# Patient Record
Sex: FEMALE | Race: OTHER | HISPANIC OR LATINO | Employment: OTHER | URBAN - METROPOLITAN AREA
[De-identification: names, ages, dates, MRNs, and addresses within clinical notes are randomized per-mention and may not be internally consistent; named-entity substitution may affect disease eponyms.]

---

## 2017-12-14 ENCOUNTER — HOSPITAL ENCOUNTER (EMERGENCY)
Facility: HOSPITAL | Age: 27
Discharge: HOME | End: 2017-12-14
Attending: EMERGENCY MEDICINE | Admitting: EMERGENCY MEDICINE
Payer: COMMERCIAL

## 2017-12-14 ENCOUNTER — APPOINTMENT (EMERGENCY)
Dept: RADIOLOGY | Facility: HOSPITAL | Age: 27
End: 2017-12-14
Payer: COMMERCIAL

## 2017-12-14 VITALS
DIASTOLIC BLOOD PRESSURE: 77 MMHG | BODY MASS INDEX: 31.71 KG/M2 | RESPIRATION RATE: 17 BRPM | HEART RATE: 108 BPM | HEIGHT: 68 IN | TEMPERATURE: 99.1 F | OXYGEN SATURATION: 99 % | WEIGHT: 209.22 LBS | SYSTOLIC BLOOD PRESSURE: 138 MMHG

## 2017-12-14 DIAGNOSIS — M79.671 RIGHT FOOT PAIN: Primary | ICD-10-CM

## 2017-12-14 DIAGNOSIS — R10.2 SUPRAPUBIC PAIN: ICD-10-CM

## 2017-12-14 DIAGNOSIS — Z20.2 EXPOSURE TO STD: ICD-10-CM

## 2017-12-14 LAB
AMORPH URATE CRY URNS QL MICRO: ABNORMAL /HPF
BACTERIA UR QL AUTO: ABNORMAL /HPF
BILIRUB UR QL STRIP: NEGATIVE
CHLAMYDIA DNA CVX QL NAA+PROBE: NORMAL
CLARITY UR: ABNORMAL
COLOR UR: YELLOW
EXT PREG TEST URINE: NEGATIVE
GLUCOSE UR STRIP-MCNC: NEGATIVE MG/DL
HGB UR QL STRIP.AUTO: NEGATIVE
KETONES UR STRIP-MCNC: ABNORMAL MG/DL
LEUKOCYTE ESTERASE UR QL STRIP: ABNORMAL
N GONORRHOEA DNA GENITAL QL NAA+PROBE: NORMAL
NITRITE UR QL STRIP: NEGATIVE
NON-SQ EPI CELLS URNS QL MICRO: ABNORMAL /HPF
PH UR STRIP.AUTO: 5.5 [PH] (ref 4.5–8)
PROT UR STRIP-MCNC: NEGATIVE MG/DL
RBC #/AREA URNS AUTO: ABNORMAL /HPF
SP GR UR STRIP.AUTO: >=1.03 (ref 1–1.03)
UROBILINOGEN UR QL STRIP.AUTO: 0.2 E.U./DL
WBC #/AREA URNS AUTO: ABNORMAL /HPF

## 2017-12-14 PROCEDURE — 81001 URINALYSIS AUTO W/SCOPE: CPT | Performed by: EMERGENCY MEDICINE

## 2017-12-14 PROCEDURE — 73630 X-RAY EXAM OF FOOT: CPT

## 2017-12-14 PROCEDURE — 81025 URINE PREGNANCY TEST: CPT | Performed by: EMERGENCY MEDICINE

## 2017-12-14 PROCEDURE — 99283 EMERGENCY DEPT VISIT LOW MDM: CPT

## 2017-12-14 PROCEDURE — 87591 N.GONORRHOEAE DNA AMP PROB: CPT | Performed by: EMERGENCY MEDICINE

## 2017-12-14 PROCEDURE — 90471 IMMUNIZATION ADMIN: CPT

## 2017-12-14 PROCEDURE — 87491 CHLMYD TRACH DNA AMP PROBE: CPT | Performed by: EMERGENCY MEDICINE

## 2017-12-14 PROCEDURE — 87086 URINE CULTURE/COLONY COUNT: CPT | Performed by: EMERGENCY MEDICINE

## 2017-12-14 PROCEDURE — 90715 TDAP VACCINE 7 YRS/> IM: CPT | Performed by: EMERGENCY MEDICINE

## 2017-12-14 RX ORDER — CEPHALEXIN 500 MG/1
500 CAPSULE ORAL EVERY 6 HOURS SCHEDULED
Qty: 20 CAPSULE | Refills: 0 | Status: SHIPPED | OUTPATIENT
Start: 2017-12-14 | End: 2017-12-19

## 2017-12-14 RX ORDER — AZITHROMYCIN 250 MG/1
1000 TABLET, FILM COATED ORAL ONCE
Status: COMPLETED | OUTPATIENT
Start: 2017-12-14 | End: 2017-12-14

## 2017-12-14 RX ADMIN — TETANUS TOXOID, REDUCED DIPHTHERIA TOXOID AND ACELLULAR PERTUSSIS VACCINE, ADSORBED 0.5 ML: 5; 2.5; 8; 8; 2.5 SUSPENSION INTRAMUSCULAR at 06:12

## 2017-12-14 RX ADMIN — LIDOCAINE HYDROCHLORIDE 250 MG: 10 INJECTION, SOLUTION EPIDURAL; INFILTRATION; INTRACAUDAL; PERINEURAL at 06:12

## 2017-12-14 RX ADMIN — AZITHROMYCIN 1000 MG: 250 TABLET, FILM COATED ORAL at 06:12

## 2017-12-14 NOTE — ED NOTES
Xray at bedside     Kunal Collins, [facility]  12/14/17 4915

## 2017-12-14 NOTE — ED PROVIDER NOTES
History  Chief Complaint   Patient presents with    Foot Injury     Pt c/o slipping and injuring R foot  States there was previous injury     Exposure to STD     Pt also requesting testing for uti and chlamydia because she doesn't believe the treatment she received worked      26-year-old female presents with multiple complaints  Patient notes persistent right foot pain after a fall 2 weeks ago  Patient has pain over the dorsum of her right foot that she describes a constant dull and occasionally shooting pain without radiation  Patient denies any pain over the ankle  She states that she subsequently fell due to the pain earlier today, worsening the injury  She states she is now unable to bear weight on the foot  She denies striking her head or any loss of consciousness  Patient states that when she fell 2 weeks ago, she did strike her head  She is concerned because she did not receive tetanus prophylaxis at that point  Additionally, patient notes that she was diagnosed with Chlamydia 2 months ago  She states that she has subsequently having persistent symptoms which she describes as dyspareunia and suprapubic cramping  She denies any dysuria, vaginal discharge, or bleeding  She states that she was treated with doxycycline at the time of the incident and her boyfriend was treated with ceftriaxone and azithromycin  She denies any new sexual partners  Impression and plan: 1 )  Right foot pain  Will obtain plain film imaging of patient's right foot to evaluate for potential fracture  Will treat symptomatically with anti-inflammatory medications, crutches as she is unable to bear weight, and splinting or cast shoe depending upon the outcome of the patient's x-ray  Will refer to podiatry for further evaluation and monitoring   2 ) Fall without tetanus prophylaxis  Will update patient's tetanus vaccination  3 )  Potential recurrent exposure to sexually transmitted disease    Will check urinalysis to evaluate for pregnancy and urinary tract infection  Will send gonorrhea and chlamydia and if urinalysis is negative, will treat empirically  Patient is not having vaginal discharge or signs of PID  Will refer to gynecology for further evaluation and monitoring  None       Past Medical History:   Diagnosis Date    Cancer ([facility] [address] )     ovarian       Past Surgical History:   Procedure Laterality Date    OVARIAN CYST REMOVAL         History reviewed  No pertinent family history  I have reviewed and agree with the history as documented  Social History   Substance Use Topics    Smoking status: Current Every Day Smoker     Packs/day: 2 00    Smokeless tobacco: Never Used    Alcohol use Yes      Comment: socially        Review of Systems   Constitutional: Negative for chills, fatigue and fever  Respiratory: Negative for cough and shortness of breath  Cardiovascular: Negative for chest pain  Gastrointestinal: Negative for abdominal pain, diarrhea, nausea and vomiting  Genitourinary: Positive for pelvic pain (suprapubic)  Negative for dysuria, flank pain, genital sores, hematuria, urgency, vaginal bleeding, vaginal discharge and vaginal pain  Musculoskeletal: Negative for back pain  Neurological: Negative for dizziness, facial asymmetry and headaches  Psychiatric/Behavioral: Negative for confusion  Physical Exam  ED Triage Vitals [12/14/17 0520]   Temperature Pulse Respirations Blood Pressure SpO2   99 1 °F (37 3 °C) (!) 108 17 138/77 99 %      Temp Source Heart Rate Source Patient Position - Orthostatic VS BP Location FiO2 (%)   Oral Monitor Lying Right arm --      Pain Score       6           Orthostatic Vital Signs  Vitals:    12/14/17 0520   BP: 138/77   Pulse: (!) 108   Patient Position - Orthostatic VS: Lying       Physical Exam   HENT:   Head: Atraumatic  Eyes: Pupils are equal, round, and reactive to light  Neck: Neck supple  Cardiovascular: Normal rate  Pulmonary/Chest: Effort normal    Abdominal: Soft  She exhibits no distension  There is no tenderness  There is no rebound and no guarding  No CVAT  Musculoskeletal: She exhibits tenderness  She exhibits no deformity  Right foot: no deformity or defect noted  Absence of effusion, swelling, or ecchymosis  Normal range of motion and resisted strength of the ankle with dorsiflexion, plantar flexion, inversion, eversion, flexion and extension of the toes  Tenderness over the base of the 1-2nd metatarsals and the navicular  No tenderness to palpitation at the medial or lateral malleoli, base of the 5th metatarsal, lateral ligaments, deltoid ligament, proximal or mid fibula and tibia, the Achilles tendon, talocrural joint line  No tenderness to passive range of motion of the ankle, including inversion and eversion  Normal squeeze test      Neurological: She is alert  Normal sensory, motor of right foot in all dermatomes  Skin: Skin is warm and dry  Psychiatric: She has a normal mood and affect  Vitals reviewed  ED Medications  Medications   tetanus-diphtheria-acellular pertussis (BOOSTRIX) IM injection 0 5 mL (0 5 mL Intramuscular Given 12/14/17 0612)   cefTRIAXone (ROCEPHIN) 250 mg in lidocaine (PF) (XYLOCAINE-MPF) 1 % IM only syringe (250 mg Intramuscular Given 12/14/17 0612)   azithromycin (ZITHROMAX) tablet 1,000 mg (1,000 mg Oral Given 12/14/17 0612)       Diagnostic Studies  Results Reviewed     Procedure Component Value Units Date/Time    Urine Microscopic [[id number]]  (Abnormal) Collected:  12/14/17 0538    Lab Status:  Final result Specimen:  Urine from Urine, Clean Catch Updated:  12/14/17 0556     RBC, UA None Seen /hpf      WBC, UA 10-20 (A) /hpf      Epithelial Cells Innumerable (A) /hpf      Bacteria, UA Moderate (A) /hpf      AMORPH URATES Occasional /hpf     Urine culture [[id number]] Collected:  12/14/17 0538    Lab Status:   In process Specimen:  Urine from Urine, Clean Catch Updated: 12/14/17 0556    UA w Reflex to Microscopic w Reflex to Culture [[id number]]  (Abnormal) Collected:  12/14/17 0538    Lab Status:  Final result Specimen:  Urine from Urine, Clean Catch Updated:  12/14/17 0548     Color, UA Yellow     Clarity, UA Cloudy     Specific Gravity, UA >=1 030     pH, UA 5 5     Leukocytes, UA Small (A)     Nitrite, UA Negative     Protein, UA Negative mg/dl      Glucose, UA Negative mg/dl      Ketones, UA Trace (A) mg/dl      Urobilinogen, UA 0 2 E U /dl      Bilirubin, UA Negative     Blood, UA Negative    Chlamydia/GC amplified DNA by PCR [[id number]] Collected:  12/14/17 0539    Lab Status: In process Specimen:  Urine, Other Updated:  12/14/17 0545    POCT pregnancy, urine [[id number]]  (Normal) Resulted:  12/14/17 0544    Lab Status:  Final result Updated:  12/14/17 0544     EXT PREG TEST UR (Ref: Negative) Negative                 XR foot 3+ views RIGHT   ED Interpretation by Teri Dove MD (12/14 0552)   No acute findings  Procedures  Procedures       Phone Contacts  ED Phone Contact    ED Course  ED Course as of Dec 14 0616   Thu Dec 14, 2017   0609 Epithelial Cells: Carnia Oneal   9008 Extensive discussion with the patient  Considering she is having ongoing suprapubic pain with dyspareunia, after discussion of the risks and benefits will treat patient presumptively for gonorrhea and/or chlamydia with ceftriaxone and azithromycin  Has patient's urinalysis was contaminated with epithelial cells, will start patient on a course of cephalexin for potential urinary tract infection  I have discussed the risks of these medications with the patient including potential for diarrhea  I have discussed follow-up with OBGYN if her symptoms continue  Patient has established care regarding this  Discussed follow-up with Podiatry regarding her foot  Patient's foot placed in his short posterior splint by technician    Post check neurovascular exam is intact as completed by myself  [facility]  CritCare Time    Disposition  Final diagnoses:   Right foot pain   Exposure to STD   Suprapubic pain     Time reflects when diagnosis was documented in both MDM as applicable and the Disposition within this note     Time User Action Codes Description Comment    12/14/2017  5:39 AM Ethelda Hasten Add [G03 613] Right foot pain     12/14/2017  5:39 AM Ethelda Hasten Add [Z20 2] Exposure to STD     12/14/2017  6:11 AM Ethelda Hasten Add [R10 2] Suprapubic pain       ED Disposition     ED Disposition Condition Comment    Discharge  Jakcie Records discharge to home/self care  Condition at discharge: Stable        Follow-up Information     Follow up With Specialties Details Why Contact Info    [facility]  Schedule an appointment as soon as possible for a visit Follow up  [address] [facility]  [address] [facility] Obstetrics and Gynecology Schedule an appointment as soon as possible for a visit Follow up  [address]  [phone]        Patient's Medications   Discharge Prescriptions    CEPHALEXIN (KEFLEX) 500 MG CAPSULE    Take 1 capsule by mouth every 6 (six) hours for 5 days       Start Date: 12/14/2017End Date: 12/19/2017       Order Dose: 500 mg       Quantity: 20 capsule    Refills: 0     No discharge procedures on file      ED Provider  Electronically Signed by           Thang Mcfarlane MD  12/14/17 [address] [place] [address] Loyal Heimlich, MD  12/14/17 7707

## 2017-12-14 NOTE — DISCHARGE INSTRUCTIONS
Sexually Transmitted Diseases   WHAT YOU NEED TO KNOW:   A sexually transmitted disease (STD), is also called a sexually transmitted infection (STI)  An STD is an infection caused by bacteria or a virus  STDs are spread by oral, genital, or anal sex  Some examples of STDs are HIV, chlamydia, syphilis, and gonorrhea  DISCHARGE INSTRUCTIONS:   Return to the emergency department if:   · You have genital swelling or pain, or unusual bleeding  · You have joint pain, rash, swollen lymph nodes, or night sweats  · You have severe abdominal pain  Contact your healthcare provider if:   · You have a fever  · Your symptoms do not go away or they get worse, even after treatment  · You have bleeding or pain during sex  · You have questions or concerns about your condition or care  Medicines:   · Medicines  help treat the infection  · Take your medicine as directed  Contact your healthcare provider if you think your medicine is not helping or if you have side effects  Tell him of her if you are allergic to any medicine  Keep a list of the medicines, vitamins, and herbs you take  Include the amounts, and when and why you take them  Bring the list or the pill bottles to follow-up visits  Carry your medicine list with you in case of an emergency  Prevent the spread of an STD:  Ask your healthcare provider for more information about the following safe sex practices:  · Use condoms  Use a latex condom if you have oral, genital, or anal sex  Use a new condom each time  Use a polyurethane condom if you are allergic to latex  · Do not douche  Douching upsets the normal balance of bacteria are found in your vagina  It does not prevent or clear up vaginal infections  · Do not have sex with someone who has an STD  This includes oral and anal sex  · Limit sexual partners  Have sex with one person who is not having sex with anyone else  · Do not have sex during treatment    Do not have sex while you or your partners are being treated for an STD  · Get screening tests regularly  if you are sexually active  · Get vaccinated  Vaccines may help to prevent your risk of some STDs  Ask your healthcare provider for more information about vaccines for STDs  Follow up with your healthcare provider as directed:  Write down your questions so you remember to ask them during your visits  © 2017 [address] [employer] [employer] Information is for End User's use only and may not be sold, redistributed or otherwise used for commercial purposes  All illustrations and images included in CareNotes® are the copyrighted property of [employer]  or [employer]ika [employer]ell  The above information is an  only  It is not intended as medical advice for individual conditions or treatments  Talk to your doctor, nurse or pharmacist before following any medical regimen to see if it is safe and effective for you  Foot Sprain   WHAT YOU NEED TO KNOW:   A foot sprain is caused by a stretched or torn ligament in the foot or toe  Ligaments are tough tissues that connect bones  DISCHARGE INSTRUCTIONS:   Seek care immediately if:   · You have numbness or tingling below the injury, such as in your toes  · The skin on your injured foot is blue or pale  · You have increased pain, even after you take pain medicine  Contact your healthcare provider if:   · You have new weakness in your foot  · You have new or increased swelling in your foot  · You have new or increased stiffness when you move your injured foot  · You have questions or concerns about your condition or care  Medicines:   · NSAIDs , such as ibuprofen, help decrease swelling, pain, and fever  This medicine is available with or without a doctor's order  NSAIDs can cause stomach bleeding or kidney problems in certain people  If you take blood thinner medicine, always ask if NSAIDs are safe for you   Always read the medicine label and follow directions  Do not give these medicines to children under 10months of age without direction from your child's healthcare provider  · Take your medicine as directed  Contact your healthcare provider if you think your medicine is not helping or if you have side effects  Tell him of her if you are allergic to any medicine  Keep a list of the medicines, vitamins, and herbs you take  Include the amounts, and when and why you take them  Bring the list or the pill bottles to follow-up visits  Carry your medicine list with you in case of an emergency  Self-care:   · Rest your foot  Limit movement in your sprained foot for the first 2 to 3 days  You might need crutches to take weight off your injured foot as it heals  Use crutches as directed  · Apply ice  on your foot for 15 to 20 minutes every hour or as directed  Use an ice pack, or put crushed ice in a plastic bag  Cover it with a towel  Ice helps prevent tissue damage and decreases swelling and pain  · Compress your foot  You may need to use tape or an elastic bandage to support your foot if you have a mild sprain  You may need a splint on your foot for support if your sprain is severe  Wear your splint for as many days as directed  · Elevate your foot  above the level of your heart as often as you can  This will help decrease swelling and pain  Prop your foot on pillows or blankets to keep it elevated comfortably  Exercise your foot:  You may be given exercises to improve your strength and to help decrease stiffness  The exercises and physical therapy can help restore strength and increase the range of motion in your foot  Ask your healthcare provider when you can return to your normal activities or play sports  Prevent another foot sprain:   · Warm up and stretch before you exercise  · Do not exercise when you feel pain or are tired  · Wear equipment to protect yourself when you play sports    Follow up with your healthcare provider as directed:  Write down your questions so you remember to ask them during your visits  © 2017 [employer] [employer] Information is for End User's use only and may not be sold, redistributed or otherwise used for commercial purposes  All illustrations and images included in CareNotes® are the copyrighted property of [employer]  or [employer] Jewell  The above information is an  only  It is not intended as medical advice for individual conditions or treatments  Talk to your doctor, nurse or pharmacist before following any medical regimen to see if it is safe and effective for you

## 2017-12-15 LAB — BACTERIA UR CULT: NORMAL

## 2018-02-12 ENCOUNTER — HOSPITAL ENCOUNTER (EMERGENCY)
Facility: HOSPITAL | Age: 28
Discharge: HOME | End: 2018-02-12
Attending: EMERGENCY MEDICINE | Admitting: EMERGENCY MEDICINE
Payer: COMMERCIAL

## 2018-02-12 VITALS
DIASTOLIC BLOOD PRESSURE: 80 MMHG | TEMPERATURE: 98.1 F | HEART RATE: 112 BPM | SYSTOLIC BLOOD PRESSURE: 141 MMHG | OXYGEN SATURATION: 95 % | WEIGHT: 235.89 LBS | HEIGHT: 68 IN | RESPIRATION RATE: 18 BRPM | BODY MASS INDEX: 35.75 KG/M2

## 2018-02-12 DIAGNOSIS — Z00.8 MEDICAL CLEARANCE FOR PSYCHIATRIC ADMISSION: ICD-10-CM

## 2018-02-12 DIAGNOSIS — N73.0 PID (ACUTE PELVIC INFLAMMATORY DISEASE): Primary | ICD-10-CM

## 2018-02-12 DIAGNOSIS — F32.A DEPRESSION: ICD-10-CM

## 2018-02-12 LAB
AMPHETAMINES SERPL QL SCN: NEGATIVE
BARBITURATES UR QL: NEGATIVE
BENZODIAZ UR QL: NEGATIVE
CLARITY, POC: CLEAR
COCAINE UR QL: NEGATIVE
COLOR, POC: YELLOW
EXT BILIRUBIN, UA: NEGATIVE
EXT BLOOD URINE: NEGATIVE
EXT GLUCOSE, UA: NEGATIVE
EXT KETONES: NEGATIVE
EXT NITRITE, UA: NEGATIVE
EXT PH, UA: 6
EXT PREG TEST URINE: NEGATIVE
EXT PROTEIN, UA: NEGATIVE
EXT SPECIFIC GRAVITY, UA: 1.01
EXT UROBILINOGEN: NEGATIVE
METHADONE UR QL: NEGATIVE
OPIATES UR QL SCN: NEGATIVE
PCP UR QL: NEGATIVE
THC UR QL: NEGATIVE
WBC # BLD EST: NEGATIVE 10*3/UL

## 2018-02-12 PROCEDURE — 81025 URINE PREGNANCY TEST: CPT | Performed by: EMERGENCY MEDICINE

## 2018-02-12 PROCEDURE — 87591 N.GONORRHOEAE DNA AMP PROB: CPT | Performed by: EMERGENCY MEDICINE

## 2018-02-12 PROCEDURE — 81002 URINALYSIS NONAUTO W/O SCOPE: CPT | Performed by: EMERGENCY MEDICINE

## 2018-02-12 PROCEDURE — 99283 EMERGENCY DEPT VISIT LOW MDM: CPT

## 2018-02-12 PROCEDURE — 80307 DRUG TEST PRSMV CHEM ANLYZR: CPT | Performed by: EMERGENCY MEDICINE

## 2018-02-12 PROCEDURE — 87491 CHLMYD TRACH DNA AMP PROBE: CPT | Performed by: EMERGENCY MEDICINE

## 2018-02-12 PROCEDURE — 96372 THER/PROPH/DIAG INJ SC/IM: CPT

## 2018-02-12 RX ORDER — METRONIDAZOLE 500 MG/1
500 TABLET ORAL EVERY 12 HOURS SCHEDULED
Qty: 28 TABLET | Refills: 0 | Status: SHIPPED | OUTPATIENT
Start: 2018-02-12 | End: 2018-02-26

## 2018-02-12 RX ORDER — CLONAZEPAM 2 MG/1
1 TABLET ORAL 2 TIMES DAILY
Status: ON HOLD | COMMUNITY
End: 2022-05-17 | Stop reason: SDUPTHER

## 2018-02-12 RX ORDER — AZITHROMYCIN 250 MG/1
1000 TABLET, FILM COATED ORAL ONCE
Status: COMPLETED | OUTPATIENT
Start: 2018-02-12 | End: 2018-02-12

## 2018-02-12 RX ORDER — QUETIAPINE FUMARATE 50 MG/1
200 TABLET, FILM COATED ORAL
Status: ON HOLD | COMMUNITY

## 2018-02-12 RX ORDER — METRONIDAZOLE 500 MG/1
500 TABLET ORAL ONCE
Status: COMPLETED | OUTPATIENT
Start: 2018-02-12 | End: 2018-02-12

## 2018-02-12 RX ORDER — FLUCONAZOLE 200 MG/1
200 TABLET ORAL DAILY
Qty: 1 TABLET | Refills: 0 | Status: SHIPPED | OUTPATIENT
Start: 2018-02-12 | End: 2018-02-13

## 2018-02-12 RX ADMIN — METRONIDAZOLE 500 MG: 500 TABLET ORAL at 04:24

## 2018-02-12 RX ADMIN — AZITHROMYCIN 1000 MG: 250 TABLET, FILM COATED ORAL at 04:24

## 2018-02-12 RX ADMIN — LIDOCAINE HYDROCHLORIDE 250 MG: 10 INJECTION, SOLUTION EPIDURAL; INFILTRATION; INTRACAUDAL; PERINEURAL at 04:31

## 2018-02-12 NOTE — DISCHARGE INSTRUCTIONS
Tox screen here is negative  Patient is cleared for psychiatric admission to [facility]  Should proceed there tomorrow upon wakening and packing to be admitted for psychiatric care  Depression   WHAT YOU NEED TO KNOW:   What is depression? Depression is a medical condition that causes feelings of sadness or hopelessness that do not go away  Depression may cause you to lose interest in things you used to enjoy  These feelings may interfere with your daily life  What causes or increases my risk for depression? Depression may be caused by changes in brain chemicals that affect your mood  Your risk for depression may be higher if you have any of the following:  · Stressful events such as the death of a loved one, unemployment, childhood trauma, divorce, or domestic abuse    · A chronic medical condition such as diabetes, heart disease, or cancer    · Parents, siblings, or other family members with a history of depression    · Drug or alcohol abuse  What are the signs and symptoms of depression? · Appetite changes, or weight gain or loss    · Trouble going to sleep or staying asleep, or sleeping too much    · Fatigue or lack of energy    · Feeling restless, irritable, or withdrawn    · Feeling worthless, hopeless, discouraged, or guilty    · Trouble concentrating, remembering things, doing daily tasks, or making decisions    · Thoughts about hurting or killing yourself  How is depression diagnosed? Your healthcare provider will ask about your symptoms and how long you have had them  He or she will ask if you have any family members with depression  Tell your healthcare provider about any stressful events in your life  He or she may ask about any other health conditions or medicines you take  How is depression treated? · Therapy  may be used to treat your depression  A therapist will help you learn to cope with your thoughts and feelings  This can be done alone or in a group   It may also be done with family members or a significant other  · Antidepressant medicine  may be given to improve or balance your mood  You may need to take this medicine for several weeks before you begin to feel better  Tell your healthcare provider about any side effects or problems you have with your medicine  The type or amount of medicine may need to be changed  How can I manage depression? · Get regular physical activity  Try to exercise for 30 minutes, 3 to 5 days a week  Work with your healthcare provider to develop an exercise plan that you enjoy  Physical activity may improve your symptoms  · Get enough sleep  Create a routine to help you relax before bed  You can listen to music, read, or do yoga  Try to go to bed and wake up at the same time every day  Sleep is important for emotional health  · Eat a variety of healthy foods from all of the food groups  A healthy meal plan is low in fat, salt, and added sugar  Ask your healthcare provider for more information about a meal plan that is right for you  · Do not drink alcohol or use drugs  Alcohol and drugs can make your symptoms worse  Call 911 for any of the following:   · You think about harming yourself or someone else  When should I contact my healthcare provider? · Your symptoms do not improve  · You cannot make it to your next appointment  · You have new symptoms  · You have questions or concerns about your condition or care  CARE AGREEMENT:   You have the right to help plan your care  Learn about your health condition and how it may be treated  Discuss treatment options with your caregivers to decide what care you want to receive  You always have the right to refuse treatment  The above information is an  only  It is not intended as medical advice for individual conditions or treatments   Talk to your doctor, nurse or pharmacist before following any medical regimen to see if it is safe and effective for you   © 2017 [facility] Information is for End User's use only and may not be sold, redistributed or otherwise used for commercial purposes  All illustrations and images included in CareNotes® are the copyrighted property of [employer]  or Mika [employer]ell  Medical Clearance for Psychiatric Care   WHAT YOU NEED TO KNOW:   What is medical clearance for psychiatric care? Medical clearance for psychiatric care is a medical exam to make sure that a patient's psychiatric symptoms are not caused by a medical condition  Psychiatric symptoms such as delusions or hallucinations may be caused or made worse by medicine or a physical illness  Healthcare providers will provide any necessary treatment so that the patient may be safely transferred to a psychiatric facility  Talk to the patient's healthcare provider if you have questions or concerns about his condition or care  What tests may be needed to medically clear a patient for psychiatric care? The healthcare provider will ask if the patient takes medicine or has a history of psychiatric or health conditions  He will ask the patient about his symptoms and when they began  The patient may need any of the following:  · A mental exam  checks the patient's awareness and memory  The healthcare provider will ask the patient if he knows his name, his location, and the date  · A neurological exam  checks the patient's vision, sensation, reflexes, and muscle function  · An EKG  records the patient's heart rhythm to make sure it is safe to transfer him  · Blood and urine tests  check for infection, test kidney function, or get information about the patient's overall health  · An x-ray  takes pictures of the patient's chest to check for infection or fluid in the lungs  · A CT scan , or CAT scan, is a type of x-ray that uses a computer to take pictures of the patient's head  The scan checks for fluid or a tumor   The patient may be given a dye before the pictures are taken to help healthcare providers see the pictures better  Tell the healthcare provider if the patient has ever had a reaction to contrast dye  CARE AGREEMENT:   Patients have the right to help plan their care  To help with this plan, patients must learn about their health condition, how it may be treated, and when psychiatric care is needed  Treatment options should be discussed with healthcare providers  Patients and healthcare providers can work together to decide what care may be best  The above information is an  only  It is not intended as medical advice for individual conditions or treatments  Talk to your doctor, nurse or pharmacist before following any medical regimen to see if it is safe and effective for you  © 2017 [address] [employer]  Information is for End User's use only and may not be sold, redistributed or otherwise used for commercial purposes  All illustrations and images included in CareNotes® are the copyrighted property of [employer]  or Mika Jewell  Pelvic Inflammatory Disease   WHAT YOU NEED TO KNOW:   What is pelvic inflammatory disease? Pelvic inflammatory disease (PID) is a condition that causes your reproductive organs to become inflamed  Your reproductive organs include your ovaries, fallopian tubes, uterus, cervix (lower area of your uterus), and vagina  What causes PID? PID is an infection caused by bacteria or viruses  The infection may start in your vagina and spread upward through your cervix  The infection may then spread to your uterus and into your tubes and ovaries  PID may also spread to other areas of your abdomen  Causes of PID include the following:  · Vaginal infections  develop when the normal vaginal bacteria suddenly increase  A vaginal infection may then lead to PID  · Sexually transmitted infections (STIs)  are spread by having sex with an infected partner   STIs that commonly cause PID are gonorrhea, chlamydia, and cytomegalovirus (CMV)  PID often occurs if an STI is not treated  Ask your healthcare provider for more information about STIs  What increases my risk for PID? Uterine procedures may damage your cervix and cause PID  Your cervix helps block germs from entering into your reproductive organs  The following may increase your risk:  · A new sexual partner within the last 3 months, or more than 1 partner    · Past STI or PID    · Sex at a young age    ·     · An x-ray of your uterus and tubes using an injection of dye    · Intrauterine device (IUD) insertion within the past month    · Intrauterine insemination (injecting sperm directly into your uterus)    · In vitro fertilization  What are the signs and symptoms of PID? · Fever    · Nausea or vomiting    · Pain during sex, especially if your PID is new    · Lower abdominal or back pain    · Green or yellow discharge from your vagina that may have an unusual or bad smell    · Vaginal bleeding or spotting during or after sex or bleeding in between your monthly periods  How is PID diagnosed? Your healthcare provider will ask about your health and sex history  Tell your healthcare provider if you have other medical conditions or if you have had surgeries or procedures  Tell your healthcare provider about any STIs that you or your partner may have  You may be given a pregnancy test as well as any of the following:  · A bimanual vaginal exam  is used to examine your cervix and ovaries  Your healthcare provider will insert 2 gloved fingers into your vagina and place the other hand on your abdomen  Your healthcare provider will feel for your cervix and ovaries while pressing down lightly on your abdomen  If you feel pain, you may have PID  · A biopsy  is used to take a sample of tissue from your uterus to be checked for signs of inflammation  · Blood and urine tests  may be used to check for infection   The tests may also show if another condition is causing your symptoms  · A culture or smear test  may help your healthcare provider learn which germ is causing your PID  A sample of discharge from your vagina or cervix may be taken for testing  · An ultrasound  is used to take pictures of your organs and tissues  You may have an abdominal, transvaginal, or Doppler ultrasound  · Laparoscopy  is surgery to look directly at your uterus, ovaries, and fallopian tubes  A scope is put into your abdomen through a small incision  You may have one or more incisions in or below your bellybutton  Ask your healthcare provider for more information about this surgery  How is PID treated? · Antibiotics  are given to fight a bacterial infection       · Surgery  may be needed to treat problems related to PID  If you have an abscess on your tubes or ovaries, you may need surgery to drain it  Ask your healthcare provider for more information about surgeries or other procedures you may need  What can I do to manage PID? · Finish your treatment  If you do not finish your treatment for PID, your infection may not go away  You may also have an increased risk for another STI in the future  · Do not have sex until your healthcare provider says it is okay  You will need to finish treatment before it is safe to have sex  · Do not have unprotected sex  Always use a latex condom  Do not have sex while you or your partners are being treated for an STI  · Talk to your sex partners  If you have an STI, tell your recent partners  Tell them to see a healthcare provider for testing and treatment  This will help stop the spread of infection to others or back to you  When should I seek immediate care? · You have chills or a high fever  · You have pain in your upper right abdomen  · You have pain in your lower abdomen that does not go away with rest or medicine      · Your symptoms get worse or do not improve after 3 days of treatment  When should I contact my healthcare provider? · You have nausea or are vomiting  · Your skin is red, itchy, or you have a new rash  · You think or know you are pregnant  · You have questions or concerns about your condition or care  CARE AGREEMENT:   You have the right to help plan your care  Learn about your health condition and how it may be treated  Discuss treatment options with your caregivers to decide what care you want to receive  You always have the right to refuse treatment  The above information is an  only  It is not intended as medical advice for individual conditions or treatments  Talk to your doctor, nurse or pharmacist before following any medical regimen to see if it is safe and effective for you  © 2017 [address] [employer] Information is for End User's use only and may not be sold, redistributed or otherwise used for commercial purposes  All illustrations and images included in CareNotes® are the copyrighted property of [employer]  or [address]

## 2018-02-12 NOTE — ED PROVIDER NOTES
History  Chief Complaint   Patient presents with    Psychiatric Evaluation     pt presents to ED with suicidal thoughts, without a plan, pt has hx of suicidal attempts by slitting her wrists  Pt also wants an chlamydia test, co of odor and dischrge  pt is trying to get placement [facility] to [facility]  29-year-old female presents to the emergency department with multiple complaints  This patient is presenting stating that she would like to go [facility] in the morning however they told her to come here for clearance  She states that she needs the clearance because she has a pelvic infection  Her pelvic infection sounds consistent with PID  And she will have a pelvic exam performed here  I called Sherrie Norman from [facility] who stated that the reason she came to the emergency department was for medical clearance  She has been taking her boyfriend's Klonopin and Seroquel and she needed to be cleared from these before she can come to [facility]  Patient denies this and states that no one had ever told her that  The patient is either a poor historian or is lying  Patient states she is going [facility] to get her medications sorted out and because she was having some suicidal thoughts  The patient recently lost her  to suicide and she is having a difficult time dealing with this  She has depression however she states she does not have a plan and does not want to commit suicide at this time  She states she has a lot to live for including her 8year-old daughter and her grandmother  She states that she is not currently having any thoughts of suicide  She states she is safe to go home  She states if she had any thoughts of suicide overnight before getting to [facility] is she would talk with her grandmother and called the ambulance to come to the emergency department  However at this time she is not having suicidal thoughts, has no plan    She is able to contract for safety and there is no grounds to keep her in the emergency department at this time  Prior to Admission Medications   Prescriptions Last Dose Informant Patient Reported? Taking? QUEtiapine (SEROquel) 300 mg tablet   Yes Yes   Sig: Take 400 mg by mouth daily at bedtime   clonazePAM (KlonoPIN) 2 mg tablet   Yes No   Sig: Take 1 mg by mouth 2 (two) times a day      Facility-Administered Medications: None       Past Medical History:   Diagnosis Date    Cancer ([facility] [address] )     ovarian       Past Surgical History:   Procedure Laterality Date    OVARIAN CYST REMOVAL         History reviewed  No pertinent family history  I have reviewed and agree with the history as documented  Social History   Substance Use Topics    Smoking status: Current Every Day Smoker     Packs/day: 1 00     Types: Cigarettes    Smokeless tobacco: Never Used    Alcohol use Yes      Comment: socially        Review of Systems   Constitutional: Negative for chills, fatigue and fever  HENT: Negative for congestion and sore throat  Respiratory: Negative for cough, chest tightness and shortness of breath  Cardiovascular: Negative for chest pain and palpitations  Gastrointestinal: Positive for abdominal pain (Suprapubic)  Negative for constipation, diarrhea, nausea and vomiting  Genitourinary: Positive for dyspareunia, pelvic pain, vaginal discharge and vaginal pain  Negative for dysuria, flank pain and genital sores  Musculoskeletal: Negative for back pain and neck pain  Skin: Negative for color change and rash  Allergic/Immunologic: Negative for immunocompromised state  Neurological: Negative for dizziness, syncope and headaches         Physical Exam  ED Triage Vitals   Temperature Pulse Respirations Blood Pressure SpO2   02/12/18 0105 02/12/18 0103 02/12/18 0103 02/12/18 0103 02/12/18 0103   98 1 °F (36 7 °C) (!) 114 20 168/81 98 %      Temp Source Heart Rate Source Patient Position - Orthostatic VS BP Location FiO2 (%)   02/12/18 0105 02/12/18 0103 02/12/18 0103 02/12/18 0103 --   Oral Monitor Sitting Right arm       Pain Score       02/12/18 0103       5           Orthostatic Vital Signs  Vitals:    02/12/18 0103 02/12/18 0222   BP: 168/81 141/80   Pulse: (!) 114 (!) 112   Patient Position - Orthostatic VS: Sitting        Physical Exam   Constitutional: She is oriented to person, place, and time  She appears well-developed and well-nourished  No distress  HENT:   Head: Normocephalic and atraumatic  Mouth/Throat: Oropharynx is clear and moist    Eyes: Conjunctivae and EOM are normal  Pupils are equal, round, and reactive to light  Right eye exhibits no discharge  Left eye exhibits no discharge  No scleral icterus  Neck: Normal range of motion  Neck supple  No JVD present  Cardiovascular: Regular rhythm, normal heart sounds and intact distal pulses  Exam reveals no gallop and no friction rub  No murmur heard  Mild tachycardia   Pulmonary/Chest: Effort normal and breath sounds normal  No respiratory distress  She has no wheezes  She has no rales  She exhibits no tenderness  Abdominal: Soft  Bowel sounds are normal  She exhibits no distension  There is tenderness (Suprapubic)  There is no guarding  Genitourinary: Vaginal discharge found  Genitourinary Comments: Pelvic exam reveals no outside vaginal lesions  Positive cervical motion tenderness  White yellow discharge   Musculoskeletal: Normal range of motion  She exhibits no edema, tenderness or deformity  Neurological: She is alert and oriented to person, place, and time  No cranial nerve deficit  Skin: Skin is warm and dry  No rash noted  She is not diaphoretic  No erythema  No pallor  Psychiatric: She has a normal mood and affect  Her behavior is normal  Judgment and thought content normal    Vitals reviewed        ED Medications  Medications   cefTRIAXone (ROCEPHIN) 250 mg in lidocaine (PF) (XYLOCAINE-MPF) 1 % IM only syringe (250 mg Intramuscular Given 2/12/18 0431)   azithromycin (ZITHROMAX) tablet 1,000 mg (1,000 mg Oral Given 2/12/18 0424)   metroNIDAZOLE (FLAGYL) tablet 500 mg (500 mg Oral Given 2/12/18 0424)       Diagnostic Studies  Results Reviewed     Procedure Component Value Units Date/Time    Rapid drug screen, urine [[id number]]  (Normal) Collected:  02/12/18 0257    Lab Status:  Final result Specimen:  Urine Updated:  02/12/18 0417     Amph/Meth UR Negative     Barbiturate Ur Negative     Benzodiazepine Urine Negative     Cocaine Urine Negative     Methadone Urine Negative     Opiate Urine Negative     PCP Ur Negative     THC Urine Negative    Narrative:         FOR MEDICAL PURPOSES ONLY  IF CONFIRMATION NEEDED PLEASE CONTACT THE LAB WITHIN 5 DAYS  Drug Screen Cutoff Levels:  AMPHETAMINE/METHAMPHETAMINES  1000 ng/mL  BARBITURATES     200 ng/mL  BENZODIAZEPINES     200 ng/mL  COCAINE      300 ng/mL  METHADONE      300 ng/mL  OPIATES      300 ng/mL  PHENCYCLIDINE     25 ng/mL  THC       50 ng/mL    Chlamydia/GC amplified DNA by PCR [[id number]] Collected:  02/12/18 0257    Lab Status:   In process Specimen:  Vaginal Updated:  02/12/18 0310    POCT urinalysis dipstick [[id number]]  (Normal) Resulted:  02/12/18 0228    Lab Status:  Final result Updated:  02/12/18 0228     Color, UA Yellow     Clarity, UA Clear     EXT Glucose, UA Negative     EXT Bilirubin, UA (Ref: Negative) Negative     EXT Ketones, UA (Ref: Negative) Negative     EXT Spec Grav, UA 1 010     EXT Blood, UA (Ref: Negative) Negative     EXT pH, UA 6 0     EXT Protein, UA (Ref: Negative) Negative     EXT Urobilinogen, UA (Ref: 0 2- 1 0) Negative     EXT Leukocytes, UA (Ref: Negative) Negative     EXT Nitrite, UA (Ref: Negative) Negative    POCT pregnancy, urine [[id number]]  (Normal) Resulted:  02/12/18 0227    Lab Status:  Final result Updated:  02/12/18 0228     EXT PREG TEST UR (Ref: Negative) Negative                 No orders to display Procedures  Procedures       Phone Contacts  ED Phone Contact    ED Course  ED Course                                MDM  Number of Diagnoses or Management Options  Depression:   Medical clearance for psychiatric admission:   PID (acute pelvic inflammatory disease):   Diagnosis management comments: PID - will be treated for pelvic inflammatory disease  Advised patient to have her partners tested as well  Patient admits to suicidal thoughts earlier in the day however at this point she has no suicidal thoughts, has a plan in place in case she develops suicidal thoughts, it is going to [facility] tomorrow  She was here for medical clearance and she has obtained medical clearance as her tox screen is negative  She was provided with this paperwork to come [facility]  Discussed with patient and they understood the risks and benefits of discharge  Patient had opportunity to ask questions regarding care and discharge instructions and had no further questions  Advised follow up with PCP, advised returning if worsening, and discussed disease specific return precautions  Patient understood discharge instructions  Amount and/or Complexity of Data Reviewed  Clinical lab tests: ordered and reviewed      CritCare Time    Disposition  Final diagnoses:   PID (acute pelvic inflammatory disease)   Depression   Medical clearance for psychiatric admission     Time reflects when diagnosis was documented in both MDM as applicable and the Disposition within this note     Time User Action Codes Description Comment    2/12/2018  4:23 AM Neva Hammond Add [N73 0] PID (acute pelvic inflammatory disease)     2/12/2018  4:23 AM Malgorzata Hammond Add [F32 9] Depression     2/12/2018  4:23 AM Malgorzata Hammond Add [Z00 8] Medical clearance for psychiatric admission       ED Disposition     ED Disposition Condition Comment    Discharge  Teretha Cluck discharge to home/self care      Condition at discharge: Good        Follow-up Information     Follow up With Specialties Details Why Contact Info Additional [facility] [facility]  If symptoms worsen: thoughts of suicide, homocide, worsening abdominal pain, etc [address] [facility] [phone] [facility] ED, [address], [place], [address]    follow up osiel Galicia first thing tomorrow for psych admission  Patient's Medications   Discharge Prescriptions    FLUCONAZOLE (DIFLUCAN) 200 MG TABLET    Take 1 tablet (200 mg total) by mouth daily for 1 day Take if yeast infection develops       Start Date: 2/12/2018 End Date: 2/13/2018       Order Dose: 200 mg       Quantity: 1 tablet    Refills: 0    METRONIDAZOLE (FLAGYL) 500 MG TABLET    Take 1 tablet (500 mg total) by mouth every 12 (twelve) hours for 14 days       Start Date: 2/12/2018 End Date: 2/26/2018       Order Dose: 500 mg       Quantity: 28 tablet    Refills: 0     No discharge procedures on file      ED Provider  Electronically Signed by           Everton Jacobson DO  02/12/18 6755

## 2018-02-27 LAB
CHLAMYDIA DNA CVX QL NAA+PROBE: ABNORMAL
N GONORRHOEA DNA GENITAL QL NAA+PROBE: ABNORMAL

## 2021-08-16 ENCOUNTER — BMR PREADMISSION ASSESSMENT (OUTPATIENT)
Dept: ADMISSIONS | Facility: REHABILITATION | Age: 31
End: 2021-08-16

## 2021-10-07 ENCOUNTER — BMR PREADMISSION ASSESSMENT (OUTPATIENT)
Dept: ADMISSIONS | Facility: REHABILITATION | Age: 31
End: 2021-10-07

## 2022-04-19 ENCOUNTER — APPOINTMENT (EMERGENCY)
Dept: RADIOLOGY | Facility: HOSPITAL | Age: 32
End: 2022-04-19
Payer: COMMERCIAL

## 2022-04-19 ENCOUNTER — HOSPITAL ENCOUNTER (EMERGENCY)
Facility: HOSPITAL | Age: 32
Discharge: HOME | End: 2022-04-19
Attending: EMERGENCY MEDICINE
Payer: COMMERCIAL

## 2022-04-19 VITALS
SYSTOLIC BLOOD PRESSURE: 102 MMHG | OXYGEN SATURATION: 98 % | HEIGHT: 68 IN | BODY MASS INDEX: 27.36 KG/M2 | DIASTOLIC BLOOD PRESSURE: 57 MMHG | RESPIRATION RATE: 18 BRPM | HEART RATE: 98 BPM | TEMPERATURE: 97.4 F | WEIGHT: 180.56 LBS

## 2022-04-19 DIAGNOSIS — H61.20 CERUMEN IMPACTION: ICD-10-CM

## 2022-04-19 DIAGNOSIS — S46.911A STRAIN OF RIGHT UPPER ARM, INITIAL ENCOUNTER: Primary | ICD-10-CM

## 2022-04-19 DIAGNOSIS — H92.09 EAR DISCOMFORT: ICD-10-CM

## 2022-04-19 DIAGNOSIS — R30.0 DYSURIA: ICD-10-CM

## 2022-04-19 DIAGNOSIS — Z46.6 ENCOUNTER FOR FOLEY CATHETER REPLACEMENT: ICD-10-CM

## 2022-04-19 LAB
ALBUMIN SERPL BCP-MCNC: 3.2 G/DL (ref 3.5–5)
ALP SERPL-CCNC: 102 U/L (ref 46–116)
ALT SERPL W P-5'-P-CCNC: 42 U/L (ref 12–78)
ANION GAP SERPL CALCULATED.3IONS-SCNC: 7 MMOL/L (ref 4–13)
AST SERPL W P-5'-P-CCNC: 46 U/L (ref 5–45)
BACTERIA UR QL AUTO: ABNORMAL /HPF
BASOPHILS # BLD AUTO: 0.04 THOUSANDS/ΜL (ref 0–0.1)
BASOPHILS NFR BLD AUTO: 1 % (ref 0–1)
BILIRUB SERPL-MCNC: 0.49 MG/DL (ref 0.2–1)
BILIRUB UR QL STRIP: NEGATIVE
BUN SERPL-MCNC: 7 MG/DL (ref 5–25)
CALCIUM ALBUM COR SERPL-MCNC: 9.2 MG/DL (ref 8.3–10.1)
CALCIUM SERPL-MCNC: 8.6 MG/DL (ref 8.3–10.1)
CHLORIDE SERPL-SCNC: 105 MMOL/L (ref 100–108)
CLARITY UR: ABNORMAL
CO2 SERPL-SCNC: 27 MMOL/L (ref 21–32)
COLOR UR: ABNORMAL
CREAT SERPL-MCNC: 0.39 MG/DL (ref 0.6–1.3)
EOSINOPHIL # BLD AUTO: 0.14 THOUSAND/ΜL (ref 0–0.61)
EOSINOPHIL NFR BLD AUTO: 3 % (ref 0–6)
ERYTHROCYTE [DISTWIDTH] IN BLOOD BY AUTOMATED COUNT: 13.7 % (ref 11.6–15.1)
GFR SERPL CREATININE-BSD FRML MDRD: 140 ML/MIN/1.73SQ M
GLUCOSE SERPL-MCNC: 79 MG/DL (ref 65–140)
GLUCOSE UR STRIP-MCNC: NEGATIVE MG/DL
HCT VFR BLD AUTO: 39.1 % (ref 34.8–46.1)
HGB BLD-MCNC: 13.2 G/DL (ref 11.5–15.4)
HGB UR QL STRIP.AUTO: ABNORMAL
IMM GRANULOCYTES # BLD AUTO: 0.02 THOUSAND/UL (ref 0–0.2)
IMM GRANULOCYTES NFR BLD AUTO: 0 % (ref 0–2)
KETONES UR STRIP-MCNC: ABNORMAL MG/DL
LEUKOCYTE ESTERASE UR QL STRIP: ABNORMAL
LYMPHOCYTES # BLD AUTO: 1.76 THOUSANDS/ΜL (ref 0.6–4.47)
LYMPHOCYTES NFR BLD AUTO: 31 % (ref 14–44)
MCH RBC QN AUTO: 30.1 PG (ref 26.8–34.3)
MCHC RBC AUTO-ENTMCNC: 33.8 G/DL (ref 31.4–37.4)
MCV RBC AUTO: 89 FL (ref 82–98)
MONOCYTES # BLD AUTO: 0.46 THOUSAND/ΜL (ref 0.17–1.22)
MONOCYTES NFR BLD AUTO: 8 % (ref 4–12)
NEUTROPHILS # BLD AUTO: 3.19 THOUSANDS/ΜL (ref 1.85–7.62)
NEUTS SEG NFR BLD AUTO: 57 % (ref 43–75)
NITRITE UR QL STRIP: NEGATIVE
NON-SQ EPI CELLS URNS QL MICRO: ABNORMAL /HPF
NRBC BLD AUTO-RTO: 0 /100 WBCS
PH UR STRIP.AUTO: 6.5 [PH]
PLATELET # BLD AUTO: 192 THOUSANDS/UL (ref 149–390)
PMV BLD AUTO: 9.6 FL (ref 8.9–12.7)
POTASSIUM SERPL-SCNC: 4.2 MMOL/L (ref 3.5–5.3)
PROT SERPL-MCNC: 7.2 G/DL (ref 6.4–8.2)
PROT UR STRIP-MCNC: ABNORMAL MG/DL
RBC # BLD AUTO: 4.38 MILLION/UL (ref 3.81–5.12)
RBC #/AREA URNS AUTO: ABNORMAL /HPF
SODIUM SERPL-SCNC: 139 MMOL/L (ref 136–145)
SP GR UR STRIP.AUTO: 1.02 (ref 1–1.03)
UROBILINOGEN UR QL STRIP.AUTO: 0.2 E.U./DL
WBC # BLD AUTO: 5.61 THOUSAND/UL (ref 4.31–10.16)
WBC #/AREA URNS AUTO: ABNORMAL /HPF

## 2022-04-19 PROCEDURE — 81001 URINALYSIS AUTO W/SCOPE: CPT | Performed by: EMERGENCY MEDICINE

## 2022-04-19 PROCEDURE — 85025 COMPLETE CBC W/AUTO DIFF WBC: CPT | Performed by: EMERGENCY MEDICINE

## 2022-04-19 PROCEDURE — 87077 CULTURE AEROBIC IDENTIFY: CPT | Performed by: EMERGENCY MEDICINE

## 2022-04-19 PROCEDURE — 87086 URINE CULTURE/COLONY COUNT: CPT | Performed by: EMERGENCY MEDICINE

## 2022-04-19 PROCEDURE — 80053 COMPREHEN METABOLIC PANEL: CPT | Performed by: EMERGENCY MEDICINE

## 2022-04-19 PROCEDURE — 36415 COLL VENOUS BLD VENIPUNCTURE: CPT | Performed by: EMERGENCY MEDICINE

## 2022-04-19 PROCEDURE — 99285 EMERGENCY DEPT VISIT HI MDM: CPT | Performed by: EMERGENCY MEDICINE

## 2022-04-19 PROCEDURE — 87186 SC STD MICRODIL/AGAR DIL: CPT | Performed by: EMERGENCY MEDICINE

## 2022-04-19 PROCEDURE — 73030 X-RAY EXAM OF SHOULDER: CPT

## 2022-04-19 PROCEDURE — 99284 EMERGENCY DEPT VISIT MOD MDM: CPT

## 2022-04-19 RX ORDER — ACETAMINOPHEN 325 MG/1
975 TABLET ORAL ONCE
Status: COMPLETED | OUTPATIENT
Start: 2022-04-19 | End: 2022-04-19

## 2022-04-19 RX ADMIN — CARBAMIDE PEROXIDE 6.5% 5 DROP: 6.5 LIQUID AURICULAR (OTIC) at 19:51

## 2022-04-19 RX ADMIN — ACETAMINOPHEN 975 MG: 325 TABLET ORAL at 18:41

## 2022-04-19 NOTE — ED NOTES
Shoulder immobilizer placed   debrox and syringe to the patient and instructions to pt as per Provider Dr Jordy Restrepo  Family and pt verbalized understanding       Amanda Avila RN  04/19/22 2557

## 2022-04-19 NOTE — DISCHARGE INSTRUCTIONS
As discussed, please follow up with PCP for further evaluation if symptoms continue  Follow up with ortho if right arm pain continues  Okay to take Tylenol for pain as needed  Debrox for removal of earwax    Sling for comfort, though make sure to move arm around to prevent "frozen shoulder "

## 2022-04-20 NOTE — ED NOTES
Discharged to home with 3 persons assistance to get pt into her personal vehicle   Pt rather thake her own car driven by family member     Edison Villar RN  04/19/22 2020

## 2022-04-21 ENCOUNTER — TELEPHONE (OUTPATIENT)
Dept: OTHER | Facility: HOSPITAL | Age: 32
End: 2022-04-21

## 2022-04-21 DIAGNOSIS — N39.0 URINARY TRACT INFECTION WITHOUT HEMATURIA, SITE UNSPECIFIED: Primary | ICD-10-CM

## 2022-04-21 LAB
BACTERIA UR CULT: ABNORMAL
BACTERIA UR CULT: ABNORMAL

## 2022-04-21 RX ORDER — SULFAMETHOXAZOLE AND TRIMETHOPRIM 800; 160 MG/1; MG/1
1 TABLET ORAL EVERY 12 HOURS SCHEDULED
Qty: 14 TABLET | Refills: 0 | Status: SHIPPED | OUTPATIENT
Start: 2022-04-21 | End: 2022-04-28

## 2022-05-04 ENCOUNTER — HOSPITAL ENCOUNTER (EMERGENCY)
Facility: HOSPITAL | Age: 32
Discharge: HOME | End: 2022-05-04
Attending: EMERGENCY MEDICINE
Payer: COMMERCIAL

## 2022-05-04 VITALS
BODY MASS INDEX: 27.28 KG/M2 | WEIGHT: 180 LBS | HEIGHT: 68 IN | RESPIRATION RATE: 18 BRPM | DIASTOLIC BLOOD PRESSURE: 77 MMHG | SYSTOLIC BLOOD PRESSURE: 126 MMHG | TEMPERATURE: 98.1 F | HEART RATE: 84 BPM | OXYGEN SATURATION: 98 %

## 2022-05-04 DIAGNOSIS — Z46.6 URINARY CATHETER (FOLEY) CHANGE REQUIRED: Primary | ICD-10-CM

## 2022-05-04 PROCEDURE — 99283 EMERGENCY DEPT VISIT LOW MDM: CPT

## 2022-05-04 PROCEDURE — 51705 CHANGE OF BLADDER TUBE: CPT | Performed by: EMERGENCY MEDICINE

## 2022-05-04 PROCEDURE — 69209 REMOVE IMPACTED EAR WAX UNI: CPT | Performed by: EMERGENCY MEDICINE

## 2022-05-04 PROCEDURE — 99284 EMERGENCY DEPT VISIT MOD MDM: CPT | Performed by: EMERGENCY MEDICINE

## 2022-05-04 RX ORDER — TRIAMCINOLONE ACETONIDE 1 MG/G
CREAM TOPICAL 2 TIMES DAILY
Qty: 30 G | Refills: 0 | Status: ON HOLD | OUTPATIENT
Start: 2022-05-04

## 2022-05-04 RX ORDER — SELENIUM SULFIDE 2.5 MG/100ML
LOTION TOPICAL DAILY PRN
Qty: 118 ML | Refills: 0 | Status: ON HOLD | OUTPATIENT
Start: 2022-05-04

## 2022-05-04 NOTE — ED PROVIDER NOTES
History  No chief complaint on file  32year old female patient comes to the ED with cc of clogging awan and clogged ears  She also has a skin eruption that is most consistent with plaque psoriasis  She has psoriasis on her trunk but it has spread to her face recently  On physical exam the patient has bilateral cerumen impaction, some of which was removed with a curette, but the patient was not tolerating the procedure well will go home and utilize Debrox instead  The patient also complains of increased sediment in her Awan which she gets she is due for Awan change  Awan will be exchanged here  History provided by:  Patient   used: No    Medical Problem  Severity:  Mild  Onset quality:  Gradual  Timing:  Constant  Progression:  Worsening  Chronicity:  New  Associated symptoms: no chest pain, no congestion, no fever, no myalgias and no rhinorrhea        Prior to Admission Medications   Prescriptions Last Dose Informant Patient Reported? Taking? QUEtiapine (SEROquel) 300 mg tablet   Yes No   Sig: Take 400 mg by mouth daily at bedtime   clonazePAM (KlonoPIN) 2 mg tablet   Yes No   Sig: Take 1 mg by mouth 2 (two) times a day      Facility-Administered Medications: None       Past Medical History:   Diagnosis Date    Abscess     to the spine aug 10 2021    Cancer ([facility] [address] )     ovarian       Past Surgical History:   Procedure Laterality Date    OVARIAN CYST REMOVAL         No family history on file  I have reviewed and agree with the history as documented  E-Cigarette/Vaping     E-Cigarette/Vaping Substances     Social History     Tobacco Use    Smoking status: Current Every Day Smoker     Packs/day: 1 00     Types: Cigarettes    Smokeless tobacco: Never Used   Substance Use Topics    Alcohol use: Yes     Comment: socially    Drug use: No     Comment: 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       Review of Systems   Constitutional: Negative for fever     HENT: Negative for congestion and rhinorrhea  Cardiovascular: Negative for chest pain  Musculoskeletal: Negative for myalgias  All other systems reviewed and are negative  Physical Exam  Physical Exam  Vitals and nursing note reviewed  Constitutional:       Appearance: She is well-developed  HENT:      Head: Normocephalic and atraumatic  Right Ear: External ear normal       Left Ear: External ear normal    Eyes:      Conjunctiva/sclera: Conjunctivae normal    Neck:      Thyroid: No thyromegaly  Vascular: No JVD  Trachea: No tracheal deviation  Cardiovascular:      Rate and Rhythm: Normal rate  Pulmonary:      Effort: Pulmonary effort is normal       Breath sounds: Normal breath sounds  No stridor  Abdominal:      General: There is no distension  Palpations: Abdomen is soft  There is no mass  Tenderness: There is no abdominal tenderness  There is no guarding  Hernia: No hernia is present  Musculoskeletal:         General: No tenderness or deformity  Normal range of motion  Lymphadenopathy:      Cervical: No cervical adenopathy  Skin:     General: Skin is warm  Coloration: Skin is not pale  Findings: No erythema or rash  Neurological:      Mental Status: She is alert and oriented to person, place, and time     Psychiatric:         Behavior: Behavior normal          Vital Signs  ED Triage Vitals [05/04/22 1846]   Temperature Pulse Respirations Blood Pressure SpO2   98 1 °F (36 7 °C) 84 18 126/77 98 %      Temp Source Heart Rate Source Patient Position - Orthostatic VS BP Location FiO2 (%)   Oral Monitor Sitting Left arm --      Pain Score       --           Vitals:    05/04/22 1846   BP: 126/77   Pulse: 84   Patient Position - Orthostatic VS: Sitting         Visual Acuity      ED Medications  Medications - No data to display    Diagnostic Studies  Results Reviewed     None                 No orders to display              Procedures  Procedures         ED Course MDM  Number of Diagnoses or Management Options  Urinary catheter (Gonzalez) change required: new and requires workup     Amount and/or Complexity of Data Reviewed  Decide to obtain previous medical records or to obtain history from someone other than the patient: yes  Review and summarize past medical records: yes    Patient Progress  Patient progress: stable      Disposition  Final diagnoses:   Urinary catheter (Gonzalez) change required     Time reflects when diagnosis was documented in both MDM as applicable and the Disposition within this note     Time User Action Codes Description Comment    5/4/2022  7:02 PM Jessicatistine Pride Add [Z46 6] Urinary catheter (Gonzalez) change required       ED Disposition     ED Disposition Condition Date/Time Comment    Discharge Stable Wed May 4, 2022  7:02 PM Tino Salts discharge to home/self care  Follow-up Information     Follow up With Specialties Details Why Contact Info    Shilpa Fagan MD [place] [address]  [phone]            Patient's Medications   Discharge Prescriptions    CARBAMIDE PEROXIDE (DEBROX) 6 5 % OTIC SOLUTION    Administer 5 drops into ears 2 (two) times a day       Start Date: 5/4/2022  End Date: --       Order Dose: 5 drops       Quantity: 15 mL    Refills: 0    SELENIUM SULFIDE (SELSUN) 2 5 % SHAMPOO    Apply topically daily as needed for dandruff       Start Date: 5/4/2022  End Date: --       Order Dose: --       Quantity: 118 mL    Refills: 0    TRIAMCINOLONE (KENALOG) 0 1 % CREAM    Apply topically 2 (two) times a day       Start Date: 5/4/2022  End Date: --       Order Dose: --       Quantity: 30 g    Refills: 0       No discharge procedures on file      PDMP Review     None          ED Provider  Electronically Signed by           Carmen Husain DO  05/04/22 1929

## 2022-05-04 NOTE — ED PROVIDER NOTES
History  Chief Complaint   Patient presents with    Arm Pain     rt arm pain since 1 5 week after a fall  unable to do OT yesterday due to the pain  also c/o of darhhea since 2 days and c/ of urine bag leaking   HPI   19-year-old female with extensive history significant for spinal epidural abscess (Aug 2021) with resultant quadriplegia presents to the ED with multiple complaints  Patient states that she has had right upper arm pain for the past 1 5 weeks since her arm was pulled while she was being adjusted  She states that on top of chronic weakness, she has had decreased range of motion at the right shoulder due to pain  She also complains of clogged feeling in both ears, which has been present for a long time  She is concerned that she may have a UTI, as she has seen increased sediment in her catheter  She complains of mild dysuria, as well  She does have history of CAUTI  On ROS she denies any additional acute complaints  Patient lives at home, has a caretaker  Prior to Admission Medications   Prescriptions Last Dose Informant Patient Reported? Taking? QUEtiapine (SEROquel) 300 mg tablet   Yes No   Sig: Take 400 mg by mouth daily at bedtime   clonazePAM (KlonoPIN) 2 mg tablet   Yes No   Sig: Take 1 mg by mouth 2 (two) times a day      Facility-Administered Medications: None       Past Medical History:   Diagnosis Date    Abscess     to the spine aug 10 2021    Cancer ([facility] [address] )     ovarian       Past Surgical History:   Procedure Laterality Date    OVARIAN CYST REMOVAL         No family history on file  I have reviewed and agree with the history as documented      E-Cigarette/Vaping     E-Cigarette/Vaping Substances     Social History     Tobacco Use    Smoking status: Current Every Day Smoker     Packs/day: 1 00     Types: Cigarettes    Smokeless tobacco: Never Used   Substance Use Topics    Alcohol use: Yes     Comment: socially    Drug use: No     Comment: 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       Review of Systems   Constitutional: Negative for fever  HENT:        Ears clogged, no pain   Respiratory: Negative for shortness of breath  Cardiovascular: Negative for chest pain  Genitourinary: Positive for dysuria  Musculoskeletal: Negative for joint swelling  Skin: Negative for wound  Neurological:        Chronic quadriplegia   All other systems reviewed and are negative  Physical Exam  Physical Exam  Vitals and nursing note reviewed  Constitutional:       General: She is not in acute distress  Appearance: She is not diaphoretic  HENT:      Head: Normocephalic and atraumatic  Right Ear: There is impacted cerumen  Left Ear: There is impacted cerumen  Mouth/Throat:      Mouth: Mucous membranes are moist    Cardiovascular:      Rate and Rhythm: Normal rate and regular rhythm  Pulmonary:      Effort: Pulmonary effort is normal  No respiratory distress  Breath sounds: Normal breath sounds  Abdominal:      General: There is no distension  Palpations: Abdomen is soft  Tenderness: There is no abdominal tenderness  Genitourinary:     Comments: Gonzalez catheter in place  Musculoskeletal:      Cervical back: Normal range of motion and neck supple  Comments: Atrophy in all 4 extremities  Pain with active ROM R shoulder   Skin:     General: Skin is warm and dry  Neurological:      Mental Status: She is alert and oriented to person, place, and time     Psychiatric:         Behavior: Behavior normal          Vital Signs  ED Triage Vitals   Temperature Pulse Respirations Blood Pressure SpO2   04/19/22 1649 04/19/22 1649 04/19/22 1649 04/19/22 1649 04/19/22 1649   (!) 97 4 °F (36 3 °C) 71 20 126/77 99 %      Temp Source Heart Rate Source Patient Position - Orthostatic VS BP Location FiO2 (%)   04/19/22 1649 04/19/22 1649 04/19/22 1649 04/19/22 1649 --   Oral Monitor Lying Left arm       Pain Score       04/19/22 1841       8           Vitals: 04/19/22 1649 04/19/22 1700 04/19/22 1800 04/19/22 1900   BP: 126/77 116/77 119/83 102/57   Pulse: 71 65 76 98   Patient Position - Orthostatic VS: Lying   Lying         Visual Acuity  Visual Acuity      Most Recent Value   L Pupil Size (mm) 4   R Pupil Size (mm) 4          ED Medications  Medications   acetaminophen (TYLENOL) tablet 975 mg (975 mg Oral Given 4/19/22 1841)   carbamide peroxide (DEBROX) 6 5 % otic solution 5 drop (5 drops Both Ears Given 4/19/22 1951)       Diagnostic Studies  Results Reviewed     Procedure Component Value Units Date/Time    Urine culture [[id number]]  (Abnormal)  (Susceptibility) Collected: 04/19/22 1835    Lab Status: Final result Specimen: Urine, Indwelling Gonzalez Catheter Updated: 04/21/22 1618     Urine Culture 80,000-89,000 cfu/ml Methicillin Resistant Staphylococcus aureus      <10,000 cfu/ml     Susceptibility     Methicillin Resistant Staphylococcus aureus (1)     Antibiotic Interpretation Microscan   Method Status    ZID Performed  Yes  ARDEN Final    Ampicillin ($$) Resistant >8 00 ug/ml ARDEN Final    Cefazolin ($) Resistant 16 00 ug/ml ARDEN Final    Gentamicin ($$) Susceptible <=1 ug/ml ARDEN Final    Nitrofurantoin Susceptible <=32 ug/ml ARDEN Final    Oxacillin Resistant >2 00 ug/ml ARDEN Final    Tetracycline Susceptible <=2 ug/ml ARDEN Final    Trimethoprim + Sulfamethoxazole ($$$) Susceptible <=0 5/9 5 ug/ml ARDEN Final    Vancomycin ($) Susceptible 1 00 ug/ml ARDEN Final                   Urine Microscopic [[id number]]  (Abnormal) Collected: 04/19/22 1835    Lab Status: Final result Specimen: Urine, Indwelling Gonzalez Catheter Updated: 04/19/22 1912     RBC, UA 10-20 /hpf      WBC, UA 10-20 /hpf      Epithelial Cells Occasional /hpf      Bacteria, UA Occasional /hpf     UA w Reflex to Microscopic w Reflex to Culture [[id number]]  (Abnormal) Collected: 04/19/22 1835    Lab Status: Final result Specimen: Urine, Indwelling Gonzalez Catheter Updated: 04/19/22 1847     Color, UA Light Yellow Clarity, UA Slightly Cloudy     Specific Gravity, UA 1 025     pH, UA 6 5     Leukocytes, UA Small     Nitrite, UA Negative     Protein,  (2+) mg/dl      Glucose, UA Negative mg/dl      Ketones, UA 15 (1+) mg/dl      Urobilinogen, UA 0 2 E U /dl      Bilirubin, UA Negative     Blood, UA Moderate    Comprehensive metabolic panel [[id number]]  (Abnormal) Collected: 04/19/22 1744    Lab Status: Final result Specimen: Blood from Arm, Right Updated: 04/19/22 1816     Sodium 139 mmol/L      Potassium 4 2 mmol/L      Chloride 105 mmol/L      CO2 27 mmol/L      ANION GAP 7 mmol/L      BUN 7 mg/dL      Creatinine 0 39 mg/dL      Glucose 79 mg/dL      Calcium 8 6 mg/dL      Corrected Calcium 9 2 mg/dL      AST 46 U/L      ALT 42 U/L      Alkaline Phosphatase 102 U/L      Total Protein 7 2 g/dL      Albumin 3 2 g/dL      Total Bilirubin 0 49 mg/dL      eGFR 140 ml/min/1 73sq m     Narrative:      National Kidney Disease Foundation guidelines for Chronic Kidney Disease (CKD):     Stage 1 with normal or high GFR (GFR > 90 mL/min/1 73 square meters)    Stage 2 Mild CKD (GFR = 60-89 mL/min/1 73 square meters)    Stage 3A Moderate CKD (GFR = 45-59 mL/min/1 73 square meters)    Stage 3B Moderate CKD (GFR = 30-44 mL/min/1 73 square meters)    Stage 4 Severe CKD (GFR = 15-29 mL/min/1 73 square meters)    Stage 5 End Stage CKD (GFR <15 mL/min/1 73 square meters)  Note: GFR calculation is accurate only with a steady state creatinine    CBC and differential [[id number]] Collected: 04/19/22 1744    Lab Status: Final result Specimen: Blood from Arm, Right Updated: 04/19/22 1749     WBC 5 61 Thousand/uL      RBC 4 38 Million/uL      Hemoglobin 13 2 g/dL      Hematocrit 39 1 %      MCV 89 fL      MCH 30 1 pg      MCHC 33 8 g/dL      RDW 13 7 %      MPV 9 6 fL      Platelets 543 Thousands/uL      nRBC 0 /100 WBCs      Neutrophils Relative 57 %      Immat GRANS % 0 %      Lymphocytes Relative 31 %      Monocytes Relative 8 % Eosinophils Relative 3 %      Basophils Relative 1 %      Neutrophils Absolute 3 19 Thousands/µL      Immature Grans Absolute 0 02 Thousand/uL      Lymphocytes Absolute 1 76 Thousands/µL      Monocytes Absolute 0 46 Thousand/µL      Eosinophils Absolute 0 14 Thousand/µL      Basophils Absolute 0 04 Thousands/µL                  XR shoulder 2+ views RIGHT   ED Interpretation by Matthew Sun MD (04/19 1930)   No acute osseous abnormality      Final Result by Isaac Bran MD (04/20 5699)      No acute osseous abnormality  Workstation performed: [id number]                    Procedures  Procedures         ED Course  ED Course as of 05/04/22 1601   Tue Apr 19, 2022   1929 Awaiting XR                               SBIRT 20yo+      Most Recent Value   SBIRT (25 yo +)    In order to provide better care to our patients, we are screening all of our patients for alcohol and drug use  Would it be okay to ask you these screening questions? Yes Filed at: 04/19/2022 1723   Initial Alcohol Screen: US AUDIT-C     1  How often do you have a drink containing alcohol? 3 Filed at: 04/19/2022 1723   2  How many drinks containing alcohol do you have on a typical day you are drinking? 3 Filed at: 04/19/2022 1723   3b  FEMALE Any Age, or MALE 65+: How often do you have 4 or more drinks on one occassion? 3 Filed at: 04/19/2022 1723   Audit-C Score 9 Filed at: 04/19/2022 1723   Full Alcohol Screen: US AUDIT    4  How often during the last year have you found that you were not able to stop drinking once you had started? 2 Filed at: 04/19/2022 1723   5  How often during past year have you failed to do what was normally expected of you because of drinking? 2 Filed at: 04/19/2022 1723   6  How often in past year have you needed a first drink in the morning to get yourself going after a heavy drinking session? 2 Filed at: 04/19/2022 1723   7  How often in past year have you had feeling of guilt or remorse after drinking?   2 Filed at: 04/19/2022 1723   8  How often in past year have you been unable to remember what happened night before because you had been drinking? 2 Filed at: 04/19/2022 1723   9  Have you or someone else been injured as a result of your drinking? 0 Filed at: 04/19/2022 1723   10  Has a relative, friend, doctor or other health worker been concerned about your drinking and suggested you cut down? 4 Filed at: 04/19/2022 1723   AUDIT Total Score 23 Filed at: 04/19/2022 1723   MINA: How many times in the past year have you    Used an illegal drug or used a prescription medication for non-medical reasons? Never Filed at: 04/19/2022 1723                    MDM  Number of Diagnoses or Management Options  Cerumen impaction  Dysuria  Ear discomfort  Encounter for Gonzalez catheter replacement  Strain of right upper arm, initial encounter  Diagnosis management comments: 66-year-old female with multiple complaints as above  Plan for UA w culture, XR R shoulder (low suspicion for fracture), debrox for cerumen impaction  Likely dc with OP follow up         Disposition  Final diagnoses:   Cerumen impaction   Ear discomfort   Strain of right upper arm, initial encounter   Encounter for Gonzalez catheter replacement   Dysuria     Time reflects when diagnosis was documented in both MDM as applicable and the Disposition within this note     Time User Action Codes Description Comment    4/19/2022  7:36 PM Nyra Radha Add [H61 20] Cerumen impaction     4/19/2022  7:36 PM Nyra Radha Add [H92 09] Ear discomfort     4/19/2022  7:36 PM Nyra Radha Add [B44 322K] Strain of right upper arm, initial encounter     4/19/2022  7:36 PM Nyra Radha Add [Z46 6] Encounter for Gonzalez catheter replacement     4/19/2022  7:36 PM Nyra Radha Add [R30 0] Dysuria     4/19/2022  7:36 PM Nyra Radha Modify [H61 20] Cerumen impaction     4/19/2022  7:36 PM Nyra Radha Modify [Z01 596H] Strain of right upper arm, initial encounter       ED Disposition     ED Disposition Condition Date/Time Comment    Discharge Stable Tue Apr 19, 2022 48 Bonny Andrew Carlosritu discharge to home/self care  Follow-up Information     Follow up With Specialties Details Why Contact Info Additional Information    Juan Cruz MD Family Medicine  As needed, If symptoms worsen [address] [facility] [address] [facility] Emergency Medicine  If symptoms worsen [address] [facility] [address] [facility], [address] [place], [place], [address] [facility] Orthopedic Surgery Schedule an appointment as soon as possible for a visit  As needed [address] [facility] [address]  [address] [facility], [address] [address] [place], [place], [address] [facility]          Discharge Medication List as of 4/19/2022  7:38 PM      CONTINUE these medications which have NOT CHANGED    Details   clonazePAM (KlonoPIN) 2 mg tablet Take 1 mg by mouth 2 (two) times a day, Historical Med      QUEtiapine (SEROquel) 300 mg tablet Take 400 mg by mouth daily at bedtime, Historical Med             No discharge procedures on file      PDMP Review     None          ED Provider  Electronically Signed by           Rosie Miller MD  05/15/22 3789

## 2022-05-14 ENCOUNTER — HOSPITAL ENCOUNTER (OUTPATIENT)
Facility: HOSPITAL | Age: 32
Setting detail: OBSERVATION
End: 2022-05-17
Attending: EMERGENCY MEDICINE | Admitting: INTERNAL MEDICINE
Payer: COMMERCIAL

## 2022-05-14 DIAGNOSIS — G47.00 INSOMNIA: ICD-10-CM

## 2022-05-14 DIAGNOSIS — R76.8 HEPATITIS C ANTIBODY POSITIVE IN BLOOD: ICD-10-CM

## 2022-05-14 DIAGNOSIS — R26.2 AMBULATORY DYSFUNCTION: ICD-10-CM

## 2022-05-14 DIAGNOSIS — G82.50 QUADRIPLEGIC SPINAL PARALYSIS (HCC): ICD-10-CM

## 2022-05-14 DIAGNOSIS — F19.10 SUBSTANCE ABUSE (HCC): ICD-10-CM

## 2022-05-14 DIAGNOSIS — F41.9 ANXIETY: ICD-10-CM

## 2022-05-14 DIAGNOSIS — R53.81 PHYSICAL DECONDITIONING: ICD-10-CM

## 2022-05-14 DIAGNOSIS — M54.9 CHRONIC BACK PAIN, UNSPECIFIED BACK LOCATION, UNSPECIFIED BACK PAIN LATERALITY: Primary | ICD-10-CM

## 2022-05-14 DIAGNOSIS — G89.29 CHRONIC BACK PAIN, UNSPECIFIED BACK LOCATION, UNSPECIFIED BACK PAIN LATERALITY: Primary | ICD-10-CM

## 2022-05-14 PROBLEM — Z97.8 FOLEY CATHETER IN PLACE: Status: ACTIVE | Noted: 2022-05-14

## 2022-05-14 PROBLEM — R62.7 FAILURE TO THRIVE IN ADULT: Status: ACTIVE | Noted: 2022-05-14

## 2022-05-14 PROBLEM — G82.20 PARAPLEGIA (HCC): Status: ACTIVE | Noted: 2022-05-14

## 2022-05-14 LAB
ALBUMIN SERPL BCP-MCNC: 3.2 G/DL (ref 3.5–5)
ALP SERPL-CCNC: 97 U/L (ref 46–116)
ALT SERPL W P-5'-P-CCNC: 48 U/L (ref 12–78)
ANION GAP SERPL CALCULATED.3IONS-SCNC: 9 MMOL/L (ref 4–13)
AST SERPL W P-5'-P-CCNC: 38 U/L (ref 5–45)
BACTERIA UR QL AUTO: NORMAL /HPF
BASOPHILS # BLD AUTO: 0.05 THOUSANDS/ΜL (ref 0–0.1)
BASOPHILS NFR BLD AUTO: 1 % (ref 0–1)
BILIRUB SERPL-MCNC: 0.21 MG/DL (ref 0.2–1)
BILIRUB UR QL STRIP: NEGATIVE
BUN SERPL-MCNC: 6 MG/DL (ref 5–25)
CALCIUM ALBUM COR SERPL-MCNC: 8.9 MG/DL (ref 8.3–10.1)
CALCIUM SERPL-MCNC: 8.3 MG/DL (ref 8.3–10.1)
CHLORIDE SERPL-SCNC: 106 MMOL/L (ref 100–108)
CLARITY UR: CLEAR
CO2 SERPL-SCNC: 25 MMOL/L (ref 21–32)
COLOR UR: YELLOW
CREAT SERPL-MCNC: 0.39 MG/DL (ref 0.6–1.3)
EOSINOPHIL # BLD AUTO: 0.11 THOUSAND/ΜL (ref 0–0.61)
EOSINOPHIL NFR BLD AUTO: 2 % (ref 0–6)
ERYTHROCYTE [DISTWIDTH] IN BLOOD BY AUTOMATED COUNT: 13.2 % (ref 11.6–15.1)
FLUAV RNA RESP QL NAA+PROBE: NEGATIVE
FLUBV RNA RESP QL NAA+PROBE: NEGATIVE
GFR SERPL CREATININE-BSD FRML MDRD: 140 ML/MIN/1.73SQ M
GLUCOSE SERPL-MCNC: 85 MG/DL (ref 65–140)
GLUCOSE UR STRIP-MCNC: NEGATIVE MG/DL
HCT VFR BLD AUTO: 38.7 % (ref 34.8–46.1)
HGB BLD-MCNC: 12.8 G/DL (ref 11.5–15.4)
HGB UR QL STRIP.AUTO: NEGATIVE
IMM GRANULOCYTES # BLD AUTO: 0.01 THOUSAND/UL (ref 0–0.2)
IMM GRANULOCYTES NFR BLD AUTO: 0 % (ref 0–2)
KETONES UR STRIP-MCNC: NEGATIVE MG/DL
LEUKOCYTE ESTERASE UR QL STRIP: ABNORMAL
LYMPHOCYTES # BLD AUTO: 2.27 THOUSANDS/ΜL (ref 0.6–4.47)
LYMPHOCYTES NFR BLD AUTO: 51 % (ref 14–44)
MCH RBC QN AUTO: 29.7 PG (ref 26.8–34.3)
MCHC RBC AUTO-ENTMCNC: 33.1 G/DL (ref 31.4–37.4)
MCV RBC AUTO: 90 FL (ref 82–98)
MONOCYTES # BLD AUTO: 0.2 THOUSAND/ΜL (ref 0.17–1.22)
MONOCYTES NFR BLD AUTO: 4 % (ref 4–12)
NEUTROPHILS # BLD AUTO: 1.94 THOUSANDS/ΜL (ref 1.85–7.62)
NEUTS SEG NFR BLD AUTO: 42 % (ref 43–75)
NITRITE UR QL STRIP: POSITIVE
NON-SQ EPI CELLS URNS QL MICRO: NORMAL /HPF
NRBC BLD AUTO-RTO: 0 /100 WBCS
PH UR STRIP.AUTO: 6.5 [PH]
PLATELET # BLD AUTO: 175 THOUSANDS/UL (ref 149–390)
PMV BLD AUTO: 8.7 FL (ref 8.9–12.7)
POTASSIUM SERPL-SCNC: 3.8 MMOL/L (ref 3.5–5.3)
PROT SERPL-MCNC: 7.1 G/DL (ref 6.4–8.2)
PROT UR STRIP-MCNC: NEGATIVE MG/DL
RBC # BLD AUTO: 4.31 MILLION/UL (ref 3.81–5.12)
RBC #/AREA URNS AUTO: NORMAL /HPF
RSV RNA RESP QL NAA+PROBE: NEGATIVE
SARS-COV-2 RNA RESP QL NAA+PROBE: NEGATIVE
SODIUM SERPL-SCNC: 140 MMOL/L (ref 136–145)
SP GR UR STRIP.AUTO: <=1.005 (ref 1–1.03)
UROBILINOGEN UR QL STRIP.AUTO: 0.2 E.U./DL
WBC # BLD AUTO: 4.58 THOUSAND/UL (ref 4.31–10.16)
WBC #/AREA URNS AUTO: NORMAL /HPF

## 2022-05-14 PROCEDURE — 0241U HB NFCT DS VIR RESP RNA 4 TRGT: CPT | Performed by: SURGERY

## 2022-05-14 PROCEDURE — 81001 URINALYSIS AUTO W/SCOPE: CPT | Performed by: SURGERY

## 2022-05-14 PROCEDURE — 85025 COMPLETE CBC W/AUTO DIFF WBC: CPT | Performed by: SURGERY

## 2022-05-14 PROCEDURE — 93005 ELECTROCARDIOGRAM TRACING: CPT

## 2022-05-14 PROCEDURE — 99284 EMERGENCY DEPT VISIT MOD MDM: CPT

## 2022-05-14 PROCEDURE — 99220 PR INITIAL OBSERVATION CARE/DAY 70 MINUTES: CPT

## 2022-05-14 PROCEDURE — 80053 COMPREHEN METABOLIC PANEL: CPT | Performed by: SURGERY

## 2022-05-14 PROCEDURE — 36415 COLL VENOUS BLD VENIPUNCTURE: CPT | Performed by: SURGERY

## 2022-05-14 RX ADMIN — SODIUM CHLORIDE 1000 ML: 0.9 INJECTION, SOLUTION INTRAVENOUS at 23:16

## 2022-05-15 PROBLEM — F19.10 SUBSTANCE ABUSE (HCC): Status: ACTIVE | Noted: 2022-05-15

## 2022-05-15 LAB
ATRIAL RATE: 93 BPM
HAV IGM SER QL: ABNORMAL
HBV CORE IGM SER QL: ABNORMAL
HBV SURFACE AG SER QL: ABNORMAL
HCV AB SER QL: ABNORMAL
P AXIS: 58 DEGREES
PR INTERVAL: 156 MS
QRS AXIS: 60 DEGREES
QRSD INTERVAL: 84 MS
QT INTERVAL: 376 MS
QTC INTERVAL: 467 MS
T WAVE AXIS: 69 DEGREES
VENTRICULAR RATE: 93 BPM

## 2022-05-15 PROCEDURE — 36415 COLL VENOUS BLD VENIPUNCTURE: CPT

## 2022-05-15 PROCEDURE — 97163 PT EVAL HIGH COMPLEX 45 MIN: CPT

## 2022-05-15 PROCEDURE — 93010 ELECTROCARDIOGRAM REPORT: CPT | Performed by: INTERNAL MEDICINE

## 2022-05-15 PROCEDURE — 80074 ACUTE HEPATITIS PANEL: CPT

## 2022-05-15 PROCEDURE — 99232 SBSQ HOSP IP/OBS MODERATE 35: CPT | Performed by: INTERNAL MEDICINE

## 2022-05-15 RX ORDER — ZOLPIDEM TARTRATE 5 MG/1
5 TABLET ORAL
Status: DISCONTINUED | OUTPATIENT
Start: 2022-05-15 | End: 2022-05-17 | Stop reason: HOSPADM

## 2022-05-15 RX ORDER — PRAZOSIN HYDROCHLORIDE 1 MG/1
1 CAPSULE ORAL
Status: DISCONTINUED | OUTPATIENT
Start: 2022-05-15 | End: 2022-05-17 | Stop reason: HOSPADM

## 2022-05-15 RX ORDER — ZOLPIDEM TARTRATE 5 MG/1
5 TABLET ORAL
Status: ON HOLD | COMMUNITY
End: 2022-05-17 | Stop reason: SDUPTHER

## 2022-05-15 RX ORDER — QUETIAPINE FUMARATE 100 MG/1
200 TABLET, FILM COATED ORAL
Status: DISCONTINUED | OUTPATIENT
Start: 2022-05-15 | End: 2022-05-17 | Stop reason: HOSPADM

## 2022-05-15 RX ORDER — PRAZOSIN HYDROCHLORIDE 1 MG/1
1 CAPSULE ORAL
Status: ON HOLD | COMMUNITY

## 2022-05-15 RX ORDER — DULOXETIN HYDROCHLORIDE 60 MG/1
60 CAPSULE, DELAYED RELEASE ORAL DAILY
Status: ON HOLD | COMMUNITY

## 2022-05-15 RX ORDER — BACLOFEN 10 MG/1
10 TABLET ORAL 3 TIMES DAILY
Status: ON HOLD | COMMUNITY

## 2022-05-15 RX ORDER — GABAPENTIN 400 MG/1
400 CAPSULE ORAL EVERY 8 HOURS
Status: ON HOLD | COMMUNITY

## 2022-05-15 RX ORDER — CLONAZEPAM 1 MG/1
1 TABLET ORAL 2 TIMES DAILY
Status: DISCONTINUED | OUTPATIENT
Start: 2022-05-15 | End: 2022-05-17 | Stop reason: HOSPADM

## 2022-05-15 RX ORDER — GABAPENTIN 400 MG/1
400 CAPSULE ORAL EVERY 8 HOURS
Status: DISCONTINUED | OUTPATIENT
Start: 2022-05-15 | End: 2022-05-17 | Stop reason: HOSPADM

## 2022-05-15 RX ORDER — BACLOFEN 10 MG/1
10 TABLET ORAL 3 TIMES DAILY
Status: DISCONTINUED | OUTPATIENT
Start: 2022-05-15 | End: 2022-05-17 | Stop reason: HOSPADM

## 2022-05-15 RX ORDER — NICOTINE 21 MG/24HR
1 PATCH, TRANSDERMAL 24 HOURS TRANSDERMAL DAILY
Status: DISCONTINUED | OUTPATIENT
Start: 2022-05-15 | End: 2022-05-17 | Stop reason: HOSPADM

## 2022-05-15 RX ORDER — DULOXETIN HYDROCHLORIDE 60 MG/1
60 CAPSULE, DELAYED RELEASE ORAL DAILY
Status: DISCONTINUED | OUTPATIENT
Start: 2022-05-15 | End: 2022-05-17 | Stop reason: HOSPADM

## 2022-05-15 RX ADMIN — GABAPENTIN 400 MG: 400 CAPSULE ORAL at 17:27

## 2022-05-15 RX ADMIN — NICOTINE 1 PATCH: 21 PATCH, EXTENDED RELEASE TRANSDERMAL at 08:32

## 2022-05-15 RX ADMIN — DULOXETINE HYDROCHLORIDE 60 MG: 60 CAPSULE, DELAYED RELEASE ORAL at 08:31

## 2022-05-15 RX ADMIN — APIXABAN 2.5 MG: 2.5 TABLET, FILM COATED ORAL at 18:27

## 2022-05-15 RX ADMIN — ZOLPIDEM TARTRATE 5 MG: 5 TABLET, COATED ORAL at 22:03

## 2022-05-15 RX ADMIN — BACLOFEN 10 MG: 10 TABLET ORAL at 08:31

## 2022-05-15 RX ADMIN — APIXABAN 2.5 MG: 2.5 TABLET, FILM COATED ORAL at 08:31

## 2022-05-15 RX ADMIN — CLONAZEPAM 1 MG: 1 TABLET ORAL at 08:31

## 2022-05-15 RX ADMIN — BACLOFEN 10 MG: 10 TABLET ORAL at 22:03

## 2022-05-15 RX ADMIN — CLONAZEPAM 1 MG: 1 TABLET ORAL at 18:27

## 2022-05-15 RX ADMIN — GABAPENTIN 400 MG: 400 CAPSULE ORAL at 08:31

## 2022-05-15 RX ADMIN — SODIUM CHLORIDE 1000 ML: 0.9 INJECTION, SOLUTION INTRAVENOUS at 02:18

## 2022-05-15 RX ADMIN — QUETIAPINE FUMARATE 200 MG: 100 TABLET ORAL at 22:03

## 2022-05-15 RX ADMIN — PRAZOSIN HYDROCHLORIDE 1 MG: 1 CAPSULE ORAL at 22:03

## 2022-05-15 RX ADMIN — BACLOFEN 10 MG: 10 TABLET ORAL at 18:28

## 2022-05-15 NOTE — ASSESSMENT & PLAN NOTE
· Awan catheter in place  · Currently draining urine  · UA not suggestive of UTI  · Continue monitoring output and awan care

## 2022-05-15 NOTE — H&P
[address] [facility]  H&P- Vinicius Tyson 1990, 32 y o  female MRN: [id number]  Unit/Bed#: -Saw Encounter: [id number]  Primary Care Provider: Devonte Butts MD   Date and time admitted to hospital: 5/14/2022  9:09 PM    * Failure to thrive in adult  Assessment & Plan  Patient presenting to the ED with complaints that there is no caregiver available at home  She is a quadriplegic after spinal epidural abscess  Had been living at home with her grandmother, who is moving, and unable to take care for any longer  She currently denies any complaints      BP 98/55 (BP Location: Right arm)   Pulse 93   Temp 98 5 °F (36 9 °C) (Oral)   Resp 18   Ht 5' 8" (1 727 m)   LMP 04/15/2022   SpO2 95%   BMI 27 37 kg/m²     · Patient developed quadriplegia last year after a spinal epidural abscess due to IV drug abuse  · Currently lives at home with 81y/o grandmother who is no longer able to care for her   · Presented to the ED today as there is no care at home available   · Case management consulted       Substance abuse ([facility] )  Assessment & Plan  · Has IV drug abuse history  · Requesting hepatitis and HIV testing at this time  · Recommended continued cessation    Back pain  Assessment & Plan  · Had complaints of chronic mild back pain while in ED  · Reports no pain currently  · Will utilize aqua K pad and continue home baclofen + gabapentin at this time  · Continue monitoring     Awan catheter in place  Assessment & Plan  · Awan catheter in place  · Currently draining urine  · UA not suggestive of UTI  · Continue monitoring output and awan care     Quadriplegic spinal paralysis ([facility] )  Assessment & Plan  · Patient is a paraplegic following a spinal abscess   · Awan catheter in place as above   · S/P tracheostomy and PEG tube reversal  · Frequent repositioning   · Supportive care   · Currently admitted for failure to thrive as above     VTE Pharmacologic Prophylaxis: VTE Score: 3 Moderate Risk (Score 3-4) - Pharmacological DVT Prophylaxis Ordered: apixaban (Eliquis)  Code Status: Level 1 - Full Code   Discussion with family: update in AM      Anticipated Length of Stay: Patient will be admitted on an observation basis with an anticipated length of stay of less than 2 midnights secondary to failure to thrive   Total Time for Visit, including Counseling / Coordination of Care: 30 minutes Greater than 50% of this total time spent on direct patient counseling and coordination of care  Chief Complaint: no available caretaker at home    History of Present Illness:  Quinn Morin is a 32 y o  female with a PMH of spinal epidural abscess and resulting [place] who presents with no available caretaker at home  Patient presenting to the ED with complaints that there is no caregiver available at home  She is a quadriplegic after spinal epidural abscess  Had been living at home with her grandmother, who is moving, and unable to take care for any longer  She currently denies any complaints  All questions answered at the bedside the patient's satisfaction  Review of Systems:  Review of Systems   Constitutional: Negative for activity change, appetite change, chills, diaphoresis, fatigue and fever  HENT: Negative for congestion, ear pain and nosebleeds  Eyes: Negative for pain and visual disturbance  Respiratory: Negative for apnea, cough, chest tightness, shortness of breath and wheezing  Cardiovascular: Negative for chest pain, palpitations and leg swelling  Gastrointestinal: Negative for abdominal distention, abdominal pain, blood in stool, constipation, diarrhea, nausea and vomiting  Endocrine: Negative for cold intolerance, heat intolerance and polyuria  Genitourinary: Negative for flank pain and hematuria  Musculoskeletal: Positive for back pain and gait problem (Quadraplegic)  Negative for arthralgias, neck pain and neck stiffness  Skin: Negative for color change, rash and wound     Neurological: Negative for dizziness, tremors, syncope, light-headedness and headaches  +Quadrapelgia     Hematological: Negative for adenopathy  All other systems reviewed and are negative  Past Medical and Surgical History:   Past Medical History:   Diagnosis Date    Abscess     to the spine aug 10 2021    Cancer ([facility] )     ovarian       Past Surgical History:   Procedure Laterality Date    LUMBAR LAMINECTOMY  08/10/2021    evacuation of spinal epidural abcess    OVARIAN CYST REMOVAL      POSTERIOR FUSION CERVICAL SPINE      secondary to evacuation of spinal abcess s/p IVDU       Meds/Allergies:  Prior to Admission medications    Medication Sig Start Date End Date Taking? Authorizing Provider   carbamide peroxide (DEBROX) 6 5 % otic solution Administer 5 drops into ears 2 (two) times a day 5/4/22   Ora Star, DO   clonazePAM (KlonoPIN) 2 mg tablet Take 1 mg by mouth 2 (two) times a day    Historical Provider, MD   QUEtiapine (SEROquel) 300 mg tablet Take 400 mg by mouth daily at bedtime    Historical Provider, MD   selenium sulfide (SELSUN) 2 5 % shampoo Apply topically daily as needed for dandruff 5/4/22   Ora Star, DO   triamcinolone (KENALOG) 0 1 % cream Apply topically 2 (two) times a day 5/4/22   Ora Star, DO     I have reviewed home medications with patient personally  Allergies:    Allergies   Allergen Reactions    Coconut Oil - Food Allergy Anaphylaxis    Suboxone [Buprenorphine Hcl-Naloxone Hcl] Anaphylaxis       Social History:  Marital Status: Single   Occupation: N/A  Patient Pre-hospital Living Situation: Home  Patient Pre-hospital Level of Mobility: non-ambulatory/bed bound  Patient Pre-hospital Diet Restrictions: PEG  Substance Use History:   Social History     Substance and Sexual Activity   Alcohol Use Yes    Comment: socially     Social History     Tobacco Use   Smoking Status Smoker, Current Status Unknown    Packs/day: 1 00   Smokeless Tobacco Never Used     Social History     Substance and Sexual Activity   Drug Use No    Comment: 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       Family History:  History reviewed  No pertinent family history  Physical Exam:     Vitals:   Blood Pressure: (!) 82/46 (05/15/22 0041)  Pulse: 98 (05/15/22 0041)  Temperature: 98 2 °F (36 8 °C) (05/15/22 0041)  Temp Source: Oral (05/15/22 0041)  Respirations: 18 (05/15/22 0041)  Height: 5' 8" (172 7 cm) (05/15/22 0041)  Weight - Scale: 81 6 kg (179 lb 14 3 oz) (05/15/22 0041)  SpO2: 95 % (05/15/22 0101)    Physical Exam  Vitals and nursing note reviewed  Constitutional:       General: She is not in acute distress  Appearance: Normal appearance  HENT:      Head: Normocephalic and atraumatic  Right Ear: External ear normal       Left Ear: External ear normal       Nose: Nose normal       Mouth/Throat:      Mouth: Mucous membranes are moist    Eyes:      Pupils: Pupils are equal, round, and reactive to light  Cardiovascular:      Rate and Rhythm: Normal rate and regular rhythm  Pulses: Normal pulses  Heart sounds: Normal heart sounds  No murmur heard  Pulmonary:      Effort: Pulmonary effort is normal  No respiratory distress  Breath sounds: Normal breath sounds  No wheezing or rales  Chest:      Chest wall: No tenderness  Abdominal:      General: Bowel sounds are normal  There is no distension  Palpations: Abdomen is soft  There is no mass  Tenderness: There is no abdominal tenderness  There is no guarding  Genitourinary:     Comments: Gonzalez catheter in place and draining urine   Musculoskeletal:         General: No swelling or tenderness  Cervical back: Normal range of motion and neck supple  No rigidity or tenderness  Right lower leg: No edema  Left lower leg: No edema  Skin:     General: Skin is warm and dry  Capillary Refill: Capillary refill takes less than 2 seconds  Findings: No lesion or rash     Neurological:      General: No focal deficit present  Mental Status: She is alert  Motor: Weakness (All 4 extremities ) present  Psychiatric:         Mood and Affect: Mood normal           Additional Data:     Lab Results:  Results from last 7 days   Lab Units 05/14/22  2301   WBC Thousand/uL 4 58   HEMOGLOBIN g/dL 12 8   HEMATOCRIT % 38 7   PLATELETS Thousands/uL 175   NEUTROS PCT % 42*   LYMPHS PCT % 51*   MONOS PCT % 4   EOS PCT % 2     Results from last 7 days   Lab Units 05/14/22  2301   SODIUM mmol/L 140   POTASSIUM mmol/L 3 8   CHLORIDE mmol/L 106   CO2 mmol/L 25   BUN mg/dL 6   CREATININE mg/dL 0 39*   ANION GAP mmol/L 9   CALCIUM mg/dL 8 3   ALBUMIN g/dL 3 2*   TOTAL BILIRUBIN mg/dL 0 21   ALK PHOS U/L 97   ALT U/L 48   AST U/L 38   GLUCOSE RANDOM mg/dL 85                       Imaging: No pertinent imaging reviewed  No orders to display       EKG and Other Studies Reviewed on Admission:   · EKG: No EKG obtained  ** Please Note: This note has been constructed using a voice recognition system   **

## 2022-05-15 NOTE — UTILIZATION REVIEW
Initial Clinical Review    Admission: Date/Time/Statement:   Admission Orders (From admission, onward)     Ordered        05/14/22 2346  Place in Observation  Once                      Orders Placed This Encounter   Procedures    Place in Observation     Standing Status:   Standing     Number of Occurrences:   1     Order Specific Question:   Level of Care     Answer:   Med Surg [16]     ED Arrival Information     Expected   -    Arrival   5/14/2022 21:09    Acuity   Urgent            Means of arrival   Ambulance    Escorted by   [facility]    Service   Hospitalist    Admission type   Urgent            Arrival complaint   -           Chief Complaint   Patient presents with    Back Pain     Pt C/O of back pain which is chronic in nature, pt is not appropriately being cared for at home, patient is a quadriplegic, unable to obtain placement for care       Initial Presentation: 32 y o  female PMH of spinal epidural abscess and resulting [place] who presents with no available caretaker at home  Patient presenting to the ED with complaints that there is no caregiver available at home  She is a quadriplegic after spinal epidural abscess  Had been living at home with her grandmother, who is moving, and unable to take care for any longer  Admitted OBS status for CM consult   Hx substance abuse requesting hepatitis and HIV testing   Gonzalez in place , ua neg for UTI   5/15 IM Note   CM consulted for safe DC plan   Pt need placement   5/16 IM Note   Pt medically cleared for DC awaiting placement   PT rec acute rehab   CM working on safe DC plan      ED Triage Vitals [05/14/22 2131]   Temperature Pulse Respirations Blood Pressure SpO2   98 5 °F (36 9 °C) 93 18 98/55 95 %      Temp Source Heart Rate Source Patient Position - Orthostatic VS BP Location FiO2 (%)   Oral Monitor Lying Right arm --      Pain Score       5          Wt Readings from Last 1 Encounters:   05/15/22 81 6 kg (179 lb 14 3 oz)     Additional Vital Signs:   05/15/22 2256 98 2 °F (36 8 °C) 65 18 144/97 -- -- Lying   05/15/22 2203 -- -- -- 140/95 -- -- --   05/15/22 1426 98 5 °F (36 9 °C) 92 18 109/81 96 % None (Room air) Lying   05/15/22 0700 98 5 °F (36 9 °C) 92 18 105/78 96 % -- Lying       05/15/22 0543 98 5 °F (36 9 °C) -- -- -- -- -- --   05/15/22 0400 -- 92 18 100/62 96 % None (Room air) Lying   05/15/22 0200 -- -- -- 84/48 Abnormal  -- -- Lying   05/15/22 0101 -- -- -- -- 95 % None (Room air) --   05/15/22 0041 98 2 °F (36 8 °C) 98 18 82/46 Abnormal  -- -- Lying       Pertinent Labs/Diagnostic Test Results:   No orders to display     Results from last 7 days   Lab Units 05/14/22  2248   SARS-COV-2  Negative     Results from last 7 days   Lab Units 05/16/22  0608 05/14/22  2301   WBC Thousand/uL 4 36 4 58   HEMOGLOBIN g/dL 12 1 12 8   HEMATOCRIT % 36 5 38 7   PLATELETS Thousands/uL 176 175   NEUTROS ABS Thousands/µL 2 10 1 94     Results from last 7 days   Lab Units 05/16/22  0608 05/14/22  2301   SODIUM mmol/L 139 140   POTASSIUM mmol/L 3 9 3 8   CHLORIDE mmol/L 105 106   CO2 mmol/L 26 25   ANION GAP mmol/L 8 9   BUN mg/dL 10 6   CREATININE mg/dL 0 49* 0 39*   EGFR ml/min/1 73sq m 130 140   CALCIUM mg/dL 8 5 8 3     Results from last 7 days   Lab Units 05/14/22  2301   AST U/L 38   ALT U/L 48   ALK PHOS U/L 97   TOTAL PROTEIN g/dL 7 1   ALBUMIN g/dL 3 2*   TOTAL BILIRUBIN mg/dL 0 21     Results from last 7 days   Lab Units 05/16/22  0608 05/14/22  2301   GLUCOSE RANDOM mg/dL 91 85     Results from last 7 days   Lab Units 05/14/22  2250   CLARITY UA  Clear   COLOR UA  Yellow   SPEC GRAV UA  <=1 005   PH UA  6 5   GLUCOSE UA mg/dl Negative   KETONES UA mg/dl Negative   BLOOD UA  Negative   PROTEIN UA mg/dl Negative   NITRITE UA  Positive*   BILIRUBIN UA  Negative   UROBILINOGEN UA E U /dl 0 2   LEUKOCYTES UA  Trace*   WBC UA /hpf None Seen   RBC UA /hpf None Seen   BACTERIA UA /hpf None Seen   EPITHELIAL CELLS WET PREP /hpf None Seen     Results from last 7 days   Lab Units 05/14/22  2248   INFLUENZA A PCR  Negative   INFLUENZA B PCR  Negative   RSV PCR  Negative     ED Treatment:   Medication Administration from 05/14/2022 2109 to 05/15/2022 0019       Date/Time Order Dose Route Action Action by Comments     05/14/2022 2316 sodium chloride 0 9 % bolus 1,000 mL 1,000 mL Intravenous New Bag Bard Karen RN         Past Medical History:   Diagnosis Date    Abscess     to the spine aug 10 2021    Cancer [facility])     ovarian     Present on Admission:  **None**      Admitting Diagnosis: Substance abuse ([facility] ) [F19 10]  Back pain [M54 9]  Quadriplegic spinal paralysis (HCC) [G82 50]  Physical deconditioning [R53 81]  Ambulatory dysfunction [R26 2]  Chronic back pain, unspecified back location, unspecified back pain laterality [M54 9, G89 29]  Age/Sex: 32 y o  female  Admission Orders:  Scheduled Medications:  apixaban, 2 5 mg, Oral, BID  baclofen, 10 mg, Oral, TID  clonazePAM, 1 mg, Oral, BID  DULoxetine, 60 mg, Oral, Daily  gabapentin, 400 mg, Oral, Q8H  nicotine, 1 patch, Transdermal, Daily  prazosin, 1 mg, Oral, HS  QUEtiapine, 200 mg, Oral, HS      Continuous IV Infusions:     PRN Meds:  zolpidem, 5 mg, Oral, HS PRN    PT eval   Reg diet     IP CONSULT TO CASE MANAGEMENT    Network Utilization Review Department  ATTENTION: Please call with any questions or concerns to [phone] and carefully listen to the prompts so that you are directed to the right person  All voicemails are confidential   Giovanni Lima all requests for admission clinical reviews, approved or denied determinations and any other requests to dedicated fax number below belonging to the campus where the patient is receiving treatment  List of dedicated fax numbers for the Facilities:  [address] [place] [address] DENIALS (Administrative/Medical Necessity) [phone]   [address] (Maternity/NICU/Pediatrics) [phone]   [address] [place] [address] [phone]     [address]  [phone]   [address] [facility] [place] [address] [address] [place] [address] [phone] [phone]

## 2022-05-15 NOTE — ASSESSMENT & PLAN NOTE
· Patient is a paraplegic following a spinal abscess   · Gonzalez catheter in place as above   · S/P tracheostomy and PEG tube reversal  · Frequent repositioning   · Supportive care   · Currently admitted for failure to thrive as above

## 2022-05-15 NOTE — PHYSICAL THERAPY NOTE
Physical Therapy Evaluation     Patient's Name: Melissa Rene    Admitting Diagnosis  Substance abuse ([facility] ) [F19 10]  Back pain [M54 9]  Quadriplegic spinal paralysis ([facility] ) [G82 50]  Physical deconditioning [R53 81]  Ambulatory dysfunction [R26 2]  Chronic back pain, unspecified back location, unspecified back pain laterality [M54 9, G89 29]    Problem List  Patient Active Problem List   Diagnosis    Failure to thrive in adult    Quadriplegic spinal paralysis ([facility] )    Gonzalez catheter in place    Back pain    Substance abuse [facility])       Past Medical History  Past Medical History:   Diagnosis Date    Abscess     to the spine aug 10 2021    Cancer ([facility] )     ovarian       Past Surgical History  Past Surgical History:   Procedure Laterality Date    LUMBAR LAMINECTOMY  08/10/2021    evacuation of spinal epidural abcess    OVARIAN CYST REMOVAL      POSTERIOR FUSION CERVICAL SPINE      secondary to evacuation of spinal abcess s/p IVDU        05/15/22 0800   PT Last Visit   PT Visit Date 05/15/22   Note Type   Note type Evaluation   Pain Assessment   Pain Assessment Tool 0-10   Pain Score No Pain   Restrictions/Precautions   Weight Bearing Precautions Per Order No   Braces or Orthoses Other (Comment)  (none per patient)   Other Precautions Chair Alarm; Bed Alarm; Fall Risk;Pain   Home Living   Type of [address] [place] [address] One level;Performs ADLs on one level; Able to live on main level with bedroom/bathroom; Ramped entrance   Bathroom Shower/Tub Tub/shower unit   [employer]   (incontinent of bowel)   Bathroom Equipment Tub transfer bench   Bathroom Accessibility Accessible via wheelchair   [facility]  (tilt in space wheelchair)   Additional Comments Pt reports she utilizes a manual tilt in space wheelchair at baseline  She completes stand pivot transfers with assistance from family  Prior Function   Level of [place] Needs assistance with IADLs; Needs assistance with ADLs and functional mobility Lives With Friend(s); Other (Comment)  (friend, grandmother, brother, father, sister)   Receives Help From Friend(s)  (friend was caregiver 69hrs/wk)   ADL Assistance Needs assistance   IADLs Needs assistance   Falls in the last 6 months (S)  5 to 8  (during transfers)   Vocational   (does not work)   Comments Pt reports her friend was being paid to assist her 69hrs/wk however she is planning on moving  Her family is unable to assist her   General   Family/Caregiver Present No   Cognition   Overall Cognitive Status WFL   Arousal/Participation Alert   Orientation Level Oriented X4   Memory Within functional limits   Following Commands Follows all commands and directions without difficulty   Comments Pt agreeable to participate in PT eval   Subjective   Subjective "I can move a bit more now"   RUE Assessment   RUE Assessment X   RUE Strength   RUE Overall Strength Deficits   LUE Assessment   LUE Assessment X   LUE Strength   LUE Overall Strength Deficits   RLE Assessment   RLE Assessment X   Strength RLE   R Hip Flexion 2/5   R Knee Extension 3+/5   R Ankle Dorsiflexion 2/5   R Ankle Plantar Flexion 2/5   LLE Assessment   LLE Assessment X   Strength LLE   L Hip Flexion 2/5   L Knee Extension 3+/5   L Ankle Dorsiflexion 2/5   L Ankle Plantar Flexion 2/5   Coordination   Movements are Fluid and Coordinated 0   Finger to Nose & Finger to Finger  Impaired   Heel to [place] Impaired   Light Touch   RLE Light Touch Grossly intact   LLE Light Touch Grossly intact   Bed Mobility   Rolling R 3  Moderate assistance   Additional items Assist x 1;Bedrails; Increased time required;LE management   Rolling L 2  Maximal assistance   Additional items Assist x 1;Bedrails; Increased time required;LE management   Additional Comments Pt refused to sit at EOB despite encouragement due to fatigue and "not sleeping well"  Completed rolling 6x to each side for changing clothing, removing extra blankets, and toileting     Transfers   Sit to Stand Unable to assess  (Pt declined to sit at EOB at this time)   Ambulation/Elevation   Gait pattern Not tested   Endurance Deficit   Endurance Deficit Yes   Endurance Deficit Description decreased activity tolerance   Activity Tolerance   Activity Tolerance Patient limited by fatigue;Patient limited by pain   Medical Staff Made Aware Allan Shipman   Nurse Made Aware RN Gwen confirmed pt appropriate for PT eval and made aware of session outcomes  Following session pt remained in bed with needs met, call bell within reach   Assessment   Prognosis Fair   Problem List Decreased strength;Decreased mobility; Impaired balance;Decreased endurance; Impaired tone;Pain   Assessment Pt is 32 y o  female seen for PT evaluation s/p admit to [facility] on 5/14/2022 w/ Failure to thrive in adult  PT consulted to assess pt's functional mobility and d/c needs  Order placed for PT eval and tx, w/ up w/ A order  Comorbidities affecting pt's physical performance at time of assessment include: failure to thrive in adult, quadriplegic spinal paralysis, back pain  PTA, pt was requiring A for mobility, lives w/ grandmother, father, brother and sister in 1 level house and unemployed  Personal factors affecting pt at time of IE include: inaccessible home environment, inability to ambulate household distances, inability to navigate community distances, inability to navigate level surfaces w/o external assistance, unable to perform dynamic tasks in community, positive fall history, inability to perform IADLs and inability to perform ADLs  Please find objective findings from PT assessment regarding body systems outlined above with impairments and limitations including weakness, impaired balance, decreased endurance, impaired coordination, decreased activity tolerance, decreased functional mobility tolerance, fall risk and impaired tone   The following objective measures performed on IE also reveal limitations: Barthel Index: 10/100, Modified Rashel: 4 (moderate/severe disability) and AM-PAC 6-Clicks: 28/04 low function score  Pt's clinical presentation is currently unstable/unpredictable seen in pt's presentation of continued need for medical management and monitoring, impaired tone and decreased strength limiting mobility, impaired balance and activity tolerance resulting in an increased risk for falls and inability to complete ADLs without assistance  Pt to benefit from continued PT tx to address deficits as defined above and maximize level of functional independent mobility and consistency  From PT/mobility standpoint, recommendation at time of d/c would be post acute rehabilitation services, acute rehab, pending progress in order to facilitate return to PLOF  Barriers to Discharge Inaccessible home environment;Decreased caregiver support   Goals   Patient Goals "To go to rehab"   STG Expiration Date 05/25/22   Short Term Goal #1 In 7-10 days: Increase bilateral LE strength 1/2 grade to facilitate independent mobility, Perform all bed mobility tasks with mod A of 1 to decrease caregiver burden, Tolerate 4 hr OOB to faciliate upright tolerance, Tolerate seated at EOB 10 minutes to facilitate functional task performance and PT to see and establish goals for transfers when appropriate   Plan   Treatment/Interventions Functional transfer training;LE strengthening/ROM; Therapeutic exercise; Endurance training;Bed mobility;Spoke to nursing;Spoke to case management   PT Frequency 3-5x/wk   Recommendation   PT Discharge Recommendation Post acute rehabilitation services   AM-PAC Basic Mobility Inpatient   Turning in Bed Without Bedrails 2   Lying on Back to Sitting on Edge of Flat Bed 2   Moving Bed to Chair 1   Standing Up From Chair 1   Walk in Room 1   Climb 3-5 Stairs 1   Basic Mobility Inpatient Raw Score 8   Turning Head Towards Sound 4   Follow Simple Instructions 4   Low Function Basic Mobility Raw Score 16   Low Function Basic Mobility Standardized Score 25 72   Highest Level Of Mobility   [facility] Goal 3: Sit at edge of bed   [facility] Achieved 2: Bed activities/Dependent transfer   Modified [place] Scale   Modified [place] Scale 4   Barthel Index   Feeding 5   Bathing 0   Grooming Score 0   Dressing Score 0   Bladder Score 0   Bowels Score 0   Toilet Use Score 0   Transfers (Bed/Chair) Score 5   Mobility (Level Surface) Score 0   Stairs Score 0   Barthel Index Score 10       Herminia David, PT

## 2022-05-15 NOTE — PLAN OF CARE
Problem: PHYSICAL THERAPY ADULT  Goal: Performs mobility at highest level of function for planned discharge setting  See evaluation for individualized goals  Description: Treatment/Interventions: Functional transfer training, LE strengthening/ROM, Therapeutic exercise, Endurance training, Bed mobility, Spoke to nursing, Spoke to case management          See flowsheet documentation for full assessment, interventions and recommendations  Outcome: Progressing  Note: Prognosis: Fair  Problem List: Decreased strength, Decreased mobility, Impaired balance, Decreased endurance, Impaired tone, Pain  Assessment: Pt is 32 y o  female seen for PT evaluation s/p admit to [facility] on 5/14/2022 w/ Failure to thrive in adult  PT consulted to assess pt's functional mobility and d/c needs  Order placed for PT eval and tx, w/ up w/ A order  Comorbidities affecting pt's physical performance at time of assessment include: failure to thrive in adult, quadriplegic spinal paralysis, back pain  PTA, pt was requiring A for mobility, lives w/ grandmother, father, brother and sister in 1 level house and unemployed  Personal factors affecting pt at time of IE include: inaccessible home environment, inability to ambulate household distances, inability to navigate community distances, inability to navigate level surfaces w/o external assistance, unable to perform dynamic tasks in community, positive fall history, inability to perform IADLs and inability to perform ADLs  Please find objective findings from PT assessment regarding body systems outlined above with impairments and limitations including weakness, impaired balance, decreased endurance, impaired coordination, decreased activity tolerance, decreased functional mobility tolerance, fall risk and impaired tone   The following objective measures performed on IE also reveal limitations: Barthel Index: 10/100, Modified Rashel: 4 (moderate/severe disability) and AM-PAC 6-Clicks: 16/24 low function score  Pt's clinical presentation is currently unstable/unpredictable seen in pt's presentation of continued need for medical management and monitoring, impaired tone and decreased strength limiting mobility, impaired balance and activity tolerance resulting in an increased risk for falls and inability to complete ADLs without assistance  Pt to benefit from continued PT tx to address deficits as defined above and maximize level of functional independent mobility and consistency  From PT/mobility standpoint, recommendation at time of d/c would be post acute rehabilitation services, acute rehab, pending progress in order to facilitate return to PLOF  Barriers to Discharge: Inaccessible home environment, Decreased caregiver support        PT Discharge Recommendation: Post acute rehabilitation services          See flowsheet documentation for full assessment

## 2022-05-15 NOTE — CASE MANAGEMENT
Case Management Assessment & Discharge Planning Note    Patient name Jewel Caputo  Location /-90 MRN [id number]  : 1990 Date 5/15/2022       Current Admission Date: 2022  Current Admission Diagnosis:Failure to thrive in adult   Patient Active Problem List    Diagnosis Date Noted    Substance abuse ([facility] [address] ) 05/15/2022    Failure to thrive in adult 2022    Quadriplegic spinal paralysis ([facility] ) 2022    Gonzalez catheter in place 2022    Back pain 2022      LOS (days): 0  Geometric Mean LOS (GMLOS) (days):   Days to GMLOS:     OBJECTIVE:              Current admission status: Observation       Preferred Pharmacy:   [facility] [place] [facility] [address]  [facility], [address] [place] [address] Drive [address]  Phone: [phone] Fax: [phone]    Primary Care Provider: Renetta Starks MD    Primary Insurance: [facility]  Secondary Insurance:     ASSESSMENT:  Active Health Care Proxies    There are no active Health Care Proxies on file  Advance Directives  Does patient have a [address] [place] [address] Avenue?: No  Was patient offered paperwork?: Yes (CM supplied info)  Does patient currently have a Health Care decision maker?: Yes, please see Health Care Proxy section  Does patient have Advance Directives?: No  Was patient offered paperwork?: Yes (CM supplied info)  Primary Contact: karena Silva    Obs Notice Signed: 05/15/22    Readmission Root Cause  30 Day Readmission: No    Patient Information  Admitted from[de-identified] Home  Mental Status: Alert  During Assessment patient was accompanied by: Not accompanied during assessment  Assessment information provided by[de-identified] Patient  Primary Caregiver: Self  Support Systems: Friends/neighbors, [place] of Residence: [place] [address] do you live in?: [address] entry access options   Select all that apply : Ramp  Type of Current Residence: [facility]  In the last 15 months, was there a time when you were not able to pay the mortgage or rent on time?: No  In the last 12 months, how many places have you lived?: 1  In the last 12 months, was there a time when you did not have a steady place to sleep or slept in a shelter (including now)?: No  Homeless/housing insecurity resource given?: No  Living Arrangements: Lives w/ Extended Family, Lives w/ Friend (lives with grandmother, sister, brother and best friend who is returning to [place])  Is patient a ?: No    Activities of Daily Living Prior to Admission  Functional Status: Assistance  Completes ADLs independently?: No  Level of ADL dependence: Assistance  Ambulates independently?: No  Level of ambulatory dependence: Total Dependent  Does patient use assisted devices?: Yes  Assisted Devices (DME) used: Shower Chair, Walker, [address] lift, Wheelchair  Does patient currently own DME?: Yes  What DME does the patient currently own?: Shower Chair, Kermitt Prudent, Wheelchair, [address] lift  Does patient have a history of Outpatient Therapy (PT/OT)?: Yes ([facility])  Does the patient have a history of Short-Term Rehab?: Yes ([facility])  Does patient have a history of HHC?: Yes ([facility])  Does patient currently have [address]?: No         Patient Information Continued  Income Source: SSI/SSD  Does patient have prescription coverage?: Yes  Within the past 12 months, you worried that your food would run out before you got the money to buy more : Never true  Within the past 12 months, the food you bought just didn't last and you didn't have money to get more : Never true  Food insecurity resource given?: No  Does patient receive dialysis treatments?: No  Does patient have a history of substance abuse?: Yes  Historical substance use preference: Alcohol/ETOH, "Crack" cocaine, Heroin  History of Withdrawal Symptoms: Other withdrawal symptoms (specify in comment) (Developed abscess of spine from drug use    Has been clean and sober x 10 months)  Is patient currently in treatment for substance abuse?: N/A - sober  Does patient have a history of Mental Health Diagnosis?: Yes  Is patient receiving treatment for mental health?: Yes (Is treated with medication for depression and anxiety by ehr psychiatrist-Dr Brett Yoon)  Has patient received inpatient treatment related to mental health in the last 2 years?: No    PHQ 2/9 Screening   Reviewed PHQ 2/9 Depression Screening Score?: No    Means of Transportation  Means of Transport to Appts[de-identified] Magaly Dubon  In the past 12 months, has lack of transportation kept you from medical appointments or from getting medications?: No  In the past 12 months, has lack of transportation kept you from meetings, work, or from getting things needed for daily living?: No  Was application for public transport provided?: No        DISCHARGE DETAILS:    Discharge planning discussed with[de-identified] patient  Freedom of Choice: Yes  Comments - Freedom of Choice: PT's recommendation is for acute rehab and pt is agreeable to this  PT feels that pt definitely has potential to improve  Is starting to have more mobility in both legs and her R arm  Pt's caregiver will be moving back to [facility] and her [de-identified] yr old grandmother can not manage her care in her present condition    CM contacted family/caregiver?: No- see comments (Pt states that she will speak to them when they visit today)  Were Treatment Team discharge recommendations reviewed with patient/caregiver?: Yes  Did patient/caregiver verbalize understanding of patient care needs?: Yes  Were patient/caregiver advised of the risks associated with not following Treatment Team discharge recommendations?: Yes    Contacts  Patient Contacts: [place]  Relationship to Patient[de-identified] [address]         Is the patient interested in [facility] at discharge?: No    DME Referral Provided  Referral made for DME?: No    Other Referral/Resources/Interventions Provided:  Interventions: Acute Rehab  Referral Comments: PT's recommendation is for acute rehab  Acute rehab pended  Pt has no preference of location  Caregiver is moving back to [facility] and 81yo grandmother will not be able to care for her in her present condition      Would you like to participate in our [address] service program?  : No - Declined    Treatment Team Recommendation: Acute Rehab  Discharge Destination Plan[de-identified] Acute Rehab  Transport at Discharge : [facility] Ambulance

## 2022-05-15 NOTE — ASSESSMENT & PLAN NOTE
Patient presenting to the ED with complaints that there is no caregiver available at home  She is a quadriplegic after spinal epidural abscess  Had been living at home with her grandmother, who is moving, and unable to take care for any longer  She currently denies any complaints      BP 98/55 (BP Location: Right arm)   Pulse 93   Temp 98 5 °F (36 9 °C) (Oral)   Resp 18   Ht 5' 8" (1 727 m)   LMP 04/15/2022   SpO2 95%   BMI 27 37 kg/m²     · Patient developed quadriplegia last year after a spinal epidural abscess due to IV drug abuse  · Currently lives at home with 81y/o grandmother who is no longer able to care for her   · Presented to the ED today as there is no care at home available   · Case management consulted

## 2022-05-15 NOTE — ASSESSMENT & PLAN NOTE
· Had complaints of chronic mild back pain while in ED  · Reports no pain currently  · Will utilize aqua K pad and continue home baclofen + gabapentin at this time  · Continue monitoring

## 2022-05-15 NOTE — ASSESSMENT & PLAN NOTE
Patient developed quadriplegia last year after a spinal epidural abscess due to IV drug abuse  Currently lives at home with [de-identified] grandmother who is no longer able to care for her   Presented to the ED today as there is no care at home available   Case management consulted

## 2022-05-15 NOTE — ASSESSMENT & PLAN NOTE
· Patient is a paraplegic following a spinal abscess   · Gonzalez catheter in place as above   · S/P tracheostomy and PEG tube reversal  · Frequent repositioning

## 2022-05-15 NOTE — ASSESSMENT & PLAN NOTE
· Has IV drug abuse history  · Requesting hepatitis and HIV testing at this time  · Recommended continued cessation

## 2022-05-15 NOTE — PROGRESS NOTES
[facility]  Progress Note - Sebas Luu 1990, 32 y o  female MRN: [id number]  Unit/Bed#: -01 Encounter: [id number]  Primary Care Provider: Jeremias Huffman MD   Date and time admitted to hospital: 5/14/2022  9:09 PM    * Failure to thrive in adult  Assessment & Plan  Patient developed quadriplegia last year after a spinal epidural abscess due to IV drug abuse  Currently lives at home with [de-identified] grandmother who is no longer able to care for her   Presented to the ED today as there is no care at home available   Case management consulted       Back pain  Assessment & Plan  · Had complaints of chronic mild back pain while in ED  · Reports no pain currently  · Will utilize aqua K pad and continue home baclofen + gabapentin at this time  · Continue monitoring     Gonzalez catheter in place  Assessment & Plan  · Gonzalez catheter in place  · In setting of quadriplegia     Quadriplegic spinal paralysis ([address] )  Assessment & Plan  · Patient is a paraplegic following a spinal abscess   · Gonzalez catheter in place as above   · S/P tracheostomy and PEG tube reversal  · Frequent repositioning         VTE Pharmacologic Prophylaxis:   Pharmacologic: Apixaban (Eliquis)  Mechanical VTE Prophylaxis in Place: Yes    Patient Centered Rounds: I have performed bedside rounds with nursing staff today  Discussions with Specialists or Other Care Team Provider: cm, nursing    Education and Discussions with Family / Patient: pt    Time Spent for Care: 30 minutes  More than 50% of total time spent on counseling and coordination of care as described above      Current Length of Stay: 0 day(s)    Current Patient Status: Observation   Certification Statement: The patient will continue to require additional inpatient hospital stay due to see above    Discharge Plan: pending likely placement need    Code Status: Level 1 - Full Code      Subjective:   Denies chest pain, cough, abd pain, nausea, vomiting    Objective: Vitals:   Temp (24hrs), Av 4 °F (36 9 °C), Min:98 2 °F (36 8 °C), Max:98 5 °F (36 9 °C)    Temp:  [98 2 °F (36 8 °C)-98 5 °F (36 9 °C)] 98 5 °F (36 9 °C)  HR:  [92-98] 92  Resp:  [18] 18  BP: ()/(46-62) 100/62  SpO2:  [95 %-96 %] 96 %  Body mass index is 27 35 kg/m²  Input and Output Summary (last 24 hours): Intake/Output Summary (Last 24 hours) at 5/15/2022 0814  Last data filed at 5/15/2022 0401  Gross per 24 hour   Intake 2000 ml   Output 1000 ml   Net 1000 ml       Physical Exam:     Physical Exam  Constitutional:       General: She is not in acute distress  Appearance: She is well-developed  She is not diaphoretic  HENT:      Head: Normocephalic and atraumatic  Nose: Nose normal       Mouth/Throat:      Pharynx: No oropharyngeal exudate  Eyes:      General: No scleral icterus  Right eye: No discharge  Left eye: No discharge  Conjunctiva/sclera: Conjunctivae normal    Neck:      Thyroid: No thyromegaly  Vascular: No JVD  Cardiovascular:      Rate and Rhythm: Normal rate and regular rhythm  Heart sounds: Normal heart sounds  No murmur heard  No friction rub  No gallop  Pulmonary:      Effort: Pulmonary effort is normal  No respiratory distress  Breath sounds: Normal breath sounds  No wheezing or rales  Chest:      Chest wall: No tenderness  Abdominal:      General: Bowel sounds are normal  There is no distension  Palpations: Abdomen is soft  Tenderness: There is no abdominal tenderness  There is no guarding or rebound  Musculoskeletal:         General: No tenderness or deformity  Normal range of motion  Cervical back: Normal range of motion and neck supple  Skin:     General: Skin is warm and dry  Findings: No erythema or rash  Neurological:      Mental Status: She is alert  Mental status is at baseline  Cranial Nerves: No cranial nerve deficit  Sensory: No sensory deficit        Motor: No abnormal muscle tone  Coordination: Coordination normal            Additional Data:     Labs:    Results from last 7 days   Lab Units 05/14/22  2301   WBC Thousand/uL 4 58   HEMOGLOBIN g/dL 12 8   HEMATOCRIT % 38 7   PLATELETS Thousands/uL 175   NEUTROS PCT % 42*   LYMPHS PCT % 51*   MONOS PCT % 4   EOS PCT % 2     Results from last 7 days   Lab Units 05/14/22  2301   SODIUM mmol/L 140   POTASSIUM mmol/L 3 8   CHLORIDE mmol/L 106   CO2 mmol/L 25   BUN mg/dL 6   CREATININE mg/dL 0 39*   ANION GAP mmol/L 9   CALCIUM mg/dL 8 3   ALBUMIN g/dL 3 2*   TOTAL BILIRUBIN mg/dL 0 21   ALK PHOS U/L 97   ALT U/L 48   AST U/L 38   GLUCOSE RANDOM mg/dL 85                           * I Have Reviewed All Lab Data Listed Above  * Additional Pertinent Lab Tests Reviewed: All Labs Within Last 24 Hours Reviewed    Imaging:    Imaging Reports Reviewed Today Include: na  Imaging Personally Reviewed by Myself Includes:  na    Recent Cultures (last 7 days):           Last 24 Hours Medication List:   Current Facility-Administered Medications   Medication Dose Route Frequency Provider Last Rate    apixaban  2 5 mg Oral BID [facility]      [facility]  [facility]ID [facility]      clonazePAM  1 mg Oral BID [place], [place]      DULoxetine  60 mg Oral Daily [place], [place]      gabapentin  400 mg Oral [address] [place], [place]      nicotine  1 patch Transdermal Daily [place], [place]      prazosin  1 mg Oral HS Kendall Jaylyn [place], [place]      QUEtiapine  200 mg Oral HS Kendall Jaylyn [place], [place]      zolpidem  5 mg Oral HS PRN [facility] ISAI Andrew          Today, Patient Was Seen By: Ravindra Sandhu MD    ** Please Note: Dictation voice to text software may have been used in the creation of this document   **

## 2022-05-16 LAB
ANION GAP SERPL CALCULATED.3IONS-SCNC: 8 MMOL/L (ref 4–13)
BASOPHILS # BLD AUTO: 0.04 THOUSANDS/ΜL (ref 0–0.1)
BASOPHILS NFR BLD AUTO: 1 % (ref 0–1)
BUN SERPL-MCNC: 10 MG/DL (ref 5–25)
CALCIUM SERPL-MCNC: 8.5 MG/DL (ref 8.3–10.1)
CHLORIDE SERPL-SCNC: 105 MMOL/L (ref 100–108)
CO2 SERPL-SCNC: 26 MMOL/L (ref 21–32)
CREAT SERPL-MCNC: 0.49 MG/DL (ref 0.6–1.3)
EOSINOPHIL # BLD AUTO: 0.11 THOUSAND/ΜL (ref 0–0.61)
EOSINOPHIL NFR BLD AUTO: 3 % (ref 0–6)
ERYTHROCYTE [DISTWIDTH] IN BLOOD BY AUTOMATED COUNT: 13.2 % (ref 11.6–15.1)
GFR SERPL CREATININE-BSD FRML MDRD: 130 ML/MIN/1.73SQ M
GLUCOSE SERPL-MCNC: 91 MG/DL (ref 65–140)
HCT VFR BLD AUTO: 36.5 % (ref 34.8–46.1)
HGB BLD-MCNC: 12.1 G/DL (ref 11.5–15.4)
IMM GRANULOCYTES # BLD AUTO: 0.01 THOUSAND/UL (ref 0–0.2)
IMM GRANULOCYTES NFR BLD AUTO: 0 % (ref 0–2)
LYMPHOCYTES # BLD AUTO: 1.81 THOUSANDS/ΜL (ref 0.6–4.47)
LYMPHOCYTES NFR BLD AUTO: 42 % (ref 14–44)
MCH RBC QN AUTO: 30.3 PG (ref 26.8–34.3)
MCHC RBC AUTO-ENTMCNC: 33.2 G/DL (ref 31.4–37.4)
MCV RBC AUTO: 91 FL (ref 82–98)
MONOCYTES # BLD AUTO: 0.29 THOUSAND/ΜL (ref 0.17–1.22)
MONOCYTES NFR BLD AUTO: 7 % (ref 4–12)
NEUTROPHILS # BLD AUTO: 2.1 THOUSANDS/ΜL (ref 1.85–7.62)
NEUTS SEG NFR BLD AUTO: 47 % (ref 43–75)
NRBC BLD AUTO-RTO: 0 /100 WBCS
PLATELET # BLD AUTO: 176 THOUSANDS/UL (ref 149–390)
PMV BLD AUTO: 8.8 FL (ref 8.9–12.7)
POTASSIUM SERPL-SCNC: 3.9 MMOL/L (ref 3.5–5.3)
RBC # BLD AUTO: 4 MILLION/UL (ref 3.81–5.12)
SODIUM SERPL-SCNC: 139 MMOL/L (ref 136–145)
WBC # BLD AUTO: 4.36 THOUSAND/UL (ref 4.31–10.16)

## 2022-05-16 PROCEDURE — 80048 BASIC METABOLIC PNL TOTAL CA: CPT | Performed by: INTERNAL MEDICINE

## 2022-05-16 PROCEDURE — 85025 COMPLETE CBC W/AUTO DIFF WBC: CPT | Performed by: INTERNAL MEDICINE

## 2022-05-16 PROCEDURE — 99225 PR SBSQ OBSERVATION CARE/DAY 25 MINUTES: CPT | Performed by: INTERNAL MEDICINE

## 2022-05-16 RX ADMIN — BACLOFEN 10 MG: 10 TABLET ORAL at 08:47

## 2022-05-16 RX ADMIN — GABAPENTIN 400 MG: 400 CAPSULE ORAL at 16:39

## 2022-05-16 RX ADMIN — BACLOFEN 10 MG: 10 TABLET ORAL at 21:58

## 2022-05-16 RX ADMIN — PRAZOSIN HYDROCHLORIDE 1 MG: 1 CAPSULE ORAL at 21:58

## 2022-05-16 RX ADMIN — CLONAZEPAM 1 MG: 1 TABLET ORAL at 08:47

## 2022-05-16 RX ADMIN — CLONAZEPAM 1 MG: 1 TABLET ORAL at 16:39

## 2022-05-16 RX ADMIN — DULOXETINE HYDROCHLORIDE 60 MG: 60 CAPSULE, DELAYED RELEASE ORAL at 08:47

## 2022-05-16 RX ADMIN — GABAPENTIN 400 MG: 400 CAPSULE ORAL at 08:47

## 2022-05-16 RX ADMIN — GABAPENTIN 400 MG: 400 CAPSULE ORAL at 01:13

## 2022-05-16 RX ADMIN — ZOLPIDEM TARTRATE 5 MG: 5 TABLET, COATED ORAL at 21:58

## 2022-05-16 RX ADMIN — QUETIAPINE FUMARATE 200 MG: 100 TABLET ORAL at 21:58

## 2022-05-16 RX ADMIN — APIXABAN 2.5 MG: 2.5 TABLET, FILM COATED ORAL at 16:39

## 2022-05-16 RX ADMIN — NICOTINE 1 PATCH: 21 PATCH, EXTENDED RELEASE TRANSDERMAL at 08:47

## 2022-05-16 RX ADMIN — APIXABAN 2.5 MG: 2.5 TABLET, FILM COATED ORAL at 08:47

## 2022-05-16 RX ADMIN — BACLOFEN 10 MG: 10 TABLET ORAL at 16:39

## 2022-05-16 NOTE — PLAN OF CARE
Problem: Potential for Falls  Goal: Patient will remain free of falls  Description: INTERVENTIONS:  - Educate patient/family on patient safety including physical limitations  - Instruct patient to call for assistance with activity   - Consult OT/PT to assist with strengthening/mobility   - Keep Call bell within reach  - Keep bed low and locked with side rails adjusted as appropriate  - Keep care items and personal belongings within reach  - Initiate and maintain comfort rounds  - Make Fall Risk Sign visible to staff  - Offer Toileting every 2 Hours, in advance of need  - Initiate/Maintain bed alarm  - Obtain necessary fall risk management equipment:   - Apply yellow socks and bracelet for high fall risk patients  - Consider moving patient to room near nurses station  Outcome: Progressing     Problem: MOBILITY - ADULT  Goal: Maintain or return to baseline ADL function  Description: INTERVENTIONS:  -  Assess patient's ability to carry out ADLs; assess patient's baseline for ADL function and identify physical deficits which impact ability to perform ADLs (bathing, care of mouth/teeth, toileting, grooming, dressing, etc )  - Assess/evaluate cause of self-care deficits   - Assess range of motion  - Assess patient's mobility; develop plan if impaired  - Assess patient's need for assistive devices and provide as appropriate  - Encourage maximum independence but intervene and supervise when necessary  - Involve family in performance of ADLs  - Assess for home care needs following discharge   - Consider OT consult to assist with ADL evaluation and planning for discharge  - Provide patient education as appropriate  Outcome: Progressing  Goal: Maintains/Returns to pre admission functional level  Description: INTERVENTIONS:  - Perform BMAT or MOVE assessment daily    - Set and communicate daily mobility goal to care team and patient/family/caregiver     - Collaborate with rehabilitation services on mobility goals if consulted  - Perform Range of Motion 3 times a day  - Reposition patient every 2 hours    - Dangle patient 3 times a day  - Stand patient 3 times a day  - Ambulate patient 3 times a day  - Out of bed to chair 3 times a day   - Out of bed for meals 3 times a day  - Out of bed for toileting  - Record patient progress and toleration of activity level   Outcome: Progressing     Problem: Prexisting or High Potential for Compromised Skin Integrity  Goal: Skin integrity is maintained or improved  Description: INTERVENTIONS:  - Identify patients at risk for skin breakdown  - Assess and monitor skin integrity  - Assess and monitor nutrition and hydration status  - Monitor labs   - Assess for incontinence   - Turn and reposition patient  - Assist with mobility/ambulation  - Relieve pressure over bony prominences  - Avoid friction and shearing  - Provide appropriate hygiene as needed including keeping skin clean and dry  - Evaluate need for skin moisturizer/barrier cream  - Collaborate with interdisciplinary team   - Patient/family teaching  - Consider wound care consult   Outcome: Progressing

## 2022-05-16 NOTE — PROGRESS NOTES
[facility]  Progress Note - Carlyn Epps 1990, 32 y o  female MRN: [id number]  Unit/Bed#: -01 Encounter: [id number]  Primary Care Provider: Rick Brown MD   Date and time admitted to hospital: 5/14/2022  9:09 PM    * Failure to thrive in adult  Assessment & Plan  Patient developed quadriplegia last year after a spinal epidural abscess due to IV drug abuse  Currently lives at home with [de-identified] grandmother who is no longer able to care for her   Presented to the ED today as there is no care at home available   Case management consulted     Substance abuse [facility])  Assessment & Plan  · Has IV drug abuse history  · Requesting hepatitis and HIV testing at this time  · Recommended continued cessation    Back pain  Assessment & Plan  · Had complaints of chronic mild back pain while in ED  · Reports no pain currently  · Will utilize aqua K pad and continue home baclofen + gabapentin at this time  · Continue monitoring     Gonzalez catheter in place  Assessment & Plan  · Gonzalez catheter in place  · In setting of quadriplegia     Quadriplegic spinal paralysis [facility])  Assessment & Plan    Patient is a paraplegic following a spinal abscess   Gonzalez catheter in place as above   S/P tracheostomy and PEG tube reversal  Frequent repositioning         VTE Pharmacologic Prophylaxis:   Pharmacologic: Apixaban (Eliquis)  Mechanical VTE Prophylaxis in Place: Yes    Patient Centered Rounds: I have performed bedside rounds with nursing staff today  Discussions with Specialists or Other Care Team Provider: cm, nursing    Education and Discussions with Family / Patient: pt    Time Spent for Care: 30 minutes  More than 50% of total time spent on counseling and coordination of care as described above      Current Length of Stay: 0 day(s)    Current Patient Status: Observation   Certification Statement: The patient will continue to require additional inpatient hospital stay due to See below    Discharge Plan: Medically clear for discharge awaiting placement    Code Status: Level 1 - Full Code      Subjective:   Acute overnight events  Denies chest pain, shortness  Of breath cough, fevers, chills    Objective:     Vitals:   Temp (24hrs), Av 1 °F (36 7 °C), Min:97 5 °F (36 4 °C), Max:98 5 °F (36 9 °C)    Temp:  [97 5 °F (36 4 °C)-98 5 °F (36 9 °C)] 97 5 °F (36 4 °C)  HR:  [65-92] 72  Resp:  [18] 18  BP: (109-144)/(75-97) 117/75  SpO2:  [96 %-97 %] 97 %  Body mass index is 27 35 kg/m²  Input and Output Summary (last 24 hours): Intake/Output Summary (Last 24 hours) at 2022 0816  Last data filed at 2022 0305  Gross per 24 hour   Intake 360 ml   Output 6625 ml   Net -6265 ml       Physical Exam:     Physical Exam  Constitutional:       General: She is not in acute distress  Appearance: She is well-developed  She is not diaphoretic  HENT:      Head: Normocephalic and atraumatic  Nose: Nose normal       Mouth/Throat:      Pharynx: No oropharyngeal exudate  Eyes:      General: No scleral icterus  Right eye: No discharge  Left eye: No discharge  Conjunctiva/sclera: Conjunctivae normal    Neck:      Thyroid: No thyromegaly  Vascular: No JVD  Cardiovascular:      Rate and Rhythm: Normal rate and regular rhythm  Heart sounds: Normal heart sounds  No murmur heard  No friction rub  No gallop  Pulmonary:      Effort: Pulmonary effort is normal  No respiratory distress  Breath sounds: Normal breath sounds  No wheezing or rales  Chest:      Chest wall: No tenderness  Abdominal:      General: Bowel sounds are normal  There is no distension  Palpations: Abdomen is soft  Tenderness: There is no abdominal tenderness  There is no guarding or rebound  Musculoskeletal:         General: No tenderness or deformity  Normal range of motion  Cervical back: Normal range of motion and neck supple  Skin:     General: Skin is warm and dry  Findings: No erythema or rash  Neurological:      Mental Status: She is alert  Mental status is at baseline  Cranial Nerves: No cranial nerve deficit  Sensory: No sensory deficit  Motor: No abnormal muscle tone  Coordination: Coordination normal            Additional Data:     Labs:    Results from last 7 days   Lab Units 05/16/22  0608   WBC Thousand/uL 4 36   HEMOGLOBIN g/dL 12 1   HEMATOCRIT % 36 5   PLATELETS Thousands/uL 176   NEUTROS PCT % 47   LYMPHS PCT % 42   MONOS PCT % 7   EOS PCT % 3     Results from last 7 days   Lab Units 05/16/22  0608 05/14/22  2301   SODIUM mmol/L 139 140   POTASSIUM mmol/L 3 9 3 8   CHLORIDE mmol/L 105 106   CO2 mmol/L 26 25   BUN mg/dL 10 6   CREATININE mg/dL 0 49* 0 39*   ANION GAP mmol/L 8 9   CALCIUM mg/dL 8 5 8 3   ALBUMIN g/dL  --  3 2*   TOTAL BILIRUBIN mg/dL  --  0 21   ALK PHOS U/L  --  97   ALT U/L  --  48   AST U/L  --  38   GLUCOSE RANDOM mg/dL 91 85                           * I Have Reviewed All Lab Data Listed Above  * Additional Pertinent Lab Tests Reviewed:  All Labs Within Last 24 Hours Reviewed    Imaging:    Imaging Reports Reviewed Today Include: na  Imaging Personally Reviewed by Myself Includes:  na    Recent Cultures (last 7 days):           Last 24 Hours Medication List:   Current Facility-Administered Medications   Medication Dose Route Frequency Provider Last Rate    apixaban  2 5 mg Oral BID Feliberto Jolly PA-C      baclofen  10 mg Oral TID Feliberto Jolly PA-C      clonazePAM  1 mg Oral BID [place], [place]      DULoxetine  60 mg Oral Daily [place], [place]      gabapentin  400 mg Oral [address] [place], [place]      nicotine  1 patch Transdermal Daily [place], [place]      prazosin  1 mg Oral HS Debora [place], [place]      QUEtiapine  200 mg Oral HS [place]      zolpidem  5 mg Oral HS PRN Feliberto Jolly PA-C          Today, Patient Was Seen By: González Jolly MD    ** Please Note: Dictation voice to text software may have been used in the creation of this document   **

## 2022-05-16 NOTE — ASSESSMENT & PLAN NOTE
Patient is a paraplegic following a spinal abscess   Gonzalez catheter in place as above   S/P tracheostomy and PEG tube reversal  Frequent repositioning

## 2022-05-16 NOTE — ASSESSMENT & PLAN NOTE
Inpatient device interrogation and/or reprogramming orders received; the industry representative was contacted and provided with patient's name and room number.        · Had complaints of chronic mild back pain while in ED  · Reports no pain currently  · Will utilize aqua K pad and continue home baclofen + gabapentin at this time  · Continue monitoring

## 2022-05-16 NOTE — ARC ADMISSION
Referral received for [facility] and [facility]  Referral reviewed with ARC MD and patient is denied due to not having a dispo after rehab because family can no longer care for her and caregiver is moving    ARC MD recommended long term care vs referral to [facility] since she has been there for OP therapy in the past

## 2022-05-16 NOTE — PLAN OF CARE
Problem: Potential for Falls  Goal: Patient will remain free of falls  Description: INTERVENTIONS:  - Educate patient/family on patient safety including physical limitations  - Instruct patient to call for assistance with activity   - Consult OT/PT to assist with strengthening/mobility   - Keep Call bell within reach  - Keep bed low and locked with side rails adjusted as appropriate  - Keep care items and personal belongings within reach  - Initiate and maintain comfort rounds  - Make Fall Risk Sign visible to staff  - Offer Toileting every  Hours, in advance of need  - Initiate/Maintain alarm  - Obtain necessary fall risk management equipment:   - Apply yellow socks and bracelet for high fall risk patients  - Consider moving patient to room near nurses station  Outcome: Progressing     Problem: MOBILITY - ADULT  Goal: Maintain or return to baseline ADL function  Description: INTERVENTIONS:  -  Assess patient's ability to carry out ADLs; assess patient's baseline for ADL function and identify physical deficits which impact ability to perform ADLs (bathing, care of mouth/teeth, toileting, grooming, dressing, etc )  - Assess/evaluate cause of self-care deficits   - Assess range of motion  - Assess patient's mobility; develop plan if impaired  - Assess patient's need for assistive devices and provide as appropriate  - Encourage maximum independence but intervene and supervise when necessary  - Involve family in performance of ADLs  - Assess for home care needs following discharge   - Consider OT consult to assist with ADL evaluation and planning for discharge  - Provide patient education as appropriate  Outcome: Progressing  Goal: Maintains/Returns to pre admission functional level  Description: INTERVENTIONS:  - Perform BMAT or MOVE assessment daily    - Set and communicate daily mobility goal to care team and patient/family/caregiver     - Collaborate with rehabilitation services on mobility goals if consulted  - Perform Range of Motion  times a day  - Reposition patient every hours    - Dangle patient  times a day  - Stand patient  times a day  - Ambulate patient  times a day  - Out of bed to chair  times a day   - Out of bed for meals times a day  - Out of bed for toileting  - Record patient progress and toleration of activity level   Outcome: Progressing     Problem: Prexisting or High Potential for Compromised Skin Integrity  Goal: Skin integrity is maintained or improved  Description: INTERVENTIONS:  - Identify patients at risk for skin breakdown  - Assess and monitor skin integrity  - Assess and monitor nutrition and hydration status  - Monitor labs   - Assess for incontinence   - Turn and reposition patient  - Assist with mobility/ambulation  - Relieve pressure over bony prominences  - Avoid friction and shearing  - Provide appropriate hygiene as needed including keeping skin clean and dry  - Evaluate need for skin moisturizer/barrier cream  - Collaborate with interdisciplinary team   - Patient/family teaching  - Consider wound care consult   Outcome: Progressing

## 2022-05-17 VITALS
BODY MASS INDEX: 27.26 KG/M2 | RESPIRATION RATE: 18 BRPM | SYSTOLIC BLOOD PRESSURE: 124 MMHG | HEIGHT: 68 IN | OXYGEN SATURATION: 96 % | HEART RATE: 78 BPM | DIASTOLIC BLOOD PRESSURE: 83 MMHG | TEMPERATURE: 98.2 F | WEIGHT: 179.9 LBS

## 2022-05-17 PROBLEM — G47.00 INSOMNIA: Status: ACTIVE | Noted: 2022-05-17

## 2022-05-17 PROBLEM — R76.8 HEPATITIS C ANTIBODY POSITIVE IN BLOOD: Status: ACTIVE | Noted: 2022-05-17

## 2022-05-17 PROBLEM — F41.9 ANXIETY: Status: ACTIVE | Noted: 2022-05-17

## 2022-05-17 LAB
ANION GAP SERPL CALCULATED.3IONS-SCNC: 8 MMOL/L (ref 4–13)
BASOPHILS # BLD AUTO: 0.03 THOUSANDS/ΜL (ref 0–0.1)
BASOPHILS NFR BLD AUTO: 1 % (ref 0–1)
BUN SERPL-MCNC: 12 MG/DL (ref 5–25)
CALCIUM SERPL-MCNC: 8.8 MG/DL (ref 8.3–10.1)
CHLORIDE SERPL-SCNC: 104 MMOL/L (ref 100–108)
CO2 SERPL-SCNC: 25 MMOL/L (ref 21–32)
CREAT SERPL-MCNC: 0.41 MG/DL (ref 0.6–1.3)
EOSINOPHIL # BLD AUTO: 0.13 THOUSAND/ΜL (ref 0–0.61)
EOSINOPHIL NFR BLD AUTO: 3 % (ref 0–6)
ERYTHROCYTE [DISTWIDTH] IN BLOOD BY AUTOMATED COUNT: 13.1 % (ref 11.6–15.1)
FLUAV RNA RESP QL NAA+PROBE: NEGATIVE
FLUBV RNA RESP QL NAA+PROBE: NEGATIVE
GFR SERPL CREATININE-BSD FRML MDRD: 138 ML/MIN/1.73SQ M
GLUCOSE P FAST SERPL-MCNC: 91 MG/DL (ref 65–99)
GLUCOSE SERPL-MCNC: 91 MG/DL (ref 65–140)
HCT VFR BLD AUTO: 38.9 % (ref 34.8–46.1)
HGB BLD-MCNC: 12.8 G/DL (ref 11.5–15.4)
IMM GRANULOCYTES # BLD AUTO: 0.01 THOUSAND/UL (ref 0–0.2)
IMM GRANULOCYTES NFR BLD AUTO: 0 % (ref 0–2)
LYMPHOCYTES # BLD AUTO: 1.78 THOUSANDS/ΜL (ref 0.6–4.47)
LYMPHOCYTES NFR BLD AUTO: 41 % (ref 14–44)
MCH RBC QN AUTO: 29.6 PG (ref 26.8–34.3)
MCHC RBC AUTO-ENTMCNC: 32.9 G/DL (ref 31.4–37.4)
MCV RBC AUTO: 90 FL (ref 82–98)
MONOCYTES # BLD AUTO: 0.32 THOUSAND/ΜL (ref 0.17–1.22)
MONOCYTES NFR BLD AUTO: 7 % (ref 4–12)
NEUTROPHILS # BLD AUTO: 2.12 THOUSANDS/ΜL (ref 1.85–7.62)
NEUTS SEG NFR BLD AUTO: 48 % (ref 43–75)
NRBC BLD AUTO-RTO: 0 /100 WBCS
PLATELET # BLD AUTO: 176 THOUSANDS/UL (ref 149–390)
PMV BLD AUTO: 8.6 FL (ref 8.9–12.7)
POTASSIUM SERPL-SCNC: 3.8 MMOL/L (ref 3.5–5.3)
RBC # BLD AUTO: 4.32 MILLION/UL (ref 3.81–5.12)
RSV RNA RESP QL NAA+PROBE: NEGATIVE
SARS-COV-2 RNA RESP QL NAA+PROBE: NEGATIVE
SODIUM SERPL-SCNC: 137 MMOL/L (ref 136–145)
WBC # BLD AUTO: 4.39 THOUSAND/UL (ref 4.31–10.16)

## 2022-05-17 PROCEDURE — 0241U HB NFCT DS VIR RESP RNA 4 TRGT: CPT | Performed by: STUDENT IN AN ORGANIZED HEALTH CARE EDUCATION/TRAINING PROGRAM

## 2022-05-17 PROCEDURE — 80048 BASIC METABOLIC PNL TOTAL CA: CPT | Performed by: INTERNAL MEDICINE

## 2022-05-17 PROCEDURE — 87389 HIV-1 AG W/HIV-1&-2 AB AG IA: CPT | Performed by: INTERNAL MEDICINE

## 2022-05-17 PROCEDURE — 99217 PR OBSERVATION CARE DISCHARGE MANAGEMENT: CPT | Performed by: STUDENT IN AN ORGANIZED HEALTH CARE EDUCATION/TRAINING PROGRAM

## 2022-05-17 PROCEDURE — 85025 COMPLETE CBC W/AUTO DIFF WBC: CPT | Performed by: INTERNAL MEDICINE

## 2022-05-17 PROCEDURE — 99285 EMERGENCY DEPT VISIT HI MDM: CPT | Performed by: SURGERY

## 2022-05-17 RX ORDER — CLONAZEPAM 2 MG/1
1 TABLET ORAL 2 TIMES DAILY
Qty: 10 TABLET | Refills: 0 | Status: ON HOLD | OUTPATIENT
Start: 2022-05-17

## 2022-05-17 RX ORDER — ZOLPIDEM TARTRATE 5 MG/1
5 TABLET ORAL
Qty: 5 TABLET | Refills: 0 | Status: ON HOLD | OUTPATIENT
Start: 2022-05-17

## 2022-05-17 RX ADMIN — GABAPENTIN 400 MG: 400 CAPSULE ORAL at 01:07

## 2022-05-17 RX ADMIN — BACLOFEN 10 MG: 10 TABLET ORAL at 08:21

## 2022-05-17 RX ADMIN — CLONAZEPAM 1 MG: 1 TABLET ORAL at 17:32

## 2022-05-17 RX ADMIN — CLONAZEPAM 1 MG: 1 TABLET ORAL at 08:21

## 2022-05-17 RX ADMIN — NICOTINE 1 PATCH: 21 PATCH, EXTENDED RELEASE TRANSDERMAL at 08:21

## 2022-05-17 RX ADMIN — BACLOFEN 10 MG: 10 TABLET ORAL at 17:32

## 2022-05-17 RX ADMIN — GABAPENTIN 400 MG: 400 CAPSULE ORAL at 08:21

## 2022-05-17 RX ADMIN — APIXABAN 2.5 MG: 2.5 TABLET, FILM COATED ORAL at 08:21

## 2022-05-17 RX ADMIN — GABAPENTIN 400 MG: 400 CAPSULE ORAL at 17:32

## 2022-05-17 RX ADMIN — DULOXETINE HYDROCHLORIDE 60 MG: 60 CAPSULE, DELAYED RELEASE ORAL at 08:21

## 2022-05-17 RX ADMIN — APIXABAN 2.5 MG: 2.5 TABLET, FILM COATED ORAL at 17:32

## 2022-05-17 NOTE — DISCHARGE SUMMARY
[facility]  Discharge- Melissa Rene 1990, 32 y o  female MRN: [id number]  Unit/Bed#: -01 Encounter: [id number]  Primary Care Provider: Juan Jose Augustin MD   Date and time admitted to hospital: 5/14/2022  9:09 PM    * Failure to thrive in adult  Assessment & Plan  Patient developed quadriplegia last year after a spinal epidural abscess due to IV drug abuse  Currently lives at home with 81y/o grandmother who is no longer able to care for her   Presented to the ED today as there is no care at home available   Patient had been awaiting placement to rehab prior to my service  She has been except to [facility] as per   Currently she is stable for discharge and transition to [address] [place] [address]  Hepatitis C antibody positive in blood  Assessment & Plan  Noted with positive hep C antibodies  Patient reports that she is aware of having chronic history  HIV result pending at the time of the discharge and recommended outpatient follow-up with PCP for report  Follow-up with primary care provider  Insomnia  Assessment & Plan  Continue Ambien q h s         Anxiety  Assessment & Plan  Continue Xanax    Substance abuse ([facility] [address] )  Assessment & Plan  · Has IV drug abuse history  · HIV result pending at the time of discharge    · Recommended continued cessation    Back pain  Assessment & Plan  · Had complaints of chronic mild back pain while in ED  · Reports no pain currently  · Will utilize aqua K pad and continue home baclofen + gabapentin at this time  · Continue monitoring     Gonzalez catheter in place  Assessment & Plan  · Gonzalez catheter in place  · In setting of quadriplegia     Quadriplegic spinal paralysis [facility])  Assessment & Plan    Patient is a paraplegic following a spinal abscess   Gonzalez catheter in place as above   S/P tracheostomy and PEG tube reversal  Frequent repositioning       Medical Problems             Resolved Problems  Date Reviewed: 5/16/2022   None Discharging Physician / Practitioner: Iebth Reyez MD  PCP: Edmundo Barry MD  Admission Date:   Admission Orders (From admission, onward)     Ordered        05/14/22 2346  Place in Observation  Once                      Discharge Date: 05/17/22      Reason for Admission:  Lack of social support at home  Hospital Course:   Bonnie Jimenez is a 32 y o  female patient with past medical history of spinal epidural abscess in the settings of history of IV drug use, resulting in paraplegia, chronic Gonzalez catheter, anxiety, insomnia, who originally presented to the hospital on 5/14/2022 due to lack of caretaker availability at home  Patient reports that she has been dealing with grandmother however she is moving and unable to take care of her any longer  Patient had been awaiting placement to rehab prior to beginning my service  She is currently stable at her baseline and she has been accepted to [facility] as per case management  She is hemodynamically stable to transition  Refer to earlier notes for further clarification  Please see above list of diagnoses and related plan for additional information  Condition at Discharge: fair    Discharge Day Visit / Exam:   Subjective:  Patient appears comfortable not in distress  Denies any complaints  Patient was made aware of hep C findings and reports that she is aware of positive test which is chronic however she is eager to find out HIV result which is currently pending at the time of the discharge and I have recommended follow-up with PCP for results  She is agreeable      Vitals: Blood Pressure: 124/83 (05/17/22 1500)  Pulse: 78 (05/17/22 0700)  Temperature: 98 2 °F (36 8 °C) (05/17/22 1500)  Temp Source: Oral (05/17/22 1500)  Respirations: 18 (05/17/22 0700)  Height: 5' 8" (172 7 cm) (05/15/22 0041)  Weight - Scale: 81 6 kg (179 lb 14 3 oz) (05/15/22 0041)  SpO2: 96 % (05/17/22 0700)  Exam:   Physical Exam  Constitutional:       General: She is not in acute distress  Appearance: She is not ill-appearing  Eyes:      Pupils: Pupils are equal, round, and reactive to light  Cardiovascular:      Rate and Rhythm: Normal rate  Pulmonary:      Effort: Pulmonary effort is normal  No respiratory distress  Breath sounds: No wheezing or rhonchi  Musculoskeletal:      Cervical back: No rigidity  Neurological:      Mental Status: She is alert and oriented to person, place, and time  Psychiatric:         Behavior: Behavior normal          Discharge instructions/Information to patient and family:   See after visit summary for information provided to patient and family  Provisions for Follow-Up Care:  See after visit summary for information related to follow-up care and any pertinent home health orders  Disposition:   Other: [facility]    Planned Readmission:      Discharge Statement:  I spent 35 minutes discharging the patient  This time was spent on the day of discharge  I had direct contact with the patient on the day of discharge  Greater than 50% of the total time was spent examining patient, answering all patient questions, arranging and discussing plan of care with patient as well as directly providing post-discharge instructions  Additional time then spent on discharge activities  Discharge Medications:  See after visit summary for reconciled discharge medications provided to patient and/or family        **Please Note: This note may have been constructed using a voice recognition system**

## 2022-05-17 NOTE — PLAN OF CARE
Problem: Potential for Falls  Goal: Patient will remain free of falls  Description: INTERVENTIONS:  - Educate patient/family on patient safety including physical limitations  - Instruct patient to call for assistance with activity   - Consult OT/PT to assist with strengthening/mobility   - Keep Call bell within reach  - Keep bed low and locked with side rails adjusted as appropriate  - Keep care items and personal belongings within reach  - Initiate and maintain comfort rounds  - Make Fall Risk Sign visible to staff  - Offer Toileting every 2 Hours, in advance of need  - Initiate/Maintain bed alarm  - Obtain necessary fall risk management equipment:   - Apply yellow socks and bracelet for high fall risk patients  - Consider moving patient to room near nurses station  Outcome: Progressing     Problem: MOBILITY - ADULT  Goal: Maintain or return to baseline ADL function  Description: INTERVENTIONS:  -  Assess patient's ability to carry out ADLs; assess patient's baseline for ADL function and identify physical deficits which impact ability to perform ADLs (bathing, care of mouth/teeth, toileting, grooming, dressing, etc )  - Assess/evaluate cause of self-care deficits   - Assess range of motion  - Assess patient's mobility; develop plan if impaired  - Assess patient's need for assistive devices and provide as appropriate  - Encourage maximum independence but intervene and supervise when necessary  - Involve family in performance of ADLs  - Assess for home care needs following discharge   - Consider OT consult to assist with ADL evaluation and planning for discharge  - Provide patient education as appropriate  Outcome: Progressing  Goal: Maintains/Returns to pre admission functional level  Description: INTERVENTIONS:  - Perform BMAT or MOVE assessment daily    - Set and communicate daily mobility goal to care team and patient/family/caregiver     - Collaborate with rehabilitation services on mobility goals if consulted  - Perform Range of Motion 3 times a day  - Reposition patient every 2 hours    - Dangle patient 3 times a day  - Stand patient 3 times a day  - Ambulate patient 3 times a day  - Out of bed to chair 3 times a day   - Out of bed for meals 3 times a day  - Out of bed for toileting  - Record patient progress and toleration of activity level   Outcome: Progressing     Problem: Prexisting or High Potential for Compromised Skin Integrity  Goal: Skin integrity is maintained or improved  Description: INTERVENTIONS:  - Identify patients at risk for skin breakdown  - Assess and monitor skin integrity  - Assess and monitor nutrition and hydration status  - Monitor labs   - Assess for incontinence   - Turn and reposition patient  - Assist with mobility/ambulation  - Relieve pressure over bony prominences  - Avoid friction and shearing  - Provide appropriate hygiene as needed including keeping skin clean and dry  - Evaluate need for skin moisturizer/barrier cream  - Collaborate with interdisciplinary team   - Patient/family teaching  - Consider wound care consult   Outcome: Progressing English

## 2022-05-17 NOTE — CASE MANAGEMENT
Case Management Discharge Planning Note    Patient name Madal[facility] /-[facility] MRN [id number]  : 1990 Date 2022       Current Admission Date: 2022  Current Admission Diagnosis:Failure to thrive in adult   Patient Active Problem List    Diagnosis Date Noted    Substance abuse ([facility] [address] ) 05/15/2022    Failure to thrive in adult 2022    Quadriplegic spinal paralysis ([facility] [address] ) 2022    Gonzalez catheter in place 2022    Back pain 2022      LOS (days): 0  Geometric Mean LOS (GMLOS) (days):   Days to GMLOS:     OBJECTIVE:            Current admission status: Observation   Preferred Pharmacy:   [facility] [address], [address] [id number]  Phone: [phone] Fax: [phone]    Primary Care Provider: Pradeep Segundo MD    Primary Insurance: [facility]  Secondary Insurance:     DISCHARGE DETAILS:      Other Referral/Resources/Interventions Provided:  Interventions: Acute Rehab  Referral Comments:  LEFT MESSAGE FOR LILY AGUILERA/ADMISSION COORD  REGARDING ADMISSION APPROVAL/DENIAL AND BED AVAILABILITY  AWAIT CALLBACK  Treatment Team Recommendation: Acute Rehab  Discharge Destination Plan[de-identified] Acute Rehab  Transport at Discharge : BLS Ambulance      [facility] ACUTE REHAB COORD HAS DENIED FOR ADMISSION   [facility] CARE PORT MESSAGE

## 2022-05-17 NOTE — CASE MANAGEMENT
Case Management Discharge Planning Note    Patient name Bonnie Jimenez  [facility] /-73 MRN [id number]  : 1990 Date 2022       Current Admission Date: 2022  Current Admission Diagnosis:Failure to thrive in adult   Patient Active Problem List    Diagnosis Date Noted    Substance abuse ([facility] [address] ) 05/15/2022    Failure to thrive in adult 2022    Quadriplegic spinal paralysis ([facility] [address] ) 2022    Gonzalez catheter in place 2022    Back pain 2022      LOS (days): 0  Geometric Mean LOS (GMLOS) (days):   Days to GMLOS:     OBJECTIVE:            Current admission status: Observation   Preferred Pharmacy:   [facility] [address] [address]  Phone: [phone] Fax: [phone]    Primary Care Provider: Edmundo Barry MD    Primary Insurance: [address] [place] [address]  Secondary Insurance:     DISCHARGE DETAILS:    Discharge planning discussed with[de-identified] PATIENT  Freedom of Choice: Yes  Comments - Freedom of Choice: CM DISCUSSED FREEDOM OF CHOICE  PT APPROVED BY INSURANCE AND CARL CRESPO/LILY FIELD APPROVED W/ BED AVAILABLE TODAY  PT DESIREES LILY KAUR  CM contacted family/caregiver?: No- see comments  Were Treatment Team discharge recommendations reviewed with patient/caregiver?: Yes  Did patient/caregiver verbalize understanding of patient care needs?: Yes  Were patient/caregiver advised of the risks associated with not following Treatment Team discharge recommendations?: Yes         [facility]         Is the patient interested in [address] at discharge?: No    DME Referral Provided  Referral made for DME?: No    Other Referral/Resources/Interventions Provided:  Interventions: Acute Rehab  Referral Comments: SEE NOTES ABOVE           Treatment Team Recommendation: Acute Rehab  Discharge Destination Plan[de-identified] Acute Rehab  Transport at Discharge : hospitals Ambulance  Dispatcher Contacted: Yes  Number/Name of Dispatcher: Jess Manriquez  Transported by [employer] and Unit #):  JOLANTA  ETA of Transport (Date): 05/17/22  ETA of Transport (Time): [address] Name, [address] : [address] [place] [facility]  Receiving Facility/Agency Phone Number: [phone]  Facility/Agency Fax Number: [phone]

## 2022-05-17 NOTE — CASE MANAGEMENT
Case Management Discharge Planning Note    Patient name Erica Dozier  Location /-06 MRN [id number]  : 1990 Date 2022       Current Admission Date: 2022  Current Admission Diagnosis:Failure to thrive in adult   Patient Active Problem List    Diagnosis Date Noted    Substance abuse ([facility] [address] ) 05/15/2022    Failure to thrive in adult 2022    Quadriplegic spinal paralysis ([facility] [address] ) 2022    Gonzalez catheter in place 2022    Back pain 2022      LOS (days): 0  Geometric Mean LOS (GMLOS) (days):   Days to GMLOS:     OBJECTIVE:            Current admission status: Observation   Preferred Pharmacy:   [facility] # [address], [address] [address]  Phone: [phone] Fax: [phone]    Primary Care Provider: Angélica Sánchez MD    Primary Insurance: [employer]  Secondary Insurance:     DISCHARGE DETAILS:    Discharge planning discussed with[de-identified] PATIENT  Freedom of Choice: Yes  Comments - Freedom of Choice: PT AGREES TO [facility] ACUTE REHAB  AND WAS WORKING ON SAME W/ [facility] PROGRAM AS OUTPATIENT W/ Whitney Gilliam [phone]  AUTH PENDING PER INSURANCE  CM REQUESTED COVID SWAB PER DR BEE WOODARD  D/C PENDING FOR TODAY  CM contacted family/caregiver?: No- see comments  Were Treatment Team discharge recommendations reviewed with patient/caregiver?: Yes  Did patient/caregiver verbalize understanding of patient care needs?: Yes  Were patient/caregiver advised of the risks associated with not following Treatment Team discharge recommendations?: Yes         Requested  [employer] [address]         Is the patient interested in [address] at discharge?: No    DME Referral Provided  Referral made for DME?: No    Other Referral/Resources/Interventions Provided:  Interventions: Acute Rehab  Referral Comments: SEE NOTES ABOVE           Treatment Team Recommendation: Acute Rehab  Discharge Destination Plan[de-identified] Acute Rehab  Transport at Discharge : [facility]  Dispatcher Contacted: Yes (CMN REQUESTED TRANSPORT VIA [facility]  [facility]  CMN PLACED IN STICKER CHART W/ FACE SHEET   COPY TO MEDICAL RECORDS BIN FOR TRANSPORT )                 Transfer Mode: Stretcher  Accompanied by: Alone

## 2022-05-17 NOTE — DISCHARGE INSTR - AVS FIRST PAGE
Recommended close follow-up with primary care provider in 3-5 days of discharge  Recommended follow-up with Gastroenterology/hepatology positive hepatitis-C antibodies

## 2022-05-17 NOTE — CASE MANAGEMENT
Case Management Discharge Planning Note    Patient name Jelena Bhatt  Location /-63 MRN [id number]  : 1990 Date 2022       Current Admission Date: 2022  Current Admission Diagnosis:Failure to thrive in adult   Patient Active Problem List    Diagnosis Date Noted    Substance abuse ([facility] [address] ) 05/15/2022    Failure to thrive in adult 2022    Quadriplegic spinal paralysis ([facility] [address] ) 2022    Gonzalez catheter in place 2022    Back pain 2022      LOS (days): 0  Geometric Mean LOS (GMLOS) (days):   Days to GMLOS:     OBJECTIVE:            Current admission status: Observation   Preferred Pharmacy:   [facility] [address], [address] [id number]  Phone: [phone] Fax: [phone]    Primary Care Provider: Sharon Purcell MD    Primary Insurance: [address] [place] [address]  Secondary Insurance:     DISCHARGE DETAILS:    Other Referral/Resources/Interventions Provided:  Interventions: Acute Rehab  Referral Comments: CM SPOKE [place]  REGARDING ADMISSION APPROVAL/DENIAL AND BED AVAILABILITY  SHE IS REVIEWING AND GET BACK TO WRITER  W / ADMISSION DETERMINATION            Treatment Team Recommendation: Acute Rehab  Discharge Destination Plan[de-identified] Acute Rehab  Transport at Discharge : [facility]

## 2022-05-18 LAB — HIV 1+2 AB+HIV1 P24 AG SERPL QL IA: NORMAL

## 2022-05-26 ENCOUNTER — TELEPHONE (OUTPATIENT)
Dept: UROLOGY | Facility: AMBULATORY SURGERY CENTER | Age: 32
End: 2022-05-26

## 2022-06-09 ENCOUNTER — OFFICE VISIT (OUTPATIENT)
Dept: UROLOGY | Facility: CLINIC | Age: 32
End: 2022-06-09
Payer: COMMERCIAL

## 2022-06-09 VITALS
DIASTOLIC BLOOD PRESSURE: 76 MMHG | BODY MASS INDEX: 27.35 KG/M2 | SYSTOLIC BLOOD PRESSURE: 130 MMHG | HEART RATE: 62 BPM | HEIGHT: 68 IN

## 2022-06-09 DIAGNOSIS — N20.0 KIDNEY STONES: Primary | ICD-10-CM

## 2022-06-09 PROCEDURE — 99203 OFFICE O/P NEW LOW 30 MIN: CPT | Performed by: PHYSICIAN ASSISTANT

## 2022-06-09 NOTE — PROGRESS NOTES
UROLOGY CONSULTATION NOTE     Patient Identifiers: Demetri Montenegro (MRN: [id number])  Service Requesting Consultation: Referral Self  Service Providing Consultation:  Urology, Elif Carnes PA-C  Consults  Date of Service: 6/9/2022    Reason for Consultation:  Kidney stone and neurogenic bladder    History of Present Illness:     Demetri Montenegro is a 32 y o  female with history of epidural abscess and quadriplegia with neurogenic bladder  She is seen at [facility] as well as [facility]  Currently at [facility] she had an ultrasound in February which showed a 10 mm stone in left kidney nonobstructing  She is concerned about this and would like it re-evaluated  She had a Gonzalez catheter in for some time and now it is out and she is voiding volitionally but requires straight catheterization occasionally  Past Medical, Past Surgical History:     Past Medical History:   Diagnosis Date    Abscess     to the spine aug 10 2021    Cancer [facility])     ovarian   :    Past Surgical History:   Procedure Laterality Date    LUMBAR LAMINECTOMY  08/10/2021    evacuation of spinal epidural abcess    OVARIAN CYST REMOVAL      POSTERIOR FUSION CERVICAL SPINE      secondary to evacuation of spinal abcess s/p IVDU   :    Medications, Allergies:     Current Outpatient Medications:     apixaban (ELIQUIS) 2 5 mg, Take 2 5 mg by mouth in the morning and 2 5 mg in the evening , Disp: , Rfl:     baclofen 10 mg tablet, Take 10 mg by mouth in the morning and 10 mg in the evening and 10 mg before bedtime  , Disp: , Rfl:     carbamide peroxide (DEBROX) 6 5 % otic solution, Administer 5 drops into ears 2 (two) times a day, Disp: 15 mL, Rfl: 0    clonazePAM (KlonoPIN) 2 mg tablet, Take 0 5 tablets (1 mg total) by mouth in the morning and 0 5 tablets (1 mg total) in the evening , Disp: 10 tablet, Rfl: 0    DULoxetine (CYMBALTA) 60 mg delayed release capsule, Take 60 mg by mouth in the morning , Disp: , Rfl:     gabapentin (NEURONTIN) 400 mg capsule, Take 400 mg by mouth every 8 (eight) hours, Disp: , Rfl:     prazosin (MINIPRESS) 1 mg capsule, Take 1 mg by mouth daily at bedtime, Disp: , Rfl:     QUEtiapine (SEROquel) 50 mg tablet, Take 200 mg by mouth daily at bedtime, Disp: , Rfl:     selenium sulfide (SELSUN) 2 5 % shampoo, Apply topically daily as needed for dandruff, Disp: 118 mL, Rfl: 0    triamcinolone (KENALOG) 0 1 % cream, Apply topically 2 (two) times a day, Disp: 30 g, Rfl: 0    zolpidem (Ambien) 5 mg tablet, Take 1 tablet (5 mg total) by mouth daily at bedtime as needed for sleep, Disp: 5 tablet, Rfl: 0    Allergies: Allergies   Allergen Reactions    Coconut Oil - Food Allergy Anaphylaxis    Coconut Oil-Flaxseed Oil - Food Allergy Anaphylaxis    Suboxone [Buprenorphine Hcl-Naloxone Hcl] Anaphylaxis   :    Social and Family History:   Social History:   Social History     Tobacco Use    Smoking status: Former Smoker     Packs/day: 1 00    Smokeless tobacco: Never Used   Vaping Use    Vaping Use: Never used   Substance Use Topics    Alcohol use: Yes     Comment: socially    Drug use: No     Comment: 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     Social History     Tobacco Use   Smoking Status Former Smoker    Packs/day: 1 00   Smokeless Tobacco Never Used       Family History:  History reviewed  No pertinent family history :     Review of Systems:     General: Fever, chills, or night sweats: negative  Cardiac: Negative for chest pain  Pulmonary: Negative for shortness of breath  Gastrointestinal: Abdominal pain negative  Nausea, vomiting, or diarrhea negative,  Genitourinary: See HPI above  Patient does not have hematuria  All other systems queried were negative  Physical Exam:   General: Patient is pleasant and in NAD    brought in by stretcher  /76 (BP Location: Left arm, Patient Position: Sitting, Cuff Size: Adult)   Pulse 62   Ht 5' 8" (1 727 m)   BMI 27 35 kg/m²   HEENT:  Conjunctiva clear  Constitutional:  pleasant and cooperative     no apparent distress  Cardiac: Peripheral edema: negative  Pulmonary: Non-labored breathing  Abdomen: Soft, non-tender, non-distended  No surgical scars  No masses, tenderness, hernias noted  Genitourinary: Negative CVA tenderness, negative suprapubic tenderness  Extremities:  Moves extremities  Neurological:CNII-XII intact  No numbness or tingling  Essentially non focal neurologic exam  Psychiatric:mood affect and behavior normal        Labs:     Lab Results   Component Value Date    HGB 12 8 05/17/2022    HCT 38 9 05/17/2022    WBC 4 39 05/17/2022     05/17/2022   ]    Lab Results   Component Value Date    K 3 8 05/17/2022     05/17/2022    CO2 25 05/17/2022    BUN 12 05/17/2022    CREATININE 0 41 (L) 05/17/2022    CALCIUM 8 8 05/17/2022   ]    Imaging:   I personally reviewed the images and report of the following studies, and reviewed them with the patient:  Ultrasound of the limited retroperitoneum with focus on the kidneys   The right kidney demonstrates no hydronephrosis and measures 9 8 cm  The left   kidney demonstrates no hydronephrosis and measures 11 cm  Renal echogenicity is   within normal limits  Left renal simple appearing cyst measures 1 3 cm  Left   intrarenal calculi measure up to 10 mm by sonographic measurements  Impression: No hydronephrosis  Left nephrolithiasis         ASSESSMENT:     1  Nephrolithiasis  2  Neurogenic bladder with history of indwelling catheter  3  History UTI       PLAN:   -recommend we check a CT stone study for more definitive evaluation of her stone  -if her stone is nonobstructing will defer any further follow-up  -if she has an obstructing stone then treatment will be recommended      Thank you for allowing me to participate in this patients care  Please do not hesitate to call with any additional questions    Edwina Temple PA-C

## 2022-06-25 NOTE — ED PROVIDER NOTES
History  Chief Complaint   Patient presents with    Back Pain     Pt C/O of back pain which is chronic in nature, pt is not appropriately being cared for at home, patient is a quadriplegic, unable to obtain placement for care     Jewel Caputo is a 32 y o  female with a past medical history of, IV drug abuse, spinal epidural abscess and resulting quadriplegia presenting today secondary to having no available caretaker at home  Patient had been living at home with her grandmother, was moving and is unable to take care for any longer  Patient has no complaints at this time  Her spinal epidural abscess occurred last year causing cardioplegia this occurred secondary to IV drug abuse  No further complaints at this time  Prior to Admission Medications   Prescriptions Last Dose Informant Patient Reported? Taking? DULoxetine (CYMBALTA) 60 mg delayed release capsule  Self Yes Yes   Sig: Take 60 mg by mouth in the morning  QUEtiapine (SEROquel) 50 mg tablet  Self Yes No   Sig: Take 200 mg by mouth daily at bedtime   apixaban (ELIQUIS) 2 5 mg  Self Yes Yes   Sig: Take 2 5 mg by mouth in the morning and 2 5 mg in the evening  baclofen 10 mg tablet  Self Yes Yes   Sig: Take 10 mg by mouth in the morning and 10 mg in the evening and 10 mg before bedtime     carbamide peroxide (DEBROX) 6 5 % otic solution  Self No No   Sig: Administer 5 drops into ears 2 (two) times a day   clonazePAM (KlonoPIN) 2 mg tablet   Yes No   Sig: Take 1 mg by mouth 2 (two) times a day   gabapentin (NEURONTIN) 400 mg capsule  Self Yes Yes   Sig: Take 400 mg by mouth every 8 (eight) hours   prazosin (MINIPRESS) 1 mg capsule  Self Yes Yes   Sig: Take 1 mg by mouth daily at bedtime   selenium sulfide (SELSUN) 2 5 % shampoo  Self No No   Sig: Apply topically daily as needed for dandruff   triamcinolone (KENALOG) 0 1 % cream  Self No No   Sig: Apply topically 2 (two) times a day   zolpidem (Ambien) 5 mg tablet   Yes Yes   Sig: Take 5 mg by mouth daily at bedtime as needed for sleep      Facility-Administered Medications: None       Past Medical History:   Diagnosis Date    Abscess     to the spine aug 10 2021    Cancer ([facility] [address] )     ovarian       Past Surgical History:   Procedure Laterality Date    LUMBAR LAMINECTOMY  08/10/2021    evacuation of spinal epidural abcess    OVARIAN CYST REMOVAL      POSTERIOR FUSION CERVICAL SPINE      secondary to evacuation of spinal abcess s/p IVDU       History reviewed  No pertinent family history  I have reviewed and agree with the history as documented  E-Cigarette/Vaping    E-Cigarette Use Never User      E-Cigarette/Vaping Substances    Nicotine Yes      Social History     Tobacco Use    Smoking status: Former Smoker     Packs/day: 1 00    Smokeless tobacco: Never Used   Vaping Use    Vaping Use: Never used   Substance Use Topics    Alcohol use: Yes     Comment: socially    Drug use: No     Comment: 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       Review of Systems   Constitutional: Negative for activity change, chills, diaphoresis and fever  HENT: Negative for congestion, ear discharge, ear pain, rhinorrhea, sore throat and trouble swallowing  Eyes: Negative for pain, discharge, redness and visual disturbance  Respiratory: Negative for cough, chest tightness, shortness of breath and wheezing  Cardiovascular: Negative for chest pain, palpitations and leg swelling  Gastrointestinal: Negative for abdominal distention, abdominal pain, blood in stool, constipation, diarrhea, nausea and vomiting  Genitourinary: Negative for difficulty urinating, dysuria, flank pain, frequency, hematuria and urgency  Musculoskeletal: Negative for arthralgias, myalgias, neck pain and neck stiffness  Skin: Negative for color change and rash  Neurological: Negative for dizziness, syncope, facial asymmetry, weakness, light-headedness, numbness and headaches  Physical Exam  Physical Exam  Vitals reviewed  Constitutional:       General: She is not in acute distress  Appearance: Normal appearance  She is not ill-appearing  HENT:      Head: Normocephalic and atraumatic  Right Ear: Tympanic membrane normal       Left Ear: Tympanic membrane normal       Nose: Nose normal  No congestion or rhinorrhea  Mouth/Throat:      Mouth: Mucous membranes are moist       Pharynx: Oropharynx is clear  No oropharyngeal exudate or posterior oropharyngeal erythema  Eyes:      Extraocular Movements: Extraocular movements intact  Conjunctiva/sclera: Conjunctivae normal       Pupils: Pupils are equal, round, and reactive to light  Cardiovascular:      Rate and Rhythm: Normal rate and regular rhythm  Pulses: Normal pulses  Heart sounds: Normal heart sounds  Pulmonary:      Effort: Pulmonary effort is normal  No respiratory distress  Breath sounds: Normal breath sounds  No wheezing  Abdominal:      General: Abdomen is flat  Bowel sounds are normal  There is no distension  Palpations: Abdomen is soft  There is no mass  Tenderness: There is no abdominal tenderness  There is no right CVA tenderness, left CVA tenderness or guarding  Hernia: No hernia is present  Musculoskeletal:         General: No swelling, tenderness or deformity  Normal range of motion  Cervical back: Normal range of motion and neck supple  No rigidity or tenderness  Right lower leg: No edema  Left lower leg: No edema  Comments: Quadriplegic at baseline  Skin:     General: Skin is warm and dry  Capillary Refill: Capillary refill takes less than 2 seconds  Coloration: Skin is not jaundiced  Findings: No erythema or rash  Neurological:      General: No focal deficit present  Mental Status: She is alert and oriented to person, place, and time  Cranial Nerves: No cranial nerve deficit  Sensory: No sensory deficit  Motor: No weakness        Coordination: Coordination normal       Gait: Gait normal       Deep Tendon Reflexes: Reflexes normal          Vital Signs  ED Triage Vitals [05/14/22 2131]   Temperature Pulse Respirations Blood Pressure SpO2   98 5 °F (36 9 °C) 93 18 98/55 95 %      Temp Source Heart Rate Source Patient Position - Orthostatic VS BP Location FiO2 (%)   Oral Monitor Lying Right arm --      Pain Score       5           Vitals:    05/16/22 1500 05/16/22 2158 05/17/22 0700 05/17/22 1500   BP: 117/75 115/77 96/66 124/83   Pulse: 91  78    Patient Position - Orthostatic VS: Lying  Lying          Visual Acuity      ED Medications  Medications   sodium chloride 0 9 % bolus 1,000 mL (1,000 mL Intravenous New Bag 5/14/22 2316)   sodium chloride 0 9 % bolus 1,000 mL (1,000 mL Intravenous New Bag 5/15/22 0218)       Diagnostic Studies  Results Reviewed     Procedure Component Value Units Date/Time    Hepatitis panel, acute [[id number]]  (Abnormal) Collected: 05/15/22 0015    Lab Status: Final result Specimen: Blood from Arm, Right Updated: 05/15/22 1429     Hepatitis B Surface Ag Non-reactive     Hep A IgM Non-reactive     Hepatitis C Ab High Reactive     Hep B C IgM Non-reactive    COVID/FLU/RSV - 2 hour TAT [[id number]]  (Normal) Collected: 05/14/22 2248    Lab Status: Final result Specimen: Nasopharyngeal Swab Updated: 05/14/22 2350     SARS-CoV-2 Negative     INFLUENZA A PCR Negative     INFLUENZA B PCR Negative     RSV PCR Negative    Narrative:      FOR PEDIATRIC PATIENTS - copy/paste COVID Guidelines URL to browser: [email or web address]  Advisor Client [employer]    SARS-CoV-2 assay is a Nucleic Acid Amplification assay intended for the  qualitative detection of nucleic acid from SARS-CoV-2 in nasopharyngeal  swabs  Results are for the presumptive identification of SARS-CoV-2 RNA      Positive results are indicative of infection with SARS-CoV-2, the virus  causing COVID-19, but do not rule out bacterial infection or co-infection  with other viruses  Laboratories within the [place] and its  territories are required to report all positive results to the appropriate  public health authorities  Negative results do not preclude SARS-CoV-2  infection and should not be used as the sole basis for treatment or other  patient management decisions  Negative results must be combined with  clinical observations, patient history, and epidemiological information  This test has not been FDA cleared or approved  This test has been authorized by FDA under an Emergency Use Authorization  (EUA)  This test is only authorized for the duration of time the  declaration that circumstances exist justifying the authorization of the  emergency use of an in vitro diagnostic tests for detection of SARS-CoV-2  virus and/or diagnosis of COVID-19 infection under section 564(b)(1) of  the Act, 21 U  S C  666HSI-9(P)(4), unless the authorization is terminated  or revoked sooner  The test has been validated but independent review by FDA  and CLIA is pending  Test performed using [employer] Gene[employer]pert: This RT-PCR assay targets N2,  a region unique to SARS-CoV-2  A conserved region in the E-gene was chosen  for pan-Sarbecovirus detection which includes SARS-CoV-2      Comprehensive metabolic panel [[id number]]  (Abnormal) Collected: 05/14/22 2301    Lab Status: Final result Specimen: Blood from Arm, Right Updated: 05/14/22 2330     Sodium 140 mmol/L      Potassium 3 8 mmol/L      Chloride 106 mmol/L      CO2 25 mmol/L      ANION GAP 9 mmol/L      BUN 6 mg/dL      Creatinine 0 39 mg/dL      Glucose 85 mg/dL      Calcium 8 3 mg/dL      Corrected Calcium 8 9 mg/dL      AST 38 U/L      ALT 48 U/L      Alkaline Phosphatase 97 U/L      Total Protein 7 1 g/dL      Albumin 3 2 g/dL      Total Bilirubin 0 21 mg/dL      eGFR 140 ml/min/1 73sq m     Narrative:      [place] guidelines for Chronic Kidney Disease (CKD):     Stage 1 with normal or high GFR (GFR > 90 mL/min/1 73 square meters)    Stage 2 Mild CKD (GFR = 60-89 mL/min/1 73 square meters)    Stage 3A Moderate CKD (GFR = 45-59 mL/min/1 73 square meters)    Stage 3B Moderate CKD (GFR = 30-44 mL/min/1 73 square meters)    Stage 4 Severe CKD (GFR = 15-29 mL/min/1 73 square meters)    Stage 5 End Stage CKD (GFR <15 mL/min/1 73 square meters)  Note: GFR calculation is accurate only with a steady state creatinine    CBC and differential [[id number]]  (Abnormal) Collected: 05/14/22 2301    Lab Status: Final result Specimen: Blood from Arm, Right Updated: 05/14/22 2318     WBC 4 58 Thousand/uL      RBC 4 31 Million/uL      Hemoglobin 12 8 g/dL      Hematocrit 38 7 %      MCV 90 fL      MCH 29 7 pg      MCHC 33 1 g/dL      RDW 13 2 %      MPV 8 7 fL      Platelets 519 Thousands/uL      nRBC 0 /100 WBCs      Neutrophils Relative 42 %      Immat GRANS % 0 %      Lymphocytes Relative 51 %      Monocytes Relative 4 %      Eosinophils Relative 2 %      Basophils Relative 1 %      Neutrophils Absolute 1 94 Thousands/µL      Immature Grans Absolute 0 01 Thousand/uL      Lymphocytes Absolute 2 27 Thousands/µL      Monocytes Absolute 0 20 Thousand/µL      Eosinophils Absolute 0 11 Thousand/µL      Basophils Absolute 0 05 Thousands/µL     Urine Microscopic [[id number]]  (Normal) Collected: 05/14/22 2250    Lab Status: Final result Specimen: Urine, Indwelling Gonzalez Catheter Updated: 05/14/22 2304     RBC, UA None Seen /hpf      WBC, UA None Seen /hpf      Epithelial Cells None Seen /hpf      Bacteria, UA None Seen /hpf     UA w Reflex to Microscopic w Reflex to Culture [[id number]]  (Abnormal) Collected: 05/14/22 2250    Lab Status: Final result Specimen: Urine, Indwelling Gonzalez Catheter Updated: 05/14/22 2302     Color, UA Yellow     Clarity, UA Clear     Specific Gravity, UA <=1 005     pH, UA 6 5     Leukocytes, UA Trace     Nitrite, UA Positive     Protein, UA Negative mg/dl      Glucose, UA Negative mg/dl Ketones, UA Negative mg/dl      Urobilinogen, UA 0 2 E U /dl      Bilirubin, UA Negative     Occult Blood, UA Negative                 No orders to display              Procedures  Procedures         ED Course  ED Course as of 06/25/22 0620   Sat May 14, 2022   2342 Tiger text to [facility] for admission  1924 [facility] internal medicine will accept for observation  SBIRT 22yo+    Flowsheet Row Most Recent Value   SBIRT (23 yo +)    In order to provide better care to our patients, we are screening all of our patients for alcohol and drug use  Would it be okay to ask you these screening questions? Yes Filed at: 05/15/2022 0010   Initial Alcohol Screen: US AUDIT-C     1  How often do you have a drink containing alcohol? 0 Filed at: 05/15/2022 0010   2  How many drinks containing alcohol do you have on a typical day you are drinking? 0 Filed at: 05/15/2022 0010   3b  FEMALE Any Age, or MALE 65+: How often do you have 4 or more drinks on one occassion? 0 Filed at: 05/15/2022 0010   Audit-C Score 0 Filed at: 05/15/2022 0010   MINA: How many times in the past year have you    Used an illegal drug or used a prescription medication for non-medical reasons? Never Filed at: 05/15/2022 0010                    MDM  Number of Diagnoses or Management Options  Ambulatory dysfunction: established and worsening  Chronic back pain, unspecified back location, unspecified back pain laterality: established and worsening  Physical deconditioning: established and worsening  Diagnosis management comments: Patient available caretaker at home, quadriplegic  Will admit secondary to ambulatory dysfunction         Amount and/or Complexity of Data Reviewed  Clinical lab tests: ordered and reviewed  Tests in the radiology section of CPT®: ordered and reviewed  Tests in the medicine section of CPT®: ordered and reviewed  Obtain history from someone other than the patient: yes  Review and summarize past medical records: yes  Discuss the patient with other providers: yes  Independent visualization of images, tracings, or specimens: yes    Risk of Complications, Morbidity, and/or Mortality  Presenting problems: moderate  Diagnostic procedures: moderate  Management options: moderate    Patient Progress  Patient progress: stable      Disposition  Final diagnoses:   Chronic back pain, unspecified back location, unspecified back pain laterality   Ambulatory dysfunction   Physical deconditioning     Time reflects when diagnosis was documented in both MDM as applicable and the Disposition within this note     Time User Action Codes Description Comment    5/14/2022 11:44 PM Evalene Marrow Add [M54 9,  G89 29] Chronic back pain, unspecified back location, unspecified back pain laterality     5/14/2022 11:44 PM Evalene Marrow Add [R26 2] Ambulatory dysfunction     5/14/2022 11:44 PM Evalene Marrow Add [R53 81] Physical deconditioning     5/14/2022 11:59 PM Marya Cliche Add [F19 10] Substance abuse ([facility] [address] )     5/15/2022 12:12 AM Marya Cliche Add [G82 50] Quadriplegic spinal paralysis ([facility] [address] )     5/17/2022  4:48 PM Mangani, Luis M V Add [G47 00] Insomnia     5/17/2022  4:48 PM Mangani, Luis M V Add [F41 9] Anxiety     5/17/2022  4:48 PM ManganiNina V Add [R76 8] Hepatitis C antibody positive in blood       ED Disposition     ED Disposition   Admit    Condition   Stable    Date/Time   Sat May 14, 2022 1971    Comment   Case was discussed with Marcos Butler PA-C and the patient's admission status was agreed to be Admission Status: observation status to the service of Dr Vazquez Malave MD Documentation    [address], [place] [address]   Transported by (Company and Unit #) [address], [facility] [address]   Transported by [facility] and Unit #) [facility]      Follow-up Information     Follow up With Specialties Details Why Contact Info    Vickey Sorensen MD Family Medicine Follow up  [address] [facility] [address], MD Gastroenterology Follow up  [facility]            Discharge Medication List as of 5/17/2022  5:44 PM      CONTINUE these medications which have CHANGED    Details   clonazePAM (KlonoPIN) 2 mg tablet Take 0 5 tablets (1 mg total) by mouth in the morning and 0 5 tablets (1 mg total) in the evening , Starting Tue 5/17/2022, Print      zolpidem (Ambien) 5 mg tablet Take 1 tablet (5 mg total) by mouth daily at bedtime as needed for sleep, Starting Tue 5/17/2022, Print         CONTINUE these medications which have NOT CHANGED    Details   apixaban (ELIQUIS) 2 5 mg Take 2 5 mg by mouth in the morning and 2 5 mg in the evening , Historical Med      baclofen 10 mg tablet Take 10 mg by mouth in the morning and 10 mg in the evening and 10 mg before bedtime  , Historical Med      DULoxetine (CYMBALTA) 60 mg delayed release capsule Take 60 mg by mouth in the morning , Historical Med      gabapentin (NEURONTIN) 400 mg capsule Take 400 mg by mouth every 8 (eight) hours, Historical Med      prazosin (MINIPRESS) 1 mg capsule Take 1 mg by mouth daily at bedtime, Historical Med      carbamide peroxide (DEBROX) 6 5 % otic solution Administer 5 drops into ears 2 (two) times a day, Starting Wed 5/4/2022, Normal      QUEtiapine (SEROquel) 50 mg tablet Take 200 mg by mouth daily at bedtime, Historical Med      selenium sulfide (SELSUN) 2 5 % shampoo Apply topically daily as needed for dandruff, Starting Wed 5/4/2022, Normal      triamcinolone (KENALOG) 0 1 % cream Apply topically 2 (two) times a day, Starting Wed 5/4/2022, Normal             Outpatient Discharge Orders   HIV 1/2 Antigen/Antibody (4th Generation) w Reflex [facility]   Standing Status: Future Standing Exp   Date: 05/14/23       PDMP Review       Value Time User    PDMP Reviewed  Yes 5/17/2022  4:45 PM Laura Boogie MD          ED Provider  Electronically Signed by           Tracey Tolentino PA-C  06/25/22 8906

## 2022-07-12 ENCOUNTER — OFFICE VISIT (OUTPATIENT)
Dept: DENTISTRY | Facility: CLINIC | Age: 32
End: 2022-07-12

## 2022-07-12 VITALS — DIASTOLIC BLOOD PRESSURE: 49 MMHG | SYSTOLIC BLOOD PRESSURE: 74 MMHG | HEART RATE: 94 BPM | TEMPERATURE: 97.3 F

## 2022-07-12 DIAGNOSIS — B99.9 INFECTION: Primary | ICD-10-CM

## 2022-07-12 RX ORDER — AMOXICILLIN 500 MG/1
500 CAPSULE ORAL EVERY 8 HOURS SCHEDULED
Qty: 21 CAPSULE | Refills: 0 | Status: CANCELLED | OUTPATIENT
Start: 2022-07-12 | End: 2022-07-19

## 2022-07-12 NOTE — PROGRESS NOTES
Subjective   Patient ID: Ashley Childress is a 32 y o  female  Chief Complaint   Patient presents with   • Emergency/limited Exam     Reviewed medical history ( patient has special needs in a wheel chair) [facility] [employer]   ASA II   Chief complaint today " My whole mouth hurts" patient is sober from drug abuse and has not seen a dentist in 11ys  Completed Pan upon evaluation, maxillary teeth severe decay and root tip, discussed having full upper denture and mandibular teeth need full mouth debridement to determine prognosis of remaining teeth  Patient is aware of that she will need to see a oral surgeon for extractions       NV: Full mouth debridement ( 60 min in room #7 because of wheelchair bound)     Exam by Dr Devonte Samayoa

## 2022-08-02 ENCOUNTER — APPOINTMENT (EMERGENCY)
Dept: VASCULAR ULTRASOUND | Facility: HOSPITAL | Age: 32
DRG: 249 | End: 2022-08-02
Payer: COMMERCIAL

## 2022-08-02 ENCOUNTER — HOSPITAL ENCOUNTER (INPATIENT)
Facility: HOSPITAL | Age: 32
LOS: 24 days | Discharge: HOME HEALTH | DRG: 249 | End: 2022-08-28
Attending: EMERGENCY MEDICINE | Admitting: INTERNAL MEDICINE
Payer: COMMERCIAL

## 2022-08-02 DIAGNOSIS — R53.1 WEAKNESS: ICD-10-CM

## 2022-08-02 DIAGNOSIS — F39 MOOD DISORDER (HCC): ICD-10-CM

## 2022-08-02 DIAGNOSIS — G47.00 INSOMNIA: ICD-10-CM

## 2022-08-02 DIAGNOSIS — K59.00 CONSTIPATION: ICD-10-CM

## 2022-08-02 DIAGNOSIS — G89.29 CHRONIC BACK PAIN, UNSPECIFIED BACK LOCATION, UNSPECIFIED BACK PAIN LATERALITY: ICD-10-CM

## 2022-08-02 DIAGNOSIS — N39.0 UTI (URINARY TRACT INFECTION): ICD-10-CM

## 2022-08-02 DIAGNOSIS — G82.50 QUADRIPLEGIC SPINAL PARALYSIS (HCC): ICD-10-CM

## 2022-08-02 DIAGNOSIS — A41.9 SEPSIS (HCC): ICD-10-CM

## 2022-08-02 DIAGNOSIS — R11.0 NAUSEA: Primary | ICD-10-CM

## 2022-08-02 DIAGNOSIS — F41.9 ANXIETY: ICD-10-CM

## 2022-08-02 DIAGNOSIS — M54.9 CHRONIC BACK PAIN, UNSPECIFIED BACK LOCATION, UNSPECIFIED BACK PAIN LATERALITY: ICD-10-CM

## 2022-08-02 DIAGNOSIS — L21.9 SEBORRHEIC DERMATITIS: ICD-10-CM

## 2022-08-02 DIAGNOSIS — Z46.6 URINARY CATHETER (FOLEY) CHANGE REQUIRED: ICD-10-CM

## 2022-08-02 DIAGNOSIS — Z97.8 FOLEY CATHETER IN PLACE: ICD-10-CM

## 2022-08-02 PROBLEM — R60.0 LOWER EXTREMITY EDEMA: Status: ACTIVE | Noted: 2022-08-02

## 2022-08-02 PROBLEM — Z59.9 INABILITY TO RETURN TO LIVING SITUATION: Status: ACTIVE | Noted: 2022-08-02

## 2022-08-02 LAB
ALBUMIN SERPL BCP-MCNC: 3.3 G/DL (ref 3.5–5)
ALP SERPL-CCNC: 107 U/L (ref 46–116)
ALT SERPL W P-5'-P-CCNC: 51 U/L (ref 12–78)
ANION GAP SERPL CALCULATED.3IONS-SCNC: 9 MMOL/L (ref 4–13)
AST SERPL W P-5'-P-CCNC: 55 U/L (ref 5–45)
BASOPHILS # BLD AUTO: 0.03 THOUSANDS/ΜL (ref 0–0.1)
BASOPHILS NFR BLD AUTO: 0 % (ref 0–1)
BILIRUB SERPL-MCNC: 0.66 MG/DL (ref 0.2–1)
BUN SERPL-MCNC: 8 MG/DL (ref 5–25)
CALCIUM ALBUM COR SERPL-MCNC: 9.2 MG/DL (ref 8.3–10.1)
CALCIUM SERPL-MCNC: 8.6 MG/DL (ref 8.3–10.1)
CHLORIDE SERPL-SCNC: 104 MMOL/L (ref 96–108)
CO2 SERPL-SCNC: 26 MMOL/L (ref 21–32)
CREAT SERPL-MCNC: 0.46 MG/DL (ref 0.6–1.3)
EOSINOPHIL # BLD AUTO: 0.04 THOUSAND/ΜL (ref 0–0.61)
EOSINOPHIL NFR BLD AUTO: 1 % (ref 0–6)
ERYTHROCYTE [DISTWIDTH] IN BLOOD BY AUTOMATED COUNT: 14.3 % (ref 11.6–15.1)
GFR SERPL CREATININE-BSD FRML MDRD: 132 ML/MIN/1.73SQ M
GLUCOSE SERPL-MCNC: 97 MG/DL (ref 65–140)
HCT VFR BLD AUTO: 41.2 % (ref 34.8–46.1)
HGB BLD-MCNC: 14 G/DL (ref 11.5–15.4)
IMM GRANULOCYTES # BLD AUTO: 0.03 THOUSAND/UL (ref 0–0.2)
IMM GRANULOCYTES NFR BLD AUTO: 0 % (ref 0–2)
LYMPHOCYTES # BLD AUTO: 1.13 THOUSANDS/ΜL (ref 0.6–4.47)
LYMPHOCYTES NFR BLD AUTO: 16 % (ref 14–44)
MCH RBC QN AUTO: 30 PG (ref 26.8–34.3)
MCHC RBC AUTO-ENTMCNC: 34 G/DL (ref 31.4–37.4)
MCV RBC AUTO: 88 FL (ref 82–98)
MONOCYTES # BLD AUTO: 0.35 THOUSAND/ΜL (ref 0.17–1.22)
MONOCYTES NFR BLD AUTO: 5 % (ref 4–12)
NEUTROPHILS # BLD AUTO: 5.56 THOUSANDS/ΜL (ref 1.85–7.62)
NEUTS SEG NFR BLD AUTO: 78 % (ref 43–75)
NRBC BLD AUTO-RTO: 0 /100 WBCS
PLATELET # BLD AUTO: 220 THOUSANDS/UL (ref 149–390)
PMV BLD AUTO: 8.9 FL (ref 8.9–12.7)
POTASSIUM SERPL-SCNC: 4.6 MMOL/L (ref 3.5–5.3)
PROT SERPL-MCNC: 8.2 G/DL (ref 6.4–8.4)
RBC # BLD AUTO: 4.67 MILLION/UL (ref 3.81–5.12)
SODIUM SERPL-SCNC: 139 MMOL/L (ref 135–147)
WBC # BLD AUTO: 7.14 THOUSAND/UL (ref 4.31–10.16)

## 2022-08-02 PROCEDURE — 85025 COMPLETE CBC W/AUTO DIFF WBC: CPT | Performed by: EMERGENCY MEDICINE

## 2022-08-02 PROCEDURE — 80053 COMPREHEN METABOLIC PANEL: CPT | Performed by: EMERGENCY MEDICINE

## 2022-08-02 PROCEDURE — 96374 THER/PROPH/DIAG INJ IV PUSH: CPT

## 2022-08-02 PROCEDURE — 99285 EMERGENCY DEPT VISIT HI MDM: CPT | Performed by: EMERGENCY MEDICINE

## 2022-08-02 PROCEDURE — 93971 EXTREMITY STUDY: CPT

## 2022-08-02 PROCEDURE — 36415 COLL VENOUS BLD VENIPUNCTURE: CPT | Performed by: EMERGENCY MEDICINE

## 2022-08-02 PROCEDURE — 93971 EXTREMITY STUDY: CPT | Performed by: SURGERY

## 2022-08-02 PROCEDURE — 99285 EMERGENCY DEPT VISIT HI MDM: CPT

## 2022-08-02 PROCEDURE — 99220 PR INITIAL OBSERVATION CARE/DAY 70 MINUTES: CPT | Performed by: FAMILY MEDICINE

## 2022-08-02 RX ORDER — ZOLPIDEM TARTRATE 5 MG/1
5 TABLET ORAL
Status: DISCONTINUED | OUTPATIENT
Start: 2022-08-02 | End: 2022-08-28 | Stop reason: HOSPADM

## 2022-08-02 RX ORDER — PRAZOSIN HYDROCHLORIDE 1 MG/1
1 CAPSULE ORAL
Status: DISCONTINUED | OUTPATIENT
Start: 2022-08-02 | End: 2022-08-28 | Stop reason: HOSPADM

## 2022-08-02 RX ORDER — ENOXAPARIN SODIUM 100 MG/ML
40 INJECTION SUBCUTANEOUS DAILY
Status: DISCONTINUED | OUTPATIENT
Start: 2022-08-03 | End: 2022-08-28 | Stop reason: HOSPADM

## 2022-08-02 RX ORDER — ONDANSETRON 2 MG/ML
4 INJECTION INTRAMUSCULAR; INTRAVENOUS ONCE
Status: COMPLETED | OUTPATIENT
Start: 2022-08-02 | End: 2022-08-02

## 2022-08-02 RX ORDER — DULOXETIN HYDROCHLORIDE 60 MG/1
60 CAPSULE, DELAYED RELEASE ORAL DAILY
Status: DISCONTINUED | OUTPATIENT
Start: 2022-08-03 | End: 2022-08-28 | Stop reason: HOSPADM

## 2022-08-02 RX ORDER — CLONAZEPAM 1 MG/1
1 TABLET ORAL 2 TIMES DAILY
Status: DISCONTINUED | OUTPATIENT
Start: 2022-08-02 | End: 2022-08-28 | Stop reason: HOSPADM

## 2022-08-02 RX ORDER — GABAPENTIN 400 MG/1
400 CAPSULE ORAL EVERY 8 HOURS
Status: DISCONTINUED | OUTPATIENT
Start: 2022-08-02 | End: 2022-08-28 | Stop reason: HOSPADM

## 2022-08-02 RX ORDER — QUETIAPINE FUMARATE 100 MG/1
200 TABLET, FILM COATED ORAL
Status: DISCONTINUED | OUTPATIENT
Start: 2022-08-02 | End: 2022-08-28 | Stop reason: HOSPADM

## 2022-08-02 RX ADMIN — PRAZOSIN HYDROCHLORIDE 1 MG: 1 CAPSULE ORAL at 22:11

## 2022-08-02 RX ADMIN — QUETIAPINE FUMARATE 200 MG: 200 TABLET ORAL at 22:11

## 2022-08-02 RX ADMIN — ZOLPIDEM TARTRATE 5 MG: 5 TABLET, COATED ORAL at 22:11

## 2022-08-02 RX ADMIN — ONDANSETRON 4 MG: 2 INJECTION INTRAMUSCULAR; INTRAVENOUS at 13:03

## 2022-08-02 RX ADMIN — GABAPENTIN 400 MG: 400 CAPSULE ORAL at 17:31

## 2022-08-02 RX ADMIN — CLONAZEPAM 1 MG: 1 TABLET ORAL at 17:31

## 2022-08-02 NOTE — ASSESSMENT & PLAN NOTE
Background:  Presented to the ED as was noted to be vomiting and reported that she had not been getting proper care at home requesting placement     · Discharged to [facility] on 05/17/2022  · Case management consulted  · PT/OT consulted

## 2022-08-02 NOTE — ED NOTES
Patient changed  Gonzalez catheter seems to be draining appropriately  Resting on stretcher  Currently patient is mesturating, clots noted when cleaning  Brief placed  Turned and repositioned          Dorie Kulkarni RN  08/02/22 1644

## 2022-08-02 NOTE — ED NOTES
Assumed care of patient at this time   Patient received on hospital bed, no distress noted     Rachael Barry, HEYDI  08/02/22 4998

## 2022-08-02 NOTE — ED PROVIDER NOTES
History  Chief Complaint   Patient presents with    Nausea     Patient states that nausea and vomiting since this am  States that the last time she had food was at 0500 this am  Also wishes to have a consult to be placed in a nursing facility because she is not getting the proper care at home  Paraplegic  Patient also states that she randomly shaved her hair yesterday because "I have not been feeling well"       This is a 78-year-old paraplegic patient comes emergency department with a chief complaint of nausea since this morning feeling hot and cold and not having a bowel movement for few days  However the true concerns of the patient is that she lives with her grandmother who has not been taking proper care of her  She has a friend that has been helping but she is moving away and she feels like she has nowhere to go which she can get the help that she needs  She feels unsafe going back home and she can not take care of herself  She does have a visiting nurse that comes about once a week  Patient is requesting evaluation and treatment for placement purposes  She has chronic lower extremity edema but the left is much more swollen than the right and this is acute on chronic  History provided by:  Patient   used: No    Nausea  The primary symptoms include nausea  Primary symptoms do not include fever, abdominal pain, vomiting, dysuria, arthralgias or rash  The illness does not include chills or back pain  Prior to Admission Medications   Prescriptions Last Dose Informant Patient Reported? Taking? DULoxetine (CYMBALTA) 60 mg delayed release capsule  Self Yes No   Sig: Take 60 mg by mouth in the morning  QUEtiapine (SEROquel) 50 mg tablet  Self Yes No   Sig: Take 200 mg by mouth daily at bedtime   Patient not taking: Reported on 7/12/2022   apixaban (ELIQUIS) 2 5 mg  Self Yes No   Sig: Take 2 5 mg by mouth in the morning and 2 5 mg in the evening     Patient not taking: Reported on 7/12/2022   baclofen 10 mg tablet  Self Yes No   Sig: Take 10 mg by mouth in the morning and 10 mg in the evening and 10 mg before bedtime  Patient not taking: Reported on 7/12/2022   carbamide peroxide (DEBROX) 6 5 % otic solution  Self No No   Sig: Administer 5 drops into ears 2 (two) times a day   Patient not taking: Reported on 7/12/2022   clonazePAM (KlonoPIN) 2 mg tablet  Self No No   Sig: Take 0 5 tablets (1 mg total) by mouth in the morning and 0 5 tablets (1 mg total) in the evening    gabapentin (NEURONTIN) 400 mg capsule  Self Yes No   Sig: Take 400 mg by mouth every 8 (eight) hours   prazosin (MINIPRESS) 1 mg capsule  Self Yes No   Sig: Take 1 mg by mouth daily at bedtime   selenium sulfide (SELSUN) 2 5 % shampoo  Self No No   Sig: Apply topically daily as needed for dandruff   triamcinolone (KENALOG) 0 1 % cream  Self No No   Sig: Apply topically 2 (two) times a day   zolpidem (Ambien) 5 mg tablet  Self No No   Sig: Take 1 tablet (5 mg total) by mouth daily at bedtime as needed for sleep      Facility-Administered Medications: None       Past Medical History:   Diagnosis Date    Abscess     to the spine aug 10 2021    Cancer ([facility] [address] )     ovarian       Past Surgical History:   Procedure Laterality Date    LUMBAR LAMINECTOMY  08/10/2021    evacuation of spinal epidural abcess    OVARIAN CYST REMOVAL      POSTERIOR FUSION CERVICAL SPINE      secondary to evacuation of spinal abcess s/p IVDU       No family history on file  I have reviewed and agree with the history as documented      E-Cigarette/Vaping    E-Cigarette Use Never User      E-Cigarette/Vaping Substances    Nicotine Yes      Social History     Tobacco Use    Smoking status: Former Smoker     Packs/day: 1 00    Smokeless tobacco: Never Used   Vaping Use    Vaping Use: Never used   Substance Use Topics    Alcohol use: Yes     Comment: socially    Drug use: No     Comment: 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       Review of Systems   Constitutional: Negative for chills and fever  HENT: Negative for ear pain and sore throat  Eyes: Negative for pain and visual disturbance  Respiratory: Negative for cough and shortness of breath  Cardiovascular: Negative for chest pain and palpitations  Gastrointestinal: Positive for nausea  Negative for abdominal pain and vomiting  Genitourinary: Negative for dysuria and hematuria  Musculoskeletal: Negative for arthralgias and back pain  Skin: Negative for color change and rash  Neurological: Negative for seizures and syncope  All other systems reviewed and are negative  Physical Exam  Physical Exam  Vitals and nursing note reviewed  Constitutional:       General: She is not in acute distress  Appearance: Normal appearance  She is well-developed and normal weight  HENT:      Head: Normocephalic and atraumatic  Nose: Nose normal       Mouth/Throat:      Pharynx: Oropharynx is clear  Eyes:      Extraocular Movements: Extraocular movements intact  Conjunctiva/sclera: Conjunctivae normal       Pupils: Pupils are equal, round, and reactive to light  Cardiovascular:      Rate and Rhythm: Normal rate and regular rhythm  Pulses: Normal pulses  Heart sounds: Normal heart sounds  No murmur heard  Pulmonary:      Effort: Pulmonary effort is normal  No respiratory distress  Breath sounds: Normal breath sounds  Abdominal:      General: Abdomen is flat  Bowel sounds are normal       Palpations: Abdomen is soft  Tenderness: There is no abdominal tenderness  Musculoskeletal:      Cervical back: Normal range of motion and neck supple  Right lower leg: Edema present  Left lower leg: Left lower leg edema: with pitting  Skin:     General: Skin is warm and dry  Capillary Refill: Capillary refill takes less than 2 seconds  Neurological:      Mental Status: She is alert     Psychiatric:      Comments: Anxious, stressed  But not SI Vital Signs  ED Triage Vitals [08/02/22 1215]   Temperature Pulse Respirations Blood Pressure SpO2   98 6 °F (37 °C) 68 18 137/90 93 %      Temp Source Heart Rate Source Patient Position - Orthostatic VS BP Location FiO2 (%)   Oral Monitor Lying Right arm --      Pain Score       --           Vitals:    08/02/22 1215 08/02/22 1518   BP: 137/90 131/80   Pulse: 68 72   Patient Position - Orthostatic VS: Lying          Visual Acuity      ED Medications  Medications   ondansetron (ZOFRAN) injection 4 mg (4 mg Intravenous Given 8/2/22 1303)       Diagnostic Studies  Results Reviewed     Procedure Component Value Units Date/Time    Comprehensive metabolic panel [[id number]]  (Abnormal) Collected: 08/02/22 1305    Lab Status: Final result Specimen: Blood from Arm, Right Updated: 08/02/22 1331     Sodium 139 mmol/L      Potassium 4 6 mmol/L      Chloride 104 mmol/L      CO2 26 mmol/L      ANION GAP 9 mmol/L      BUN 8 mg/dL      Creatinine 0 46 mg/dL      Glucose 97 mg/dL      Calcium 8 6 mg/dL      Corrected Calcium 9 2 mg/dL      AST 55 U/L      ALT 51 U/L      Alkaline Phosphatase 107 U/L      Total Protein 8 2 g/dL      Albumin 3 3 g/dL      Total Bilirubin 0 66 mg/dL      eGFR 132 ml/min/1 73sq m     Narrative:      [place] guidelines for Chronic Kidney Disease (CKD):     Stage 1 with normal or high GFR (GFR > 90 mL/min/1 73 square meters)    Stage 2 Mild CKD (GFR = 60-89 mL/min/1 73 square meters)    Stage 3A Moderate CKD (GFR = 45-59 mL/min/1 73 square meters)    Stage 3B Moderate CKD (GFR = 30-44 mL/min/1 73 square meters)    Stage 4 Severe CKD (GFR = 15-29 mL/min/1 73 square meters)    Stage 5 End Stage CKD (GFR <15 mL/min/1 73 square meters)  Note: GFR calculation is accurate only with a steady state creatinine    CBC and differential [[id number]]  (Abnormal) Collected: 08/02/22 1305    Lab Status: Final result Specimen: Blood from Arm, Right Updated: 08/02/22 1314     WBC 7 14 Thousand/uL      RBC 4 67 Million/uL      Hemoglobin 14 0 g/dL      Hematocrit 41 2 %      MCV 88 fL      MCH 30 0 pg      MCHC 34 0 g/dL      RDW 14 3 %      MPV 8 9 fL      Platelets 799 Thousands/uL      nRBC 0 /100 WBCs      Neutrophils Relative 78 %      Immat GRANS % 0 %      Lymphocytes Relative 16 %      Monocytes Relative 5 %      Eosinophils Relative 1 %      Basophils Relative 0 %      Neutrophils Absolute 5 56 Thousands/µL      Immature Grans Absolute 0 03 Thousand/uL      Lymphocytes Absolute 1 13 Thousands/µL      Monocytes Absolute 0 35 Thousand/µL      Eosinophils Absolute 0 04 Thousand/µL      Basophils Absolute 0 03 Thousands/µL                  VAS lower limb venous duplex study, unilateral/limited    (Results Pending)              Procedures  Procedures         ED Course  ED Course as of 08/02/22 1531   Tue Aug 02, 2022   1316 WBC: 7 14   1316 Hemoglobin: 14 0   1316 HCT: 41 2                               SBIRT 20yo+    Flowsheet Row Most Recent Value   SBIRT (25 yo +)    In order to provide better care to our patients, we are screening all of our patients for alcohol and drug use  Would it be okay to ask you these screening questions? No Filed at: 08/02/2022 1324                    MDM  Number of Diagnoses or Management Options     Amount and/or Complexity of Data Reviewed  Clinical lab tests: ordered and reviewed  Tests in the radiology section of CPT®: ordered and reviewed (Neg DVT US of the leg )    Risk of Complications, Morbidity, and/or Mortality  General comments: Case discussed with hospitalist,   Awaiting case management input       Patient Progress  Patient progress: stable      Disposition  Final diagnoses:   Nausea   Weakness     Time reflects when diagnosis was documented in both MDM as applicable and the Disposition within this note     Time User Action Codes Description Comment    8/2/2022  3:30 PM Bev Torres 58 [R11 0] Nausea     8/2/2022  3:30 PM Jonathan Prajapati Add [R53 1] Weakness       ED Disposition     ED Disposition   Admit    Condition   Stable    Date/Time   Tue Aug 2, 2022  3:30 PM    Comment   Case was discussed with hospitalist and the patient's admission status was agreed to be Admission Status: observation status to the service of Dr Kiana Evans, [address] [place] [address]   Follow-up Information    None         Patient's Medications   Discharge Prescriptions    No medications on file       No discharge procedures on file      PDMP Review       Value Time User    PDMP Reviewed  Yes 5/17/2022  4:45 PM Anamaria Encarnacion MD          ED Provider  Electronically Signed by           Gin Billingsley MD  08/02/22 5695

## 2022-08-02 NOTE — ED NOTES
Pt transferred into hospital bed with 2 tech assist  Gonzalez emptied, 700cc     Keily Watkins, RN  08/02/22 4696

## 2022-08-02 NOTE — ASSESSMENT & PLAN NOTE
· Chronic lower extremity edema greater in left and right   · Patient reports that she had a duplex of lower extremity was clear last year  · Lower extremity Doppler negative for any DVT  · Elevate extremities  · Previously maintained on Eliquis however has been noncompliant outpatient

## 2022-08-02 NOTE — ASSESSMENT & PLAN NOTE
· Status post spinal epidural abscess in the setting of IV drug abuse  · Lives at home with 25-year-old grandmother who axis primary caregiver  · Improved sleep paraplegia patient does have some lower extremity sensation and movement as well and has the ability to return to full function Ng status with appropriate rehab  · Frequent turns  · Assist with all feeding  · Gonzalez catheter care    · Continue pain control with aqua K, baclofen, and gabapentin

## 2022-08-02 NOTE — Clinical Note
Rosalba Mendoza was seen and treated in our emergency department on 8/2/2022  Diagnosis:     Evelyne Rod    She may return on this date: If you have any questions or concerns, please don't hesitate to call        Quinn Champion RN    ______________________________           _______________          _______________  Hospital Representative                              Date                                Time

## 2022-08-02 NOTE — ED NOTES
Pt turned and repositioned to R side with foam wedge  Requested hospital bed, waiting for it from storage        Alexandra Bernal RN  08/02/22 5888

## 2022-08-02 NOTE — H&P
[address] [place] [address]  H&P- Siddhartha Rene 1990, 32 y o  female MRN: [id number]  Unit/Bed#: ED 10 Encounter: [id number]  Primary Care Provider: Donato Gallagher MD   Date and time admitted to hospital: 8/2/2022 12:01 PM    * Inability to return to living situation  Assessment & Plan  Background:  Presented to the ED as was noted to be vomiting and reported that she had not been getting proper care at home requesting placement  · Discharged to [facility] on 05/17/2022  · Case management consulted  · PT/OT consulted    Quadriplegic spinal paralysis [facility])  Assessment & Plan  · Status post spinal epidural abscess in the setting of IV drug abuse  · Lives at home with 51-year-old grandmother who axis primary caregiver  · Frequent turns  · Oral care/suctioning  · Assist with all feeding  · Gonzalez catheter care    · Continue pain control with aqua K, baclofen, and gabapentin    Anxiety  Assessment & Plan  · Continue Xanax per PTA medication regimen as well as Ambien for sleep      VTE Prophylaxis: Enoxaparin (Lovenox)  / sequential compression device   Code Status: full code   Discussion with family:  Family at bedside    Anticipated Length of Stay:  Patient will be admitted on an Observation basis with an anticipated length of stay of  < 2 midnights  Justification for Hospital Stay: inability it return to previous living environment     Total Time for Visit, including Counseling / Coordination of Care: 45 minutes  Greater than 50% of this total time spent on direct patient counseling and coordination of care  Past Medical History:    Past Medical History:   Diagnosis Date    Abscess     to the spine aug 10 2021    Cancer [facility])     ovarian       Chief Complaint:   Nausea (Patient states that nausea and vomiting since this am  States that the last time she had food was at 0500 this am  Also wishes to have a consult to be placed in a nursing facility because she is not getting the proper care at home  Paraplegic  Patient also states that she randomly shaved her hair yesterday because "I have not been feeling well"/)      History of Present Illness:    Elver Lane is a 32 y o  female with past medical history as noted in table above who presents with Nausea (Patient states that nausea and vomiting since this am  States that the last time she had food was at 0500 this am  Also wishes to have a consult to be placed in a nursing facility because she is not getting the proper care at home  Paraplegic  Patient also states that she randomly shaved her hair yesterday because "I have not been feeling well"/)    However the true concerns of the patient is that she lives with her grandmother who has not been taking proper care of her  She has a friend that has been helping but she is moving away and she feels like she has nowhere to go which she can get the help that she needs  She feels unsafe going back home and she can not take care of herself  She does have a visiting nurse that comes about once a week  Patient is requesting evaluation and treatment for placement purposes  She has chronic lower extremity edema but the left is much more swollen than the right and this is acute on chronic  Review of Systems:    Review of Systems   Constitutional: Positive for activity change  HENT: Negative  Eyes: Negative  Respiratory: Negative for shortness of breath  Cardiovascular: Positive for leg swelling  Endocrine: Negative  Genitourinary: Negative  Musculoskeletal: Negative  Skin: Negative  Neurological: Negative  Hematological: Negative  Psychiatric/Behavioral: Negative  All other systems reviewed and are negative        Past Surgical History:     Past Medical History:   Diagnosis Date    Abscess     to the spine aug 10 2021    Cancer [facility])     ovarian       Past Surgical History:   Procedure Laterality Date    LUMBAR LAMINECTOMY  08/10/2021    evacuation of spinal epidural abcess    OVARIAN CYST REMOVAL      POSTERIOR FUSION CERVICAL SPINE      secondary to evacuation of spinal abcess s/p IVDU       Meds/Allergies:    Prior to Admission medications    Medication Sig Start Date End Date Taking? Authorizing Provider   apixaban (ELIQUIS) 2 5 mg Take 2 5 mg by mouth in the morning and 2 5 mg in the evening  Patient not taking: Reported on 7/12/2022    Historical Provider, MD   baclofen 10 mg tablet Take 10 mg by mouth in the morning and 10 mg in the evening and 10 mg before bedtime  Patient not taking: Reported on 7/12/2022    Historical Provider, MD   carbamide peroxide (DEBROX) 6 5 % otic solution Administer 5 drops into ears 2 (two) times a day  Patient not taking: Reported on 7/12/2022 5/4/22   Christian Tellez DO   clonazePAM (KlonoPIN) 2 mg tablet Take 0 5 tablets (1 mg total) by mouth in the morning and 0 5 tablets (1 mg total) in the evening  5/17/22   Luis M PETERSEN MD   DULoxetine (CYMBALTA) 60 mg delayed release capsule Take 60 mg by mouth in the morning  Historical Provider, MD   gabapentin (NEURONTIN) 400 mg capsule Take 400 mg by mouth every 8 (eight) hours    Historical Provider, MD   prazosin (MINIPRESS) 1 mg capsule Take 1 mg by mouth daily at bedtime    Historical Provider, MD   QUEtiapine (SEROquel) 50 mg tablet Take 200 mg by mouth daily at bedtime  Patient not taking: Reported on 7/12/2022    Historical Provider, MD   selenium sulfide (SELSUN) 2 5 % shampoo Apply topically daily as needed for dandruff 5/4/22   Christian Tellez DO   triamcinolone (KENALOG) 0 1 % cream Apply topically 2 (two) times a day 5/4/22   Christian Tellez DO   zolpidem (Ambien) 5 mg tablet Take 1 tablet (5 mg total) by mouth daily at bedtime as needed for sleep 5/17/22   Emmie Quarles MD     I have reviewed home medications using allscripts  Allergies:    Allergies   Allergen Reactions    Coconut Oil - Food Allergy Anaphylaxis    Coconut Oil-Flaxseed Oil - Food Allergy Anaphylaxis    Suboxone [Buprenorphine Hcl-Naloxone Hcl] Anaphylaxis       Social History:     Marital Status: Single   Patient Pre-hospital Living Situation: with grandmother  Patient Pre-hospital Level of Mobility: paraplegic   Patient Pre-hospital Diet Restrictions: none   Substance Use History:   Social History     Substance and Sexual Activity   Alcohol Use Yes    Comment: socially     Social History     Tobacco Use   Smoking Status Former Smoker    Packs/day: 1 00   Smokeless Tobacco Never Used     Social History     Substance and Sexual Activity   Drug Use No    Comment: 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       Family History:    No family history on file  Physical Exam:     Vitals:   Blood Pressure: 131/80 (08/02/22 1518)  Pulse: 72 (08/02/22 1518)  Temperature: 98 6 °F (37 °C) (08/02/22 1215)  Temp Source: Oral (08/02/22 1215)  Respirations: 16 (08/02/22 1518)  SpO2: 99 % (08/02/22 1518)    Physical Exam  Vitals reviewed  HENT:      Head: Normocephalic  Cardiovascular:      Rate and Rhythm: Normal rate  Pulmonary:      Effort: No respiratory distress  Abdominal:      Palpations: Abdomen is soft  Musculoskeletal:         General: No tenderness  Right lower leg: Edema present  Left lower leg: Edema present  Skin:     General: Skin is warm  Neurological:      Mental Status: She is alert  Mental status is at baseline  Sensory: Sensory deficit present  Motor: Weakness present  Psychiatric:         Mood and Affect: Mood normal        Additional Data:     Lab Results: I have personally reviewed pertinent reports        Results from last 7 days   Lab Units 08/02/22  1305   WBC Thousand/uL 7 14   HEMOGLOBIN g/dL 14 0   HEMATOCRIT % 41 2   PLATELETS Thousands/uL 220   NEUTROS PCT % 78*   LYMPHS PCT % 16   MONOS PCT % 5   EOS PCT % 1     Results from last 7 days   Lab Units 08/02/22  1305   SODIUM mmol/L 139   POTASSIUM mmol/L 4 6   CHLORIDE mmol/L 104   CO2 mmol/L 26   BUN mg/dL 8   CREATININE mg/dL 0 46* ANION GAP mmol/L 9   CALCIUM mg/dL 8 6   ALBUMIN g/dL 3 3*   TOTAL BILIRUBIN mg/dL 0 66   ALK PHOS U/L 107   ALT U/L 51   AST U/L 55*   GLUCOSE RANDOM mg/dL 97                     Imaging: I have personally reviewed pertinent reports  VAS lower limb venous duplex study, unilateral/limited    (Results Pending)     EKG, Pathology, and Other Studies Reviewed on Admission:   · EKG: no EKG obtained   · Outpatient documentation reviewed when available     [facility] Records Reviewed: Yes     ** Please Note: This note has been constructed using a voice recognition system   **

## 2022-08-03 LAB
ANION GAP SERPL CALCULATED.3IONS-SCNC: 8 MMOL/L (ref 4–13)
BASOPHILS # BLD AUTO: 0.04 THOUSANDS/ΜL (ref 0–0.1)
BASOPHILS NFR BLD AUTO: 1 % (ref 0–1)
BUN SERPL-MCNC: 9 MG/DL (ref 5–25)
CALCIUM SERPL-MCNC: 8.2 MG/DL (ref 8.3–10.1)
CHLORIDE SERPL-SCNC: 103 MMOL/L (ref 96–108)
CO2 SERPL-SCNC: 26 MMOL/L (ref 21–32)
CREAT SERPL-MCNC: 0.61 MG/DL (ref 0.6–1.3)
EOSINOPHIL # BLD AUTO: 0.1 THOUSAND/ΜL (ref 0–0.61)
EOSINOPHIL NFR BLD AUTO: 2 % (ref 0–6)
ERYTHROCYTE [DISTWIDTH] IN BLOOD BY AUTOMATED COUNT: 14.4 % (ref 11.6–15.1)
GFR SERPL CREATININE-BSD FRML MDRD: 121 ML/MIN/1.73SQ M
GLUCOSE P FAST SERPL-MCNC: 92 MG/DL (ref 65–99)
GLUCOSE SERPL-MCNC: 92 MG/DL (ref 65–140)
HCT VFR BLD AUTO: 33.6 % (ref 34.8–46.1)
HGB BLD-MCNC: 11.5 G/DL (ref 11.5–15.4)
IMM GRANULOCYTES # BLD AUTO: 0.01 THOUSAND/UL (ref 0–0.2)
IMM GRANULOCYTES NFR BLD AUTO: 0 % (ref 0–2)
LYMPHOCYTES # BLD AUTO: 1.67 THOUSANDS/ΜL (ref 0.6–4.47)
LYMPHOCYTES NFR BLD AUTO: 36 % (ref 14–44)
MCH RBC QN AUTO: 30.8 PG (ref 26.8–34.3)
MCHC RBC AUTO-ENTMCNC: 34.2 G/DL (ref 31.4–37.4)
MCV RBC AUTO: 90 FL (ref 82–98)
MONOCYTES # BLD AUTO: 0.4 THOUSAND/ΜL (ref 0.17–1.22)
MONOCYTES NFR BLD AUTO: 9 % (ref 4–12)
NEUTROPHILS # BLD AUTO: 2.43 THOUSANDS/ΜL (ref 1.85–7.62)
NEUTS SEG NFR BLD AUTO: 52 % (ref 43–75)
NRBC BLD AUTO-RTO: 0 /100 WBCS
PLATELET # BLD AUTO: 166 THOUSANDS/UL (ref 149–390)
PMV BLD AUTO: 8.9 FL (ref 8.9–12.7)
POTASSIUM SERPL-SCNC: 3.3 MMOL/L (ref 3.5–5.3)
RBC # BLD AUTO: 3.73 MILLION/UL (ref 3.81–5.12)
SODIUM SERPL-SCNC: 137 MMOL/L (ref 135–147)
WBC # BLD AUTO: 4.65 THOUSAND/UL (ref 4.31–10.16)

## 2022-08-03 PROCEDURE — 97163 PT EVAL HIGH COMPLEX 45 MIN: CPT

## 2022-08-03 PROCEDURE — 80048 BASIC METABOLIC PNL TOTAL CA: CPT | Performed by: FAMILY MEDICINE

## 2022-08-03 PROCEDURE — 99225 PR SBSQ OBSERVATION CARE/DAY 25 MINUTES: CPT | Performed by: INTERNAL MEDICINE

## 2022-08-03 PROCEDURE — 36415 COLL VENOUS BLD VENIPUNCTURE: CPT | Performed by: FAMILY MEDICINE

## 2022-08-03 PROCEDURE — 97167 OT EVAL HIGH COMPLEX 60 MIN: CPT

## 2022-08-03 PROCEDURE — 85025 COMPLETE CBC W/AUTO DIFF WBC: CPT | Performed by: FAMILY MEDICINE

## 2022-08-03 RX ORDER — DIPHENHYDRAMINE HCL 25 MG
25 TABLET ORAL EVERY 8 HOURS PRN
Status: DISCONTINUED | OUTPATIENT
Start: 2022-08-03 | End: 2022-08-03

## 2022-08-03 RX ORDER — POTASSIUM CHLORIDE 20 MEQ/1
40 TABLET, EXTENDED RELEASE ORAL ONCE
Status: COMPLETED | OUTPATIENT
Start: 2022-08-03 | End: 2022-08-03

## 2022-08-03 RX ORDER — DIPHENHYDRAMINE HYDROCHLORIDE 50 MG/ML
25 INJECTION INTRAMUSCULAR; INTRAVENOUS ONCE
Status: COMPLETED | OUTPATIENT
Start: 2022-08-03 | End: 2022-08-03

## 2022-08-03 RX ADMIN — GABAPENTIN 400 MG: 400 CAPSULE ORAL at 00:39

## 2022-08-03 RX ADMIN — POTASSIUM CHLORIDE 40 MEQ: 1500 TABLET, EXTENDED RELEASE ORAL at 08:24

## 2022-08-03 RX ADMIN — CLONAZEPAM 1 MG: 1 TABLET ORAL at 08:24

## 2022-08-03 RX ADMIN — PRAZOSIN HYDROCHLORIDE 1 MG: 1 CAPSULE ORAL at 21:20

## 2022-08-03 RX ADMIN — GABAPENTIN 400 MG: 400 CAPSULE ORAL at 17:24

## 2022-08-03 RX ADMIN — QUETIAPINE FUMARATE 200 MG: 200 TABLET ORAL at 21:20

## 2022-08-03 RX ADMIN — GABAPENTIN 400 MG: 400 CAPSULE ORAL at 08:24

## 2022-08-03 RX ADMIN — DULOXETINE HYDROCHLORIDE 60 MG: 60 CAPSULE, DELAYED RELEASE ORAL at 08:24

## 2022-08-03 RX ADMIN — ZOLPIDEM TARTRATE 5 MG: 5 TABLET, COATED ORAL at 21:20

## 2022-08-03 RX ADMIN — CLONAZEPAM 1 MG: 1 TABLET ORAL at 17:24

## 2022-08-03 RX ADMIN — DIPHENHYDRAMINE HYDROCHLORIDE 25 MG: 50 INJECTION, SOLUTION INTRAMUSCULAR; INTRAVENOUS at 14:01

## 2022-08-03 NOTE — PLAN OF CARE
Problem: PHYSICAL THERAPY ADULT  Goal: Performs mobility at highest level of function for planned discharge setting  See evaluation for individualized goals  Description: Treatment/Interventions: Functional transfer training, LE strengthening/ROM, Elevations, Therapeutic exercise, Endurance training, Bed mobility, Gait training, Equipment eval/education, Patient/family training, Spoke to nursing, OT          See flowsheet documentation for full assessment, interventions and recommendations  Outcome: Progressing  Note: Prognosis: Good  Problem List: Decreased strength, Decreased endurance, Impaired balance, Decreased mobility, Pain  Assessment: Pt is 32 y o  female seen for PT evaluation s/p admit to [facility] on 8/2/2022 w/ Inability to return to living situation  PT consulted to assess pt's functional mobility and d/c needs  Order placed for PT eval and tx, w/ activity order  Comorbidities affecting pt's physical performance at time of assessment include: quadriplegic spinal paralysis incomplete (2021 s/p spinal epidural abscess), anxiety, awan catheter, substance abuse hx, decreased social support    PTA, pt was requiring A for mobility, on disability and assist for ADL/IADL  Personal factors affecting pt at time of IE include: lives in one story house, limited home support, positive fall history, inability to perform IADLs, inability to perform ADLs and "steep ramp" to enter/exit home  Please find objective findings from PT assessment regarding body systems outlined above with impairments and limitations including weakness, impaired balance, decreased endurance, pain, decreased functional mobility tolerance and fall risk  The following objective measures performed on IE also reveal limitations: AM-PAC 6-Clicks: 9+/91  Pt's clinical presentation is currently unstable/unpredictable seen in pt's presentation    Pt to benefit from continued PT tx to address deficits as defined above and maximize level of functional independent mobility and consistency  From PT/mobility standpoint, recommendation at time of d/c would be post acute rehabilitation services pending progress in order to facilitate return to PLOF  Barriers to Discharge: Inaccessible home environment, Decreased caregiver support          See flowsheet documentation for full assessment

## 2022-08-03 NOTE — ED NOTES
Pt stating she has generalized itchiness   Provider notified and asked for order     Liz Ibarra RN  08/03/22 3942

## 2022-08-03 NOTE — OCCUPATIONAL THERAPY NOTE
Occupational Therapy Evaluation     Patient Name: Christian Salazar  Today's Date: 8/3/2022  Problem List  Principal Problem:    Inability to return to living situation  Active Problems:    Quadriplegic spinal paralysis [facility])    Anxiety    Lower extremity edema    Past Medical History  Past Medical History:   Diagnosis Date    Abscess     to the spine aug 10 2021    Cancer ([address] )     ovarian     Past Surgical History  Past Surgical History:   Procedure Laterality Date    LUMBAR LAMINECTOMY  08/10/2021    evacuation of spinal epidural abcess    OVARIAN CYST REMOVAL      POSTERIOR FUSION CERVICAL SPINE      secondary to evacuation of spinal abcess s/p IVDU         08/03/22 0913   OT Last Visit   OT Visit Date 08/03/22   Note Type   Note type Evaluation   Restrictions/Precautions   Braces or Orthoses Other (Comment)  (hand splint for L wrist drop; dorsiflex assist brace for L LE)   Other Precautions Multiple lines; Fall Risk;Telemetry   Pain Assessment   Pain Assessment Tool 0-10   Pain Score No Pain   Home Living   Type of [address] [place] [address] entrance  ("very steep ramp")   Bathroom Shower/Tub Walk-in shower   Bathroom Toilet Standard   Bathroom Equipment Commode   [address]  (sliding board, reconfirm regarding RW)   Additional Comments Per pt and medical chart, pt wasn't receiving adequate care at home  Reports that she has cargiver but not reliably  pt reports she started walking at good alfonso, not more than 1 week since she has stood up  She has not taken any steps since she left rehab about a month ago  Reports has been using platform walker in therapy only  Pt typically uses slide board vs stand/sit pivot to transfer at home and gets around with a manual w/c  Prior Function   Level of [place] Needs assistance with IADLs; Needs assistance with ADLs and functional mobility   Lives With Family  (grandmother and family)   Receives Help From Family;Friend(s)   ADL Assistance Needs assistance  (some assist; shower w/c she rolls into shower)   IADLs Needs assistance   Falls in the last 6 months 1 to 4  (3 when transferring with help of "drunk" father)   Vocational On disability   Lifestyle   Autonomy Pt requires some assist with ADLs, has assist with IADLs, and completed functional mobility using w/c and slide board   Reciprocal Relationships Has a supportive boyfriend who is a , however he is not present to assist her  Has a caregiver who is unreliable  Father abuses alcohol and is not able to properly assist her  Grandmother [de-identified] y o ) unable to care for pt either   Service to Others Pt is on disability   Psychosocial   Psychosocial (WDL) X   Patient Behaviors/Mood Cooperative;Calm   ADL   Eating Assistance 5  Supervision/Setup   Grooming Assistance 5  Supervision/Setup   UB Bathing Assistance 4  Minimal Assistance   LB Bathing Assistance 3  Moderate Assistance   UB Dressing Assistance 4  Minimal Assistance   LB Dressing Assistance 3  Moderate [address]  3  Moderate Assistance   Toileting Deficit Perineal hygiene  (while supine in bed)   Additional Comments Assist levels as outlined above are based on functional assessment of performance skills and deficits   Bed Mobility   Supine to Sit 3  Moderate assistance   Additional items Assist x 1; Increased time required;Verbal cues   Sit to Supine 3  Moderate assistance   Additional items Assist x 1; Increased time required;Verbal cues   Additional Comments Pt supine in bed at start of session and agreeable to OT  Pt sat EOB estimated 5 mins with fair sitting balance  pt returned to supine in bed at end of session with all needs met, call bell within reach     Transfers   Sit to Stand   (not tested at this time)   Balance   Static Sitting Fair   Dynamic Sitting Fair -   Activity Tolerance   Activity Tolerance Patient tolerated treatment well   Medical Staff Made Aware care coordination with PT yaquelin   Nurse Made Aware discussed case with RN   RUE Assessment   RUE Assessment X   RUE Overall AROM   R Shoulder Flexion limited   R Elbow Flexion WFL   R Elbow Extension WFL   R Wrist Flexion WFL   R Wrist Extension WFL   R Mass Grasp WFL   LUE Assessment   LUE Assessment X   LUE Overall AROM   L Shoulder Flexion limited   L Elbow Flexion WFL   L Elbow Extension WFL   L Wrist Flexion WFL   L Wrist Extension impaired - wrist drop at baseline   Hand Function   Gross Motor Coordination Impaired   Fine Motor Coordination Impaired   Vision-Basic Assessment   Patient Visual Report   (no acute visual changes reported - will continue to assess)   Cognition   Overall Cognitive Status [facility]   Arousal/Participation Alert; Cooperative   Attention Within functional limits   Orientation Level Oriented X4   Memory Within functional limits   Following Commands Follows all commands and directions without difficulty   Comments Pt able to identify self by full name and birthdate  Assessment   Limitation Decreased ADL status; Decreased UE ROM; Decreased UE strength;Decreased endurance;Decreased self-care trans;Decreased high-level ADLs   Assessment Pt is a 32 y o  female seen for OT evaluation (time 8676-4160) s/p admit to [facility] on 8/2/2022 w/ Inability to return to living situation  Comorbidities affecting pt's functional performance at time of assessment include: lower extremity edema, anxiety, insomnia, hepatitis C, substance abuse, failure to thrive in adult, quadriplegic spinal paralysis, awan catheter in place, back pain, and  has a past medical history of Abscess and Cancer ([facility] )    Personal factors affecting pt at time of IE include:limited home support, difficulty performing ADLS, difficulty performing IADLS  and health management   Prior to admission, Pt requires some assist with ADLs, has assist with IADLs, and completed functional mobility using w/c and slide board   Upon evaluation: Based on functional assessment of performance skills and deficits, pt requires set-up for feeding, supervision for grooming, Min A for UB ADLs, Mod A for LB ADLs, Mod A for toileting, Mod A x 1 for bed mobility,  which may indicate the following deficits impacting occupational performance: energy level, joint range of motion, body adjustment reactions, postural correction reactions, supporting reactions, bilateral integration and coordination, fine motor control, gross motor control and physical endurance/activity tolerance  Cognition appears to be [facility] and vision is [facility] - please refer to flowsheet above for details  Pt to benefit from continued skilled OT tx while in the hospital to address deficits as defined above and maximize level of functional independence w ADL's and functional mobility  Occupational Performance areas to address include: bathing, showering, toileting and hygiene, dressing, functional mobility, personal hygiene and grooming, health management, interest exploration, community participation and family participation  From OT standpoint, recommendation at time of d/c would be post acute rehab  Goals   Patient Goals to go to rehab   Plan   Treatment Interventions ADL retraining;Functional transfer training; Endurance training;Patient/family training;Equipment evaluation/education; Compensatory technique education;Continued evaluation; Energy conservation; Activityengagement   Goal Expiration Date 08/13/22   OT Frequency 3-5x/wk   Recommendation   OT Discharge Recommendation Post acute rehabilitation services   AM-PAC Daily Activity Inpatient   Lower Body Dressing 2   Bathing 2   Toileting 2   Upper Body Dressing 3   Grooming 3   Eating 3   Daily Activity Raw Score 15   Daily Activity Standardized Score (Calc for Raw Score >=11) 34 69   AM-PAC Applied Cognition Inpatient   Following a Speech/Presentation 4   Understanding Ordinary Conversation 4   Taking Medications 4   Remembering Where Things Are Placed or Put Away 4   Remembering List of 4-5 Errands 4   Taking Care of Complicated Tasks 4   Applied Cognition Raw Score 24   Applied Cognition Standardized Score 62 21   Barthel Index   Feeding 5   Bathing 0   Grooming Score 0   Dressing Score 5   Bladder Score 10   Bowels Score 10   Toilet Use Score 0   Transfers (Bed/Chair) Score 5   Mobility (Level Surface) Score 0   Stairs Score 0   Barthel Index Score 35   Modified [place] Scale   Modified [place] Scale 4   Goals to be met within 10 days:    Pt will complete grooming/oral hygiene tasks Mod I while seated EOB    Pt will complete UB bathing/dressing Mod I while seated EOB    Pt will complete LB bathing/dressing Mod I while seated EOB     Pt will improve functional activity tolerance while seated EOB to 15+ minutes in order to increase safety and independence during functional transfers and ADL tasks  Pt will improve dynamic sitting balance to good to increase safety and independence during functional transfers and ADL and decrease risk for falls  Pt will participate in continued assessment of functional transfers and mobility in order to determine most appropriate POC  Pt will identify and implement at least 3 healthy coping strategies to increase participation in meaningful activities and decrease risk for readmission      Keith Hodgkin, OTR/L

## 2022-08-03 NOTE — ED NOTES
Pt resting comfortably showing no signs of distress at this time      Carlito Stevenson RN  08/03/22 0150

## 2022-08-03 NOTE — CASE MANAGEMENT
Case Management Assessment & Discharge Planning Note    Patient name Dav Diaz  Location ED 10/ED 10 MRN [id number]  : 1990 Date 8/3/2022       Current Admission Date: 2022  Current Admission Diagnosis:Inability to return to living situation   Patient Active Problem List    Diagnosis Date Noted    Inability to return to living situation 2022    Lower extremity edema 2022    Anxiety 2022    Insomnia 2022    Hepatitis C antibody positive in blood 2022    Substance abuse ([facility] ) 05/15/2022    Failure to thrive in adult 2022    Quadriplegic spinal paralysis ([facility] ) 2022    Gonzalez catheter in place 2022    Back pain 2022      LOS (days): 0  Geometric Mean LOS (GMLOS) (days):   Days to GMLOS:     OBJECTIVE:              Current admission status: Observation       Preferred Pharmacy:   [facility], [address] [id number]  Phone: [phone] Fax: [phone]    Primary Care Provider: Paula Shen MD    Primary Insurance: [address] [place] [address]  Secondary Insurance:     ASSESSMENT:  Active Health Care Proxies    There are no active Health Care Proxies on file         Advance Directives  Does patient have a [address] [place] [address] Avenue?: No  Was patient offered paperwork?: Yes (already has paperwork)  Does patient currently have a Health Care decision maker?: Yes, please see Health Care Proxy section  Does patient have Advance Directives?: No  Was patient offered paperwork?: Yes (already has paperwork)  Primary Contact: karena DieterTremayne    Obs Notice Signed:  (not on workqueue)    Readmission Root Cause  30 Day Readmission: No    Patient Information  Admitted from[de-identified] Home  Mental Status: Alert  During Assessment patient was accompanied by: Not accompanied during assessment  Assessment information provided by[de-identified] Patient  Primary Caregiver: Family  Caregiver's Name[de-identified] Pt lkives with her grandmother, father, sister and brother, but does not feel they provide adequate care  Her boyfriend tries to help, but he is a  and is not around much  Her friend also tries to help, but has her own health problems  Caregiver's Relationship to Patient[de-identified] Kobe Cummings Telephone Number[de-identified] [phone]  Support Systems: Family members (grandmother Elton Mcdonald)  [place] of Residence: [place] [address] do you live in?: [address] entry access options  Select all that apply : Ramp  Type of Current Residence: [place]  In the last 12 months, was there a time when you were not able to pay the mortgage or rent on time?: No  In the last 12 months, how many places have you lived?: 1  In the last 12 months, was there a time when you did not have a steady place to sleep or slept in a shelter (including now)?: No  Homeless/housing insecurity resource given?: No  Living Arrangements: Lives w/ Extended Family (Lives with grandmother, father, sister and brother)  Is patient a ?: No    Activities of Daily Living Prior to Admission  Functional Status: Total dependent  Completes ADLs independently?: No  Level of ADL dependence: Total Dependent  Ambulates independently?: No  Level of ambulatory dependence: Total Dependent  Does patient use assisted devices?: Yes  Assisted Devices (DME) used:  Shower Chair, Walker, Wheelchair, Curtis Gal lift  Does patient currently own DME?: Yes  What DME does the patient currently own?: Wheelchair, Tana Bearded, Jayesh [address], [address] lift  Does patient have a history of Outpatient Therapy (PT/OT)?: Yes Negrito Resenidz)  Does the patient have a history of Short-Term Rehab?: Yes  Does patient have a history of HHC?: Yes  Does patient currently have [address]?: Yes ([facility])    [facility]  Type of Current Home Care Services: Home health aide, Home PT, Home OT, Nurse visit  [address][de-identified] [facility] Follow-Up Provider[de-identified] PCP    Patient Information Continued  Income Source: SSI/SSD  Does patient have prescription coverage?: Yes  Within the past 12 months, you worried that your food would run out before you got the money to buy more : Never true  Within the past 12 months, the food you bought just didn't last and you didn't have money to get more : Never true  Food insecurity resource given?: No  Does patient receive dialysis treatments?: No  Does patient have a history of substance abuse?: Yes  Historical substance use preference: "Crack" cocaine, Heroin  History of Withdrawal Symptoms: Other withdrawal symptoms (specify in comment) (Developed abscess of spine from drug use  Has been clean and sober for almost a year)  Is patient currently in treatment for substance abuse?: N/A - sober  Does patient have a history of Mental Health Diagnosis?: Yes  Is patient receiving treatment for mental health?: Yes (Is treated for depression with medication by her psychiatrist-Dr Lianna Cohen)  Has patient received inpatient treatment related to mental health in the last 2 years?: No    PHQ 2/9 Screening   Reviewed PHQ 2/9 Depression Screening Score?: No    Means of Transportation  Means of Transport to Appts[de-identified] Maglay Dubon  In the past 12 months, has lack of transportation kept you from medical appointments or from getting medications?: No  In the past 12 months, has lack of transportation kept you from meetings, work, or from getting things needed for daily living?: No  Was application for public transport provided?: No        DISCHARGE DETAILS:    Discharge planning discussed with[de-identified] patient  Freedom of Choice: Yes  Comments - Freedom of Choice: Pt is asking for referrals to facilities that could go into long term care if needed  Pt feels she does not get adequate support from her family    CM contacted family/caregiver?: No- see comments (Pt does not want CM to contact family)  Were Treatment Team discharge recommendations reviewed with patient/caregiver?: Yes  Did patient/caregiver verbalize understanding of patient care needs?: Yes  Were patient/caregiver advised of the risks associated with not following Treatment Team discharge recommendations?: Yes    Contacts  Patient Contacts: Luis Cheung  Relationship to Patient[de-identified] [address]         Is the patient interested in [place] [address] at discharge?: No    DME Referral Provided  Referral made for DME?: No    Other Referral/Resources/Interventions Provided:  Interventions: Short Term Rehab  Referral Comments: Pt is requesting a STR that could go into long term care if needed    Referrals placed    Would you like to participate in our [facility] service program?  : No - Declined    Treatment Team Recommendation: Short Term Rehab  Discharge Destination Plan[de-identified] Short Term Rehab  Transport at Discharge : BLS Ambulance

## 2022-08-03 NOTE — PLAN OF CARE
Problem: OCCUPATIONAL THERAPY ADULT  Goal: Performs self-care activities at highest level of function for planned discharge setting  See evaluation for individualized goals  Description: Treatment Interventions: ADL retraining, Functional transfer training, Endurance training, Patient/family training, Equipment evaluation/education, Compensatory technique education, Continued evaluation, Energy conservation, Activityengagement          See flowsheet documentation for full assessment, interventions and recommendations  Note: Limitation: Decreased ADL status, Decreased UE ROM, Decreased UE strength, Decreased endurance, Decreased self-care trans, Decreased high-level ADLs     Assessment: Pt is a 32 y o  female seen for OT evaluation (time 3355-4611) s/p admit to [facility] on 8/2/2022 w/ Inability to return to living situation  Comorbidities affecting pt's functional performance at time of assessment include: lower extremity edema, anxiety, insomnia, hepatitis C, substance abuse, failure to thrive in adult, quadriplegic spinal paralysis, awan catheter in place, back pain, and  has a past medical history of Abscess and Cancer ([facility] [address] )    Personal factors affecting pt at time of IE include:limited home support, difficulty performing ADLS, difficulty performing IADLS  and health management   Prior to admission, Pt requires some assist with ADLs, has assist with IADLs, and completed functional mobility using w/c and slide board   Upon evaluation: Based on functional assessment of performance skills and deficits, pt requires set-up for feeding, supervision for grooming, Min A for UB ADLs, Mod A for LB ADLs, Mod A for toileting, Mod A x 1 for bed mobility,  which may indicate the following deficits impacting occupational performance: energy level, joint range of motion, body adjustment reactions, postural correction reactions, supporting reactions, bilateral integration and coordination, fine motor control, gross motor control and physical endurance/activity tolerance  Cognition appears to be [facility] and vision is [facility] - please refer to flowsheet above for details  Pt to benefit from continued skilled OT tx while in the hospital to address deficits as defined above and maximize level of functional independence w ADL's and functional mobility  Occupational Performance areas to address include: bathing, showering, toileting and hygiene, dressing, functional mobility, personal hygiene and grooming, health management, interest exploration, community participation and family participation  From OT standpoint, recommendation at time of d/c would be post acute rehab       OT Discharge Recommendation: Post acute rehabilitation services

## 2022-08-03 NOTE — PHYSICAL THERAPY NOTE
Physical Therapy Evaluation     Patient's Name: Thong Sampson    Admitting Diagnosis  Nausea [R11 0]    Problem List  Patient Active Problem List   Diagnosis    Failure to thrive in adult    Quadriplegic spinal paralysis ([facility] )    Gonzalez catheter in place    Back pain    Substance abuse ([facility] )    Anxiety    Insomnia    Hepatitis C antibody positive in blood    Inability to return to living situation    Lower extremity edema       Past Medical History  Past Medical History:   Diagnosis Date    Abscess     to the spine aug 10 2021    Cancer ([facility] )     ovarian       Past Surgical History  Past Surgical History:   Procedure Laterality Date    LUMBAR LAMINECTOMY  08/10/2021    evacuation of spinal epidural abcess    OVARIAN CYST REMOVAL      POSTERIOR FUSION CERVICAL SPINE      secondary to evacuation of spinal abcess s/p IVDU          08/03/22 9235   Note Type   Note type Evaluation   Pain Assessment   Pain Assessment Tool 0-10   Pain Score No Pain   Home Living   Type of [address] [place] [address] entrance  (very steep ramp)   Bathroom Shower/Tub Walk-in shower   [address]  (sliding board, reconfirm regarding RW)   Additional Comments Pt  reports family use of drugs/alcohol abuse and inability to care for her  She has hx of rehab at [facility] where she was starting to walk  She has been standing at home but not walking  She was using a DF assist brace on LLE and platform walker for L UE  Pt  boyfriend drives  She has primarily been using sliding board for transfers vs stand pivot  Manual W/C     Prior Function   Lives With Family  (grandmother and family)   ADL Assistance Needs assistance  (some assist; shower w/c she rolls into shower)   IADLs Needs assistance   Falls in the last 6 months 1 to 4  (3 when transferring with help of drunk father)   Vocational On disability   Cognition   Overall Cognitive Status [facility]   Arousal/Participation Alert   Orientation Level Oriented X4 Memory Within functional limits   Following Commands Follows all commands and directions without difficulty   Subjective   Subjective I am doing ok today  RLE Assessment   RLE Assessment X   Strength RLE   R Hip Flexion 3-/5   R Knee Extension 3-/5   R Ankle Dorsiflexion 3-/5   LLE Assessment   LLE Assessment X   Strength LLE   L Hip Flexion 3/5   L Knee Extension 3-/5   L Ankle Dorsiflexion 2-/5   Bed Mobility   Supine to Sit 3  Moderate assistance   Additional items Assist x 1; Increased time required;Verbal cues   Sit to Supine 3  Moderate assistance   Additional items Assist x 1; Increased time required;Verbal cues   Additional Comments Pt  seated EOB, fair sitting balance  Able to maintain sitting balance with UE with mod perturbation in all directions  Deferred standing at time of IE due to safety, height of plinth  Will continue to assess as appropriate  Balance   Static Sitting Fair   Dynamic Sitting Fair -   Activity Tolerance   Activity Tolerance Patient tolerated treatment well   Nurse Made Aware RN ok to see   Assessment   Prognosis Good   Problem List Decreased strength;Decreased endurance; Impaired balance;Decreased mobility;Pain   Assessment Pt is 32 y o  female seen for PT evaluation s/p admit to [facility] on 8/2/2022 w/ Inability to return to living situation  PT consulted to assess pt's functional mobility and d/c needs  Order placed for PT eval and tx, w/ activity order  Comorbidities affecting pt's physical performance at time of assessment include: quadriplegic spinal paralysis incomplete (2021 s/p spinal epidural abscess), anxiety, awan catheter, substance abuse hx, decreased social support    PTA, pt was requiring A for mobility, on disability and assist for ADL/IADL  Personal factors affecting pt at time of IE include: lives in one story house, limited home support, positive fall history, inability to perform IADLs, inability to perform ADLs and "steep ramp" to enter/exit home  Please find objective findings from PT assessment regarding body systems outlined above with impairments and limitations including weakness, impaired balance, decreased endurance, pain, decreased functional mobility tolerance and fall risk  The following objective measures performed on IE also reveal limitations: AM-PAC 6-Clicks: 5+/46  Pt's clinical presentation is currently unstable/unpredictable seen in pt's presentation   Pt to benefit from continued PT tx to address deficits as defined above and maximize level of functional independent mobility and consistency  From PT/mobility standpoint, recommendation at time of d/c would be post acute rehabilitation services pending progress in order to facilitate return to PLOF  Barriers to Discharge Inaccessible home environment;Decreased caregiver support   Goals   Patient Goals to go to rehab   STG Expiration Date 08/17/22   Short Term Goal #1 1  Pt will complete bed mobility rolling L/R with Min A x1  2  Pt  Will complete sit to/from supine transfers with Min A x1  3  Pt  Will tolerate sitting EOB >30-40 min with good sitting tolerance/balance  4  Pt will participate in LE strengthening TE to increase strength in B/L LE by 1/2 grade as able  5  PT to see for transfer, standing balance and ambulation goals as appropriate  PT Treatment Day 0   Plan   Treatment/Interventions Functional transfer training;LE strengthening/ROM; Elevations; Therapeutic exercise; Endurance training;Bed mobility;Gait training;Equipment eval/education;Patient/family training;Spoke to nursing;OT   PT Frequency 3-5x/wk   AM-PAC Basic Mobility Inpatient   Turning in Bed Without Bedrails 3   Lying on Back to Sitting on Edge of Flat Bed 2   Moving Bed to Chair 1   Standing Up From Chair 1   Walk in Room 1   Climb 3-5 Stairs 1   Basic Mobility Inpatient Raw Score 9   Turning Head Towards Sound 4   Follow Simple Instructions 4   Low Function Basic Mobility Raw Score 17   Low Function Basic Mobility Standardized Score 27 46   Highest Level Of Mobility   -[facility] Goal 3: Sit at edge of bed         Jesus Trujillo, PT

## 2022-08-03 NOTE — PROGRESS NOTES
[address] [place] [address]  Progress Note - Scarlett Marcelino 1990, 32 y o  female MRN: [id number]  Unit/Bed#: ED 10 Encounter: [id number]  Primary Care Provider: Bernice Jean-Baptiste MD   Date and time admitted to hospital: 8/2/2022 12:01 PM    * Inability to return to living situation  Assessment & Plan  Background:  Presented to the ED as was noted to be vomiting and reported that she had not been getting proper care at home requesting placement  · Discharged to [facility] on 05/17/2022  · Case management consulted  · PT/OT consulted    Lower extremity edema  Assessment & Plan  · Chronic lower extremity edema greater in left and right   · Patient reports that she had a duplex of lower extremity was clear last year  · Lower extremity Doppler negative for any DVT  · Elevate extremities  · Previously maintained on Eliquis however has been noncompliant outpatient    Anxiety  Assessment & Plan  · Continue Xanax per PTA medication regimen as well as Ambien for sleep    Quadriplegic spinal paralysis ([facility] )  Assessment & Plan  · Status post spinal epidural abscess in the setting of IV drug abuse  · Lives at home with 72-year-old grandmother who axis primary caregiver  · Improved sleep paraplegia patient does have some lower extremity sensation and movement as well and has the ability to return to full function Ng status with appropriate rehab  · Frequent turns  · Assist with all feeding  · Gonzalez catheter care    · Continue pain control with aqua K, baclofen, and gabapentin        VTE Pharmacologic Prophylaxis: VTE Score: 3 Moderate Risk (Score 3-4) - Pharmacological DVT Prophylaxis Ordered: enoxaparin (Lovenox)  Patient Centered Rounds: I performed bedside rounds with nursing staff today  Discussions with Specialists or Other Care Team Provider:  Case management    Education and Discussions with Family / Patient: Patient declined call to   Time Spent for Care: 20 minutes   More than 50% of total time spent on counseling and coordination of care as described above  Current Length of Stay: 0 day(s)  Current Patient Status: Observation   Certification Statement: Patient is medically stable awaiting placement  Discharge Plan: Patient is medically stable awaiting placement    Code Status: Level 1 - Full Code    Subjective:   Patient resting comfortably on examination  Patient had no overnight events or complaints on exam     Objective:     Vitals:   Temp (24hrs), Av 6 °F (37 °C), Min:98 6 °F (37 °C), Max:98 6 °F (37 °C)    Temp:  [98 6 °F (37 °C)] 98 6 °F (37 °C)  HR:  [68-94] 80  Resp:  [15-18] 16  BP: ()/(53-90) 105/55  SpO2:  [93 %-99 %] 96 %  There is no height or weight on file to calculate BMI  Input and Output Summary (last 24 hours): Intake/Output Summary (Last 24 hours) at 8/3/2022 1141  Last data filed at 2022 1909  Gross per 24 hour   Intake --   Output 700 ml   Net -700 ml       Physical Exam:   Physical Exam  Vitals and nursing note reviewed  Constitutional:       General: She is not in acute distress  Appearance: She is well-developed  HENT:      Head: Normocephalic and atraumatic  Mouth/Throat:      Comments: Poor dentition  Eyes:      General: No scleral icterus  Conjunctiva/sclera: Conjunctivae normal    Cardiovascular:      Rate and Rhythm: Normal rate and regular rhythm  Heart sounds: Normal heart sounds  No murmur heard  No friction rub  No gallop  Pulmonary:      Effort: Pulmonary effort is normal  No respiratory distress  Breath sounds: Normal breath sounds  No wheezing or rales  Abdominal:      General: Bowel sounds are normal  There is no distension  Palpations: Abdomen is soft  Tenderness: There is no abdominal tenderness  Musculoskeletal:         General: Normal range of motion  Skin:     General: Skin is warm  Findings: No rash     Neurological:      Mental Status: She is alert and oriented to person, place, and time  Sensory: Sensory deficit present  Motor: Weakness present  Additional Data:     Labs:  Results from last 7 days   Lab Units 08/03/22  0435   WBC Thousand/uL 4 65   HEMOGLOBIN g/dL 11 5   HEMATOCRIT % 33 6*   PLATELETS Thousands/uL 166   NEUTROS PCT % 52   LYMPHS PCT % 36   MONOS PCT % 9   EOS PCT % 2     Results from last 7 days   Lab Units 08/03/22  0435 08/02/22  1305   SODIUM mmol/L 137 139   POTASSIUM mmol/L 3 3* 4 6   CHLORIDE mmol/L 103 104   CO2 mmol/L 26 26   BUN mg/dL 9 8   CREATININE mg/dL 0 61 0 46*   ANION GAP mmol/L 8 9   CALCIUM mg/dL 8 2* 8 6   ALBUMIN g/dL  --  3 3*   TOTAL BILIRUBIN mg/dL  --  0 66   ALK PHOS U/L  --  107   ALT U/L  --  51   AST U/L  --  55*   GLUCOSE RANDOM mg/dL 92 97                       Lines/Drains:  Invasive Devices  Report    Peripheral Intravenous Line  Duration           Peripheral IV 08/02/22 Dorsal (posterior); Right Hand <1 day          Drain  Duration           Urethral Catheter Double-lumen 90 days              Urinary Catheter:  Goal for removal: N/A - Chronic Gonzalez               Imaging: No pertinent imaging reviewed  Recent Cultures (last 7 days):         Last 24 Hours Medication List:   Current Facility-Administered Medications   Medication Dose Route Frequency Provider Last Rate    clonazePAM  1 mg Oral BID HOWARD Nick      DULoxetine  60 mg Oral Daily HOWARD Nick      enoxaparin  40 mg Subcutaneous Daily HOWARD Nick      gabapentin  400 mg Oral Q8H HOWARD Loera      prazosin  1 mg Oral HS HOWARD Loera      QUEtiapine  200 mg Oral HS Reginald Menezes PA-C      zolpidem  5 mg Oral HS PRN HOWARD Sanon          Today, Patient Was Seen By: Shameka Campoverde DO    **Please Note: This note may have been constructed using a voice recognition system  **

## 2022-08-03 NOTE — ASSESSMENT & PLAN NOTE
Background:  Presented to the ED as was noted to be vomiting and reported that she had not been getting proper care at home requesting placement     · Discharged to [facility] on 05/17/2022  · Case management consulted  · PT/OT consulted

## 2022-08-03 NOTE — UTILIZATION REVIEW
Initial Clinical Review    Admission: Date/Time/Statement:  8/2/22 1531 Observation AND CHANGED 8/4/22 1138 INPATIENT RE:  PATIENT HAS BEEN IN OBSERVATION > 40 HOURS AND NEEDS ONGOING STAY AS QUADRIPLEGIC SPINAL PARALYSIS WITH WORSENING BASELINE FUNCTION NEEDING CONTINUE PT/OT AND RECOMMENDATION OF REHAB  Start   Ordered   08/04/22 1139  Inpatient Admission  Once        Transfer Service: Hospitalist       Question Answer Comment   Level of Care Med Surg    Estimated length of stay More than 2 Midnights    Certification I certify that inpatient services are medically necessary for this patient for a duration of greater than two midnights  See H&P and MD Progress Notes for additional information about the patient's course of treatment  08/04/22 1138       ED Arrival Information     Expected   -    Arrival   8/2/2022 11:59    Acuity   Urgent            Means of arrival   Ambulance    Escorted by   [facility]    Service   Hospitalist    Admission type   Urgent            Arrival complaint   nausea           Chief Complaint   Patient presents with    Nausea     Patient states that nausea and vomiting since this am  States that the last time she had food was at 0500 this am  Also wishes to have a consult to be placed in a nursing facility because she is not getting the proper care at home  Paraplegic  Patient also states that she randomly shaved her hair yesterday because "I have not been feeling well"       Initial Presentation: 32 y o  female  From to ED via ems admitted to observation [address]  due to Nausea/History of quadriplegia due to spinal epidural abscess in setting of IV drug abuse/inability to receive proper care at home  Presented due to nausea staring morning or arrival   Feels grandmother is unable to care for her, friend who assisted moved and feels unsafe going back home    Feels left leg more swollen than usual   On exam Right lower leg edema, Left lower leg with pitting edema  Anxious  Weakness  In the ED given Zofran  Plan is PT/OT, case management for placement options  Continue awan, oral care and suctioning  Zofran as needed  Continue pain control with aqua K, baclofen, and gabapentin    8/3/22 Observation:  Has no complaints  Feels can't return home  On exam poor dentition  Weakness  PT/OT  saw today and with decreased strength, endurance, balance and mobility  Has pain  PTA, pt was requiring A for mobility, on disability and assist for ADL/IADL  Recommendation is post acute rehab services  8/4/22 CHANGED TO INPATIENT:  per patient - had a 'bad night '  Has chronic awan  Patient is moderate assist, does 50 - 74%  Has chronic lower extremity edema  Has some lower extremity sensation and movement  Continue PT/OT  Continue pain control with aqua K, baclofen and gabapentin  8/5/22:   No new complaints  On exam chronic LE edema L>R  Has some lower extremity sensation and movement   To continue pain control with aqua K, baclofen and gabapentin  Agreeable to rehab  Care management has made referrals and awaiting acceptance       ED Triage Vitals   Temperature Pulse Respirations Blood Pressure SpO2   08/02/22 1215 08/02/22 1215 08/02/22 1215 08/02/22 1215 08/02/22 1215   98 6 °F (37 °C) 68 18 137/90 93 %      Temp Source Heart Rate Source Patient Position - Orthostatic VS BP Location FiO2 (%)   08/02/22 1215 08/02/22 1215 08/02/22 1215 08/02/22 1215 --   Oral Monitor Lying Right arm       Pain Score       08/03/22 0913       No Pain          Wt Readings from Last 1 Encounters:   08/03/22 81 6 kg (179 lb 14 3 oz)     Additional Vital Signs:   08/05/22 07:08:08 98 3 °F (36 8 °C) 89 -- 113/73 86 97 % -- --   08/04/22 23:00:03 98 5 °F (36 9 °C) -- -- 97/60 72 -- -- --   08/04/22 22:57:45 98 5 °F (36 9 °C) 105 18 97/60 72 95 % -- --   08/04/22 2120 -- -- -- -- -- -- None (Room air) --   08/04/22 21:19:20 98 °F (36 7 °C) 86 18 116/73 87 96 %       08/04/22 07:29:53 98 °F (36 7 °C) 100 21 96/63 74 97 % -- --   08/03/22 2120 97 6 °F (36 4 °C) 86 18 114/74 87 96 % None (Room air) Lying     08/03/22 0700 -- 78 18 105/57 71 97 % None (Room air) --   08/03/22 0500 -- 70 -- 97/53 70 97 % -- --   08/03/22 0300 -- 90 -- 102/54 73 96 % -- --   08/03/22 0100 -- 93 -- 101/58 73 96 % -- --   08/02/22 2300 -- 94 -- 105/59 77 95 % -- --   08/02/22 2200 -- 78 -- 126/78 97 98 % -- --   08/02/22 2100 -- 90 -- 118/63 84 98 % -- --   08/02/22 1800 -- 72 15 141/83 108 99 % None (Room air) Lying   08/02/22 1700 -- 72 15 134/79 101 99 % None (Room air)        Pertinent Labs/Diagnostic Test Results:   VAS lower limb venous duplex study, unilateral/limited   Final Result by Chintan Martinez MD (08/02 2002)        8/2/22 venous doppler - RIGHT LOWER LIMB LIMITED:   Evaluation shows no evidence of thrombus in the common femoral vein  Doppler evaluation shows a normal response to augmentation maneuvers      LEFT LOWER LIMB:   No evidence of acute or chronic deep vein thrombosis   No evidence of superficial thrombophlebitis noted  Doppler evaluation shows a normal response to augmentation maneuvers  Popliteal, posterior tibial and anterior tibial arterial Doppler waveforms are   triphasic       Results from last 7 days   Lab Units 08/03/22  0435 08/02/22  1305   WBC Thousand/uL 4 65 7 14   HEMOGLOBIN g/dL 11 5 14 0   HEMATOCRIT % 33 6* 41 2   PLATELETS Thousands/uL 166 220   NEUTROS ABS Thousands/µL 2 43 5 56     Results from last 7 days   Lab Units 08/03/22  0435 08/02/22  1305   SODIUM mmol/L 137 139   POTASSIUM mmol/L 3 3* 4 6   CHLORIDE mmol/L 103 104   CO2 mmol/L 26 26   ANION GAP mmol/L 8 9   BUN mg/dL 9 8   CREATININE mg/dL 0 61 0 46*   EGFR ml/min/1 73sq m 121 132   CALCIUM mg/dL 8 2* 8 6     Results from last 7 days   Lab Units 08/02/22  1305   AST U/L 55*   ALT U/L 51   ALK PHOS U/L 107   TOTAL PROTEIN g/dL 8 2   ALBUMIN g/dL 3 3*   TOTAL BILIRUBIN mg/dL 0 66     Results from last 7 days   Lab Units 08/03/22  0435 08/02/22  1305   GLUCOSE RANDOM mg/dL 92 97       ED Treatment:   Medication Administration from 08/02/2022 1159 to 08/03/2022 0847       Date/Time Order Dose Route Action Comments     08/02/2022 1303 ondansetron (ZOFRAN) injection 4 mg 4 mg Intravenous Given      08/02/2022 2211 zolpidem (AMBIEN) tablet 5 mg 5 mg Oral Given      08/02/2022 2211 prazosin (MINIPRESS) capsule 1 mg 1 mg Oral Given      08/03/2022 0824 gabapentin (NEURONTIN) capsule 400 mg 400 mg Oral Given      08/03/2022 0039 gabapentin (NEURONTIN) capsule 400 mg 400 mg Oral Given      08/02/2022 1731 gabapentin (NEURONTIN) capsule 400 mg 400 mg Oral Given      08/03/2022 0824 DULoxetine (CYMBALTA) delayed release capsule 60 mg 60 mg Oral Given      08/03/2022 0824 clonazePAM (KlonoPIN) tablet 1 mg 1 mg Oral Given      08/02/2022 1731 clonazePAM (KlonoPIN) tablet 1 mg 1 mg Oral Given      08/02/2022 2211 QUEtiapine (SEROquel) tablet 200 mg 200 mg Oral Given      08/03/2022 0824 potassium chloride (K-DUR,KLOR-CON) CR tablet 40 mEq 40 mEq Oral Given         Past Medical History:   Diagnosis Date    Abscess     to the spine aug 10 2021    Cancer (HCC)     ovarian     Present on Admission:   Quadriplegic spinal paralysis ([facility] )   Anxiety   Lower extremity edema      Admitting Diagnosis: Nausea [R11 0]  Weakness [R53 1]  Age/Sex: 32 y o  female  Admission Orders:  Scheduled Medications:  clonazePAM, 1 mg, Oral, BID  DULoxetine, 60 mg, Oral, Daily  enoxaparin, 40 mg, Subcutaneous, Daily  gabapentin, 400 mg, Oral, Q8H  prazosin, 1 mg, Oral, HS  QUEtiapine, 200 mg, Oral, HS    diphenhydrAMINE (BENADRYL) injection 25 mg  Dose: 25 mg  Freq:  Once Route: IV  Start: 08/03/22 1400 End: 08/03/22 1401    Continuous IV Infusions: none      PRN Meds:  zolpidem, 5 mg, Oral, HS PRN - used x 1 8/2/22, x 1 8/3/22, x 1 8/4/22     hydrOXYzine HCL (ATARAX) tablet 25 mg - used x 2 8/4/22   Dose: 25 mg  Freq: Every 6 hours PRN Route: PO  PRN Reason: itching  Start: 08/04/22 1651    Niurka DUKES  PT/OT      Network Utilization Review Department  ATTENTION: Please call with any questions or concerns to [phone] and carefully listen to the prompts so that you are directed to the right person  All voicemails are confidential   Casie Bee all requests for admission clinical reviews, approved or denied determinations and any other requests to dedicated fax number below belonging to the campus where the patient is receiving treatment   List of dedicated fax numbers for the Facilities:  [address] DENIALS (Administrative/Medical Necessity) [phone]   [address] (Maternity/NICU/Pediatrics) [phone]   [address]  [address] Medical [place] [address] [address] [place] [address] [place] [address] [facility] [place] [address] [phone]

## 2022-08-03 NOTE — ASSESSMENT & PLAN NOTE
· Status post spinal epidural abscess in the setting of IV drug abuse  · Lives at home with 49-year-old grandmother who axis primary caregiver  · Improved sleep paraplegia patient does have some lower extremity sensation and movement as well and has the ability to return to full function Ng status with appropriate rehab  · Frequent turns  · Assist with all feeding  · Gonzalez catheter care    · Continue pain control with aqua K, baclofen, and gabapentin

## 2022-08-03 NOTE — RESPIRATORY THERAPY NOTE
RT Protocol Note  Medardo Lizama 32 y o  female MRN: [id number]  Unit/Bed#: ED 10 Encounter: [id number]    Assessment    Principal Problem:    Inability to return to living situation  Active Problems:    Quadriplegic spinal paralysis ([facility] )    Anxiety    Lower extremity edema      Home Pulmonary Medications:         Past Medical History:   Diagnosis Date    Abscess     to the spine aug 10 2021    Cancer ([facility] [address] )     ovarian     Social History     Socioeconomic History    Marital status: Single     Spouse name: Not on file    Number of children: Not on file    Years of education: Not on file    Highest education level: Not on file   Occupational History    Not on file   Tobacco Use    Smoking status: Former Smoker     Packs/day: 1 00    Smokeless tobacco: Never Used   Vaping Use    Vaping Use: Never used   Substance and Sexual Activity    Alcohol use: Yes     Comment: socially    Drug use: No     Comment: 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    Sexual activity: Not Currently   Other Topics Concern    Not on file   Social History Narrative    Not on file     Social Determinants of Health     Financial Resource Strain: Not on file   Food Insecurity: No Food Insecurity    Worried About Running Out of Food in the Last Year: Never true    Ran Out of Food in the Last Year: Never true   Transportation Needs: No Transportation Needs    Lack of Transportation (Medical): No    Lack of Transportation (Non-Medical):  No   Physical Activity: Not on file   Stress: Not on file   Social Connections: Not on file   Intimate Partner Violence: Not on file   Housing Stability: Low Risk     Unable to Pay for Housing in the Last Year: No    Number of Places Lived in the Last Year: 1    Unstable Housing in the Last Year: No       Subjective         Objective    Physical Exam:   Assessment Type: (P) Assess only  General Appearance: (P) Awake, Alert  Respiratory Pattern: (P) Normal  Chest Assessment: (P) Chest expansion symmetrical  Bilateral Breath Sounds: (P) Clear    Vitals:  Blood pressure 105/59, pulse 94, temperature 98 6 °F (37 °C), temperature source Oral, resp  rate 15, SpO2 95 %  Imaging and other studies: I have personally reviewed pertinent reports  Plan    Respiratory Plan: (P) No distress/Pulmonary history, Discontinue Protocol        Resp Comments: (P) pt not in distress  lungs are clear  spo2 on room air 98%  No resp hx  protocol complete no need for intervention

## 2022-08-04 PROCEDURE — 99232 SBSQ HOSP IP/OBS MODERATE 35: CPT | Performed by: INTERNAL MEDICINE

## 2022-08-04 RX ORDER — HYDROXYZINE HYDROCHLORIDE 25 MG/1
25 TABLET, FILM COATED ORAL EVERY 6 HOURS PRN
Status: DISCONTINUED | OUTPATIENT
Start: 2022-08-04 | End: 2022-08-28 | Stop reason: HOSPADM

## 2022-08-04 RX ADMIN — CLONAZEPAM 1 MG: 1 TABLET ORAL at 17:07

## 2022-08-04 RX ADMIN — QUETIAPINE FUMARATE 200 MG: 200 TABLET ORAL at 21:34

## 2022-08-04 RX ADMIN — HYDROXYZINE HYDROCHLORIDE 25 MG: 25 TABLET ORAL at 17:07

## 2022-08-04 RX ADMIN — GABAPENTIN 400 MG: 400 CAPSULE ORAL at 17:07

## 2022-08-04 RX ADMIN — DULOXETINE HYDROCHLORIDE 60 MG: 60 CAPSULE, DELAYED RELEASE ORAL at 08:32

## 2022-08-04 RX ADMIN — GABAPENTIN 400 MG: 400 CAPSULE ORAL at 23:26

## 2022-08-04 RX ADMIN — GABAPENTIN 400 MG: 400 CAPSULE ORAL at 00:26

## 2022-08-04 RX ADMIN — HYDROXYZINE HYDROCHLORIDE 25 MG: 25 TABLET ORAL at 23:26

## 2022-08-04 RX ADMIN — CLONAZEPAM 1 MG: 1 TABLET ORAL at 08:32

## 2022-08-04 RX ADMIN — PRAZOSIN HYDROCHLORIDE 1 MG: 1 CAPSULE ORAL at 21:34

## 2022-08-04 RX ADMIN — GABAPENTIN 400 MG: 400 CAPSULE ORAL at 08:32

## 2022-08-04 RX ADMIN — ZOLPIDEM TARTRATE 5 MG: 5 TABLET, COATED ORAL at 21:34

## 2022-08-04 NOTE — ASSESSMENT & PLAN NOTE
Background:  Presented to the ED as was noted to be vomiting and reported that she had not been getting proper care at home requesting placement     · Discharged to [facility] on 05/17/2022  · Case management consulted  · PT/OT consulted

## 2022-08-04 NOTE — NURSING NOTE
Pt refused morning labs, I went in and advised patient of the importance of lab draws  She stated she had a bad night and wanted to do it later  Will pass on to dayshift RN

## 2022-08-04 NOTE — ASSESSMENT & PLAN NOTE
· Chronic lower extremity edema greater in left and right   · Patient reports that she had a duplex of lower extremity was clear last year  · Lower extremity Doppler negative for any DVT  · Elevate extremities  · Patient was previously on Eliquis but states this medication was discontinued

## 2022-08-04 NOTE — PROGRESS NOTES
Pt refusing full body/skin assessment  Pt does not want to turn/roll at this time  Pt educated on importance of assessment for prevention of wounds  Pt still refusing at this time  Pt on accumax matress

## 2022-08-04 NOTE — ASSESSMENT & PLAN NOTE
· Status post spinal epidural abscess in the setting of IV drug abuse  · Lives at home with 80-year-old grandmother who axis primary caregiver  · Improved sleep paraplegia patient does have some lower extremity sensation and movement as well and has the ability to return to full function Ng status with appropriate rehab  · Frequent turns  · Assist with all feeding  · Gonzalez catheter care    · Continue pain control with aqua K, baclofen, and gabapentin

## 2022-08-04 NOTE — PROGRESS NOTES
[address] [facility]  Progress Note - Gabriella Davalos 1990, 32 y o  female MRN: [id number]  Unit/Bed#: -01 Encounter: [id number]  Primary Care Provider: Rosemarie Arreola MD   Date and time admitted to hospital: 8/2/2022 12:01 PM    * Inability to return to living situation  Assessment & Plan  Background:  Presented to the ED as was noted to be vomiting and reported that she had not been getting proper care at home requesting placement  · Discharged to [facility] on 05/17/2022  · Case management consulted  · PT/OT consulted    Lower extremity edema  Assessment & Plan  · Chronic lower extremity edema greater in left and right   · Patient reports that she had a duplex of lower extremity was clear last year  · Lower extremity Doppler negative for any DVT  · Elevate extremities  · Patient was previously on Eliquis but states this medication was discontinued    Anxiety  Assessment & Plan  · Continue Xanax per PTA medication regimen as well as Ambien for sleep    Quadriplegic spinal paralysis ([facility] [address] )  Assessment & Plan  · Status post spinal epidural abscess in the setting of IV drug abuse  · Lives at home with 20-year-old grandmother who axis primary caregiver  · Improved sleep paraplegia patient does have some lower extremity sensation and movement as well and has the ability to return to full function Ng status with appropriate rehab  · Frequent turns  · Assist with all feeding  · Gonzalez catheter care    · Continue pain control with aqua K, baclofen, and gabapentin        VTE Pharmacologic Prophylaxis: VTE Score: 3 Moderate Risk (Score 3-4) - Pharmacological DVT Prophylaxis Ordered: enoxaparin (Lovenox)  Patient Centered Rounds: I performed bedside rounds with nursing staff today  Discussions with Specialists or Other Care Team Provider:  Case management    Education and Discussions with Family / Patient: Patient declined call to   Time Spent for Care: 20 minutes   More than 50% of total time spent on counseling and coordination of care as described above  Current Length of Stay: 0 day(s)  Current Patient Status: Inpatient   Certification Statement: Patient medically stable awaiting placement  Discharge Plan: Patient medically stable awaiting placement    Code Status: Level 1 - Full Code    Subjective:   Patient resting comfortably on examination  Patient had no overnight events or complaints on exam this morning  Objective:     Vitals:   Temp (24hrs), Av 7 °F (36 5 °C), Min:97 6 °F (36 4 °C), Max:98 °F (36 7 °C)    Temp:  [97 6 °F (36 4 °C)-98 °F (36 7 °C)] 98 °F (36 7 °C)  HR:  [] 100  Resp:  [18-21] 21  BP: ()/(63-76) 96/63  SpO2:  [96 %-98 %] 97 %  Body mass index is 27 35 kg/m²  Input and Output Summary (last 24 hours): Intake/Output Summary (Last 24 hours) at 2022 1145  Last data filed at 2022 0501  Gross per 24 hour   Intake 888 ml   Output 2450 ml   Net -1562 ml       Physical Exam:   Physical Exam  Vitals and nursing note reviewed  Constitutional:       General: She is not in acute distress  Appearance: She is well-developed  HENT:      Head: Normocephalic and atraumatic  Eyes:      General: No scleral icterus  Conjunctiva/sclera: Conjunctivae normal    Cardiovascular:      Rate and Rhythm: Normal rate and regular rhythm  Heart sounds: Normal heart sounds  No murmur heard  No friction rub  No gallop  Pulmonary:      Effort: Pulmonary effort is normal  No respiratory distress  Breath sounds: Normal breath sounds  No wheezing or rales  Abdominal:      General: Bowel sounds are normal  There is no distension  Palpations: Abdomen is soft  Tenderness: There is no abdominal tenderness  Musculoskeletal:         General: Normal range of motion  Skin:     General: Skin is warm  Findings: No rash  Neurological:      Mental Status: She is alert and oriented to person, place, and time        Sensory: Sensory deficit present  Motor: Weakness present  Additional Data:     Labs:  Results from last 7 days   Lab Units 08/03/22  0435   WBC Thousand/uL 4 65   HEMOGLOBIN g/dL 11 5   HEMATOCRIT % 33 6*   PLATELETS Thousands/uL 166   NEUTROS PCT % 52   LYMPHS PCT % 36   MONOS PCT % 9   EOS PCT % 2     Results from last 7 days   Lab Units 08/03/22  0435 08/02/22  1305   SODIUM mmol/L 137 139   POTASSIUM mmol/L 3 3* 4 6   CHLORIDE mmol/L 103 104   CO2 mmol/L 26 26   BUN mg/dL 9 8   CREATININE mg/dL 0 61 0 46*   ANION GAP mmol/L 8 9   CALCIUM mg/dL 8 2* 8 6   ALBUMIN g/dL  --  3 3*   TOTAL BILIRUBIN mg/dL  --  0 66   ALK PHOS U/L  --  107   ALT U/L  --  51   AST U/L  --  55*   GLUCOSE RANDOM mg/dL 92 97                       Lines/Drains:  Invasive Devices  Report    Peripheral Intravenous Line  Duration           Peripheral IV 08/02/22 Dorsal (posterior); Right Hand 1 day          Drain  Duration           Urethral Catheter Double-lumen 91 days              Urinary Catheter:  Goal for removal: N/A - Chronic Gonzalez               Imaging: No pertinent imaging reviewed  Recent Cultures (last 7 days):         Last 24 Hours Medication List:   Current Facility-Administered Medications   Medication Dose Route Frequency Provider Last Rate    clonazePAM  1 mg Oral BID HOWARD Nick      DULoxetine  60 mg Oral Daily HOWARD Nick      enoxaparin  40 mg Subcutaneous Daily HOWARD Nick      gabapentin  400 mg Oral Q8H HOWARD Loera      prazosin  1 mg Oral HS HOWARD Loera      QUEtiapine  200 mg Oral HS Maureen Lawson PA-C      zolpidem  5 mg Oral HS PRN HOWARD Zambrano          Today, Patient Was Seen By: Yesenia Khanna DO    **Please Note: This note may have been constructed using a voice recognition system  **

## 2022-08-05 PROCEDURE — 99232 SBSQ HOSP IP/OBS MODERATE 35: CPT | Performed by: INTERNAL MEDICINE

## 2022-08-05 RX ADMIN — GABAPENTIN 400 MG: 400 CAPSULE ORAL at 09:55

## 2022-08-05 RX ADMIN — ZOLPIDEM TARTRATE 5 MG: 5 TABLET, COATED ORAL at 21:02

## 2022-08-05 RX ADMIN — DULOXETINE HYDROCHLORIDE 60 MG: 60 CAPSULE, DELAYED RELEASE ORAL at 09:55

## 2022-08-05 RX ADMIN — CLONAZEPAM 1 MG: 1 TABLET ORAL at 18:02

## 2022-08-05 RX ADMIN — GABAPENTIN 400 MG: 400 CAPSULE ORAL at 18:02

## 2022-08-05 RX ADMIN — QUETIAPINE FUMARATE 200 MG: 200 TABLET ORAL at 21:01

## 2022-08-05 RX ADMIN — CLONAZEPAM 1 MG: 1 TABLET ORAL at 09:54

## 2022-08-05 NOTE — ASSESSMENT & PLAN NOTE
Background:  Presented to the ED as was noted to be vomiting and reported that she had not been getting proper care at home requesting placement     · Discharged to [facility] on 05/17/2022  · Case management consulted  · PT/OT consulted

## 2022-08-05 NOTE — ASSESSMENT & PLAN NOTE
· Status post spinal epidural abscess in the setting of IV drug abuse  · Lives at home with 27-year-old grandmother who axis primary caregiver  · Improved sleep paraplegia patient does have some lower extremity sensation and movement as well and has the ability to return to full function Ng status with appropriate rehab  · Frequent turns  · Assist with all feeding  · Gonzalez catheter care    · Continue pain control with aqua K, baclofen, and gabapentin

## 2022-08-05 NOTE — NURSING NOTE
Patient still does not want to be turned to her side  p500 mattress in use, one pillow under each buttock/hip applied to try to load off pressure, Allevyn on lower back/buttocks applied  Allevyn also on heels  Education provided multiple times throughout shift regarding importance of turning every 2 hours, patient still refusing

## 2022-08-05 NOTE — NURSING NOTE
Patient allowed nurse to turn her while performing tone care and assessing skin but wants to remain supine  Education provided regarding importance of turning every 2 hours to prevent skin breakdown  Patient did not want to be turned to her side but did allow nurse to put one pillow on each side of buttock to try to off load pressure  Patient did not want foam pillows to be used  Did not want waffle cushion on at this time

## 2022-08-05 NOTE — ASSESSMENT & PLAN NOTE
Background:  Presented to the ED as was noted to be vomiting and reported that she had not been getting proper care at home requesting placement     · Discharged to [facility] on 05/17/2022  · Case management consulted  · PT/OT consulted

## 2022-08-05 NOTE — NURSING NOTE
Patient informed nurse that her awan catheter was placed before she was going to [place] in order to avoid having incontinent episodes  Per patient, awan was placed last week by her best friend in her grandmother's house  Patient usually does not have awan catheter  Dr Yvette Osuna informed about patient not typically having catheter, patient informed nurse about best friend putting awan in after SLIM left for day  Information passed along to night nurse

## 2022-08-05 NOTE — PROGRESS NOTES
[address] [facility]  Progress Note - Wilman Santillan 1990, 32 y o  female MRN: [id number]  Unit/Bed#: -01 Encounter: [id number]  Primary Care Provider: Rey Wheat MD   Date and time admitted to hospital: 8/2/2022 12:01 PM    * Inability to return to living situation  Assessment & Plan  Background:  Presented to the ED as was noted to be vomiting and reported that she had not been getting proper care at home requesting placement  · Discharged to [facility] on 05/17/2022  · Case management consulted  · PT/OT consulted    Lower extremity edema  Assessment & Plan  · Chronic lower extremity edema greater in left and right   · Patient reports that she had a duplex of lower extremity was clear last year  · Lower extremity Doppler negative for any DVT  · Elevate extremities  · Patient was previously on Eliquis but states this medication was discontinued    Anxiety  Assessment & Plan  · Continue Xanax per PTA medication regimen as well as Ambien for sleep    Quadriplegic spinal paralysis ([facility] [address] )  Assessment & Plan  · Status post spinal epidural abscess in the setting of IV drug abuse  · Lives at home with 80-year-old grandmother who axis primary caregiver  · Improved sleep paraplegia patient does have some lower extremity sensation and movement as well and has the ability to return to full function Ng status with appropriate rehab  · Frequent turns  · Assist with all feeding  · Gonzalez catheter care    · Continue pain control with aqua K, baclofen, and gabapentin        VTE Pharmacologic Prophylaxis: VTE Score: 3 Moderate Risk (Score 3-4) - Pharmacological DVT Prophylaxis Ordered: enoxaparin (Lovenox)  Patient Centered Rounds: I performed bedside rounds with nursing staff today  Discussions with Specialists or Other Care Team Provider: case management    Education and Discussions with Family / Patient: Patient declined call to   Time Spent for Care: 20 minutes   More than 50% of total time spent on counseling and coordination of care as described above  Current Length of Stay: 1 day(s)  Current Patient Status: Inpatient   Certification Statement: Patient medically stable awaiting placement  Discharge Plan: Patient medically stable waiting placement    Code Status: Level 1 - Full Code    Subjective:   Patient resting comfortably on examination  Patient had no overnight events or    Objective:     Vitals:   Temp (24hrs), Av 4 °F (36 9 °C), Min:98 °F (36 7 °C), Max:98 5 °F (36 9 °C)    Temp:  [98 °F (36 7 °C)-98 5 °F (36 9 °C)] 98 3 °F (36 8 °C)  HR:  [] 89  Resp:  [16-18] 18  BP: ()/(60-73) 113/73  SpO2:  [94 %-97 %] 97 %  Body mass index is 27 35 kg/m²  Input and Output Summary (last 24 hours): Intake/Output Summary (Last 24 hours) at 2022 0918  Last data filed at 2022 0993  Gross per 24 hour   Intake 264 ml   Output 3550 ml   Net -3286 ml       Physical Exam:   Physical Exam  Vitals and nursing note reviewed  Constitutional:       General: She is not in acute distress  Appearance: She is well-developed  HENT:      Head: Normocephalic and atraumatic  Eyes:      General: No scleral icterus  Conjunctiva/sclera: Conjunctivae normal    Cardiovascular:      Rate and Rhythm: Normal rate and regular rhythm  Heart sounds: Normal heart sounds  No murmur heard  No friction rub  No gallop  Pulmonary:      Effort: Pulmonary effort is normal  No respiratory distress  Breath sounds: Normal breath sounds  No wheezing or rales  Abdominal:      General: Bowel sounds are normal  There is no distension  Palpations: Abdomen is soft  Tenderness: There is no abdominal tenderness  Musculoskeletal:         General: Normal range of motion  Skin:     General: Skin is warm  Findings: No rash  Neurological:      Mental Status: She is alert and oriented to person, place, and time  Sensory: Sensory deficit present        Motor: Weakness present  complaints on exam this morning  Additional Data:     Labs:  Results from last 7 days   Lab Units 08/03/22  0435   WBC Thousand/uL 4 65   HEMOGLOBIN g/dL 11 5   HEMATOCRIT % 33 6*   PLATELETS Thousands/uL 166   NEUTROS PCT % 52   LYMPHS PCT % 36   MONOS PCT % 9   EOS PCT % 2     Results from last 7 days   Lab Units 08/03/22  0435 08/02/22  1305   SODIUM mmol/L 137 139   POTASSIUM mmol/L 3 3* 4 6   CHLORIDE mmol/L 103 104   CO2 mmol/L 26 26   BUN mg/dL 9 8   CREATININE mg/dL 0 61 0 46*   ANION GAP mmol/L 8 9   CALCIUM mg/dL 8 2* 8 6   ALBUMIN g/dL  --  3 3*   TOTAL BILIRUBIN mg/dL  --  0 66   ALK PHOS U/L  --  107   ALT U/L  --  51   AST U/L  --  55*   GLUCOSE RANDOM mg/dL 92 97                       Lines/Drains:  Invasive Devices  Report    Peripheral Intravenous Line  Duration           Peripheral IV 08/02/22 Dorsal (posterior); Right Hand 2 days          Drain  Duration           Urethral Catheter Double-lumen 92 days              Urinary Catheter:  Goal for removal: N/A - Chronic Gonzalez               Imaging: No pertinent imaging reviewed  Recent Cultures (last 7 days):         Last 24 Hours Medication List:   Current Facility-Administered Medications   Medication Dose Route Frequency Provider Last Rate    clonazePAM  1 mg Oral BID HOWARD Nick      DULoxetine  60 mg Oral Daily HOWARD Nick      enoxaparin  40 mg Subcutaneous Daily Crystal Em, CRNP      gabapentin  400 mg Oral [address] HOWARD Gomez      hydrOXYzine HCL  25 mg Oral Q6H PRN Edward Reed MD      prazosin  1 mg Oral HS HOWARD Nick      QUEtiapine  200 mg Oral HS Floridalma Su PA-C      zolpidem  5 mg Oral HS PRN Crystal Em, CRNP          Today, Patient Was Seen By: Surendra Caban DO    **Please Note: This note may have been constructed using a voice recognition system  **

## 2022-08-05 NOTE — NURSING NOTE
Patient is not allowing nurse to complete a full skin assessment at this time  Patient is also refusing to be turned  Dr Chris Zhang informed

## 2022-08-05 NOTE — WOUND OSTOMY CARE
Progress Note - Wound   Keely Molina 32 y o  female MRN: [id number]  Unit/Bed#: -01 Encounter: [id number]      Assessment:   Patient currently refusing assessment of sacrum, buttock  Spoke with primary RN who states skin is blanchable red, however patient refuses full skin assessment regularly  Patient has also been refusing to be turned and repositioned and has been educated on importance of turning/repositioning  Will place order for P-500 low air loss mattress due to history of quadriplegic spinal paralysis  Will attempt to perform skin assessment 8/8  Please continue with skin prevention as patient allows  Will place skin care recommendations as orders at this time  Skin care Plan:  1-Protect sacrum w/Allevyn foam, laz with P, change q3d and PRN, peel back and check skin q-shift  2-Turn/reposition q2h for pressure re-distribution on skin   3-Elevate heels to offload pressure  4-Moisturize skin daily with skin nourishing cream  5-Ehob cushion in chair when out of bed  6-Hydraguard to bilateral heels BID and PRN   7-P-500 low air loss mattress            May Jay BSN RN CWON  Wound and Ostomy care

## 2022-08-05 NOTE — UTILIZATION REVIEW
Inpatient Admission Authorization Request   NOTIFICATION OF INPATIENT ADMISSION/INPATIENT AUTHORIZATION REQUEST   SERVICING FACILITY:   [address] [place] [address]  Tax ID: [id number]  NPI: [id number]  Place of Service: Inpatient [address] [facility]  [address]  Place of Service Code: 24     ATTENDING PROVIDER:  Attending Name and NPI#: Rashmi Paz [[id number]]  Address: [address] [place] [address]  Phone: [phone]     UTILIZATION REVIEW CONTACT:  Janine Giron, Utilization   Network Utilization Review Department  Phone: [phone]  Fax [phone]  Email: [email or web address]     PHYSICIAN ADVISORY SERVICES:  FOR [id number] REVIEW - MEDICAL NECESSITY DENIAL  Phone: [phone]  Fax: [phone]  Email: [email or web address]     TYPE OF REQUEST:  Inpatient Status     ADMISSION INFORMATION:  ADMISSION DATE/TIME: 8/4/22 11:38 AM  PATIENT DIAGNOSIS CODE/DESCRIPTION:  Nausea [R11 0]  Weakness [R53 1]  DISCHARGE DATE/TIME: No discharge date for patient encounter  IMPORTANT INFORMATION:  Please contact Janine Giron directly with any questions or concerns regarding this request  Department voicemails are confidential     Send requests for admission clinical reviews, concurrent reviews, approvals, and administrative denials due to lack of clinical to fax [phone]

## 2022-08-05 NOTE — NURSING NOTE
Patient's IV occluded, patient is not getting any IV meds at this time  Per SLIM, patient is okay without IV access at this time

## 2022-08-05 NOTE — ASSESSMENT & PLAN NOTE
· Status post spinal epidural abscess in the setting of IV drug abuse  · Lives at home with 72-year-old grandmother who acts as primary caregiver  · Improved sleep paraplegia patient does have some lower extremity sensation and movement as well and has the ability to return to full function Ng status with appropriate rehab  · Frequent turns  · Assist with all feeding  · Patient states she normally does not have Gonzalez but had 1 placed as she was going on vacation    Will discontinue Gonzalez at this time and place on urinary retention protocol  · Continue pain control with aqua K, baclofen, and gabapentin

## 2022-08-06 PROCEDURE — 99232 SBSQ HOSP IP/OBS MODERATE 35: CPT | Performed by: INTERNAL MEDICINE

## 2022-08-06 RX ORDER — BACLOFEN 10 MG/1
10 TABLET ORAL 3 TIMES DAILY
Status: DISCONTINUED | OUTPATIENT
Start: 2022-08-06 | End: 2022-08-28 | Stop reason: HOSPADM

## 2022-08-06 RX ADMIN — CLONAZEPAM 1 MG: 1 TABLET ORAL at 17:34

## 2022-08-06 RX ADMIN — HYDROXYZINE HYDROCHLORIDE 25 MG: 25 TABLET ORAL at 23:20

## 2022-08-06 RX ADMIN — GABAPENTIN 400 MG: 400 CAPSULE ORAL at 16:59

## 2022-08-06 RX ADMIN — GABAPENTIN 400 MG: 400 CAPSULE ORAL at 09:47

## 2022-08-06 RX ADMIN — GABAPENTIN 400 MG: 400 CAPSULE ORAL at 00:28

## 2022-08-06 RX ADMIN — CLONAZEPAM 1 MG: 1 TABLET ORAL at 09:48

## 2022-08-06 RX ADMIN — QUETIAPINE FUMARATE 200 MG: 200 TABLET ORAL at 22:27

## 2022-08-06 RX ADMIN — BACLOFEN 10 MG: 10 TABLET ORAL at 16:58

## 2022-08-06 RX ADMIN — GABAPENTIN 400 MG: 400 CAPSULE ORAL at 23:20

## 2022-08-06 RX ADMIN — BACLOFEN 10 MG: 10 TABLET ORAL at 11:06

## 2022-08-06 RX ADMIN — DULOXETINE HYDROCHLORIDE 60 MG: 60 CAPSULE, DELAYED RELEASE ORAL at 09:48

## 2022-08-06 RX ADMIN — BACLOFEN 10 MG: 10 TABLET ORAL at 22:27

## 2022-08-06 RX ADMIN — ZOLPIDEM TARTRATE 5 MG: 5 TABLET, COATED ORAL at 22:27

## 2022-08-06 NOTE — ASSESSMENT & PLAN NOTE
Background:  Presented to the ED as was noted to be vomiting and reported that she had not been getting proper care at home requesting placement     · Discharged to [facility] on 05/17/2022  · Case management consulted  · PT/OT consulted

## 2022-08-06 NOTE — ASSESSMENT & PLAN NOTE
· Status post spinal epidural abscess in the setting of IV drug abuse  · Lives at home with 80-year-old grandmother who acts as primary caregiver  · Improved sleep paraplegia patient does have some lower extremity sensation and movement as well and has the ability to return to full function Ng status with appropriate rehab  · Frequent turns  · Assist with all feeding  · Patient states she normally does not have Gonzalez but had 1 placed as she was going on vacation    Will discontinue Gonzalez at this time and place on urinary retention protocol  · Continue pain control with aqua K, baclofen, and gabapentin

## 2022-08-06 NOTE — PROGRESS NOTES
[facility]  Progress Note - Linda Hawkins 1990, 32 y o  female MRN: [id number]  Unit/Bed#: -01 Encounter: [id number]  Primary Care Provider: Tanya Otero MD   Date and time admitted to hospital: 8/2/2022 12:01 PM    * Inability to return to living situation  Assessment & Plan  Background:  Presented to the ED as was noted to be vomiting and reported that she had not been getting proper care at home requesting placement  · Discharged to [facility] on 05/17/2022  · Case management consulted  · PT/OT consulted    Lower extremity edema  Assessment & Plan  · Chronic lower extremity edema greater in left and right   · Patient reports that she had a duplex of lower extremity was clear last year  · Lower extremity Doppler negative for any DVT  · Elevate extremities  · Patient was previously on Eliquis but states this medication was discontinued    Anxiety  Assessment & Plan  · Continue Xanax per PTA medication regimen as well as Ambien for sleep    Quadriplegic spinal paralysis ([facility] [address] )  Assessment & Plan  · Status post spinal epidural abscess in the setting of IV drug abuse  · Lives at home with 59-year-old grandmother who acts as primary caregiver  · Improved sleep paraplegia patient does have some lower extremity sensation and movement as well and has the ability to return to full function Ng status with appropriate rehab  · Frequent turns  · Assist with all feeding  · Patient states she normally does not have Gonzalez but had 1 placed as she was going on vacation  Will discontinue Gonzalez at this time and place on urinary retention protocol  · Continue pain control with aqua K, baclofen, and gabapentin        VTE Pharmacologic Prophylaxis: VTE Score: 3 Moderate Risk (Score 3-4) - Pharmacological DVT Prophylaxis Ordered: enoxaparin (Lovenox)  Patient Centered Rounds: I performed bedside rounds with nursing staff today    Discussions with Specialists or Other Care Team Provider:  Case management    Education and Discussions with Family / Patient: Patient declined call to   Time Spent for Care: 30 minutes  More than 50% of total time spent on counseling and coordination of care as described above  Current Length of Stay: 2 day(s)  Current Patient Status: Inpatient   Certification Statement: Patient medically stable awaiting placement  Discharge Plan: Patient medically stable awaiting placement    Code Status: Level 1 - Full Code    Subjective:   Patient resting comfortably on examination this morning  Patient had no overnight events or complaints on exam this morning  Objective:     Vitals:   Temp (24hrs), Av 4 °F (36 9 °C), Min:98 3 °F (36 8 °C), Max:98 5 °F (36 9 °C)    Temp:  [98 3 °F (36 8 °C)-98 5 °F (36 9 °C)] 98 5 °F (36 9 °C)  HR:  [65-90] 65  Resp:  [15-17] 17  BP: ()/(58-86) 107/72  SpO2:  [93 %-96 %] 93 %  Body mass index is 27 35 kg/m²  Input and Output Summary (last 24 hours): Intake/Output Summary (Last 24 hours) at 2022 0843  Last data filed at 2022 0809  Gross per 24 hour   Intake 480 ml   Output 3800 ml   Net -3320 ml       Physical Exam:   Physical Exam  Vitals and nursing note reviewed  Constitutional:       General: She is not in acute distress  Appearance: She is well-developed  HENT:      Head: Normocephalic and atraumatic  Eyes:      General: No scleral icterus  Conjunctiva/sclera: Conjunctivae normal    Cardiovascular:      Rate and Rhythm: Normal rate and regular rhythm  Heart sounds: Normal heart sounds  No murmur heard  No friction rub  No gallop  Pulmonary:      Effort: Pulmonary effort is normal  No respiratory distress  Breath sounds: Normal breath sounds  No wheezing or rales  Abdominal:      General: Bowel sounds are normal  There is no distension  Palpations: Abdomen is soft  Tenderness: There is no abdominal tenderness     Musculoskeletal:         General: Normal range of motion  Skin:     General: Skin is warm  Findings: No rash  Neurological:      Mental Status: She is alert and oriented to person, place, and time  Sensory: No sensory deficit  Motor: Weakness present  Additional Data:     Labs:  Results from last 7 days   Lab Units 08/03/22  0435   WBC Thousand/uL 4 65   HEMOGLOBIN g/dL 11 5   HEMATOCRIT % 33 6*   PLATELETS Thousands/uL 166   NEUTROS PCT % 52   LYMPHS PCT % 36   MONOS PCT % 9   EOS PCT % 2     Results from last 7 days   Lab Units 08/03/22  0435 08/02/22  1305   SODIUM mmol/L 137 139   POTASSIUM mmol/L 3 3* 4 6   CHLORIDE mmol/L 103 104   CO2 mmol/L 26 26   BUN mg/dL 9 8   CREATININE mg/dL 0 61 0 46*   ANION GAP mmol/L 8 9   CALCIUM mg/dL 8 2* 8 6   ALBUMIN g/dL  --  3 3*   TOTAL BILIRUBIN mg/dL  --  0 66   ALK PHOS U/L  --  107   ALT U/L  --  51   AST U/L  --  55*   GLUCOSE RANDOM mg/dL 92 97                       Lines/Drains:  Invasive Devices  Report    Drain  Duration           Urethral Catheter Double-lumen 93 days              Urinary Catheter:  Goal for removal: No longer needed  Will place order to discontinue               Imaging: No pertinent imaging reviewed      Recent Cultures (last 7 days):         Last 24 Hours Medication List:   Current Facility-Administered Medications   Medication Dose Route Frequency Provider Last Rate    baclofen  10 mg Oral TID Bernadette Singh DO      clonazePAM  1 mg Oral BID HOWARD Everett      DULoxetine  60 mg Oral Daily HOWARD Everett      enoxaparin  40 mg Subcutaneous Daily HOWARD Everett      gabapentin  400 mg Oral [address]      hydrOXYzine HCL  25 mg Oral Q6H PRN Jaja Donnelly MD      prazosin  1 mg Oral HS HOWARD Nick      QUEtiapine  200 mg Oral HS Esther Cardona PA-C      zolpidem  5 mg Oral HS PRN HOWARD Everett          Today, Patient Was Seen By: Bernadette Singh DO    **Please Note: This note may have been constructed using a voice recognition system  **

## 2022-08-06 NOTE — PLAN OF CARE
Problem: MOBILITY - ADULT  Goal: Maintain or return to baseline ADL function  Description: INTERVENTIONS:  -  Assess patient's ability to carry out ADLs; assess patient's baseline for ADL function and identify physical deficits which impact ability to perform ADLs (bathing, care of mouth/teeth, toileting, grooming, dressing, etc )  - Assess/evaluate cause of self-care deficits   - Assess range of motion  - Assess patient's mobility; develop plan if impaired  - Assess patient's need for assistive devices and provide as appropriate  - Encourage maximum independence but intervene and supervise when necessary  - Involve family in performance of ADLs  - Assess for home care needs following discharge   - Consider OT consult to assist with ADL evaluation and planning for discharge  - Provide patient education as appropriate  Outcome: Progressing  Goal: Maintains/Returns to pre admission functional level  Description: INTERVENTIONS:  - Perform BMAT or MOVE assessment daily    - Set and communicate daily mobility goal to care team and patient/family/caregiver  - Collaborate with rehabilitation services on mobility goals if consulted  - Perform Range of Motion  times a day  - Reposition patient every  hours    - Dangle patient  times a day  - Stand patient  times a day  - Ambulate patient  times a day  - Out of bed to chair  times a day   - Out of bed for meals  times a day  - Out of bed for toileting  - Record patient progress and toleration of activity level   Outcome: Progressing     Problem: Prexisting or High Potential for Compromised Skin Integrity  Goal: Skin integrity is maintained or improved  Description: INTERVENTIONS:  - Identify patients at risk for skin breakdown  - Assess and monitor skin integrity  - Assess and monitor nutrition and hydration status  - Monitor labs   - Assess for incontinence   - Turn and reposition patient  - Assist with mobility/ambulation  - Relieve pressure over bony prominences  - Avoid friction and shearing  - Provide appropriate hygiene as needed including keeping skin clean and dry  - Evaluate need for skin moisturizer/barrier cream  - Collaborate with interdisciplinary team   - Patient/family teaching  - Consider wound care consult   Outcome: Progressing     Problem: Potential for Falls  Goal: Patient will remain free of falls  Description: INTERVENTIONS:  - Educate patient/family on patient safety including physical limitations  - Instruct patient to call for assistance with activity   - Consult OT/PT to assist with strengthening/mobility   - Keep Call bell within reach  - Keep bed low and locked with side rails adjusted as appropriate  - Keep care items and personal belongings within reach  - Initiate and maintain comfort rounds  - Make Fall Risk Sign visible to staff  - Offer Toileting every  Hours, in advance of need  - Initiate/Maintain alarm  - Obtain necessary fall risk management equipment:   - Apply yellow socks and bracelet for high fall risk patients  - Consider moving patient to room near nurses station  Outcome: Progressing

## 2022-08-07 PROCEDURE — 99232 SBSQ HOSP IP/OBS MODERATE 35: CPT | Performed by: INTERNAL MEDICINE

## 2022-08-07 RX ADMIN — CLONAZEPAM 1 MG: 1 TABLET ORAL at 09:59

## 2022-08-07 RX ADMIN — CLONAZEPAM 1 MG: 1 TABLET ORAL at 17:17

## 2022-08-07 RX ADMIN — QUETIAPINE FUMARATE 200 MG: 200 TABLET ORAL at 21:41

## 2022-08-07 RX ADMIN — BACLOFEN 10 MG: 10 TABLET ORAL at 09:59

## 2022-08-07 RX ADMIN — PRAZOSIN HYDROCHLORIDE 1 MG: 1 CAPSULE ORAL at 21:46

## 2022-08-07 RX ADMIN — ZOLPIDEM TARTRATE 5 MG: 5 TABLET, COATED ORAL at 21:40

## 2022-08-07 RX ADMIN — GABAPENTIN 400 MG: 400 CAPSULE ORAL at 17:14

## 2022-08-07 RX ADMIN — BACLOFEN 10 MG: 10 TABLET ORAL at 21:40

## 2022-08-07 RX ADMIN — BACLOFEN 10 MG: 10 TABLET ORAL at 16:57

## 2022-08-07 RX ADMIN — GABAPENTIN 400 MG: 400 CAPSULE ORAL at 09:14

## 2022-08-07 RX ADMIN — DULOXETINE HYDROCHLORIDE 60 MG: 60 CAPSULE, DELAYED RELEASE ORAL at 09:59

## 2022-08-07 NOTE — PROGRESS NOTES
[facility]  Progress Note - Dellar Redo 1990, 32 y o  female MRN: [id number]  Unit/Bed#: -01 Encounter: [id number]  Primary Care Provider: Deborah Blanco MD   Date and time admitted to hospital: 8/2/2022 12:01 PM    * Inability to return to living situation  Assessment & Plan  Background:  Presented to the ED as was noted to be vomiting and reported that she had not been getting proper care at home requesting placement  · Discharged to [facility] on 05/17/2022  · Case management consulted  · PT/OT consulted    Lower extremity edema  Assessment & Plan  · Chronic lower extremity edema greater in left and right   · Patient reports that she had a duplex of lower extremity was clear last year  · Lower extremity Doppler negative for any DVT  · Elevate extremities  · Patient was previously on Eliquis but states this medication was discontinued    Anxiety  Assessment & Plan  · Continue Xanax per PTA medication regimen as well as Ambien for sleep    Quadriplegic spinal paralysis ([facility] [address] )  Assessment & Plan  · Status post spinal epidural abscess in the setting of IV drug abuse  · Lives at home with 80-year-old grandmother who acts as primary caregiver  · Improved sleep paraplegia patient does have some lower extremity sensation and movement as well and has the ability to return to full function Ng status with appropriate rehab  · Frequent turns  · Assist with all feeding  · Patient states she normally does not have Gonzalez but had 1 placed as she was going on vacation  Will discontinue Gonzalez at this time and place on urinary retention protocol  · Continue pain control with aqua K, baclofen, and gabapentin        VTE Pharmacologic Prophylaxis: VTE Score: 3 Moderate Risk (Score 3-4) - Pharmacological DVT Prophylaxis Ordered: enoxaparin (Lovenox)  Patient Centered Rounds: I performed bedside rounds with nursing staff today    Discussions with Specialists or Other Care Team Provider:  Case management    Education and Discussions with Family / Patient: Patient declined call to   Time Spent for Care: 20 minutes  More than 50% of total time spent on counseling and coordination of care as described above  Current Length of Stay: 3 day(s)  Current Patient Status: Inpatient   Certification Statement: Patient medically stable awaiting placement  Discharge Plan: Patient medically stable awaiting placement    Code Status: Level 1 - Full Code    Subjective:   Patient resting comfortably on examination  Patient had no overnight events or complaints on exam this morning  Objective:     Vitals:   Temp (24hrs), Av 5 °F (36 9 °C), Min:98 3 °F (36 8 °C), Max:98 5 °F (36 9 °C)    Temp:  [98 3 °F (36 8 °C)-98 5 °F (36 9 °C)] 98 3 °F (36 8 °C)  HR:  [65-99] 99  BP: ()/(67-85) 102/67  SpO2:  [93 %-95 %] 95 %  Body mass index is 27 35 kg/m²  Input and Output Summary (last 24 hours): Intake/Output Summary (Last 24 hours) at 2022 0742  Last data filed at 2022 7846  Gross per 24 hour   Intake 480 ml   Output 5000 ml   Net -4520 ml       Physical Exam:   Physical Exam  Vitals and nursing note reviewed  Constitutional:       General: She is not in acute distress  Appearance: She is well-developed  HENT:      Head: Normocephalic and atraumatic  Eyes:      General: No scleral icterus  Conjunctiva/sclera: Conjunctivae normal    Cardiovascular:      Rate and Rhythm: Normal rate and regular rhythm  Heart sounds: Normal heart sounds  No murmur heard  No friction rub  No gallop  Pulmonary:      Effort: Pulmonary effort is normal  No respiratory distress  Breath sounds: Normal breath sounds  No wheezing or rales  Abdominal:      General: Bowel sounds are normal  There is no distension  Palpations: Abdomen is soft  Tenderness: There is no abdominal tenderness  Musculoskeletal:         General: Normal range of motion     Skin:     General: Skin is warm  Findings: No rash  Neurological:      Mental Status: She is alert and oriented to person, place, and time  Sensory: Sensory deficit present  Motor: Weakness present  Additional Data:     Labs:  Results from last 7 days   Lab Units 08/03/22  0435   WBC Thousand/uL 4 65   HEMOGLOBIN g/dL 11 5   HEMATOCRIT % 33 6*   PLATELETS Thousands/uL 166   NEUTROS PCT % 52   LYMPHS PCT % 36   MONOS PCT % 9   EOS PCT % 2     Results from last 7 days   Lab Units 08/03/22  0435 08/02/22  1305   SODIUM mmol/L 137 139   POTASSIUM mmol/L 3 3* 4 6   CHLORIDE mmol/L 103 104   CO2 mmol/L 26 26   BUN mg/dL 9 8   CREATININE mg/dL 0 61 0 46*   ANION GAP mmol/L 8 9   CALCIUM mg/dL 8 2* 8 6   ALBUMIN g/dL  --  3 3*   TOTAL BILIRUBIN mg/dL  --  0 66   ALK PHOS U/L  --  107   ALT U/L  --  51   AST U/L  --  55*   GLUCOSE RANDOM mg/dL 92 97                       Lines/Drains:  Invasive Devices  Report    None                       Imaging: No pertinent imaging reviewed  Recent Cultures (last 7 days):         Last 24 Hours Medication List:   Current Facility-Administered Medications   Medication Dose Route Frequency Provider Last Rate    baclofen  10 mg Oral TID Surendra Caban DO      clonazePAM  1 mg Oral BID HOWARD Ramirez      DULoxetine  60 mg Oral Daily HOWARD Nick      enoxaparin  40 mg Subcutaneous Daily HOWARD Ramirez      gabapentin  400 mg Oral [address]      hydrOXYzine HCL  25 mg Oral Q6H PRN Edward Reed MD      prazosin  1 mg Oral HS HOWARD Nick      QUEtiapine  200 mg Oral HS Floridalma Su PA-C      zolpidem  5 mg Oral HS PRN HOWARD Ramirez          Today, Patient Was Seen By: Surendra Caban DO    **Please Note: This note may have been constructed using a voice recognition system  **

## 2022-08-07 NOTE — CASE MANAGEMENT
Case Management Discharge Planning Note    Patient name Jose Luis Jauregui  Location /-08 MRN [id number]  : 1990 Date 2022       Current Admission Date: 2022  Current Admission Diagnosis:Inability to return to living situation   Patient Active Problem List    Diagnosis Date Noted    Inability to return to living situation 2022    Lower extremity edema 2022    Anxiety 2022    Insomnia 2022    Hepatitis C antibody positive in blood 2022    Substance abuse ([facility] [address] ) 05/15/2022    Failure to thrive in adult 2022    Quadriplegic spinal paralysis ([facility] [address] ) 2022    Gonzalez catheter in place 2022    Back pain 2022      LOS (days): 3  Geometric Mean LOS (GMLOS) (days):   Days to GMLOS:     OBJECTIVE:  Risk of Unplanned Readmission Score: 18 7         Current admission status: Inpatient   Preferred Pharmacy:   [facility] [place] [address]  Phone: [phone] Fax: [phone]    Primary Care Provider: Hair Anthony MD    Primary Insurance: [address] [place] [address]  Secondary Insurance:     DISCHARGE DETAILS:    Discharge planning discussed with[de-identified] Patient  Freedom of Choice: Yes  Comments - Freedom of Choice: Discussed FOC  CM contacted family/caregiver?: No- see comments (Declined)  Were Treatment Team discharge recommendations reviewed with patient/caregiver?: Yes  Did patient/caregiver verbalize understanding of patient care needs?: Yes  Were patient/caregiver advised of the risks associated with not following Treatment Team discharge recommendations?: Yes    Other Referral/Resources/Interventions Provided:  Interventions: Short Term Rehab, SNF  Referral Comments: Previously entered referrals to facilities unable to accept    Patient told this CM that she is agreeable to additional referrals being placed within [place] within 40 mile radius of zip code    8 additional referrals entered in [address] [place] [address]

## 2022-08-07 NOTE — ASSESSMENT & PLAN NOTE
Background:  Presented to the ED as was noted to be vomiting and reported that she had not been getting proper care at home requesting placement     · [facility]17/2022  · Case management consulted  · PT/OT consulted

## 2022-08-08 PROCEDURE — 99232 SBSQ HOSP IP/OBS MODERATE 35: CPT | Performed by: INTERNAL MEDICINE

## 2022-08-08 RX ORDER — DOCUSATE SODIUM 100 MG/1
100 CAPSULE, LIQUID FILLED ORAL 2 TIMES DAILY
Status: DISCONTINUED | OUTPATIENT
Start: 2022-08-08 | End: 2022-08-12

## 2022-08-08 RX ADMIN — PRAZOSIN HYDROCHLORIDE 1 MG: 1 CAPSULE ORAL at 21:13

## 2022-08-08 RX ADMIN — GABAPENTIN 400 MG: 400 CAPSULE ORAL at 17:06

## 2022-08-08 RX ADMIN — BACLOFEN 10 MG: 10 TABLET ORAL at 21:13

## 2022-08-08 RX ADMIN — CLONAZEPAM 1 MG: 1 TABLET ORAL at 17:06

## 2022-08-08 RX ADMIN — GABAPENTIN 400 MG: 400 CAPSULE ORAL at 08:20

## 2022-08-08 RX ADMIN — BACLOFEN 10 MG: 10 TABLET ORAL at 08:20

## 2022-08-08 RX ADMIN — DOCUSATE SODIUM 100 MG: 100 CAPSULE, LIQUID FILLED ORAL at 21:13

## 2022-08-08 RX ADMIN — DULOXETINE HYDROCHLORIDE 60 MG: 60 CAPSULE, DELAYED RELEASE ORAL at 08:20

## 2022-08-08 RX ADMIN — ZOLPIDEM TARTRATE 5 MG: 5 TABLET, COATED ORAL at 21:13

## 2022-08-08 RX ADMIN — BACLOFEN 10 MG: 10 TABLET ORAL at 17:06

## 2022-08-08 RX ADMIN — GABAPENTIN 400 MG: 400 CAPSULE ORAL at 00:24

## 2022-08-08 RX ADMIN — CLONAZEPAM 1 MG: 1 TABLET ORAL at 08:20

## 2022-08-08 RX ADMIN — QUETIAPINE FUMARATE 200 MG: 200 TABLET ORAL at 21:13

## 2022-08-08 NOTE — PROGRESS NOTES
[facility]  Progress Note - Jose Luis Jauregui 1990, 32 y o  female MRN: [id number]  Unit/Bed#: -01 Encounter: [id number]  Primary Care Provider: Hair Anthony MD   Date and time admitted to hospital: 8/2/2022 12:01 PM    * Inability to return to living situation  Assessment & Plan  Background:  Presented to the ED as was noted to be vomiting and reported that she had not been getting proper care at home requesting placement  · Discharged to [facility] on 05/17/2022  · Case management consulted  · PT/OT consulted    Lower extremity edema  Assessment & Plan  · Chronic lower extremity edema greater in left and right   · Patient reports that she had a duplex of lower extremity was clear last year  · Lower extremity Doppler negative for any DVT  · Elevate extremities  · Patient was previously on Eliquis but states this medication was discontinued    Anxiety  Assessment & Plan  · Continue Xanax per PTA medication regimen as well as Ambien for sleep    Quadriplegic spinal paralysis ([facility] [address] )  Assessment & Plan  · Status post spinal epidural abscess in the setting of IV drug abuse  · Lives at home with 22-year-old grandmother who acts as primary caregiver  · Improved sleep paraplegia patient does have some lower extremity sensation and movement as well and has the ability to return to full function Ng status with appropriate rehab  · Frequent turns  · Assist with all feeding  · Patient states she normally does not have Gonzalez but had 1 placed as she was going on vacation  Will discontinue Gonzalez at this time and place on urinary retention protocol  · Continue pain control with aqua K, baclofen, and gabapentin          VTE Pharmacologic Prophylaxis: VTE Score: 3 Moderate Risk (Score 3-4) - Pharmacological DVT Prophylaxis Ordered: enoxaparin (Lovenox)  Patient Centered Rounds: I performed bedside rounds with nursing staff today    Discussions with Specialists or Other Care Team Provider:  Case management    Education and Discussions with Family / Patient: Patient declined call to   Time Spent for Care: 30 minutes  More than 50% of total time spent on counseling and coordination of care as described above  Current Length of Stay: 4 day(s)  Current Patient Status: Inpatient   Certification Statement: Patient medically stable awaiting placement  Discharge Plan: Patient medically stable awaiting placement    Code Status: Level 1 - Full Code    Subjective:   Patient resting comfortably on examination  Patient had no overnight events or complaints on exam     Objective:     Vitals:   Temp (24hrs), Av 2 °F (36 8 °C), Min:98 1 °F (36 7 °C), Max:98 2 °F (36 8 °C)    Temp:  [98 1 °F (36 7 °C)-98 2 °F (36 8 °C)] 98 1 °F (36 7 °C)  HR:  [70-96] 96  Resp:  [16-18] 16  BP: ()/(62-72) 111/70  SpO2:  [90 %-96 %] 96 %  Body mass index is 27 35 kg/m²  Input and Output Summary (last 24 hours): Intake/Output Summary (Last 24 hours) at 2022 0825  Last data filed at 2022 0201  Gross per 24 hour   Intake 1200 ml   Output 1450 ml   Net -250 ml       Physical Exam:   Physical Exam  Vitals and nursing note reviewed  Constitutional:       General: She is not in acute distress  Appearance: She is well-developed  HENT:      Head: Normocephalic and atraumatic  Eyes:      General: No scleral icterus  Conjunctiva/sclera: Conjunctivae normal    Cardiovascular:      Rate and Rhythm: Normal rate and regular rhythm  Heart sounds: Normal heart sounds  No murmur heard  No friction rub  No gallop  Pulmonary:      Effort: Pulmonary effort is normal  No respiratory distress  Breath sounds: Normal breath sounds  No wheezing or rales  Abdominal:      General: Bowel sounds are normal  There is no distension  Palpations: Abdomen is soft  Tenderness: There is no abdominal tenderness  Musculoskeletal:         General: Normal range of motion     Skin:     General: Skin is warm  Findings: No rash  Neurological:      Mental Status: She is alert and oriented to person, place, and time  Sensory: Sensory deficit present  Motor: Weakness present  Additional Data:     Labs:  Results from last 7 days   Lab Units 08/03/22  0435   WBC Thousand/uL 4 65   HEMOGLOBIN g/dL 11 5   HEMATOCRIT % 33 6*   PLATELETS Thousands/uL 166   NEUTROS PCT % 52   LYMPHS PCT % 36   MONOS PCT % 9   EOS PCT % 2     Results from last 7 days   Lab Units 08/03/22  0435 08/02/22  1305   SODIUM mmol/L 137 139   POTASSIUM mmol/L 3 3* 4 6   CHLORIDE mmol/L 103 104   CO2 mmol/L 26 26   BUN mg/dL 9 8   CREATININE mg/dL 0 61 0 46*   ANION GAP mmol/L 8 9   CALCIUM mg/dL 8 2* 8 6   ALBUMIN g/dL  --  3 3*   TOTAL BILIRUBIN mg/dL  --  0 66   ALK PHOS U/L  --  107   ALT U/L  --  51   AST U/L  --  55*   GLUCOSE RANDOM mg/dL 92 97                       Lines/Drains:  Invasive Devices  Report    None                       Imaging: No pertinent imaging reviewed  Recent Cultures (last 7 days):         Last 24 Hours Medication List:   Current Facility-Administered Medications   Medication Dose Route Frequency Provider Last Rate    baclofen  10 mg Oral TID [address] [facility], DO      clonazePAM  1 mg Oral BID HOWARD Ramirez      DULoxetine  60 mg Oral Daily HOWARD Nick      enoxaparin  40 mg Subcutaneous Daily HOWARD Ramirez      gabapentin  400 mg Oral [address]      hydrOXYzine HCL  25 mg Oral Q6H PRN Jono Ni MD      prazosin  1 mg Oral HS HOWARD Nick      QUEtiapine  200 mg Oral HS Orval OrISAI      zolpidem  5 mg Oral HS PRN HOWARD Ramirez          Today, Patient Was Seen By: Chrissy Machuca DO    **Please Note: This note may have been constructed using a voice recognition system  **

## 2022-08-08 NOTE — PLAN OF CARE
Problem: MOBILITY - ADULT  Goal: Maintain or return to baseline ADL function  Description: INTERVENTIONS:  -  Assess patient's ability to carry out ADLs; assess patient's baseline for ADL function and identify physical deficits which impact ability to perform ADLs (bathing, care of mouth/teeth, toileting, grooming, dressing, etc )  - Assess/evaluate cause of self-care deficits   - Assess range of motion  - Assess patient's mobility; develop plan if impaired  - Assess patient's need for assistive devices and provide as appropriate  - Encourage maximum independence but intervene and supervise when necessary  - Involve family in performance of ADLs  - Assess for home care needs following discharge   - Consider OT consult to assist with ADL evaluation and planning for discharge  - Provide patient education as appropriate  Outcome: Progressing  Goal: Maintains/Returns to pre admission functional level  Description: INTERVENTIONS:  - Perform BMAT or MOVE assessment daily    - Set and communicate daily mobility goal to care team and patient/family/caregiver  - Collaborate with rehabilitation services on mobility goals if consulted  - Reposition patient every 2 hours    - Out of bed for toileting  - Record patient progress and toleration of activity level   Outcome: Progressing     Problem: Potential for Falls  Goal: Patient will remain free of falls  Description: INTERVENTIONS:  - Educate patient/family on patient safety including physical limitations  - Instruct patient to call for assistance with activity   - Consult OT/PT to assist with strengthening/mobility   - Keep Call bell within reach  - Keep bed low and locked with side rails adjusted as appropriate  - Keep care items and personal belongings within reach  - Initiate and maintain comfort rounds  - Make Fall Risk Sign visible to staff  - Offer Toileting every 2 Hours, in advance of need  - Initiate/Maintain bed alarm  - Obtain necessary fall risk management equipment:   - Apply yellow socks and bracelet for high fall risk patients  - Consider moving patient to room near nurses station  Outcome: Progressing     Problem: Prexisting or High Potential for Compromised Skin Integrity  Goal: Skin integrity is maintained or improved  Description: INTERVENTIONS:  - Identify patients at risk for skin breakdown  - Assess and monitor skin integrity  - Assess and monitor nutrition and hydration status  - Monitor labs   - Assess for incontinence   - Turn and reposition patient  - Assist with mobility/ambulation  - Relieve pressure over bony prominences  - Avoid friction and shearing  - Provide appropriate hygiene as needed including keeping skin clean and dry  - Evaluate need for skin moisturizer/barrier cream  - Collaborate with interdisciplinary team   - Patient/family teaching  - Consider wound care consult   Outcome: Progressing

## 2022-08-09 PROCEDURE — 99231 SBSQ HOSP IP/OBS SF/LOW 25: CPT | Performed by: INTERNAL MEDICINE

## 2022-08-09 RX ADMIN — BACLOFEN 10 MG: 10 TABLET ORAL at 21:38

## 2022-08-09 RX ADMIN — HYDROXYZINE HYDROCHLORIDE 25 MG: 25 TABLET ORAL at 21:45

## 2022-08-09 RX ADMIN — CLONAZEPAM 1 MG: 1 TABLET ORAL at 18:32

## 2022-08-09 RX ADMIN — GABAPENTIN 400 MG: 400 CAPSULE ORAL at 00:23

## 2022-08-09 RX ADMIN — BACLOFEN 10 MG: 10 TABLET ORAL at 09:00

## 2022-08-09 RX ADMIN — CLONAZEPAM 1 MG: 1 TABLET ORAL at 08:59

## 2022-08-09 RX ADMIN — GABAPENTIN 400 MG: 400 CAPSULE ORAL at 18:33

## 2022-08-09 RX ADMIN — ZOLPIDEM TARTRATE 5 MG: 5 TABLET, COATED ORAL at 21:38

## 2022-08-09 RX ADMIN — DULOXETINE HYDROCHLORIDE 60 MG: 60 CAPSULE, DELAYED RELEASE ORAL at 09:00

## 2022-08-09 RX ADMIN — BACLOFEN 10 MG: 10 TABLET ORAL at 18:32

## 2022-08-09 RX ADMIN — GABAPENTIN 400 MG: 400 CAPSULE ORAL at 09:00

## 2022-08-09 RX ADMIN — DOCUSATE SODIUM 100 MG: 100 CAPSULE, LIQUID FILLED ORAL at 18:33

## 2022-08-09 RX ADMIN — QUETIAPINE FUMARATE 200 MG: 200 TABLET ORAL at 21:38

## 2022-08-09 RX ADMIN — DOCUSATE SODIUM 100 MG: 100 CAPSULE, LIQUID FILLED ORAL at 08:59

## 2022-08-09 NOTE — PLAN OF CARE
Problem: MOBILITY - ADULT  Goal: Maintain or return to baseline ADL function  Description: INTERVENTIONS:  -  Assess patient's ability to carry out ADLs; assess patient's baseline for ADL function and identify physical deficits which impact ability to perform ADLs (bathing, care of mouth/teeth, toileting, grooming, dressing, etc )  - Assess/evaluate cause of self-care deficits   - Assess range of motion  - Assess patient's mobility; develop plan if impaired  - Assess patient's need for assistive devices and provide as appropriate  - Encourage maximum independence but intervene and supervise when necessary  - Involve family in performance of ADLs  - Assess for home care needs following discharge   - Consider OT consult to assist with ADL evaluation and planning for discharge  - Provide patient education as appropriate  Outcome: Progressing     Problem: Prexisting or High Potential for Compromised Skin Integrity  Goal: Skin integrity is maintained or improved  Description: INTERVENTIONS:  - Identify patients at risk for skin breakdown  - Assess and monitor skin integrity  - Assess and monitor nutrition and hydration status  - Monitor labs   - Assess for incontinence   - Turn and reposition patient  - Assist with mobility/ambulation  - Relieve pressure over bony prominences  - Avoid friction and shearing  - Provide appropriate hygiene as needed including keeping skin clean and dry  - Evaluate need for skin moisturizer/barrier cream  - Collaborate with interdisciplinary team   - Patient/family teaching  - Consider wound care consult   Outcome: Progressing     Problem: Potential for Falls  Goal: Patient will remain free of falls  Description: INTERVENTIONS:  - Educate patient/family on patient safety including physical limitations  - Instruct patient to call for assistance with activity   - Consult OT/PT to assist with strengthening/mobility   - Keep Call bell within reach  - Keep bed low and locked with side rails adjusted as appropriate  - Keep care items and personal belongings within reach  - Initiate and maintain comfort rounds  - Make Fall Risk Sign visible to staff  - Offer Toileting every  2  Hours, in advance of need  - Initiate/Maintain daily alarm  - Obtain necessary fall risk management equipment:  daily  - Apply yellow socks and bracelet for high fall risk patients  - Consider moving patient to room near nurses station  Outcome: Progressing

## 2022-08-09 NOTE — PROGRESS NOTES
[facility]  Progress Note - Dinh Lim 1990, 32 y o  female MRN: [id number]  Unit/Bed#: -01 Encounter: [id number]  Primary Care Provider: Randall North MD   Date and time admitted to hospital: 8/2/2022 12:01 PM    * Inability to return to living situation  Assessment & Plan  Background:  Presented to the ED as was noted to be vomiting and reported that she had not been getting proper care at home requesting placement  · Discharged to [facility] on 05/17/2022  · Case management consulted  · PT/OT consulted    Lower extremity edema  Assessment & Plan  · Chronic lower extremity edema greater in left and right   · Patient reports that she had a duplex of lower extremity was clear last year  · Lower extremity Doppler negative for any DVT  · Elevate extremities  · Patient was previously on Eliquis but states this medication was discontinued    Anxiety  Assessment & Plan  · Continue Xanax per PTA medication regimen as well as Ambien for sleep    Quadriplegic spinal paralysis ([facility] [address] )  Assessment & Plan  · Status post spinal epidural abscess in the setting of IV drug abuse  · Lives at home with 55-year-old grandmother who acts as primary caregiver  · Improved sleep paraplegia patient does have some lower extremity sensation and movement as well and has the ability to return to full function Ng status with appropriate rehab  · Frequent turns  · Assist with all feeding  · Patient states she normally does not have Gonzalez but had 1 placed as she was going on vacation  Will discontinue Gonzalez at this time and place on urinary retention protocol  · Continue pain control with aqua K, baclofen, and gabapentin        VTE Pharmacologic Prophylaxis: VTE Score: 3 Moderate Risk (Score 3-4) - Pharmacological DVT Prophylaxis Ordered: enoxaparin (Lovenox)  Patient Centered Rounds: I performed bedside rounds with nursing staff today    Discussions with Specialists or Other Care Team Provider:  Case management    Education and Discussions with Family / Patient: Patient declined call to   Time Spent for Care: 20 minutes  More than 50% of total time spent on counseling and coordination of care as described above  Current Length of Stay: 5 day(s)  Current Patient Status: Inpatient   Certification Statement: Patient medically stable awaiting placement  Discharge Plan: Patient medically stable waiting placement    Code Status: Level 1 - Full Code    Subjective:   Patient resting comfortably on examination  Patient had no overnight events or complaints on exam     Objective:     Vitals:   Temp (24hrs), Av 1 °F (36 7 °C), Min:97 8 °F (36 6 °C), Max:98 5 °F (36 9 °C)    Temp:  [97 8 °F (36 6 °C)-98 5 °F (36 9 °C)] 98 °F (36 7 °C)  HR:  [63-74] 68  Resp:  [18] 18  BP: (107-113)/(65-78) 107/65  SpO2:  [95 %-97 %] 96 %  Body mass index is 27 35 kg/m²  Input and Output Summary (last 24 hours): Intake/Output Summary (Last 24 hours) at 2022 0825  Last data filed at 2022 1322  Gross per 24 hour   Intake 720 ml   Output 2800 ml   Net -2080 ml       Physical Exam:   Physical Exam  Vitals and nursing note reviewed  Constitutional:       General: She is not in acute distress  Appearance: She is well-developed  HENT:      Head: Normocephalic and atraumatic  Eyes:      General: No scleral icterus  Conjunctiva/sclera: Conjunctivae normal    Cardiovascular:      Rate and Rhythm: Normal rate and regular rhythm  Heart sounds: Normal heart sounds  No murmur heard  No friction rub  No gallop  Pulmonary:      Effort: Pulmonary effort is normal  No respiratory distress  Breath sounds: Normal breath sounds  No wheezing or rales  Abdominal:      General: Bowel sounds are normal  There is no distension  Palpations: Abdomen is soft  Tenderness: There is no abdominal tenderness  Musculoskeletal:         General: Normal range of motion     Skin:     General: Skin is warm  Findings: No rash  Neurological:      Mental Status: She is alert and oriented to person, place, and time  Sensory: Sensory deficit present  Motor: Weakness present  Additional Data:     Labs:  Results from last 7 days   Lab Units 08/03/22  0435   WBC Thousand/uL 4 65   HEMOGLOBIN g/dL 11 5   HEMATOCRIT % 33 6*   PLATELETS Thousands/uL 166   NEUTROS PCT % 52   LYMPHS PCT % 36   MONOS PCT % 9   EOS PCT % 2     Results from last 7 days   Lab Units 08/03/22  0435 08/02/22  1305   SODIUM mmol/L 137 139   POTASSIUM mmol/L 3 3* 4 6   CHLORIDE mmol/L 103 104   CO2 mmol/L 26 26   BUN mg/dL 9 8   CREATININE mg/dL 0 61 0 46*   ANION GAP mmol/L 8 9   CALCIUM mg/dL 8 2* 8 6   ALBUMIN g/dL  --  3 3*   TOTAL BILIRUBIN mg/dL  --  0 66   ALK PHOS U/L  --  107   ALT U/L  --  51   AST U/L  --  55*   GLUCOSE RANDOM mg/dL 92 97                       Lines/Drains:  Invasive Devices  Report    None                       Imaging: No pertinent imaging reviewed  Recent Cultures (last 7 days):         Last 24 Hours Medication List:   Current Facility-Administered Medications   Medication Dose Route Frequency Provider Last Rate    baclofen  10 mg Oral TID Johanny Meza DO      clonazePAM  1 mg Oral BID HOWARD Nick      docusate sodium  100 mg Oral BID Kyung Anthony PA-C      DULoxetine  60 mg Oral Daily [facility], NP      enoxaparin  40 mg Subcutaneous Daily [facility], NP      gabapentin  400 mg Oral [address] HOW[address] Gomez      hydrOXYzine HCL  25 mg Oral Q6H PRN Main Dash MD      prazosin  1 mg Oral HS HOWARD Nick      QUEtiapine  200 mg Oral HS Thalia Enriquez PA-C      zolpidem  5 mg Oral HS PRN [facility]          Today, Patient Was Seen By: Johanny Meza DO    **Please Note: This note may have been constructed using a voice recognition system  **

## 2022-08-09 NOTE — WOUND OSTOMY CARE
Progress Note - Wound   Raynold Graft 32 y o  female MRN: [id number]  Unit/Bed#: -01 Encounter: [id number]      Assessment:   Patient for wound care follow-up to assess sacrum/buttock for potential pressure injury  History of quadriplegic spinal paralysis  Patient agreeable to assessment, is alert and oriented x4, incontinent of bladder, pure-wick in place for urine management and at times requires straight cath for retention, incontinent of bowel, assist of 2 to turn for assessment, allows pillow support for pressure redistribution, is on a p-500 low air loss mattress, patient does frequently refuse turning/repositioning by staff and has been educated on importance of turning/repositioning, is a heavy assist with care  Primary RN at bedside for assessment  Skin not pictured on assessment due to wound [place] malfunction  Skin pictured 8/5 by nursing staff and pictured below:      1  Bilateral sacrum and buttock- on assessment skin is intact, blanchable pink and blanchable red skin, slightly macerated skin due to incontinence, no open aspects, no drainage noted  2  Bilateral hips, elbows, and heels- skin is dry, intact, blanchable pink skin  Educated patient on importance of frequent offloading of pressure via turning, repositioning, and weight-shifting  Wound care will sing off at this time  Will place skin care recommendations as orders  Please re-consult if needed  Skin care Plan:  1-Protect sacrum w/Allevyn foam dressing to each side of sacrum/buttock, laz with P, change q3d and PRN for soilage, peel back and check skin q-shift  2-Turn/reposition q2h for pressure re-distribution on skin   3-Elevate heels to offload pressure  4-Moisturize skin daily with skin nourishing cream  5-Ehob cushion in chair when out of bed  6-Allevyn foam to heels, laz w/P, peel foam check skin integrity q-shift  Change q3d  7-P-500 low air loss mattress        Wound 08/05/22 (Active)   Wound Image   08/05/22 165 Wound Description Dry; Intact; Pink 08/09/22 1145   Sneha-wound Assessment Dry; Intact; Maceration;Colony 08/09/22 1145   Wound Length (cm) 0 cm 08/09/22 1145   Wound Width (cm) 0 cm 08/09/22 1145   Wound Depth (cm) 0 cm 08/09/22 1145   Wound Surface Area (cm^2) 0 cm^2 08/09/22 1145   Wound Volume (cm^3) 0 cm^3 08/09/22 1145   Calculated Wound Volume (cm^3) 0 cm^3 08/09/22 1145   Drainage Amount     Treatments Cleansed;Site care 08/09/22 1145   Dressing Moisture barrier 08/09/22 1145   Wound packed?  No 08/09/22 1145   Dressing Changed New 08/09/22 1145   Patient Tolerance Tolerated well 08/09/22 1145   Dressing Status         Wound Care will sign off  Call or Tigertext with any questions    Gisella iHlls BSN RN CWON  Wound and Ostomy care

## 2022-08-09 NOTE — ASSESSMENT & PLAN NOTE
· Status post spinal epidural abscess in the setting of IV drug abuse  · Lives at home with 19-year-old grandmother who acts as primary caregiver  · Improved sleep paraplegia patient does have some lower extremity sensation and movement as well and has the ability to return to full function Ng status with appropriate rehab  · Frequent turns  · Assist with all feeding  · Patient states she normally does not have Gonzalez but had 1 placed as she was going on vacation    Will discontinue Gonzalez at this time and place on urinary retention protocol  · Continue pain control with aqua K, baclofen, and gabapentin

## 2022-08-09 NOTE — PLAN OF CARE
Problem: MOBILITY - ADULT  Goal: Maintain or return to baseline ADL function  Description: INTERVENTIONS:  -  Assess patient's ability to carry out ADLs; assess patient's baseline for ADL function and identify physical deficits which impact ability to perform ADLs (bathing, care of mouth/teeth, toileting, grooming, dressing, etc )  - Assess/evaluate cause of self-care deficits   - Assess range of motion  - Assess patient's mobility; develop plan if impaired  - Assess patient's need for assistive devices and provide as appropriate  - Encourage maximum independence but intervene and supervise when necessary  - Involve family in performance of ADLs  - Assess for home care needs following discharge   - Consider OT consult to assist with ADL evaluation and planning for discharge  - Provide patient education as appropriate  Outcome: Progressing  Goal: Maintains/Returns to pre admission functional level  Description: INTERVENTIONS:  - Perform BMAT or MOVE assessment daily    - Set and communicate daily mobility goal to care team and patient/family/caregiver  - Collaborate with rehabilitation services on mobility goals if consulted  - Reposition patient every 2 hours    - Out of bed for toileting  - Record patient progress and toleration of activity level   Outcome: Progressing     Problem: Prexisting or High Potential for Compromised Skin Integrity  Goal: Skin integrity is maintained or improved  Description: INTERVENTIONS:  - Identify patients at risk for skin breakdown  - Assess and monitor skin integrity  - Assess and monitor nutrition and hydration status  - Monitor labs   - Assess for incontinence   - Turn and reposition patient  - Assist with mobility/ambulation  - Relieve pressure over bony prominences  - Avoid friction and shearing  - Provide appropriate hygiene as needed including keeping skin clean and dry  - Evaluate need for skin moisturizer/barrier cream  - Collaborate with interdisciplinary team   - Patient/family teaching  - Consider wound care consult   Outcome: Progressing     Problem: Potential for Falls  Goal: Patient will remain free of falls  Description: INTERVENTIONS:  - Educate patient/family on patient safety including physical limitations  - Instruct patient to call for assistance with activity   - Consult OT/PT to assist with strengthening/mobility   - Keep Call bell within reach  - Keep bed low and locked with side rails adjusted as appropriate  - Keep care items and personal belongings within reach  - Initiate and maintain comfort rounds  - Make Fall Risk Sign visible to staff  - Offer Toileting every 2 Hours, in advance of need  - Initiate/Maintain bed alarm  - Obtain necessary fall risk management equipment:   - Apply yellow socks and bracelet for high fall risk patients  - Consider moving patient to room near nurses station  Outcome: Progressing

## 2022-08-09 NOTE — ASSESSMENT & PLAN NOTE
Background:  Presented to the ED as was noted to be vomiting and reported that she had not been getting proper care at home requesting placement     · [facility]  · Case management consulted  · PT/OT consulted

## 2022-08-09 NOTE — ASSESSMENT & PLAN NOTE
Background:  Presented to the ED as was noted to be vomiting and reported that she had not been getting proper care at home requesting placement     · Discharged to [facility] on 05/17/2022  · Case management consulted  · PT/OT consulted

## 2022-08-10 PROCEDURE — 99232 SBSQ HOSP IP/OBS MODERATE 35: CPT | Performed by: INTERNAL MEDICINE

## 2022-08-10 RX ADMIN — GABAPENTIN 400 MG: 400 CAPSULE ORAL at 00:02

## 2022-08-10 RX ADMIN — QUETIAPINE FUMARATE 200 MG: 200 TABLET ORAL at 21:20

## 2022-08-10 RX ADMIN — GABAPENTIN 400 MG: 400 CAPSULE ORAL at 09:46

## 2022-08-10 RX ADMIN — PRAZOSIN HYDROCHLORIDE 1 MG: 1 CAPSULE ORAL at 21:20

## 2022-08-10 RX ADMIN — BACLOFEN 10 MG: 10 TABLET ORAL at 21:20

## 2022-08-10 RX ADMIN — CLONAZEPAM 1 MG: 1 TABLET ORAL at 09:46

## 2022-08-10 RX ADMIN — DULOXETINE HYDROCHLORIDE 60 MG: 60 CAPSULE, DELAYED RELEASE ORAL at 09:46

## 2022-08-10 RX ADMIN — ZOLPIDEM TARTRATE 5 MG: 5 TABLET, COATED ORAL at 21:20

## 2022-08-10 RX ADMIN — BACLOFEN 10 MG: 10 TABLET ORAL at 09:46

## 2022-08-10 RX ADMIN — DOCUSATE SODIUM 100 MG: 100 CAPSULE, LIQUID FILLED ORAL at 09:46

## 2022-08-10 NOTE — CASE MANAGEMENT
Case Management Progress Note    Patient name Rosalba Mendoza  Location [address] 303/-01 MRN [id number]  : 1990 Date 8/10/2022       LOS (days): 6  Geometric Mean LOS (GMLOS) (days):   Days to GMLOS:        OBJECTIVE:        Current admission status: Inpatient  Preferred Pharmacy:   [facility] [place], [address] [address]  Phone: [phone] Fax: [phone]    Primary Care Provider: Alem Potter MD    Primary Insurance: [address] [place] [address]  Secondary Insurance:     PROGRESS NOTE:  Search for SNF bed extended to 100 miles in [place]  Additional 4 facilities contacted  No response from 3 facilities; Inquiry sent, awaiting determination

## 2022-08-10 NOTE — PLAN OF CARE
Problem: MOBILITY - ADULT  Goal: Maintains/Returns to pre admission functional level  Description: INTERVENTIONS:  - Perform BMAT or MOVE assessment daily    - Set and communicate daily mobility goal to care team and patient/family/caregiver  - Collaborate with rehabilitation services on mobility goals if consulted  - Perform Range of Motion 3  times a day  - Reposition patient every 2 hours    - Out of bed for toileting  - Record patient progress and toleration of activity level   8/10/2022 0649 by Abril Cheung RN  Outcome: Progressing  8/10/2022 0649 by Abril Cheung RN  Outcome: Progressing     Problem: Prexisting or High Potential for Compromised Skin Integrity  Goal: Skin integrity is maintained or improved  Description: INTERVENTIONS:  - Identify patients at risk for skin breakdown  - Assess and monitor skin integrity  - Assess and monitor nutrition and hydration status  - Monitor labs   - Assess for incontinence   - Turn and reposition patient  - Assist with mobility/ambulation  - Relieve pressure over bony prominences  - Avoid friction and shearing  - Provide appropriate hygiene as needed including keeping skin clean and dry  - Evaluate need for skin moisturizer/barrier cream  - Collaborate with interdisciplinary team   - Patient/family teaching  - Consider wound care consult   8/10/2022 0649 by Abril Cheung RN  Outcome: Progressing  8/10/2022 0649 by Abril Cheung RN  Outcome: Progressing

## 2022-08-10 NOTE — PROGRESS NOTES
[address]  Progress Note - Linda Hawkins 1990, 32 y o  female MRN: [id number]  Unit/Bed#: -01 Encounter: [id number]  Primary Care Provider: Tanya Otero MD   Date and time admitted to hospital: 8/2/2022 12:01 PM    * Inability to return to living situation  Assessment & Plan  Background:  Presented to the ED as was noted to be vomiting and reported that she had not been getting proper care at home requesting placement  · Discharged to [facility] on 05/17/2022  · Case management consulted  · PT/OT consulted    Lower extremity edema  Assessment & Plan  · Chronic lower extremity edema greater in left and right   · Patient reports that she had a duplex of lower extremity was clear last year  · Lower extremity Doppler negative for any DVT  · Elevate extremities  · Patient was previously on Eliquis but states this medication was discontinued    Anxiety  Assessment & Plan  · Continue Xanax per PTA medication regimen as well as Ambien for sleep    Quadriplegic spinal paralysis ([facility] [address] )  Assessment & Plan  · Status post spinal epidural abscess in the setting of IV drug abuse  · Lives at home with 15-year-old grandmother who acts as primary caregiver  · Improved sleep paraplegia patient does have some lower extremity sensation and movement as well and has the ability to return to full function Ng status with appropriate rehab  · Frequent turns  · Assist with all feeding  · Patient states she normally does not have Gonzalez but had 1 placed as she was going on vacation  Will discontinue Gonzalez at this time and place on urinary retention protocol  · Continue pain control with aqua K, baclofen, and gabapentin        VTE Pharmacologic Prophylaxis: VTE Score: 3 Moderate Risk (Score 3-4) - Pharmacological DVT Prophylaxis Ordered: enoxaparin (Lovenox)  Patient Centered Rounds: I performed bedside rounds with nursing staff today    Discussions with Specialists or Other Care Team Provider:  Case management    Education and Discussions with Family / Patient: Patient declined call to   Time Spent for Care: 20 minutes  More than 50% of total time spent on counseling and coordination of care as described above  Current Length of Stay: 6 day(s)  Current Patient Status: Inpatient   Certification Statement: Patient medically stable awaiting placement  Discharge Plan: Patient medically stable awaiting placement    Code Status: Level 1 - Full Code    Subjective:   Patient resting comfortably on examination  Patient had no overnight events or complaints on exam     Objective:     Vitals:   Temp (24hrs), Av °F (36 7 °C), Min:97 6 °F (36 4 °C), Max:98 6 °F (37 °C)    Temp:  [97 6 °F (36 4 °C)-98 6 °F (37 °C)] 97 8 °F (36 6 °C)  HR:  [60-75] 68  Resp:  [16-18] 18  BP: (102-109)/(62-65) 105/63  SpO2:  [94 %-97 %] 96 %  Body mass index is 27 35 kg/m²  Input and Output Summary (last 24 hours): Intake/Output Summary (Last 24 hours) at 8/10/2022 0749  Last data filed at 8/10/2022 0601  Gross per 24 hour   Intake 1636 ml   Output 3600 ml   Net -1964 ml       Physical Exam:   Physical Exam  Vitals and nursing note reviewed  Constitutional:       General: She is not in acute distress  Appearance: She is well-developed  HENT:      Head: Normocephalic and atraumatic  Eyes:      General: No scleral icterus  Conjunctiva/sclera: Conjunctivae normal    Cardiovascular:      Rate and Rhythm: Normal rate and regular rhythm  Heart sounds: Normal heart sounds  No murmur heard  No friction rub  No gallop  Pulmonary:      Effort: Pulmonary effort is normal  No respiratory distress  Breath sounds: Normal breath sounds  No wheezing or rales  Abdominal:      General: Bowel sounds are normal  There is no distension  Palpations: Abdomen is soft  Tenderness: There is no abdominal tenderness  Musculoskeletal:         General: Normal range of motion     Skin:     General: Skin is warm  Findings: No rash  Neurological:      Mental Status: She is alert and oriented to person, place, and time  Sensory: Sensory deficit present  Motor: Weakness present  Additional Data:     Labs:                            Lines/Drains:  Invasive Devices  Report    Drain  Duration           External Urinary Catheter <1 day                      Imaging: No pertinent imaging reviewed  Recent Cultures (last 7 days):         Last 24 Hours Medication List:   Current Facility-Administered Medications   Medication Dose Route Frequency Provider Last Rate    baclofen  10 mg Oral TID Helder Dunlap DO      clonazePAM  1 mg Oral BID HOWARD Nick      docusate sodium  100 mg Oral BID Salas Anthony PA-C      DULoxetine  60 mg Oral Daily HOWARD Grayson      enoxaparin  40 mg Subcutaneous Daily HOWARD Grayson      gabapentin  400 mg Oral [address] HOWARD Gomez      hydrOXYzine HCL  25 mg Oral Q6H PRN Davin Montoya MD      prazosin  1 mg Oral HS HOWARD Nick      QUEtiapine  200 mg Oral HS Danitza Ruelas PA-C      zolpidem  5 mg Oral HS PRN HOWARD Grayson          Today, Patient Was Seen By: Helder Dunlap DO    **Please Note: This note may have been constructed using a voice recognition system  **

## 2022-08-11 PROCEDURE — 99232 SBSQ HOSP IP/OBS MODERATE 35: CPT | Performed by: NURSE PRACTITIONER

## 2022-08-11 RX ADMIN — CLONAZEPAM 1 MG: 1 TABLET ORAL at 11:16

## 2022-08-11 RX ADMIN — GABAPENTIN 400 MG: 400 CAPSULE ORAL at 11:16

## 2022-08-11 RX ADMIN — CLONAZEPAM 1 MG: 1 TABLET ORAL at 17:36

## 2022-08-11 RX ADMIN — DOCUSATE SODIUM 100 MG: 100 CAPSULE, LIQUID FILLED ORAL at 11:16

## 2022-08-11 RX ADMIN — DULOXETINE HYDROCHLORIDE 60 MG: 60 CAPSULE, DELAYED RELEASE ORAL at 11:16

## 2022-08-11 RX ADMIN — DOCUSATE SODIUM 100 MG: 100 CAPSULE, LIQUID FILLED ORAL at 17:36

## 2022-08-11 RX ADMIN — ZOLPIDEM TARTRATE 5 MG: 5 TABLET, COATED ORAL at 21:20

## 2022-08-11 RX ADMIN — BACLOFEN 10 MG: 10 TABLET ORAL at 21:20

## 2022-08-11 RX ADMIN — BACLOFEN 10 MG: 10 TABLET ORAL at 17:36

## 2022-08-11 RX ADMIN — PRAZOSIN HYDROCHLORIDE 1 MG: 1 CAPSULE ORAL at 21:20

## 2022-08-11 RX ADMIN — GABAPENTIN 400 MG: 400 CAPSULE ORAL at 01:10

## 2022-08-11 RX ADMIN — GABAPENTIN 400 MG: 400 CAPSULE ORAL at 17:36

## 2022-08-11 RX ADMIN — GABAPENTIN 400 MG: 400 CAPSULE ORAL at 23:49

## 2022-08-11 RX ADMIN — QUETIAPINE FUMARATE 200 MG: 200 TABLET ORAL at 21:20

## 2022-08-11 RX ADMIN — BACLOFEN 10 MG: 10 TABLET ORAL at 11:16

## 2022-08-11 NOTE — ASSESSMENT & PLAN NOTE
Background:  Presented to the ED as was noted to be vomiting and reported that she had not been getting proper care at home requesting placement     · [facility]  · Case management consulted and following  · PT/OT completed

## 2022-08-11 NOTE — ASSESSMENT & PLAN NOTE
· Status post spinal epidural abscess in the setting of IV drug abuse  · Lives at home with 59-year-old grandmother who acts as primary caregiver  · Improved sleep paraplegia patient does have some lower extremity sensation and movement as well and has the ability to return to full function Ng status with appropriate rehab  · Frequent turns  · Assist with all feeding  · Patient states she normally does not have Gonzalez but had 1 placed as she was going on vacation      · Gonzalez has since been discontinued  · Continue pain control with aqua K, baclofen, and gabapentin

## 2022-08-11 NOTE — PROGRESS NOTES
[address] [facility]  Progress Note - Siddhartha Rene 1990, 32 y o  female MRN: [id number]  Unit/Bed#: -01 Encounter: [id number]  Primary Care Provider: Donato Gallagher MD   Date and time admitted to hospital: 8/2/2022 12:01 PM    * Inability to return to living situation  Assessment & Plan  Background:  Presented to the ED as was noted to be vomiting and reported that she had not been getting proper care at home requesting placement  · Discharged to [facility] on 05/17/2022  · Case management consulted  · PT/OT consulted    Lower extremity edema  Assessment & Plan  · Chronic lower extremity edema greater in left and right   · Patient reports that she had a duplex of lower extremity was clear last year  · Lower extremity Doppler negative for any DVT  · Elevate extremities  · Patient was previously on Eliquis but states this medication was discontinued    Anxiety  Assessment & Plan  · Continue Xanax per PTA medication regimen as well as Ambien for sleep    Quadriplegic spinal paralysis ([facility] [address] )  Assessment & Plan  · Status post spinal epidural abscess in the setting of IV drug abuse  · Lives at home with 44-year-old grandmother who acts as primary caregiver  · Improved sleep paraplegia patient does have some lower extremity sensation and movement as well and has the ability to return to full function Ng status with appropriate rehab  · Frequent turns  · Assist with all feeding  · Patient states she normally does not have Gonzalez but had 1 placed as she was going on vacation  Will discontinue Gonzalez at this time and place on urinary retention protocol  · Continue pain control with aqua K, baclofen, and gabapentin        VTE Pharmacologic Prophylaxis: VTE Score: 3 Moderate Risk (Score 3-4) - Pharmacological DVT Prophylaxis Ordered: enoxaparin (Lovenox)  Patient Centered Rounds: I performed bedside rounds with nursing staff today    Discussions with Specialists or Other Care Team Provider:  Case management    Education and Discussions with Family / Patient: Patient declined call to   Time Spent for Care: 20 minutes  More than 50% of total time spent on counseling and coordination of care as described above  Current Length of Stay: 7 day(s)  Current Patient Status: Inpatient   Certification Statement: Patient medically stable awaiting placement  Discharge Plan: Patient medically stable awaiting placement    Code Status: Level 1 - Full Code    Subjective:   Patient resting comfortably on examination  Patient had no overnight events or complaints on exam     Objective:     Vitals:   Temp (24hrs), Av °F (36 7 °C), Min:98 °F (36 7 °C), Max:98 1 °F (36 7 °C)    Temp:  [98 °F (36 7 °C)-98 1 °F (36 7 °C)] 98 °F (36 7 °C)  HR:  [65-95] 82  Resp:  [16-18] 16  BP: ()/(46-79) 101/59  SpO2:  [92 %-96 %] 94 %  Body mass index is 27 35 kg/m²  Input and Output Summary (last 24 hours): Intake/Output Summary (Last 24 hours) at 2022 1158  Last data filed at 2022 0847  Gross per 24 hour   Intake 0 ml   Output 1150 ml   Net -1150 ml       Physical Exam:   Physical Exam  Vitals and nursing note reviewed  Constitutional:       General: She is not in acute distress  Appearance: She is well-developed  HENT:      Head: Normocephalic  Eyes:      Conjunctiva/sclera: Conjunctivae normal    Cardiovascular:      Rate and Rhythm: Normal rate  Pulmonary:      Effort: Pulmonary effort is normal  No respiratory distress  Abdominal:      Palpations: Abdomen is soft  Musculoskeletal:         General: Normal range of motion  Skin:     General: Skin is warm  Findings: No rash  Neurological:      Mental Status: She is alert and oriented to person, place, and time  Sensory: Sensory deficit present  Motor: Weakness present     Psychiatric:         Mood and Affect: Mood normal         Additional Data:     Labs:                            Lines/Drains:  Invasive Devices  Report    Drain  Duration           External Urinary Catheter 1 day                      Imaging: No pertinent imaging reviewed  Recent Cultures (last 7 days):         Last 24 Hours Medication List:   Current Facility-Administered Medications   Medication Dose Route Frequency Provider Last Rate    baclofen  10 mg Oral TID Yesenia Khanna DO      clonazePAM  1 mg Oral BID HOWARD Nick      docusate sodium  100 mg Oral BID Rogelio Anthony PA-C      DULoxetine  60 mg Oral Daily [facility], HOWARD      enoxaparin  40 mg Subcutaneous Daily [facility], NP      gabapentin  400 mg Oral [address] HOWARD Gomez      hydrOXYzine HCL  25 mg Oral Q6H PRN Ryne Lozada MD      prazosin  1 mg Oral HS HOWARD Nick      QUEtiapine  200 mg Oral HS Maureen Lawson PA-C      zolpidem  5 mg Oral HS [facility]          Today, Patient Was Seen By: HOWARD Asif    **Please Note: This note may have been constructed using a voice recognition system  **

## 2022-08-11 NOTE — PHYSICAL THERAPY NOTE
Physical Therapy Cancellation Note       08/11/22 1326   PT Last Visit   PT Visit Date 08/11/22   Note Type   Note type Cancelled Session   Cancel Reasons Refusal   Additional Comments Chart review performed  At this time, PT treatment session cancelled secondary to pt refusal  Attempted to engage patient in participation in exercises however pt declined due to fatigue  PT explained the benefits of mobility and exercise activities in the prevention of secondary complications of immobility  PT will follow and provide PT interventions as appropriate         Claritza Barrientos, PT

## 2022-08-11 NOTE — ASSESSMENT & PLAN NOTE
· Chronic lower extremity edema greater in left and right   · Patient reports that she had a duplex of lower extremity was clear last year  · Repeat this admission Lower extremity Doppler negative for any DVT  · Elevate extremities  · Patient was previously on Eliquis but states this medication was discontinued

## 2022-08-12 PROCEDURE — 99232 SBSQ HOSP IP/OBS MODERATE 35: CPT | Performed by: NURSE PRACTITIONER

## 2022-08-12 RX ORDER — AMOXICILLIN 250 MG
1 CAPSULE ORAL
Status: DISCONTINUED | OUTPATIENT
Start: 2022-08-12 | End: 2022-08-28 | Stop reason: HOSPADM

## 2022-08-12 RX ORDER — POLYETHYLENE GLYCOL 3350 17 G/17G
17 POWDER, FOR SOLUTION ORAL DAILY
Status: DISCONTINUED | OUTPATIENT
Start: 2022-08-13 | End: 2022-08-28 | Stop reason: HOSPADM

## 2022-08-12 RX ORDER — BISACODYL 10 MG
10 SUPPOSITORY, RECTAL RECTAL DAILY PRN
Status: DISCONTINUED | OUTPATIENT
Start: 2022-08-12 | End: 2022-08-28 | Stop reason: HOSPADM

## 2022-08-12 RX ADMIN — DOCUSATE SODIUM 100 MG: 100 CAPSULE, LIQUID FILLED ORAL at 18:04

## 2022-08-12 RX ADMIN — BACLOFEN 10 MG: 10 TABLET ORAL at 16:24

## 2022-08-12 RX ADMIN — SENNOSIDES AND DOCUSATE SODIUM 1 TABLET: 50; 8.6 TABLET ORAL at 21:10

## 2022-08-12 RX ADMIN — DULOXETINE HYDROCHLORIDE 60 MG: 60 CAPSULE, DELAYED RELEASE ORAL at 09:02

## 2022-08-12 RX ADMIN — DOCUSATE SODIUM 100 MG: 100 CAPSULE, LIQUID FILLED ORAL at 09:02

## 2022-08-12 RX ADMIN — QUETIAPINE FUMARATE 200 MG: 200 TABLET ORAL at 21:10

## 2022-08-12 RX ADMIN — BISACODYL 10 MG: 10 SUPPOSITORY RECTAL at 19:40

## 2022-08-12 RX ADMIN — GABAPENTIN 400 MG: 400 CAPSULE ORAL at 16:24

## 2022-08-12 RX ADMIN — PRAZOSIN HYDROCHLORIDE 1 MG: 1 CAPSULE ORAL at 21:10

## 2022-08-12 RX ADMIN — CLONAZEPAM 1 MG: 1 TABLET ORAL at 18:04

## 2022-08-12 RX ADMIN — CLONAZEPAM 1 MG: 1 TABLET ORAL at 09:02

## 2022-08-12 RX ADMIN — BACLOFEN 10 MG: 10 TABLET ORAL at 21:10

## 2022-08-12 RX ADMIN — ZOLPIDEM TARTRATE 5 MG: 5 TABLET, COATED ORAL at 21:10

## 2022-08-12 RX ADMIN — GABAPENTIN 400 MG: 400 CAPSULE ORAL at 09:02

## 2022-08-12 RX ADMIN — BACLOFEN 10 MG: 10 TABLET ORAL at 09:02

## 2022-08-12 NOTE — NURSING NOTE
[facility] made aware pt with no bm, stated "I dont remember the last time I had one  Asked for suppository  Medications ordered  Pt made aware of her options and new medications to help her had bm

## 2022-08-12 NOTE — PROGRESS NOTES
[address] [facility]  Progress Note - Susanne Dural 1990, 32 y o  female MRN: [id number]  Unit/Bed#: -01 Encounter: [id number]  Primary Care Provider: Matt Chino MD   Date and time admitted to hospital: 8/2/2022 12:01 PM    * Inability to return to living situation  Assessment & Plan  Background:  Presented to the ED as was noted to be vomiting and reported that she had not been getting proper care at home requesting placement  · Discharged to [facility] on 05/17/2022  · Case management consulted  · PT/OT consulted    Lower extremity edema  Assessment & Plan  · Chronic lower extremity edema greater in left and right   · Patient reports that she had a duplex of lower extremity was clear last year  · Lower extremity Doppler negative for any DVT  · Elevate extremities  · Patient was previously on Eliquis but states this medication was discontinued    Anxiety  Assessment & Plan  · Continue Xanax per PTA medication regimen as well as Ambien for sleep    Quadriplegic spinal paralysis ([facility] [address] )  Assessment & Plan  · Status post spinal epidural abscess in the setting of IV drug abuse  · Lives at home with 43-year-old grandmother who acts as primary caregiver  · Improved sleep paraplegia patient does have some lower extremity sensation and movement as well and has the ability to return to full function Ng status with appropriate rehab  · Frequent turns  · Assist with all feeding  · Patient states she normally does not have Gonzalez but had 1 placed as she was going on vacation  Will discontinue Gonzalez at this time and place on urinary retention protocol  · Continue pain control with aqua K, baclofen, and gabapentin        VTE Pharmacologic Prophylaxis: VTE Score: 3 Moderate Risk (Score 3-4) - Pharmacological DVT Prophylaxis Ordered: enoxaparin (Lovenox)  Patient Centered Rounds: I performed bedside rounds with nursing staff today    Discussions with Specialists or Other Care Team Provider:  Case management    Education and Discussions with Family / Patient: Patient declined call to   Time Spent for Care: 20 minutes  More than 50% of total time spent on counseling and coordination of care as described above  Current Length of Stay: 8 day(s)  Current Patient Status: Inpatient   Certification Statement: Patient medically stable awaiting placement  Discharge Plan: Patient medically stable awaiting placement    Code Status: Level 1 - Full Code    Subjective:   Patient resting comfortably on examination  Patient had no overnight events or complaints on exam   Complaining of some mild back pain emotional support provided Voltaren gel ordered    Objective:     Vitals:   Temp (24hrs), Av 5 °F (36 9 °C), Min:98 4 °F (36 9 °C), Max:98 6 °F (37 °C)    Temp:  [98 4 °F (36 9 °C)-98 6 °F (37 °C)] 98 4 °F (36 9 °C)  HR:  [66-85] 85  Resp:  [15-17] 15  BP: ()/(60-71) 95/60  SpO2:  [94 %-96 %] 94 %  Body mass index is 27 35 kg/m²  Input and Output Summary (last 24 hours): Intake/Output Summary (Last 24 hours) at 2022 1200  Last data filed at 2022 0901  Gross per 24 hour   Intake 0 ml   Output 1500 ml   Net -1500 ml       Physical Exam:   Physical Exam  Vitals and nursing note reviewed  Constitutional:       General: She is not in acute distress  Appearance: She is well-developed  HENT:      Head: Normocephalic  Eyes:      Conjunctiva/sclera: Conjunctivae normal    Cardiovascular:      Rate and Rhythm: Normal rate  Pulmonary:      Effort: Pulmonary effort is normal  No respiratory distress  Abdominal:      Palpations: Abdomen is soft  Musculoskeletal:         General: Normal range of motion  Skin:     General: Skin is warm  Findings: No rash  Neurological:      Mental Status: She is alert and oriented to person, place, and time  Sensory: Sensory deficit present  Motor: Weakness present     Psychiatric:         Mood and Affect: Mood normal  Additional Data:     Labs:                            Lines/Drains:  Invasive Devices  Report    Drain  Duration           External Urinary Catheter 2 days                      Imaging: No pertinent imaging reviewed  Recent Cultures (last 7 days):         Last 24 Hours Medication List:   Current Facility-Administered Medications   Medication Dose Route Frequency Provider Last Rate    baclofen  10 mg Oral TID Florina Vincent DO      clonazePAM  1 mg Oral BID HOWARD Nick      Diclofenac Sodium  2 g Topical 4x Daily HOWARD Vazquez      docusate sodium  100 mg Oral BID Irineo Anthony PA-C      DULoxetine  60 mg Oral Daily HOWARD Dias      enoxaparin  40 mg Subcutaneous Daily HOWARD Dias      gabapentin  400 mg Oral [address] HOW[address]      hydrOXYzine HCL  25 mg Oral Q6H PRN Michelle Lazaro MD      prazosin  1 mg Oral HS HOWARD Nick      QUEtiapine  200 mg Oral HS Nelda Sung PA-C      zolpidem  5 mg Oral HS PRN HOWARD Dias          Today, Patient Was Seen By: HOWARD Vazquez    **Please Note: This note may have been constructed using a voice recognition system  **

## 2022-08-12 NOTE — PLAN OF CARE
Problem: MOBILITY - ADULT  Goal: Maintain or return to baseline ADL function  Description: INTERVENTIONS:  -  Assess patient's ability to carry out ADLs; assess patient's baseline for ADL function and identify physical deficits which impact ability to perform ADLs (bathing, care of mouth/teeth, toileting, grooming, dressing, etc )  - Assess/evaluate cause of self-care deficits   - Assess range of motion  - Assess patient's mobility; develop plan if impaired  - Assess patient's need for assistive devices and provide as appropriate  - Encourage maximum independence but intervene and supervise when necessary  - Involve family in performance of ADLs  - Assess for home care needs following discharge   - Consider OT consult to assist with ADL evaluation and planning for discharge  - Provide patient education as appropriate  Outcome: Progressing  Goal: Maintains/Returns to pre admission functional level  Description: INTERVENTIONS:  - Perform BMAT or MOVE assessment daily    - Set and communicate daily mobility goal to care team and patient/family/caregiver  - Collaborate with rehabilitation services on mobility goals if consulted  - Perform Range of Motion 3  times a day  - Reposition patient every 2 hours    - Out of bed for toileting  - Record patient progress and toleration of activity level   Outcome: Progressing     Problem: Prexisting or High Potential for Compromised Skin Integrity  Goal: Skin integrity is maintained or improved  Description: INTERVENTIONS:  - Identify patients at risk for skin breakdown  - Assess and monitor skin integrity  - Assess and monitor nutrition and hydration status  - Monitor labs   - Assess for incontinence   - Turn and reposition patient  - Assist with mobility/ambulation  - Relieve pressure over bony prominences  - Avoid friction and shearing  - Provide appropriate hygiene as needed including keeping skin clean and dry  - Evaluate need for skin moisturizer/barrier cream  - Collaborate with interdisciplinary team   - Patient/family teaching  - Consider wound care consult   Outcome: Progressing     Problem: Potential for Falls  Goal: Patient will remain free of falls  Description: INTERVENTIONS:  - Educate patient/family on patient safety including physical limitations  - Instruct patient to call for assistance with activity   - Consult OT/PT to assist with strengthening/mobility   - Keep Call bell within reach  - Keep bed low and locked with side rails adjusted as appropriate  - Keep care items and personal belongings within reach  - Initiate and maintain comfort rounds  - Make Fall Risk Sign visible to staff  -- Apply yellow socks and bracelet for high fall risk patients  - Consider moving patient to room near nurses station  Outcome: Progressing

## 2022-08-12 NOTE — CASE MANAGEMENT
Case Management Discharge Planning Note    Patient name Gabriella Davalos  Location /-67 MRN [id number]  : 1990 Date 2022       Current Admission Date: 2022  Current Admission Diagnosis:Inability to return to living situation   Patient Active Problem List    Diagnosis Date Noted    Inability to return to living situation 2022    Lower extremity edema 2022    Anxiety 2022    Insomnia 2022    Hepatitis C antibody positive in blood 2022    Substance abuse ([facility] [address] ) 05/15/2022    Failure to thrive in adult 2022    Quadriplegic spinal paralysis ([facility] [address] ) 2022    Gonzalez catheter in place 2022    Back pain 2022      LOS (days): 8  Geometric Mean LOS (GMLOS) (days):   Days to GMLOS:     OBJECTIVE:  Risk of Unplanned Readmission Score: 20 52         Current admission status: Inpatient   Preferred Pharmacy:   [facility] [place] [address]  Phone: [phone] Fax: [phone]    Primary Care Provider: Rosemarie Arreola MD    Primary Insurance: [address] [place] [address]  Secondary Insurance:     DISCHARGE DETAILS:    Discharge planning discussed with[de-identified] Patient  Freedom of Choice: Yes  Comments - Freedom of Choice: Met w pt to update re: d/c plan  Made her aware that there have been no bed offers for SNF placement at this time  Discussed the possibility of getting more assistance at home through agencies such as [employer] [facility]  Pt reports she does not have a home to return to;  her grandmother does not want her to return to home  Pt reports her father lives in home as well, and is drinking and using drugs;  she does not want to return to environment as she celebrated her first year of sobriety a few days ago  Pt insistent on being d/c'd to a SNF    T/C to pt's insurance yesterday to investigate her ;  S C  identified as Abdulaziz Huber at [phone]  Will contact her on next business day to explore any options available to pt  CM contacted family/caregiver?: No- see comments (declined)                  Requested [address]         [place] the [place] [address] at discharge?: No    DME Referral Provided  Referral made for DME?: No    Other Referral/Resources/Interventions Provided:  Interventions: SNF, Short Term Rehab  Referral Comments: Referral area extended x 2 w/o bed offer  Pt reports she can no longer return to her grandmother's home and is theoretically homeless  This CM plans to reach out to pt's  at [facility]  to discuss any resources available to pt           Treatment Team Recommendation: SNF  Discharge Destination Plan[de-identified] SNF  Transport at Discharge : BLS Ambulance

## 2022-08-13 PROCEDURE — 99232 SBSQ HOSP IP/OBS MODERATE 35: CPT | Performed by: NURSE PRACTITIONER

## 2022-08-13 RX ORDER — ACETAMINOPHEN 325 MG/1
975 TABLET ORAL EVERY 6 HOURS PRN
Status: DISCONTINUED | OUTPATIENT
Start: 2022-08-13 | End: 2022-08-28 | Stop reason: HOSPADM

## 2022-08-13 RX ADMIN — GABAPENTIN 400 MG: 400 CAPSULE ORAL at 16:58

## 2022-08-13 RX ADMIN — BACLOFEN 10 MG: 10 TABLET ORAL at 09:24

## 2022-08-13 RX ADMIN — BACLOFEN 10 MG: 10 TABLET ORAL at 16:58

## 2022-08-13 RX ADMIN — PRAZOSIN HYDROCHLORIDE 1 MG: 1 CAPSULE ORAL at 21:04

## 2022-08-13 RX ADMIN — GABAPENTIN 400 MG: 400 CAPSULE ORAL at 09:12

## 2022-08-13 RX ADMIN — QUETIAPINE FUMARATE 200 MG: 200 TABLET ORAL at 21:04

## 2022-08-13 RX ADMIN — SENNOSIDES AND DOCUSATE SODIUM 1 TABLET: 50; 8.6 TABLET ORAL at 21:04

## 2022-08-13 RX ADMIN — CLONAZEPAM 1 MG: 1 TABLET ORAL at 09:24

## 2022-08-13 RX ADMIN — ZOLPIDEM TARTRATE 5 MG: 5 TABLET, COATED ORAL at 21:04

## 2022-08-13 RX ADMIN — DULOXETINE HYDROCHLORIDE 60 MG: 60 CAPSULE, DELAYED RELEASE ORAL at 09:24

## 2022-08-13 RX ADMIN — BACLOFEN 10 MG: 10 TABLET ORAL at 21:04

## 2022-08-13 RX ADMIN — CLONAZEPAM 1 MG: 1 TABLET ORAL at 17:00

## 2022-08-13 RX ADMIN — ACETAMINOPHEN 975 MG: 325 TABLET, FILM COATED ORAL at 20:19

## 2022-08-13 RX ADMIN — POLYETHYLENE GLYCOL 3350 17 G: 17 POWDER, FOR SOLUTION ORAL at 09:25

## 2022-08-13 NOTE — PLAN OF CARE
Problem: DISCHARGE PLANNING  Goal: Discharge to home or other facility with appropriate resources  Description: INTERVENTIONS:  - Identify barriers to discharge w/patient and caregiver  - Arrange for needed discharge resources and transportation as appropriate  - Identify discharge learning needs (meds, wound care, etc )  - Arrange for interpretive services to assist at discharge as needed  - Refer to Case Management Department for coordinating discharge planning if the patient needs post-hospital services based on physician/advanced practitioner order or complex needs related to functional status, cognitive ability, or social support system  Outcome: Progressing     Problem: Knowledge Deficit  Goal: Patient/family/caregiver demonstrates understanding of disease process, treatment plan, medications, and discharge instructions  Description: Complete learning assessment and assess knowledge base    Interventions:  - Provide teaching at level of understanding  - Provide teaching via preferred learning methods  Outcome: Progressing     Problem: MUSCULOSKELETAL - ADULT  Goal: Maintain or return mobility to safest level of function  Description: INTERVENTIONS:  - Assess patient's ability to carry out ADLs; assess patient's baseline for ADL function and identify physical deficits which impact ability to perform ADLs (bathing, care of mouth/teeth, toileting, grooming, dressing, etc )  - Assess/evaluate cause of self-care deficits   - Assess range of motion  - Assess patient's mobility  - Assess patient's need for assistive devices and provide as appropriate  - Encourage maximum independence but intervene and supervise when necessary  - Involve family in performance of ADLs  - Assess for home care needs following discharge   - Consider OT consult to assist with ADL evaluation and planning for discharge  - Provide patient education as appropriate  Outcome: Progressing

## 2022-08-13 NOTE — PROGRESS NOTES
[facility]  Progress Note - Ardelia Counter 1990, 32 y o  female MRN: [id number]  Unit/Bed#: -01 Encounter: [id number]  Primary Care Provider: Roel Wang MD   Date and time admitted to hospital: 8/2/2022 12:01 PM    * Inability to return to living situation  Assessment & Plan  Background:  Presented to the ED as was noted to be vomiting and reported that she had not been getting proper care at home requesting placement  · Discharged to [facility] on 05/17/2022  · Case management consulted and following  · PT/OT completed     Lower extremity edema  Assessment & Plan  · Chronic lower extremity edema greater in left and right   · Patient reports that she had a duplex of lower extremity was clear last year  · Repeat this admission Lower extremity Doppler negative for any DVT  · Elevate extremities  · Patient was previously on Eliquis but states this medication was discontinued    Anxiety  Assessment & Plan  · Continue Xanax per PTA medication regimen as well as Ambien for sleep  · Mood stable    Quadriplegic spinal paralysis ([facility] [address] )  Assessment & Plan  · Status post spinal epidural abscess in the setting of IV drug abuse  · Lives at home with 72-year-old grandmother who acts as primary caregiver  · Improved sleep paraplegia patient does have some lower extremity sensation and movement as well and has the ability to return to full function Ng status with appropriate rehab  · Frequent turns  · Assist with all feeding  · Patient states she normally does not have Gonzalez but had 1 placed as she was going on vacation  · Gonzalez has since been discontinued  · Continue pain control with aqua K, baclofen, and gabapentin          VTE Pharmacologic Prophylaxis: VTE Score: 3 Moderate Risk (Score 3-4) - Pharmacological DVT Prophylaxis Ordered: enoxaparin (Lovenox)  Patient Centered Rounds: I performed bedside rounds with nursing staff today    Discussions with Specialists or Other Care Team Provider:  Case management    Education and Discussions with Family / Patient: Patient declined call to   Time Spent for Care: 30 minutes  More than 50% of total time spent on counseling and coordination of care as described above  Current Length of Stay: 9 day(s)  Current Patient Status: Inpatient   Certification Statement: The patient will continue to require additional inpatient hospital stay due to Need for safe dispo plan  Discharge Plan: Medically stable pending safe dispo plan per case management    Code Status: Level 1 - Full Code    Subjective:   Patient sleeping arouses easily denies any pain understands need further hospitalization until discharge planning can be determined  Objective:     Vitals:   Temp (24hrs), Av 8 °F (36 6 °C), Min:97 3 °F (36 3 °C), Max:98 2 °F (36 8 °C)    Temp:  [97 3 °F (36 3 °C)-98 2 °F (36 8 °C)] 97 3 °F (36 3 °C)  HR:  [58-72] 72  Resp:  [16-18] 16  BP: (104-121)/(58-86) 104/58  SpO2:  [95 %-97 %] 95 %  Body mass index is 27 35 kg/m²  Input and Output Summary (last 24 hours): Intake/Output Summary (Last 24 hours) at 2022 1352  Last data filed at 2022 0601  Gross per 24 hour   Intake 480 ml   Output 1300 ml   Net -820 ml       Physical Exam:   Physical Exam  Vitals and nursing note reviewed  Constitutional:       General: She is sleeping  She is not in acute distress  Appearance: She is well-developed  HENT:      Head: Normocephalic and atraumatic  Eyes:      Conjunctiva/sclera: Conjunctivae normal    Cardiovascular:      Rate and Rhythm: Normal rate and regular rhythm  Heart sounds: No murmur heard  Pulmonary:      Effort: Pulmonary effort is normal  No respiratory distress  Breath sounds: Normal breath sounds  Abdominal:      Palpations: Abdomen is soft  Tenderness: There is no abdominal tenderness  Musculoskeletal:      Cervical back: Neck supple  Skin:     General: Skin is warm and dry  Neurological:      Mental Status: She is easily aroused  Mental status is at baseline  Psychiatric:         Mood and Affect: Mood normal          Behavior: Behavior normal           Additional Data:     Labs:                            Lines/Drains:  Invasive Devices  Report    Drain  Duration           External Urinary Catheter 3 days                      Imaging: No pertinent imaging reviewed  Recent Cultures (last 7 days):         Last 24 Hours Medication List:   Current Facility-Administered Medications   Medication Dose Route Frequency Provider Last Rate    baclofen  10 mg Oral TID Bernadette Singh,       bisacodyl  10 mg Rectal Daily PRN Precilla HOWARD Zavala      clonazePAM  1 mg Oral BID HOWARD Everett      Diclofenac Sodium  2 g Topical 4x Daily Precilla Lucas, HOWARD      DULoxetine  60 mg Oral Daily HOWARD Everett      enoxaparin  40 mg Subcutaneous Daily HOWARD Everett      gabapentin  400 mg Oral [address] HOWARD Gomez      hydrOXYzine HCL  25 mg Oral Q6H PRN Jaja Donnelly MD      polyethylene glycol  17 g Oral Daily Precilla Lucas, HOWARD      prazosin  1 mg Oral HS HOWARD Nick      QUEtiapine  200 mg Oral HS Esther Cardona PA-C      senna-docusate sodium  1 tablet Oral HS Precilla Lucas, HOWARD      zolpidem  5 mg Oral HS PRN HOWARD Everett          Today, Patient Was Seen By: HOWARD Purdy    **Please Note: This note may have been constructed using a voice recognition system  **

## 2022-08-13 NOTE — CASE MANAGEMENT
Case Management Discharge Planning Note    Patient name Rosalba Mendoza  Location /-04 MRN [id number]  : 1990 Date 2022       Current Admission Date: 2022  Current Admission Diagnosis:Inability to return to living situation   Patient Active Problem List    Diagnosis Date Noted    Inability to return to living situation 2022    Lower extremity edema 2022    Anxiety 2022    Insomnia 2022    Hepatitis C antibody positive in blood 2022    Substance abuse ([facility] [address] ) 05/15/2022    Failure to thrive in adult 2022    Quadriplegic spinal paralysis ([facility] [address] ) 2022    Gonzalez catheter in place 2022    Back pain 2022      LOS (days): 9  Geometric Mean LOS (GMLOS) (days):   Days to GMLOS:     OBJECTIVE:  Risk of Unplanned Readmission Score: 21 13         Current admission status: Inpatient   Preferred Pharmacy:   [facility], [address] [place] [address]  Phone: [phone] Fax: [phone]    Primary Care Provider: Alem Potter MD    Primary Insurance: [address] [place] [address]  Secondary Insurance:     DISCHARGE DETAILS:    Other Referral/Resources/Interventions Provided:  Interventions: SNF  Referral Comments: CM left messages for [facility], [facility], and [facility], as only facilities without a determination at this time for f/u regarding acceptance

## 2022-08-14 PROCEDURE — 99232 SBSQ HOSP IP/OBS MODERATE 35: CPT | Performed by: NURSE PRACTITIONER

## 2022-08-14 RX ADMIN — SENNOSIDES AND DOCUSATE SODIUM 1 TABLET: 50; 8.6 TABLET ORAL at 21:17

## 2022-08-14 RX ADMIN — CLONAZEPAM 1 MG: 1 TABLET ORAL at 09:28

## 2022-08-14 RX ADMIN — POLYETHYLENE GLYCOL 3350 17 G: 17 POWDER, FOR SOLUTION ORAL at 09:28

## 2022-08-14 RX ADMIN — BACLOFEN 10 MG: 10 TABLET ORAL at 09:28

## 2022-08-14 RX ADMIN — CLONAZEPAM 1 MG: 1 TABLET ORAL at 18:08

## 2022-08-14 RX ADMIN — ACETAMINOPHEN 975 MG: 325 TABLET, FILM COATED ORAL at 16:43

## 2022-08-14 RX ADMIN — BACLOFEN 10 MG: 10 TABLET ORAL at 21:17

## 2022-08-14 RX ADMIN — GABAPENTIN 400 MG: 400 CAPSULE ORAL at 08:58

## 2022-08-14 RX ADMIN — ZOLPIDEM TARTRATE 5 MG: 5 TABLET, COATED ORAL at 21:17

## 2022-08-14 RX ADMIN — QUETIAPINE FUMARATE 200 MG: 200 TABLET ORAL at 21:17

## 2022-08-14 RX ADMIN — DULOXETINE HYDROCHLORIDE 60 MG: 60 CAPSULE, DELAYED RELEASE ORAL at 09:28

## 2022-08-14 NOTE — PLAN OF CARE
Problem: Knowledge Deficit  Goal: Patient/family/caregiver demonstrates understanding of disease process, treatment plan, medications, and discharge instructions  Description: Complete learning assessment and assess knowledge base    Interventions:  - Provide teaching at level of understanding  - Provide teaching via preferred learning methods  Outcome: Progressing     Problem: DISCHARGE PLANNING  Goal: Discharge to home or other facility with appropriate resources  Description: INTERVENTIONS:  - Identify barriers to discharge w/patient and caregiver  - Arrange for needed discharge resources and transportation as appropriate  - Identify discharge learning needs (meds, wound care, etc )  - Arrange for interpretive services to assist at discharge as needed  - Refer to Case Management Department for coordinating discharge planning if the patient needs post-hospital services based on physician/advanced practitioner order or complex needs related to functional status, cognitive ability, or social support system  Outcome: Progressing     Problem: Prexisting or High Potential for Compromised Skin Integrity  Goal: Skin integrity is maintained or improved  Description: INTERVENTIONS:  - Identify patients at risk for skin breakdown  - Assess and monitor skin integrity  - Assess and monitor nutrition and hydration status  - Monitor labs   - Assess for incontinence   - Turn and reposition patient  - Assist with mobility/ambulation  - Relieve pressure over bony prominences  - Avoid friction and shearing  - Provide appropriate hygiene as needed including keeping skin clean and dry  - Evaluate need for skin moisturizer/barrier cream  - Collaborate with interdisciplinary team   - Patient/family teaching  - Consider wound care consult   Outcome: Progressing

## 2022-08-14 NOTE — PROGRESS NOTES
[facility]  Progress Note - Rosalba Mendoza 1990, 32 y o  female MRN: [id number]  Unit/Bed#: -01 Encounter: [id number]  Primary Care Provider: Alem Potter MD   Date and time admitted to hospital: 8/2/2022 12:01 PM    * Inability to return to living situation  Assessment & Plan  Background:  Presented to the ED as was noted to be vomiting and reported that she had not been getting proper care at home requesting placement  · [facility] on 05/17/2022  · Case management consulted and following  · PT/OT completed     Lower extremity edema  Assessment & Plan  · Chronic lower extremity edema greater in left and right   · Patient reports that she had a duplex of lower extremity was clear last year  · Repeat this admission Lower extremity Doppler negative for DVT  · Elevate extremities  · Patient was previously on Eliquis but states this medication was discontinued    Anxiety  Assessment & Plan  · Continue Xanax per PTA medication regimen as well as Ambien for sleep  · Mood stable    Quadriplegic spinal paralysis ([facility] [address] )  Assessment & Plan  · Status post spinal epidural abscess in the setting of IV drug abuse  · Lives at home with 70-year-old grandmother who acts as primary caregiver  · Improved sleep paraplegia patient does have some lower extremity sensation and movement as well and has the ability to return to full function Ng status with appropriate rehab  · Frequent turns  · Assist with all feeding  · Initially had Gonzalez this is since been discontinued as patient was only utilizing this for vacation  · Monitor patient on urinary retention protocol  · Continue pain control with aqua K, baclofen, and gabapentin          VTE Pharmacologic Prophylaxis: VTE Score: 3 Moderate Risk (Score 3-4) - Pharmacological DVT Prophylaxis Ordered: enoxaparin (Lovenox)  Patient Centered Rounds: I performed bedside rounds with nursing staff today    Discussions with Specialists or Other Care Team Provider:  Case management    Education and Discussions with Family / Patient: Patient declined call to   Time Spent for Care: 30 minutes  More than 50% of total time spent on counseling and coordination of care as described above  Current Length of Stay: 10 day(s)  Current Patient Status: Inpatient   Certification Statement: The patient will continue to require additional inpatient hospital stay due to Inability to return to current living situation awaiting dispo per case management  Discharge Plan: Patient remains medically stable pending dispo plan per case management    Code Status: Level 1 - Full Code    Subjective:   Patient sleeping very abrupt with her responses denies any pain appeared agitated was disrupting her while sleeping states she is fine and wants to be left alone  Objective:     Vitals:   Temp (24hrs), Av °F (36 7 °C), Min:97 4 °F (36 3 °C), Max:98 4 °F (36 9 °C)    Temp:  [97 4 °F (36 3 °C)-98 4 °F (36 9 °C)] 97 4 °F (36 3 °C)  HR:  [56-73] 56  Resp:  [16] 16  BP: ()/(49-74) 104/55  SpO2:  [94 %-96 %] 95 %  Body mass index is 27 35 kg/m²  Input and Output Summary (last 24 hours): Intake/Output Summary (Last 24 hours) at 2022 0931  Last data filed at 2022 1700  Gross per 24 hour   Intake 440 ml   Output 950 ml   Net -510 ml       Physical Exam:   Physical Exam  Vitals and nursing note reviewed  Constitutional:       General: She is sleeping  She is not in acute distress  Appearance: She is well-developed  HENT:      Head: Normocephalic and atraumatic  Eyes:      Conjunctiva/sclera: Conjunctivae normal    Cardiovascular:      Rate and Rhythm: Normal rate and regular rhythm  Heart sounds: No murmur heard  Pulmonary:      Effort: Pulmonary effort is normal  No respiratory distress  Breath sounds: Normal breath sounds  Abdominal:      Palpations: Abdomen is soft  Tenderness: There is no abdominal tenderness  Musculoskeletal:      Cervical back: Neck supple  Skin:     General: Skin is warm and dry  Neurological:      Mental Status: She is oriented to person, place, and time and easily aroused  Comments: Baseline quadriplegic   Psychiatric:         Mood and Affect: Affect is angry  Speech: Speech normal       Comments: Patient appeared to be angry at being disruptive denied any pain however did remain cooperative and answer questions appropriately          Additional Data:     Labs:                            Lines/Drains:  Invasive Devices  Report    Drain  Duration           External Urinary Catheter 4 days                      Imaging: No pertinent imaging reviewed  Recent Cultures (last 7 days):         Last 24 Hours Medication List:   Current Facility-Administered Medications   Medication Dose Route Frequency Provider Last Rate    acetaminophen  975 mg Oral Q6H PRN Sade Pride PA-C      baclofen  10 mg Oral TID Maria Esther Castro DO      bisacodyl  10 mg Rectal Daily PRN Chichi Lava, CRNP      clonazePAM  1 mg Oral BID Rosio Gone, CRNP      Diclofenac Sodium  2 g Topical 4x Daily Chichi Lava, CRNP      DULoxetine  60 mg Oral Daily Rosio Gone, CRNP      enoxaparin  40 mg Subcutaneous Daily Rosio Gone, CRNP      gabapentin  400 mg Oral [address] Castelan, CRNP      hydrOXYzine HCL  25 mg Oral Q6H PRN Yazmin Donis MD      polyethylene glycol  17 g Oral Daily Chichi Lava, CRNP      prazosin  1 mg Oral HS Ailin Nichols, CRNP      QUEtiapine  200 mg Oral HS Bell Juarez PA-C      senna-docusate sodium  1 tablet Oral HS Chichi Lava, CRNP      zolpidem  5 mg Oral HS PRN Rosio GoneHOWARD          Today, Patient Was Seen By: HOWARD Nguyen    **Please Note: This note may have been constructed using a voice recognition system  **

## 2022-08-14 NOTE — ASSESSMENT & PLAN NOTE
· Status post spinal epidural abscess in the setting of IV drug abuse  · Lives at home with 51-year-old grandmother who acts as primary caregiver  · Improved sleep paraplegia patient does have some lower extremity sensation and movement as well and has the ability to return to full function Ng status with appropriate rehab  · Frequent turns  · Assist with all feeding  · Initially had Gonzalez this is since been discontinued as patient was only utilizing this for vacation  · Monitor patient on urinary retention protocol  · Continue pain control with aqua K, baclofen, and gabapentin

## 2022-08-14 NOTE — PROGRESS NOTES
Patient asked to be bladder scanned, stating " I'm retaining "  Patient was bladder scanned 2X with both times bladder scan reading 0  Patient's urine output was 500cc ([employer] canister) just prior to bladder scanning

## 2022-08-14 NOTE — ASSESSMENT & PLAN NOTE
Background:  Presented to the ED as was noted to be vomiting and reported that she had not been getting proper care at home requesting placement     · Discharged to [facility] on 05/17/2022  · Case management consulted and following  · PT/OT completed

## 2022-08-14 NOTE — ASSESSMENT & PLAN NOTE
· Chronic lower extremity edema greater in left and right   · Patient reports that she had a duplex of lower extremity was clear last year  · Repeat this admission Lower extremity Doppler negative for DVT  · Elevate extremities  · Patient was previously on Eliquis but states this medication was discontinued

## 2022-08-15 PROCEDURE — 99232 SBSQ HOSP IP/OBS MODERATE 35: CPT | Performed by: NURSE PRACTITIONER

## 2022-08-15 RX ADMIN — GABAPENTIN 400 MG: 400 CAPSULE ORAL at 17:38

## 2022-08-15 RX ADMIN — BACLOFEN 10 MG: 10 TABLET ORAL at 17:38

## 2022-08-15 RX ADMIN — BACLOFEN 10 MG: 10 TABLET ORAL at 08:56

## 2022-08-15 RX ADMIN — PRAZOSIN HYDROCHLORIDE 1 MG: 1 CAPSULE ORAL at 22:13

## 2022-08-15 RX ADMIN — BACLOFEN 10 MG: 10 TABLET ORAL at 22:13

## 2022-08-15 RX ADMIN — ZOLPIDEM TARTRATE 5 MG: 5 TABLET, COATED ORAL at 22:13

## 2022-08-15 RX ADMIN — CLONAZEPAM 1 MG: 1 TABLET ORAL at 08:55

## 2022-08-15 RX ADMIN — ACETAMINOPHEN 975 MG: 325 TABLET, FILM COATED ORAL at 20:01

## 2022-08-15 RX ADMIN — SENNOSIDES AND DOCUSATE SODIUM 1 TABLET: 50; 8.6 TABLET ORAL at 22:12

## 2022-08-15 RX ADMIN — GABAPENTIN 400 MG: 400 CAPSULE ORAL at 08:56

## 2022-08-15 RX ADMIN — DULOXETINE HYDROCHLORIDE 60 MG: 60 CAPSULE, DELAYED RELEASE ORAL at 08:56

## 2022-08-15 RX ADMIN — CLONAZEPAM 1 MG: 1 TABLET ORAL at 17:38

## 2022-08-15 RX ADMIN — QUETIAPINE FUMARATE 200 MG: 200 TABLET ORAL at 22:12

## 2022-08-15 NOTE — PROGRESS NOTES
[facility]  Progress Note - Dav Diaz 1990, 32 y o  female MRN: [id number]  Unit/Bed#: -01 Encounter: [id number]  Primary Care Provider: Paula Shen MD   Date and time admitted to hospital: 8/2/2022 12:01 PM    * Inability to return to living situation  Assessment & Plan  Background:  Presented to the ED as was noted to be vomiting and reported that she had not been getting proper care at home requesting placement  · Discharged to [facility] on 05/17/2022  · Case management consulted and following  · PT/OT completed recommending short-term rehab    Lower extremity edema  Assessment & Plan  · Chronic lower extremity edema greater in left and right   · Patient reports that she had a duplex of lower extremity was clear last year  · Repeat this admission Lower extremity Doppler negative for DVT  · Elevate extremities  · Patient was previously on Eliquis but states this medication was discontinued    Anxiety  Assessment & Plan  · Continue Xanax per PTA medication regimen as well as Ambien for sleep  · Mood stable    Quadriplegic spinal paralysis ([facility] [address] )  Assessment & Plan  · Status post spinal epidural abscess in the setting of IV drug abuse  · Lives at home with 79-year-old grandmother who acts as primary caregiver  · Improved sleep paraplegia patient does have some lower extremity sensation and movement as well and has the ability to return to full function Ng status with appropriate rehab  · Frequent turns  · Assist with all feeding  · Initially had Gonzalez this has since been discontinued as patient was only utilizing this for vacation  · Monitor patient on urinary retention protocol with complaint that she was retaining bladder scan x2 with no retention noted   · Continue pain control with aqua K, baclofen, and gabapentin          VTE Pharmacologic Prophylaxis: VTE Score: 3 Moderate Risk (Score 3-4) - Pharmacological DVT Prophylaxis Ordered: enoxaparin (Lovenox)      Patient Centered Rounds: I performed bedside rounds with nursing staff today  Discussions with Specialists or Other Care Team Provider:  Case management    Education and Discussions with Family / Patient: Patient declined call to   Time Spent for Care: 30 minutes  More than 50% of total time spent on counseling and coordination of care as described above  Current Length of Stay: 11 day(s)  Current Patient Status: Inpatient   Certification Statement: The patient will continue to require additional inpatient hospital stay due to Inability to return to living situation  Discharge Plan: Patient remains medically stable for discharge pending safe dispo plan per case management    Code Status: Level 1 - Full Code    Subjective:   Patient agitated that provider entered room codes she just fallen asleep patient educated on rounding during the day proper medication  And participation being he to her discharge planning patient became quiet discussion over her retention concerns yesterday were discussed the patient states symptoms have since subsided    Objective:     Vitals:   Temp (24hrs), Av 1 °F (36 7 °C), Min:97 9 °F (36 6 °C), Max:98 3 °F (36 8 °C)    Temp:  [97 9 °F (36 6 °C)-98 3 °F (36 8 °C)] 97 9 °F (36 6 °C)  HR:  [66-87] 87  Resp:  [16-17] 17  BP: ()/() 97/62  SpO2:  [94 %-98 %] 95 %  Body mass index is 27 35 kg/m²  Input and Output Summary (last 24 hours): Intake/Output Summary (Last 24 hours) at 8/15/2022 1107  Last data filed at 8/15/2022 0537  Gross per 24 hour   Intake 1010 ml   Output 2450 ml   Net -1440 ml       Physical Exam:   Physical Exam  Vitals and nursing note reviewed  Constitutional:       General: She is not in acute distress  Appearance: She is well-developed  HENT:      Head: Normocephalic and atraumatic  Eyes:      Conjunctiva/sclera: Conjunctivae normal    Cardiovascular:      Rate and Rhythm: Normal rate and regular rhythm  Heart sounds:  No murmur heard  Pulmonary:      Effort: Pulmonary effort is normal  No respiratory distress  Breath sounds: Normal breath sounds  Abdominal:      Palpations: Abdomen is soft  Tenderness: There is no abdominal tenderness  Musculoskeletal:      Cervical back: Neck supple  Right lower leg: Edema present  Left lower leg: Edema present  Skin:     General: Skin is warm and dry  Neurological:      Mental Status: She is alert and oriented to person, place, and time  Mental status is at baseline  Psychiatric:         Mood and Affect: Affect is angry  Speech: Speech is delayed  Behavior: Behavior is withdrawn  Additional Data:     Labs:                            Lines/Drains:  Invasive Devices  Report    None                       Imaging: No pertinent imaging reviewed      Recent Cultures (last 7 days):         Last 24 Hours Medication List:   Current Facility-Administered Medications   Medication Dose Route Frequency Provider Last Rate    acetaminophen  975 mg Oral Q6H PRN Sade Pride PA-C      baclofen  10 mg Oral TID Alyson Nielsen DO      bisacodyl  10 mg Rectal Daily PRN Kerri Shaggy, CRNP      clonazePAM  1 mg Oral BID Aura Lance, CRNP      Diclofenac Sodium  2 g Topical 4x Daily Kerri Shaggy, CRNP      DULoxetine  60 mg Oral Daily Aura Lance, CRNP      enoxaparin  40 mg Subcutaneous Daily Aura Lance, CRNP      gabapentin  400 mg Oral [address]ner, CRNP      hydrOXYzine HCL  25 mg Oral Q6H PRN Alicia Hein MD      polyethylene glycol  17 g Oral Daily Kerri Shaggy, CRNP      prazosin  1 mg Oral HS Ailin Nichols, CRNP      QUEtiapine  200 mg Oral HS Mervin Downey PA-C      senna-docusate sodium  1 tablet Oral HS Kerri Shaggy, CRNP      zolpidem  5 mg Oral HS PRN HOWARD Leone          Today, Patient Was Seen By: HOWARD Whitfield    **Please Note: This note may have been constructed using a voice recognition system  **

## 2022-08-15 NOTE — ASSESSMENT & PLAN NOTE
· Status post spinal epidural abscess in the setting of IV drug abuse  · Lives at home with 75-year-old grandmother who acts as primary caregiver  · Improved sleep paraplegia patient does have some lower extremity sensation and movement as well and has the ability to return to full function Ng status with appropriate rehab  · Frequent turns  · Assist with all feeding  · Initially had Gonzalez this has since been discontinued as patient was only utilizing this for vacation  · Monitor patient on urinary retention protocol with complaint that she was retaining bladder scan x2 with no retention noted   · Continue pain control with aqua K, baclofen, and gabapentin

## 2022-08-15 NOTE — PHYSICAL THERAPY NOTE
Physical Therapy Cancellation Note       08/15/22 0809   PT Last Visit   PT Visit Date 08/15/22   Note Type   Note type Cancelled Session   Cancel Reasons Refusal   Additional Comments Chart review performed  At this time, PT treatment session cancelled secondary to pt refusal due to reports of increased headache  PT explained the benefits of mobility and exercise activities in the prevention of secondary complications of immobility  PT will follow and provide PT interventions as appropriate         Meet Leader, PT

## 2022-08-15 NOTE — PLAN OF CARE
Problem: MOBILITY - ADULT  Goal: Maintain or return to baseline ADL function  Description: INTERVENTIONS:  -  Assess patient's ability to carry out ADLs; assess patient's baseline for ADL function and identify physical deficits which impact ability to perform ADLs (bathing, care of mouth/teeth, toileting, grooming, dressing, etc )  - Assess/evaluate cause of self-care deficits   - Assess range of motion  - Assess patient's mobility; develop plan if impaired  - Assess patient's need for assistive devices and provide as appropriate  - Encourage maximum independence but intervene and supervise when necessary  - Involve family in performance of ADLs  - Assess for home care needs following discharge   - Consider OT consult to assist with ADL evaluation and planning for discharge  - Provide patient education as appropriate  Outcome: Progressing  Goal: Maintains/Returns to pre admission functional level  Description: INTERVENTIONS:  - Perform BMAT or MOVE assessment daily    - Set and communicate daily mobility goal to care team and patient/family/caregiver  - Collaborate with rehabilitation services on mobility goals if consulted  - Perform Range of Motion 3  times a day  - Reposition patient every 2 hours    - Out of bed for toileting  - Record patient progress and toleration of activity level   Outcome: Progressing     Problem: Prexisting or High Potential for Compromised Skin Integrity  Goal: Skin integrity is maintained or improved  Description: INTERVENTIONS:  - Identify patients at risk for skin breakdown  - Assess and monitor skin integrity  - Assess and monitor nutrition and hydration status  - Monitor labs   - Assess for incontinence   - Turn and reposition patient  - Assist with mobility/ambulation  - Relieve pressure over bony prominences  - Avoid friction and shearing  - Provide appropriate hygiene as needed including keeping skin clean and dry  - Evaluate need for skin moisturizer/barrier cream  - Collaborate with interdisciplinary team   - Patient/family teaching  - Consider wound care consult   Outcome: Progressing     Problem: Potential for Falls  Goal: Patient will remain free of falls  Description: INTERVENTIONS:  - Educate patient/family on patient safety including physical limitations  - Instruct patient to call for assistance with activity   - Consult OT/PT to assist with strengthening/mobility   - Keep Call bell within reach  - Keep bed low and locked with side rails adjusted as appropriate  - Keep care items and personal belongings within reach  - Initiate and maintain comfort rounds  - Make Fall Risk Sign visible to staff  -- Apply yellow socks and bracelet for high fall risk patients  - Consider moving patient to room near nurses station  Outcome: Progressing     Problem: PAIN - ADULT  Goal: Verbalizes/displays adequate comfort level or baseline comfort level  Description: Interventions:  - Encourage patient to monitor pain and request assistance  - Assess pain using appropriate pain scale  - Administer analgesics based on type and severity of pain and evaluate response  - Implement non-pharmacological measures as appropriate and evaluate response  - Consider cultural and social influences on pain and pain management  - Notify physician/advanced practitioner if interventions unsuccessful or patient reports new pain  Outcome: Progressing     Problem: INFECTION - ADULT  Goal: Absence or prevention of progression during hospitalization  Description: INTERVENTIONS:  - Assess and monitor for signs and symptoms of infection  - Monitor lab/diagnostic results  - Monitor all insertion sites, i e  indwelling lines, tubes, and drains  - Monitor endotracheal if appropriate and nasal secretions for changes in amount and color  - Purchase appropriate cooling/warming therapies per order  - Administer medications as ordered  - Instruct and encourage patient and family to use good hand hygiene technique  - Identify and instruct in appropriate isolation precautions for identified infection/condition  Outcome: Progressing     Problem: SAFETY ADULT  Goal: Maintain or return to baseline ADL function  Description: INTERVENTIONS:  -  Assess patient's ability to carry out ADLs; assess patient's baseline for ADL function and identify physical deficits which impact ability to perform ADLs (bathing, care of mouth/teeth, toileting, grooming, dressing, etc )  - Assess/evaluate cause of self-care deficits   - Assess range of motion  - Assess patient's mobility; develop plan if impaired  - Assess patient's need for assistive devices and provide as appropriate  - Encourage maximum independence but intervene and supervise when necessary  - Involve family in performance of ADLs  - Assess for home care needs following discharge   - Consider OT consult to assist with ADL evaluation and planning for discharge  - Provide patient education as appropriate  Outcome: Progressing  Goal: Maintains/Returns to pre admission functional level  Description: INTERVENTIONS:  - Perform BMAT or MOVE assessment daily    - Set and communicate daily mobility goal to care team and patient/family/caregiver  - Collaborate with rehabilitation services on mobility goals if consulted  - Perform Range of Motion 3  times a day  - Reposition patient every 2 hours    - Out of bed for toileting  - Record patient progress and toleration of activity level   Outcome: Progressing  Goal: Patient will remain free of falls  Description: INTERVENTIONS:  - Educate patient/family on patient safety including physical limitations  - Instruct patient to call for assistance with activity   - Consult OT/PT to assist with strengthening/mobility   - Keep Call bell within reach  - Keep bed low and locked with side rails adjusted as appropriate  - Keep care items and personal belongings within reach  - Initiate and maintain comfort rounds  - Make Fall Risk Sign visible to staff  -- Apply yellow socks and bracelet for high fall risk patients  - Consider moving patient to room near nurses station  Outcome: Progressing     Problem: DISCHARGE PLANNING  Goal: Discharge to home or other facility with appropriate resources  Description: INTERVENTIONS:  - Identify barriers to discharge w/patient and caregiver  - Arrange for needed discharge resources and transportation as appropriate  - Identify discharge learning needs (meds, wound care, etc )  - Arrange for interpretive services to assist at discharge as needed  - Refer to Case Management Department for coordinating discharge planning if the patient needs post-hospital services based on physician/advanced practitioner order or complex needs related to functional status, cognitive ability, or social support system  Outcome: Progressing     Problem: Knowledge Deficit  Goal: Patient/family/caregiver demonstrates understanding of disease process, treatment plan, medications, and discharge instructions  Description: Complete learning assessment and assess knowledge base    Interventions:  - Provide teaching at level of understanding  - Provide teaching via preferred learning methods  Outcome: Progressing     Problem: MUSCULOSKELETAL - ADULT  Goal: Maintain or return mobility to safest level of function  Description: INTERVENTIONS:  - Assess patient's ability to carry out ADLs; assess patient's baseline for ADL function and identify physical deficits which impact ability to perform ADLs (bathing, care of mouth/teeth, toileting, grooming, dressing, etc )  - Assess/evaluate cause of self-care deficits   - Assess range of motion  - Assess patient's mobility  - Assess patient's need for assistive devices and provide as appropriate  - Encourage maximum independence but intervene and supervise when necessary  - Involve family in performance of ADLs  - Assess for home care needs following discharge   - Consider OT consult to assist with ADL evaluation and planning for discharge  - Provide patient education as appropriate  Outcome: Progressing  Goal: Maintain proper alignment of affected body part  Description: INTERVENTIONS:  - Support, maintain and protect limb and body alignment  - Provide patient/ family with appropriate education  Outcome: Progressing

## 2022-08-15 NOTE — OCCUPATIONAL THERAPY NOTE
Occupational Therapy Cancellation Note     Patient Name: Ayanna Castaneda  Today's Date: 8/15/2022  Problem List  Principal Problem:    Inability to return to living situation  Active Problems:    Quadriplegic spinal paralysis [facility])    Anxiety    Lower extremity edema          08/15/22 1306   OT Last Visit   OT Visit Date 08/15/22   Note Type   Note type Cancelled Session   Cancel Reasons Refusal         OT order active and chart review performed  At this time, OT treatment cancelled due to patient refusing session  Attempted twice today (0810 and 5691)  On both attempts pt was educated on the importance of mobilization and participating in therapy but patient continued to declined d/t headache and fatigue  OT will follow and provide skilled interventions as appropriate        Piyush Mendiola, BEN, OTR/L

## 2022-08-15 NOTE — ASSESSMENT & PLAN NOTE
Background:  Presented to the ED as was noted to be vomiting and reported that she had not been getting proper care at home requesting placement     · Discharged to [address] [place] [address] on 05/17/2022  · Case management consulted and following  · PT/OT completed recommending short-term rehab

## 2022-08-15 NOTE — PLAN OF CARE
Problem: MOBILITY - ADULT  Goal: Maintain or return to baseline ADL function  Description: INTERVENTIONS:  -  Assess patient's ability to carry out ADLs; assess patient's baseline for ADL function and identify physical deficits which impact ability to perform ADLs (bathing, care of mouth/teeth, toileting, grooming, dressing, etc )  - Assess/evaluate cause of self-care deficits   - Assess range of motion  - Assess patient's mobility; develop plan if impaired  - Assess patient's need for assistive devices and provide as appropriate  - Encourage maximum independence but intervene and supervise when necessary  - Involve family in performance of ADLs  - Assess for home care needs following discharge   - Consider OT consult to assist with ADL evaluation and planning for discharge  - Provide patient education as appropriate  Outcome: Progressing  Goal: Maintains/Returns to pre admission functional level  Description: INTERVENTIONS:  - Perform BMAT or MOVE assessment daily    - Set and communicate daily mobility goal to care team and patient/family/caregiver  - Collaborate with rehabilitation services on mobility goals if consulted  - Perform Range of Motion 3  times a day  - Reposition patient every 2 hours    - Out of bed for toileting  - Record patient progress and toleration of activity level   Outcome: Progressing     Problem: Prexisting or High Potential for Compromised Skin Integrity  Goal: Skin integrity is maintained or improved  Description: INTERVENTIONS:  - Identify patients at risk for skin breakdown  - Assess and monitor skin integrity  - Assess and monitor nutrition and hydration status  - Monitor labs   - Assess for incontinence   - Turn and reposition patient  - Assist with mobility/ambulation  - Relieve pressure over bony prominences  - Avoid friction and shearing  - Provide appropriate hygiene as needed including keeping skin clean and dry  - Evaluate need for skin moisturizer/barrier cream  - Collaborate with interdisciplinary team   - Patient/family teaching  - Consider wound care consult   Outcome: Progressing     Problem: Potential for Falls  Goal: Patient will remain free of falls  Description: INTERVENTIONS:  - Educate patient/family on patient safety including physical limitations  - Instruct patient to call for assistance with activity   - Consult OT/PT to assist with strengthening/mobility   - Keep Call bell within reach  - Keep bed low and locked with side rails adjusted as appropriate  - Keep care items and personal belongings within reach  - Initiate and maintain comfort rounds  - Make Fall Risk Sign visible to staff  -- Apply yellow socks and bracelet for high fall risk patients  - Consider moving patient to room near nurses station  Outcome: Progressing     Problem: PAIN - ADULT  Goal: Verbalizes/displays adequate comfort level or baseline comfort level  Description: Interventions:  - Encourage patient to monitor pain and request assistance  - Assess pain using appropriate pain scale  - Administer analgesics based on type and severity of pain and evaluate response  - Implement non-pharmacological measures as appropriate and evaluate response  - Consider cultural and social influences on pain and pain management  - Notify physician/advanced practitioner if interventions unsuccessful or patient reports new pain  Outcome: Progressing     Problem: INFECTION - ADULT  Goal: Absence or prevention of progression during hospitalization  Description: INTERVENTIONS:  - Assess and monitor for signs and symptoms of infection  - Monitor lab/diagnostic results  - Monitor all insertion sites, i e  indwelling lines, tubes, and drains  - Monitor endotracheal if appropriate and nasal secretions for changes in amount and color  - [place] appropriate cooling/warming therapies per order  - Administer medications as ordered  - Instruct and encourage patient and family to use good hand hygiene technique  - Identify and instruct in appropriate isolation precautions for identified infection/condition  Outcome: Progressing     Problem: SAFETY ADULT  Goal: Maintain or return to baseline ADL function  Description: INTERVENTIONS:  -  Assess patient's ability to carry out ADLs; assess patient's baseline for ADL function and identify physical deficits which impact ability to perform ADLs (bathing, care of mouth/teeth, toileting, grooming, dressing, etc )  - Assess/evaluate cause of self-care deficits   - Assess range of motion  - Assess patient's mobility; develop plan if impaired  - Assess patient's need for assistive devices and provide as appropriate  - Encourage maximum independence but intervene and supervise when necessary  - Involve family in performance of ADLs  - Assess for home care needs following discharge   - Consider OT consult to assist with ADL evaluation and planning for discharge  - Provide patient education as appropriate  Outcome: Progressing  Goal: Maintains/Returns to pre admission functional level  Description: INTERVENTIONS:  - Perform BMAT or MOVE assessment daily    - Set and communicate daily mobility goal to care team and patient/family/caregiver  - Collaborate with rehabilitation services on mobility goals if consulted  - Perform Range of Motion 3  times a day  - Reposition patient every 2 hours    - Out of bed for toileting  - Record patient progress and toleration of activity level   Outcome: Progressing  Goal: Patient will remain free of falls  Description: INTERVENTIONS:  - Educate patient/family on patient safety including physical limitations  - Instruct patient to call for assistance with activity   - Consult OT/PT to assist with strengthening/mobility   - Keep Call bell within reach  - Keep bed low and locked with side rails adjusted as appropriate  - Keep care items and personal belongings within reach  - Initiate and maintain comfort rounds  - Make Fall Risk Sign visible to staff  -- Apply yellow socks and bracelet for high fall risk patients  - Consider moving patient to room near nurses station  Outcome: Progressing     Problem: DISCHARGE PLANNING  Goal: Discharge to home or other facility with appropriate resources  Description: INTERVENTIONS:  - Identify barriers to discharge w/patient and caregiver  - Arrange for needed discharge resources and transportation as appropriate  - Identify discharge learning needs (meds, wound care, etc )  - Arrange for interpretive services to assist at discharge as needed  - Refer to Case Management Department for coordinating discharge planning if the patient needs post-hospital services based on physician/advanced practitioner order or complex needs related to functional status, cognitive ability, or social support system  Outcome: Progressing     Problem: Knowledge Deficit  Goal: Patient/family/caregiver demonstrates understanding of disease process, treatment plan, medications, and discharge instructions  Description: Complete learning assessment and assess knowledge base    Interventions:  - Provide teaching at level of understanding  - Provide teaching via preferred learning methods  Outcome: Progressing     Problem: MUSCULOSKELETAL - ADULT  Goal: Maintain or return mobility to safest level of function  Description: INTERVENTIONS:  - Assess patient's ability to carry out ADLs; assess patient's baseline for ADL function and identify physical deficits which impact ability to perform ADLs (bathing, care of mouth/teeth, toileting, grooming, dressing, etc )  - Assess/evaluate cause of self-care deficits   - Assess range of motion  - Assess patient's mobility  - Assess patient's need for assistive devices and provide as appropriate  - Encourage maximum independence but intervene and supervise when necessary  - Involve family in performance of ADLs  - Assess for home care needs following discharge   - Consider OT consult to assist with ADL evaluation and planning for discharge  - Provide patient education as appropriate  Outcome: Progressing  Goal: Maintain proper alignment of affected body part  Description: INTERVENTIONS:  - Support, maintain and protect limb and body alignment  - Provide patient/ family with appropriate education  Outcome: Progressing

## 2022-08-15 NOTE — PHYSICAL THERAPY NOTE
Physical Therapy Cancellation Note       08/15/22 1307   PT Last Visit   PT Visit Date 08/15/22   Note Type   Note type Cancelled Session   Cancel Reasons Refusal   Additional Comments Chart review performed  Second attempt to engage patient in PT session today  PT treatment session cancelled secondary to pt refusal due to reports of increased headache  PT explained the benefits of mobility and exercise activities in the prevention of secondary complications of immobility  PT will follow and provide PT interventions as appropriate       Star Koehler, PT

## 2022-08-16 PROCEDURE — 99232 SBSQ HOSP IP/OBS MODERATE 35: CPT | Performed by: NURSE PRACTITIONER

## 2022-08-16 PROCEDURE — 97530 THERAPEUTIC ACTIVITIES: CPT

## 2022-08-16 PROCEDURE — 97110 THERAPEUTIC EXERCISES: CPT

## 2022-08-16 RX ADMIN — QUETIAPINE FUMARATE 200 MG: 200 TABLET ORAL at 21:37

## 2022-08-16 RX ADMIN — DULOXETINE HYDROCHLORIDE 60 MG: 60 CAPSULE, DELAYED RELEASE ORAL at 09:10

## 2022-08-16 RX ADMIN — SENNOSIDES AND DOCUSATE SODIUM 1 TABLET: 50; 8.6 TABLET ORAL at 21:37

## 2022-08-16 RX ADMIN — ZOLPIDEM TARTRATE 5 MG: 5 TABLET, COATED ORAL at 21:37

## 2022-08-16 RX ADMIN — CLONAZEPAM 1 MG: 1 TABLET ORAL at 09:10

## 2022-08-16 RX ADMIN — BACLOFEN 10 MG: 10 TABLET ORAL at 21:37

## 2022-08-16 RX ADMIN — GABAPENTIN 400 MG: 400 CAPSULE ORAL at 09:09

## 2022-08-16 RX ADMIN — GABAPENTIN 400 MG: 400 CAPSULE ORAL at 17:11

## 2022-08-16 RX ADMIN — BACLOFEN 10 MG: 10 TABLET ORAL at 09:09

## 2022-08-16 RX ADMIN — GABAPENTIN 400 MG: 400 CAPSULE ORAL at 23:41

## 2022-08-16 RX ADMIN — ACETAMINOPHEN 975 MG: 325 TABLET, FILM COATED ORAL at 14:56

## 2022-08-16 RX ADMIN — CLONAZEPAM 1 MG: 1 TABLET ORAL at 17:11

## 2022-08-16 RX ADMIN — BACLOFEN 10 MG: 10 TABLET ORAL at 17:11

## 2022-08-16 RX ADMIN — PRAZOSIN HYDROCHLORIDE 1 MG: 1 CAPSULE ORAL at 21:38

## 2022-08-16 NOTE — PLAN OF CARE
Problem: OCCUPATIONAL THERAPY ADULT  Goal: Performs self-care activities at highest level of function for planned discharge setting  See evaluation for individualized goals  Description: Treatment Interventions: ADL retraining, Functional transfer training, Endurance training, Patient/family training, Equipment evaluation/education, Compensatory technique education, Continued evaluation, Energy conservation, Activityengagement          See flowsheet documentation for full assessment, interventions and recommendations  Note: Limitation: Decreased ADL status, Decreased UE ROM, Decreased UE strength, Decreased endurance, Decreased self-care trans, Decreased high-level ADLs     Assessment: Patient participated in Skilled OT session this date with interventions consisting of ADL re training with the use of correct body mechnaics, Energy Conservation techniques, safety awareness and fall prevention techniques, therapeutic exercise to: increase functional use of BUEs, increase BUE muscle strength ,  therapeutic activities to: increase activity tolerance, increase cardiovascular endurance , increase dynamic sit/ stand balance during functional activity , increase postural control and increase OOB/ sitting tolerance   Patient agreeable to OT treatment session, upon arrival patient was found supine in bed, responsive  and in no apparent distress  In comparison to previous session, patient with improvements in functional mobility and sitting tolerance at edge of bed  Patient requires mod assist for UB ADLs, max assist for LB ADLs  Patient requiring verbal cues for safety, verbal cues for correct technique and frequent rest periods  Patient continues to be functioning below baseline level, occupational performance remains limited secondary to factors listed above and increased risk for falls and injury  From OT standpoint, recommendation at time of d/c would be Short Term Rehab     Patient to benefit from continued Occupational Therapy treatment while in the hospital to address deficits as defined above and maximize level of functional independence with ADLs and functional mobility       OT Discharge Recommendation: Post acute rehabilitation services

## 2022-08-16 NOTE — PHYSICAL THERAPY NOTE
Physical Therapy Treatment Note    Patient's Name: Brennan Fulton    Admitting Diagnosis  Nausea [R11 0]  Weakness [R53 1]    Problem List  Patient Active Problem List   Diagnosis    Failure to thrive in adult    Quadriplegic spinal paralysis ([facility] [address] )    Gonzalez catheter in place    Back pain    Substance abuse ([facility] [address] )    Anxiety    Insomnia    Hepatitis C antibody positive in blood    Inability to return to living situation    Lower extremity edema        08/16/22 1328   PT Last Visit   PT Visit Date 08/16/22   Note Type   Note Type Treatment   Pain Assessment   Pain Assessment Tool 0-10   Pain Score No Pain   Restrictions/Precautions   Weight Bearing Precautions Per Order No   Braces or Orthoses Other (Comment)  (hand splint for L wrist drop; dorsiflex assist brace for L LE)   Other Precautions Fall Risk;Pain;Bed Alarm; Chair Alarm   General   Chart Reviewed Yes   Response to Previous Treatment Patient with no complaints from previous session  Family/Caregiver Present No   Cognition   Overall Cognitive Status WFL   Arousal/Participation Alert; Cooperative   Attention Within functional limits   Orientation Level Oriented X4   Memory Within functional limits   Following Commands Follows all commands and directions without difficulty   Comments Pt agreeable to PT   Bed Mobility   Rolling R 3  Moderate assistance   Additional items Assist x 1;Bedrails; Increased time required   Rolling L 3  Moderate assistance   Additional items Assist x 1;Bedrails; Increased time required   Supine to Sit 3  Moderate assistance   Additional items Assist x 1;HOB elevated; Bedrails; Increased time required;Verbal cues;LE management   Sit to Supine 3  Moderate assistance   Additional items Assist x 1;HOB elevated; Bedrails; Increased time required;LE management;Verbal cues   Additional Comments Pt received at start of session supine in bed with HOB elevated   Transfers   Sit to Stand 2  Maximal assistance   Additional items Armrests;Assist x 2; Increased time required;Verbal cues   Stand to Sit 2  Maximal assistance   Additional items Assist x 2;Armrests; Increased time required;Verbal cues; Bed elevated   Additional Comments Attempted STS x2 trials with BUE support on bedside recliner however pt unable to achieve full standing position despite max Ax2   Ambulation/Elevation   Gait pattern Not tested   Balance   Static Sitting Fair   Dynamic Sitting Fair -   Endurance Deficit   Endurance Deficit Yes   Endurance Deficit Description decreased activity tolerance   Activity Tolerance   Activity Tolerance Patient limited by pain; Patient limited by fatigue  (headache)   Medical Staff Made Aware RICHARD Vang   Nurse Made Aware HEYDI Womack   Exercises   Heelslides Supine;5 reps;AAROM; Bilateral   Knee AROM Long Arc Quad Sitting;10 reps;AROM; Bilateral   Ankle Pumps Supine;10 reps;AROM; Bilateral   Balance training  Pt able to maintain static sitting at EOB x15 mins with CS up to CGA for balance and steadying   Assessment   Prognosis Good   Problem List Decreased strength;Decreased endurance; Impaired balance;Decreased mobility;Pain   Assessment Pt seen for PT treatment session this date with interventions consisting of Therapeutic exercise consisting of: LE exercises performed in supine and sitting and therapeutic activity consisting of training: bed mobility, supine<>sit transfers, sit<>stand transfers, static sitting tolerance at EOB for 15 minutes w/ bilateral UE support and vc and tactile cues for static sitting posture faciliation  Pt agreeable to PT treatment session upon arrival, pt found supine in bed w/ HOB elevated, in no apparent distress and responsive  In comparison to previous session, pt with improvements in activity tolerance and mobility  Post session: pt returned BTB, bed alarm engaged, all needs in reach and RN notified of session findings/recommendations   Continue to recommend post acute rehabilitation services at time of d/c in order to maximize pt's functional independence and safety w/ mobility  Pt continues to be functioning below baseline level, and remains limited 2* factors listed above and including continued need for medical management and monitoring, decreased strength and balance resulting in an increased risk for falls, decreased endurance and activity tolerance limiting overall mobility  PT will continue to see pt during current hospitalization in order to address the deficits listed above and provide interventions consistent w/ POC in effort to achieve STGs  Barriers to Discharge Inaccessible home environment;Decreased caregiver support   Goals   STG Expiration Date 08/17/22   PT Treatment Day 1   Plan   Treatment/Interventions Functional transfer training;LE strengthening/ROM; Therapeutic exercise; Endurance training;Patient/family training;Bed mobility; Compensatory technique education;Spoke to nursing;OT   Progress Slow progress, decreased activity tolerance   PT Frequency 3-5x/wk   Recommendation   PT Discharge Recommendation Post acute rehabilitation services   AM-PAC Basic Mobility Inpatient   Turning in Bed Without Bedrails 3   Lying on Back to Sitting on Edge of Flat Bed 2   Moving Bed to Chair 1   Standing Up From Chair 1   Walk in Room 1   Climb 3-5 Stairs 1   Basic Mobility Inpatient Raw Score 9   Highest Level Of Mobility   JH-HLM Goal 3: Sit at edge of bed   JH-HLM Achieved 3: Sit at edge of bed   Education   Education Provided Mobility training   Patient Reinforcement needed   End of Consult   Patient Position at End of Consult Supine;Bed/Chair alarm activated; All needs within reach       Karin Ro PT    Time In: 13:02  Time Out: 13:28  Total Time: 26 mins

## 2022-08-16 NOTE — OCCUPATIONAL THERAPY NOTE
Occupational Therapy Treatment note        Patient Name: Brennan ZHANG Date: 8/16/2022 08/16/22 1302   OT Last Visit   OT Visit Date 08/16/22   Note Type   Note Type Treatment   Restrictions/Precautions   Weight Bearing Precautions Per Order No   Braces or Orthoses Other (Comment)  (hand splint for L wrist drop; dorsiflex assist brace for L LE)   Other Precautions Fall Risk;Pain;Bed Alarm; Chair Alarm   Pain Assessment   Pain Assessment Tool 0-10   Pain Score No Pain   ADL   Where Assessed Edge of bed   Eating Assistance 5  Supervision/Setup   Eating Deficit Increased time to complete   Grooming Assistance 4  Minimal Assistance   UB Bathing Assistance 3  Moderate Assistance   LB Bathing Assistance 2  Maximal Assistance   UB Dressing Assistance 3  Moderate Assistance   LB Dressing Assistance 2  Maximal [address]  2  Maximal Assistance   Bed Mobility   Rolling R 3  Moderate assistance   Additional items Assist x 1;Bedrails; Increased time required   Rolling L 3  Moderate assistance   Additional items Assist x 1;Bedrails; Increased time required   Supine to Sit 3  Moderate assistance   Additional items Assist x 1;HOB elevated; Bedrails; Increased time required;Verbal cues;LE management   Sit to Supine 3  Moderate assistance   Additional items Assist x 1;HOB elevated; Bedrails; Increased time required;LE management;Verbal cues   Additional Comments Pt received at start of session supine in bed with HOB elevated   Transfers   Sit to Stand 2  Maximal assistance   Additional items Armrests;Assist x 2; Increased time required;Verbal cues   Stand to Sit 2  Maximal assistance   Additional items Assist x 2;Armrests; Increased time required;Verbal cues; Bed elevated   Additional Comments Attempted STS x2 trials with BUE support on bedside recliner however pt unable to achieve full standing position despite max Ax2   Therapeutic Exercise - ROM   UE-ROM Yes   ROM- Right Upper Extremities   R Shoulder AROM;AAROM; Flexion;ABduction;Horizontal ABduction   R Elbow AROM;AAROM;Elbow flexion;Elbow extension   R Weight/Reps/Sets 1 set of 5 repetitions each joint   ROM - Left Upper Extremities    L Shoulder AROM;AAROM; Flexion;ABduction; Extension;Horizontal ABduction   L Elbow AROM;AAROM;Elbow flexion;Elbow extension   L Weight/Reps/Sets 1 set of 5 repetitions each joint   Cognition   Overall Cognitive Status WFL   Arousal/Participation Alert; Cooperative   Attention Within functional limits   Orientation Level Oriented X4   Memory Within functional limits   Following Commands Follows all commands and directions without difficulty   Activity Tolerance   Activity Tolerance Patient limited by fatigue;Treatment limited secondary to medical complications (Comment)  (fatigue and c/o of headache at end of session)   Assessment   Assessment Patient participated in Skilled OT session this date with interventions consisting of ADL re training with the use of correct body mechnaics, Energy Conservation techniques, safety awareness and fall prevention techniques, therapeutic exercise to: increase functional use of BUEs, increase BUE muscle strength ,  therapeutic activities to: increase activity tolerance, increase cardiovascular endurance , increase dynamic sit/ stand balance during functional activity , increase postural control and increase OOB/ sitting tolerance   Patient agreeable to OT treatment session, upon arrival patient was found supine in bed, responsive  and in no apparent distress  In comparison to previous session, patient with improvements in functional mobility and sitting tolerance at edge of bed  Patient requires mod assist for UB ADLs, max assist for LB ADLs  Patient requiring verbal cues for safety, verbal cues for correct technique and frequent rest periods   Patient continues to be functioning below baseline level, occupational performance remains limited secondary to factors listed above and increased risk for falls and injury  From OT standpoint, recommendation at time of d/c would be Short Term Rehab  Patient to benefit from continued Occupational Therapy treatment while in the hospital to address deficits as defined above and maximize level of functional independence with ADLs and functional mobility  Plan   Treatment Interventions ADL retraining;Functional transfer training; Endurance training;UE strengthening/ROM; Equipment evaluation/education;Patient/family training; Compensatory technique education;Continued evaluation; Energy conservation; Activityengagement   Goal Expiration Date 08/27/22   OT Treatment Day 2   OT Frequency 3-5x/wk   Recommendation   OT Discharge Recommendation Post acute rehabilitation services   AM-PAC Daily Activity Inpatient   Lower Body Dressing 2   Bathing 2   Toileting 2   Upper Body Dressing 3   Grooming 3   Eating 3   Daily Activity Raw Score 15   Daily Activity Standardized Score (Calc for Raw Score >=11) 34 69   AM-PAC Applied Cognition Inpatient   Following a Speech/Presentation 4   Understanding Ordinary Conversation 4   Taking Medications 4   Remembering Where Things Are Placed or Put Away 4   Remembering List of 4-5 Errands 4   Taking Care of Complicated Tasks 4   Applied Cognition Raw Score 24   Applied Cognition Standardized Score 62 21

## 2022-08-16 NOTE — PROGRESS NOTES
[facility]  Progress Note - Ardelia Counter 1990, 32 y o  female MRN: [id number]  Unit/Bed#: -01 Encounter: [id number]  Primary Care Provider: Roel Wang MD   Date and time admitted to hospital: 8/2/2022 12:01 PM    * Inability to return to living situation  Assessment & Plan  Background:  Presented to the ED as was noted to be vomiting and reported that she had not been getting proper care at home requesting placement     · Discharged to [facility] on 05/17/2022  · Case management consulted and following  · PT/OT completed recommending short-term rehab  · Patient continues to refuse PT OT x2  patient will also call Care team meeting    Lower extremity edema  Assessment & Plan  · Chronic lower extremity edema greater in left and right   · Patient reports that she had a duplex of lower extremity was clear last year  · Repeat this admission Lower extremity Doppler negative for DVT  · Elevate extremities  · Patient was previously on Eliquis but states this medication was discontinued    Anxiety  Assessment & Plan  · Continue Xanax per PTA medication regimen as well as Ambien for sleep  · Mood stable    Quadriplegic spinal paralysis ([facility] [address] )  Assessment & Plan  · Status post spinal epidural abscess in the setting of IV drug abuse  · Lives at home with 42-year-old grandmother who acts as primary caregiver  · Improved sleep paraplegia patient does have some lower extremity sensation and movement as well and has the ability to return to full function Ng status with appropriate rehab  · Frequent turns  · Assist with all feeding  · Initially had Gonzalez this has since been discontinued as patient was only utilizing this for vacation  · Monitor patient on urinary retention protocol with complaint that she was retaining bladder scan x2 with no retention noted   · Continue pain control with aqua K, baclofen, and gabapentin          VTE Pharmacologic Prophylaxis: VTE Score: 3 Moderate Risk (Score 3-4) - Pharmacological DVT Prophylaxis Ordered: enoxaparin (Lovenox)  Patient Centered Rounds: I performed bedside rounds with nursing staff today  Discussions with Specialists or Other Care Team Provider:  Case management    Education and Discussions with Family / Patient: Patient declined call to   Time Spent for Care: 30 minutes  More than 50% of total time spent on counseling and coordination of care as described above  Current Length of Stay: 12 day(s)  Current Patient Status: Inpatient   Certification Statement: The patient will continue to require additional inpatient hospital stay due to Need for safe discharge planning  Discharge Plan: Patient remains medically stable for discharge pending safe dispo plan per case management    Code Status: Level 1 - Full Code    Subjective:   Patient endorses at baseline she is able to feed herself, utilize her phone and tablet does have upper extremity capabilities  Lengthy discussion on therapy refusal patient states she is agreeable to work with them this afternoon patient thoroughly educated on the importance of therapy evaluations for proper discharge planning were explained in patient stated understanding  Objective:     Vitals:   Temp (24hrs), Av 2 °F (36 8 °C), Min:97 9 °F (36 6 °C), Max:98 3 °F (36 8 °C)    Temp:  [97 9 °F (36 6 °C)-98 3 °F (36 8 °C)] 98 3 °F (36 8 °C)  HR:  [69-93] 93  Resp:  [15-16] 16  BP: ()/(53-75) 86/53  SpO2:  [93 %-96 %] 93 %  Body mass index is 27 35 kg/m²  Input and Output Summary (last 24 hours): Intake/Output Summary (Last 24 hours) at 2022 1023  Last data filed at 2022 0853  Gross per 24 hour   Intake 2140 ml   Output 1000 ml   Net 1140 ml       Physical Exam:   Physical Exam  Vitals and nursing note reviewed  Constitutional:       General: She is not in acute distress  Appearance: She is well-developed  HENT:      Head: Normocephalic and atraumatic     Eyes: Conjunctiva/sclera: Conjunctivae normal    Cardiovascular:      Rate and Rhythm: Normal rate and regular rhythm  Heart sounds: No murmur heard  Pulmonary:      Effort: Pulmonary effort is normal  No respiratory distress  Breath sounds: Normal breath sounds  Abdominal:      Palpations: Abdomen is soft  Tenderness: There is no abdominal tenderness  Musculoskeletal:      Cervical back: Neck supple  Skin:     General: Skin is warm and dry  Neurological:      Mental Status: She is alert and oriented to person, place, and time  Mental status is at baseline  Psychiatric:         Mood and Affect: Mood normal          Behavior: Behavior normal           Additional Data:     Labs:                            Lines/Drains:  Invasive Devices  Report    None                       Imaging: No pertinent imaging reviewed      Recent Cultures (last 7 days):         Last 24 Hours Medication List:   Current Facility-Administered Medications   Medication Dose Route Frequency Provider Last Rate    acetaminophen  975 mg Oral Q6H PRN Sade Pride PA-C      baclofen  10 mg Oral TID Yesenia Khanna DO      bisacodyl  10 mg Rectal Daily PRN Jamas Sic, CRNP      clonazePAM  1 mg Oral BID HOWARD Zambrano      Diclofenac Sodium  2 g Topical 4x Daily Jamas Sic, CRNP      DULoxetine  60 mg Oral Daily Brynn Parisi, CRVEGA      enoxaparin  40 mg Subcutaneous Daily HOWARD Zambrano      gabapentin  400 mg Oral [address], CRNP      hydrOXYzine HCL  25 mg Oral Q6H PRN Ryne Lozada MD      polyethylene glycol  17 g Oral Daily Jamas Sic, CRNP      prazosin  1 mg Oral HS Ailin Nichols, CRNP      QUEtiapine  200 mg Oral HS Maureen Lawson PA-C      senna-docusate sodium  1 tablet Oral HS Jamas Sic, CRNP      zolpidem  5 mg Oral HS PRN HOWARD Zambrano          Today, Patient Was Seen By: Hollie Mathis, HOWARD    **Please Note: This note may have been constructed using a voice recognition system  **

## 2022-08-16 NOTE — PLAN OF CARE
Problem: PHYSICAL THERAPY ADULT  Goal: Performs mobility at highest level of function for planned discharge setting  See evaluation for individualized goals  Description: Treatment/Interventions: Functional transfer training, LE strengthening/ROM, Therapeutic exercise, Endurance training, Patient/family training, Bed mobility, Compensatory technique education, Spoke to nursing, OT          See flowsheet documentation for full assessment, interventions and recommendations  Outcome: Progressing  Note: Prognosis: Good  Problem List: Decreased strength, Decreased endurance, Impaired balance, Decreased mobility, Pain  Assessment: Pt seen for PT treatment session this date with interventions consisting of Therapeutic exercise consisting of: LE exercises performed in supine and sitting and therapeutic activity consisting of training: bed mobility, supine<>sit transfers, sit<>stand transfers, static sitting tolerance at EOB for 15 minutes w/ bilateral UE support and vc and tactile cues for static sitting posture faciliation  Pt agreeable to PT treatment session upon arrival, pt found supine in bed w/ HOB elevated, in no apparent distress and responsive  In comparison to previous session, pt with improvements in activity tolerance and mobility  Post session: pt returned BTB, bed alarm engaged, all needs in reach and RN notified of session findings/recommendations  Continue to recommend post acute rehabilitation services at time of d/c in order to maximize pt's functional independence and safety w/ mobility  Pt continues to be functioning below baseline level, and remains limited 2* factors listed above and including continued need for medical management and monitoring, decreased strength and balance resulting in an increased risk for falls, decreased endurance and activity tolerance limiting overall mobility   PT will continue to see pt during current hospitalization in order to address the deficits listed above and provide interventions consistent w/ POC in effort to achieve STGs  Barriers to Discharge: Inaccessible home environment, Decreased caregiver support     PT Discharge Recommendation: Post acute rehabilitation services    See flowsheet documentation for full assessment

## 2022-08-16 NOTE — ASSESSMENT & PLAN NOTE
Background:  Presented to the ED as was noted to be vomiting and reported that she had not been getting proper care at home requesting placement     · Discharged to [facility] on 05/17/2022  · Case management consulted and following  · PT/OT completed recommending short-term rehab  · Patient continues to refuse PT OT x2  patient will also call Care team meeting

## 2022-08-16 NOTE — ASSESSMENT & PLAN NOTE
· Status post spinal epidural abscess in the setting of IV drug abuse  · Lives at home with 80-year-old grandmother who acts as primary caregiver  · Improved sleep paraplegia patient does have some lower extremity sensation and movement as well and has the ability to return to full function Ng status with appropriate rehab  · Frequent turns  · Assist with all feeding  · Initially had Gonzalez this has since been discontinued as patient was only utilizing this for vacation  · Monitor patient on urinary retention protocol with complaint that she was retaining bladder scan x2 with no retention noted   · Continue pain control with aqua K, baclofen, and gabapentin

## 2022-08-17 PROCEDURE — 99232 SBSQ HOSP IP/OBS MODERATE 35: CPT | Performed by: INTERNAL MEDICINE

## 2022-08-17 RX ADMIN — DULOXETINE HYDROCHLORIDE 60 MG: 60 CAPSULE, DELAYED RELEASE ORAL at 10:04

## 2022-08-17 RX ADMIN — CLONAZEPAM 1 MG: 1 TABLET ORAL at 17:48

## 2022-08-17 RX ADMIN — PRAZOSIN HYDROCHLORIDE 1 MG: 1 CAPSULE ORAL at 21:44

## 2022-08-17 RX ADMIN — ZOLPIDEM TARTRATE 5 MG: 5 TABLET, COATED ORAL at 21:43

## 2022-08-17 RX ADMIN — BACLOFEN 10 MG: 10 TABLET ORAL at 21:43

## 2022-08-17 RX ADMIN — BACLOFEN 10 MG: 10 TABLET ORAL at 17:48

## 2022-08-17 RX ADMIN — QUETIAPINE FUMARATE 200 MG: 200 TABLET ORAL at 21:43

## 2022-08-17 RX ADMIN — SENNOSIDES AND DOCUSATE SODIUM 1 TABLET: 50; 8.6 TABLET ORAL at 21:43

## 2022-08-17 RX ADMIN — GABAPENTIN 400 MG: 400 CAPSULE ORAL at 10:03

## 2022-08-17 RX ADMIN — BACLOFEN 10 MG: 10 TABLET ORAL at 10:03

## 2022-08-17 RX ADMIN — GABAPENTIN 400 MG: 400 CAPSULE ORAL at 17:48

## 2022-08-17 RX ADMIN — CLONAZEPAM 1 MG: 1 TABLET ORAL at 10:03

## 2022-08-17 NOTE — ASSESSMENT & PLAN NOTE
· Status post spinal epidural abscess in the setting of IV drug abuse  · Lives at home with 19-year-old grandmother who acts as primary caregiver  · Improved sleep paraplegia patient does have some lower extremity sensation and movement as well and has the ability to return to full function Ng status with appropriate rehab  · Frequent turns  · Assist with all feeding  · Initially had Gonzalez this has since been discontinued as patient was only utilizing this for vacation  · Continue urinary retention protocol  · Continue pain control with aqua K, baclofen, and gabapentin

## 2022-08-17 NOTE — ASSESSMENT & PLAN NOTE
Background:  Presented to the ED as was noted to be vomiting and reported that she had not been getting proper care at home requesting placement     · Discharged to [facility] on 05/17/2022  · Case management consulted and following  · PT/OT completed recommending short-term rehab  · Patient continues to refuse PT OT x2  patient will also call Care team meeting

## 2022-08-17 NOTE — ASSESSMENT & PLAN NOTE
Background:  Presented to the ED as was noted to be vomiting and reported that she had not been getting proper care at home requesting placement     · [facility] to [facility]  · Case management consulted and following  · PT/OT completed recommending short-term rehab

## 2022-08-17 NOTE — PLAN OF CARE
Problem: PAIN - ADULT  Goal: Verbalizes/displays adequate comfort level or baseline comfort level  Description: Interventions:  - Encourage patient to monitor pain and request assistance  - Assess pain using appropriate pain scale  - Administer analgesics based on type and severity of pain and evaluate response  - Implement non-pharmacological measures as appropriate and evaluate response  - Consider cultural and social influences on pain and pain management  - Notify physician/advanced practitioner if interventions unsuccessful or patient reports new pain  Outcome: Progressing     Problem: INFECTION - ADULT  Goal: Absence or prevention of progression during hospitalization  Description: INTERVENTIONS:  - Assess and monitor for signs and symptoms of infection  - Monitor lab/diagnostic results  - Monitor all insertion sites, i e  indwelling lines, tubes, and drains  - Monitor endotracheal if appropriate and nasal secretions for changes in amount and color  - Corinna appropriate cooling/warming therapies per order  - Administer medications as ordered  - Instruct and encourage patient and family to use good hand hygiene technique  - Identify and instruct in appropriate isolation precautions for identified infection/condition  Outcome: Progressing

## 2022-08-17 NOTE — PLAN OF CARE
Problem: MOBILITY - ADULT  Goal: Maintain or return to baseline ADL function  Description: INTERVENTIONS:  -  Assess patient's ability to carry out ADLs; assess patient's baseline for ADL function and identify physical deficits which impact ability to perform ADLs (bathing, care of mouth/teeth, toileting, grooming, dressing, etc )  - Assess/evaluate cause of self-care deficits   - Assess range of motion  - Assess patient's mobility; develop plan if impaired  - Assess patient's need for assistive devices and provide as appropriate  - Encourage maximum independence but intervene and supervise when necessary  - Involve family in performance of ADLs  - Assess for home care needs following discharge   - Consider OT consult to assist with ADL evaluation and planning for discharge  - Provide patient education as appropriate  Outcome: Progressing  Goal: Maintains/Returns to pre admission functional level  Description: INTERVENTIONS:  - Perform BMAT or MOVE assessment daily    - Set and communicate daily mobility goal to care team and patient/family/caregiver  - Collaborate with rehabilitation services on mobility goals if consulted  - Perform Range of Motion 3  times a day  - Reposition patient every 2 hours    - Out of bed for toileting  - Record patient progress and toleration of activity level   Outcome: Progressing     Problem: Prexisting or High Potential for Compromised Skin Integrity  Goal: Skin integrity is maintained or improved  Description: INTERVENTIONS:  - Identify patients at risk for skin breakdown  - Assess and monitor skin integrity  - Assess and monitor nutrition and hydration status  - Monitor labs   - Assess for incontinence   - Turn and reposition patient  - Assist with mobility/ambulation  - Relieve pressure over bony prominences  - Avoid friction and shearing  - Provide appropriate hygiene as needed including keeping skin clean and dry  - Evaluate need for skin moisturizer/barrier cream  - Collaborate with interdisciplinary team   - Patient/family teaching  - Consider wound care consult   Outcome: Progressing     Problem: Potential for Falls  Goal: Patient will remain free of falls  Description: INTERVENTIONS:  - Educate patient/family on patient safety including physical limitations  - Instruct patient to call for assistance with activity   - Consult OT/PT to assist with strengthening/mobility   - Keep Call bell within reach  - Keep bed low and locked with side rails adjusted as appropriate  - Keep care items and personal belongings within reach  - Initiate and maintain comfort rounds  - Make Fall Risk Sign visible to staff  -- Apply yellow socks and bracelet for high fall risk patients  - Consider moving patient to room near nurses station  Outcome: Progressing     Problem: PAIN - ADULT  Goal: Verbalizes/displays adequate comfort level or baseline comfort level  Description: Interventions:  - Encourage patient to monitor pain and request assistance  - Assess pain using appropriate pain scale  - Administer analgesics based on type and severity of pain and evaluate response  - Implement non-pharmacological measures as appropriate and evaluate response  - Consider cultural and social influences on pain and pain management  - Notify physician/advanced practitioner if interventions unsuccessful or patient reports new pain  Outcome: Progressing     Problem: INFECTION - ADULT  Goal: Absence or prevention of progression during hospitalization  Description: INTERVENTIONS:  - Assess and monitor for signs and symptoms of infection  - Monitor lab/diagnostic results  - Monitor all insertion sites, i e  indwelling lines, tubes, and drains  - Monitor endotracheal if appropriate and nasal secretions for changes in amount and color  - [place] appropriate cooling/warming therapies per order  - Administer medications as ordered  - Instruct and encourage patient and family to use good hand hygiene technique  - Identify and instruct in appropriate isolation precautions for identified infection/condition  Outcome: Progressing     Problem: SAFETY ADULT  Goal: Maintain or return to baseline ADL function  Description: INTERVENTIONS:  -  Assess patient's ability to carry out ADLs; assess patient's baseline for ADL function and identify physical deficits which impact ability to perform ADLs (bathing, care of mouth/teeth, toileting, grooming, dressing, etc )  - Assess/evaluate cause of self-care deficits   - Assess range of motion  - Assess patient's mobility; develop plan if impaired  - Assess patient's need for assistive devices and provide as appropriate  - Encourage maximum independence but intervene and supervise when necessary  - Involve family in performance of ADLs  - Assess for home care needs following discharge   - Consider OT consult to assist with ADL evaluation and planning for discharge  - Provide patient education as appropriate  Outcome: Progressing  Goal: Maintains/Returns to pre admission functional level  Description: INTERVENTIONS:  - Perform BMAT or MOVE assessment daily    - Set and communicate daily mobility goal to care team and patient/family/caregiver  - Collaborate with rehabilitation services on mobility goals if consulted  - Perform Range of Motion 3  times a day  - Reposition patient every 2 hours    - Out of bed for toileting  - Record patient progress and toleration of activity level   Outcome: Progressing  Goal: Patient will remain free of falls  Description: INTERVENTIONS:  - Educate patient/family on patient safety including physical limitations  - Instruct patient to call for assistance with activity   - Consult OT/PT to assist with strengthening/mobility   - Keep Call bell within reach  - Keep bed low and locked with side rails adjusted as appropriate  - Keep care items and personal belongings within reach  - Initiate and maintain comfort rounds  - Make Fall Risk Sign visible to staff  -- Apply yellow socks and bracelet for high fall risk patients  - Consider moving patient to room near nurses station  Outcome: Progressing     Problem: DISCHARGE PLANNING  Goal: Discharge to home or other facility with appropriate resources  Description: INTERVENTIONS:  - Identify barriers to discharge w/patient and caregiver  - Arrange for needed discharge resources and transportation as appropriate  - Identify discharge learning needs (meds, wound care, etc )  - Arrange for interpretive services to assist at discharge as needed  - Refer to Case Management Department for coordinating discharge planning if the patient needs post-hospital services based on physician/advanced practitioner order or complex needs related to functional status, cognitive ability, or social support system  Outcome: Progressing     Problem: Knowledge Deficit  Goal: Patient/family/caregiver demonstrates understanding of disease process, treatment plan, medications, and discharge instructions  Description: Complete learning assessment and assess knowledge base    Interventions:  - Provide teaching at level of understanding  - Provide teaching via preferred learning methods  Outcome: Progressing     Problem: MUSCULOSKELETAL - ADULT  Goal: Maintain or return mobility to safest level of function  Description: INTERVENTIONS:  - Assess patient's ability to carry out ADLs; assess patient's baseline for ADL function and identify physical deficits which impact ability to perform ADLs (bathing, care of mouth/teeth, toileting, grooming, dressing, etc )  - Assess/evaluate cause of self-care deficits   - Assess range of motion  - Assess patient's mobility  - Assess patient's need for assistive devices and provide as appropriate  - Encourage maximum independence but intervene and supervise when necessary  - Involve family in performance of ADLs  - Assess for home care needs following discharge   - Consider OT consult to assist with ADL evaluation and planning for discharge  - Provide patient education as appropriate  Outcome: Progressing  Goal: Maintain proper alignment of affected body part  Description: INTERVENTIONS:  - Support, maintain and protect limb and body alignment  - Provide patient/ family with appropriate education  Outcome: Progressing

## 2022-08-17 NOTE — QUICK NOTE
RN made aware that a couple of days ago, pt had a headache and felt like she was retaining urine  RN that day bladder scanned pt x2 and it said 0  Pt was addiment that she was retaining and then preceeded to call her friend and asked her to bring a straight cath kit from home and proceed with the procedure in the hospital  Unaware if RN that day knew of this event but pt's friend left urine in pt's room for RN  Charge nurse made aware

## 2022-08-17 NOTE — PROGRESS NOTES
[address] [facility]  Progress Note - Fredo Elena 1990, 32 y o  female MRN: [id number]  Unit/Bed#: -01 Encounter: [id number]  Primary Care Provider: Rosalie Kerr MD   Date and time admitted to hospital: 8/2/2022 12:01 PM    * Inability to return to living situation  Assessment & Plan  Background:  Presented to the ED as was noted to be vomiting and reported that she had not been getting proper care at home requesting placement     · Discharged to [facility] on 05/17/2022  · Case management consulted and following  · PT/OT completed recommending short-term rehab  · Patient continues to refuse PT OT x2  patient will also call Care team meeting    Lower extremity edema  Assessment & Plan  · Chronic lower extremity edema greater in left and right   · Patient reports that she had a duplex of lower extremity was clear last year  · Repeat this admission Lower extremity Doppler negative for DVT  · Elevate extremities  · Patient was previously on Eliquis but states this medication was discontinued    Anxiety  Assessment & Plan  · Continue Xanax per PTA medication regimen as well as Ambien for sleep  · Mood stable    Quadriplegic spinal paralysis ([facility] [address] )  Assessment & Plan  · Status post spinal epidural abscess in the setting of IV drug abuse  · Lives at home with 80-year-old grandmother who acts as primary caregiver  · Improved sleep paraplegia patient does have some lower extremity sensation and movement as well and has the ability to return to full function Ng status with appropriate rehab  · Frequent turns  · Assist with all feeding  · Initially had Gonzalez this has since been discontinued as patient was only utilizing this for vacation  · Monitor patient on urinary retention protocol with complaint that she was retaining bladder scan x2 with no retention noted   · Continue pain control with aqua K, baclofen, and gabapentin          VTE Pharmacologic Prophylaxis: VTE Score: 3 Moderate Risk (Score 3-4) - Pharmacological DVT Prophylaxis Ordered: enoxaparin (Lovenox)  Patient Centered Rounds: I performed bedside rounds with nursing staff today  Discussions with Specialists or Other Care Team Provider: case managment    Education and Discussions with Family / Patient: Patient declined call to   Time Spent for Care: 20 minutes  More than 50% of total time spent on counseling and coordination of care as described above  Current Length of Stay: 13 day(s)  Current Patient Status: Inpatient   Certification Statement: Patient medically stable awaiting placement  Discharge Plan: Patient medically stable awaiting placement    Code Status: Level 1 - Full Code    Subjective:   Patient resting comfortably on examination  Patient had no overnight events or complaints on exam this morning  Objective:     Vitals:   Temp (24hrs), Av 4 °F (36 9 °C), Min:97 9 °F (36 6 °C), Max:99 2 °F (37 3 °C)    Temp:  [97 9 °F (36 6 °C)-99 2 °F (37 3 °C)] 99 2 °F (37 3 °C)  HR:  [71-80] 80  Resp:  [16-18] 17  BP: ()/(49-72) 97/49  SpO2:  [94 %-97 %] 94 %  Body mass index is 27 35 kg/m²  Input and Output Summary (last 24 hours): Intake/Output Summary (Last 24 hours) at 2022 0856  Last data filed at 2022 0301  Gross per 24 hour   Intake 1200 ml   Output 1400 ml   Net -200 ml       Physical Exam:   Physical Exam  Vitals and nursing note reviewed  Constitutional:       General: She is not in acute distress  Appearance: She is well-developed  HENT:      Head: Normocephalic and atraumatic  Eyes:      General: No scleral icterus  Conjunctiva/sclera: Conjunctivae normal    Cardiovascular:      Rate and Rhythm: Normal rate and regular rhythm  Heart sounds: Normal heart sounds  No murmur heard  No friction rub  No gallop  Pulmonary:      Effort: Pulmonary effort is normal  No respiratory distress  Breath sounds: Normal breath sounds  No wheezing or rales  Abdominal:      General: Bowel sounds are normal  There is no distension  Palpations: Abdomen is soft  Tenderness: There is no abdominal tenderness  Musculoskeletal:         General: Normal range of motion  Skin:     General: Skin is warm  Findings: No rash  Neurological:      Mental Status: She is alert and oriented to person, place, and time  Additional Data:     Labs:                            Lines/Drains:  Invasive Devices  Report    Drain  Duration           External Urinary Catheter <1 day                      Imaging: No pertinent imaging reviewed  Recent Cultures (last 7 days):         Last 24 Hours Medication List:   Current Facility-Administered Medications   Medication Dose Route Frequency Provider Last Rate    acetaminophen  975 mg Oral Q6H PRN Sade Pride PA-C      baclofen  10 mg Oral TID Sunshine Smith DO      bisacodyl  10 mg Rectal Daily PRN HOWARD Boles      clonazePAM  1 mg Oral BID HOWARD Ray      Diclofenac Sodium  2 g Topical 4x Daily Cash Palmer, HOWARD      DULoxetine  60 mg Oral Daily Martah Beard, DAMIENNP      enoxaparin  40 mg Subcutaneous Daily HOWARD Ray      gabapentin  400 mg Oral [address] HOW[address]      hydrOXYzine HCL  25 mg Oral Q6H PRN Kyle Rodriguez MD      polyethylene glycol  17 g Oral Daily Cash Palmer, HOWARD      prazosin  1 mg Oral HS Ailin Nichols, CRNP      QUEtiapine  200 mg Oral HS Shirin France PA-C      senna-docusate sodium  1 tablet Oral HS Cash Estela, HOWARD      zolpidem  5 mg Oral HS PRN HOWARD Ray          Today, Patient Was Seen By: Sunshine Smith DO    **Please Note: This note may have been constructed using a voice recognition system  **

## 2022-08-17 NOTE — ASSESSMENT & PLAN NOTE
· Status post spinal epidural abscess in the setting of IV drug abuse  · Lives at home with 70-year-old grandmother who acts as primary caregiver  · Improved sleep paraplegia patient does have some lower extremity sensation and movement as well and has the ability to return to full function Ng status with appropriate rehab  · Frequent turns  · Assist with all feeding  · Initially had Gonzalez this has since been discontinued as patient was only utilizing this for vacation  · Monitor patient on urinary retention protocol with complaint that she was retaining bladder scan x2 with no retention noted   · Continue pain control with aqua K, baclofen, and gabapentin

## 2022-08-17 NOTE — CASE MANAGEMENT
Case Management Discharge Planning Note    Patient name Karl Butt  Location /-50 MRN [id number]  : 1990 Date 2022       Current Admission Date: 2022  Current Admission Diagnosis:Inability to return to living situation   Patient Active Problem List    Diagnosis Date Noted    Inability to return to living situation 2022    Lower extremity edema 2022    Anxiety 2022    Insomnia 2022    Hepatitis C antibody positive in blood 2022    Substance abuse ([facility] [address] ) 05/15/2022    Failure to thrive in adult 2022    Quadriplegic spinal paralysis ([facility] [address] ) 2022    Gonzalez catheter in place 2022    Back pain 2022      LOS (days): 13  Geometric Mean LOS (GMLOS) (days):   Days to GMLOS:     OBJECTIVE:  Risk of Unplanned Readmission Score: 22 59         Current admission status: Inpatient   Preferred Pharmacy:   [facility] # [facility], [address] [place] [address]  Phone: [phone] Fax: [phone]    Primary Care Provider: Ge Morales MD    Primary Insurance: [employer]  Secondary Insurance:     DISCHARGE DETAILS:                                          Other Referral/Resources/Interventions Provided:  Referral Comments: No bed offers at this time  Additional referrals sent  Left [facility] for pt's  at [facility];  requested c/b and awaiting same  Coordinator is Cherylene Piedra at [phone]  Will follow           Treatment Team Recommendation: Short Term Rehab, SNF  Discharge Destination Plan[de-identified] Other, SNF (pt reports she is homeless and needs long term placement as her family cannot adequately take care of her)  Transport at Discharge : [facility]/[employer]

## 2022-08-18 PROCEDURE — 99232 SBSQ HOSP IP/OBS MODERATE 35: CPT | Performed by: INTERNAL MEDICINE

## 2022-08-18 RX ADMIN — PRAZOSIN HYDROCHLORIDE 1 MG: 1 CAPSULE ORAL at 22:48

## 2022-08-18 RX ADMIN — QUETIAPINE FUMARATE 200 MG: 200 TABLET ORAL at 22:48

## 2022-08-18 RX ADMIN — GABAPENTIN 400 MG: 400 CAPSULE ORAL at 16:36

## 2022-08-18 RX ADMIN — GABAPENTIN 400 MG: 400 CAPSULE ORAL at 08:26

## 2022-08-18 RX ADMIN — ZOLPIDEM TARTRATE 5 MG: 5 TABLET, COATED ORAL at 22:48

## 2022-08-18 RX ADMIN — BACLOFEN 10 MG: 10 TABLET ORAL at 22:48

## 2022-08-18 RX ADMIN — BACLOFEN 10 MG: 10 TABLET ORAL at 16:36

## 2022-08-18 RX ADMIN — CLONAZEPAM 1 MG: 1 TABLET ORAL at 08:26

## 2022-08-18 RX ADMIN — ACETAMINOPHEN 975 MG: 325 TABLET, FILM COATED ORAL at 22:51

## 2022-08-18 RX ADMIN — SENNOSIDES AND DOCUSATE SODIUM 1 TABLET: 50; 8.6 TABLET ORAL at 22:49

## 2022-08-18 RX ADMIN — Medication 400 MG: at 18:54

## 2022-08-18 RX ADMIN — BACLOFEN 10 MG: 10 TABLET ORAL at 08:26

## 2022-08-18 RX ADMIN — ACETAMINOPHEN 975 MG: 325 TABLET, FILM COATED ORAL at 08:30

## 2022-08-18 RX ADMIN — CLONAZEPAM 1 MG: 1 TABLET ORAL at 18:54

## 2022-08-18 RX ADMIN — DULOXETINE HYDROCHLORIDE 60 MG: 60 CAPSULE, DELAYED RELEASE ORAL at 08:26

## 2022-08-18 NOTE — PROGRESS NOTES
[address] [facility]  Progress Note - Elver Lane 1990, 32 y o  female MRN: [id number]  Unit/Bed#: -01 Encounter: [id number]  Primary Care Provider: Tapan Watson MD   Date and time admitted to hospital: 8/2/2022 12:01 PM    * Inability to return to living situation  Assessment & Plan  Background:  Presented to the ED as was noted to be vomiting and reported that she had not been getting proper care at home requesting placement  · Discharged to [facility] on 05/17/2022  · Case management consulted and following  · PT/OT completed recommending short-term rehab    Lower extremity edema  Assessment & Plan  · Chronic lower extremity edema greater in left and right   · Patient reports that she had a duplex of lower extremity was clear last year  · Repeat this admission Lower extremity Doppler negative for DVT  · Elevate extremities  · Patient was previously on Eliquis but states this medication was discontinued    Anxiety  Assessment & Plan  · Continue Xanax per PTA medication regimen as well as Ambien for sleep  · Mood stable    Quadriplegic spinal paralysis ([facility] [address] )  Assessment & Plan  · Status post spinal epidural abscess in the setting of IV drug abuse  · Lives at home with 25-year-old grandmother who acts as primary caregiver  · Improved sleep paraplegia patient does have some lower extremity sensation and movement as well and has the ability to return to full function Ng status with appropriate rehab  · Frequent turns  · Assist with all feeding  · Initially had Gonzalez this has since been discontinued as patient was only utilizing this for vacation  · Continue urinary retention protocol  · Continue pain control with aqua K, baclofen, and gabapentin        VTE Pharmacologic Prophylaxis: VTE Score: 3 Moderate Risk (Score 3-4) - Pharmacological DVT Prophylaxis Ordered: enoxaparin (Lovenox)  Patient Centered Rounds: I performed bedside rounds with nursing staff today    Discussions with Specialists or Other Care Team Provider:  Case management    Education and Discussions with Family / Patient: Patient declined call to   Time Spent for Care: 20 minutes  More than 50% of total time spent on counseling and coordination of care as described above  Current Length of Stay: 14 day(s)  Current Patient Status: Inpatient   Certification Statement: Patient medically stable awaiting placement  Discharge Plan: Patient medically stable awaiting placement    Code Status: Level 1 - Full Code    Subjective:   Patient resting comfortably on examination  Patient had no overnight events or complaints on exam this morning  Objective:     Vitals:   Temp (24hrs), Av 7 °F (37 1 °C), Min:97 8 °F (36 6 °C), Max:99 2 °F (37 3 °C)    Temp:  [97 8 °F (36 6 °C)-99 2 °F (37 3 °C)] 97 8 °F (36 6 °C)  HR:  [66-80] 71  Resp:  [16-19] 16  BP: ()/(49-75) 108/62  SpO2:  [94 %-95 %] 95 %  Body mass index is 27 35 kg/m²  Input and Output Summary (last 24 hours): Intake/Output Summary (Last 24 hours) at 2022 0735  Last data filed at 2022 5212  Gross per 24 hour   Intake 480 ml   Output 3010 ml   Net -2530 ml       Physical Exam:   Physical Exam  Vitals and nursing note reviewed  Constitutional:       General: She is not in acute distress  Appearance: She is well-developed  HENT:      Head: Normocephalic and atraumatic  Eyes:      General: No scleral icterus  Conjunctiva/sclera: Conjunctivae normal    Cardiovascular:      Rate and Rhythm: Normal rate and regular rhythm  Heart sounds: Normal heart sounds  No murmur heard  No friction rub  No gallop  Pulmonary:      Effort: Pulmonary effort is normal  No respiratory distress  Breath sounds: Normal breath sounds  No wheezing or rales  Abdominal:      General: Bowel sounds are normal  There is no distension  Palpations: Abdomen is soft  Tenderness: There is no abdominal tenderness  Musculoskeletal:         General: Normal range of motion  Skin:     General: Skin is warm  Findings: No rash  Neurological:      Mental Status: She is alert and oriented to person, place, and time  Additional Data:     Labs:                            Lines/Drains:  Invasive Devices  Report    Drain  Duration           External Urinary Catheter 1 day                      Imaging: No pertinent imaging reviewed  Recent Cultures (last 7 days):         Last 24 Hours Medication List:   Current Facility-Administered Medications   Medication Dose Route Frequency Provider Last Rate    acetaminophen  975 mg Oral Q6H PRN Sade Pride PA-C      baclofen  10 mg Oral TID Malou Rios DO      bisacodyl  10 mg Rectal Daily PRN Lethaniel Domonique, HOWARD      clonazePAM  1 mg Oral BID HOWARD Camara      Diclofenac Sodium  2 g Topical 4x Daily Travis Youssef, HOWARD      DULoxetine  60 mg Oral Daily Christiano Hinton, HOWARD      enoxaparin  40 mg Subcutaneous Daily Christiano Hinton, HOWARD      gabapentin  400 mg Oral [address] HOW[address]      hydrOXYzine HCL  25 mg Oral Q6H PRN Lucio Jain MD      polyethylene glycol  17 g Oral Daily Jailynanimirella Youssef, HOWARD      prazosin  1 mg Oral HS [facility]ilin [facility], HOWARD      QUEtiapine  200 mg Oral HS Alisia Koenig PA-C      senna-docusate sodium  1 tablet Oral HS Travis Youssef, HOWARD      zolpidem  5 mg Oral HS PRN HOWARD Camara          Today, Patient Was Seen By: Malou Rios DO    **Please Note: This note may have been constructed using a voice recognition system  **

## 2022-08-18 NOTE — PLAN OF CARE
Pt resting between care  Refusing q2h turn  Demonstrated she is able to shift own weight  Pillow support provided on each side  Complaints of mild HA but refusing tylenol due to temp 99  Pt is concerned she may spike a fever  No other complaints  Incontinence care provided  Assist x2       Problem: MOBILITY - ADULT  Goal: Maintain or return to baseline ADL function  Description: INTERVENTIONS:  -  Assess patient's ability to carry out ADLs; assess patient's baseline for ADL function and identify physical deficits which impact ability to perform ADLs (bathing, care of mouth/teeth, toileting, grooming, dressing, etc )  - Assess/evaluate cause of self-care deficits   - Assess range of motion  - Assess patient's mobility; develop plan if impaired  - Assess patient's need for assistive devices and provide as appropriate  - Encourage maximum independence but intervene and supervise when necessary  - Involve family in performance of ADLs  - Assess for home care needs following discharge   - Consider OT consult to assist with ADL evaluation and planning for discharge  - Provide patient education as appropriate  Outcome: Progressing     Problem: Prexisting or High Potential for Compromised Skin Integrity  Goal: Skin integrity is maintained or improved  Description: INTERVENTIONS:  - Identify patients at risk for skin breakdown  - Assess and monitor skin integrity  - Assess and monitor nutrition and hydration status  - Monitor labs   - Assess for incontinence   - Turn and reposition patient  - Assist with mobility/ambulation  - Relieve pressure over bony prominences  - Avoid friction and shearing  - Provide appropriate hygiene as needed including keeping skin clean and dry  - Evaluate need for skin moisturizer/barrier cream  - Collaborate with interdisciplinary team   - Patient/family teaching  - Consider wound care consult   Outcome: Progressing        Problem: INFECTION - ADULT  Goal: Absence or prevention of progression during hospitalization  Description: INTERVENTIONS:  - Assess and monitor for signs and symptoms of infection  - Monitor lab/diagnostic results  - Monitor all insertion sites, i e  indwelling lines, tubes, and drains  - Monitor endotracheal if appropriate and nasal secretions for changes in amount and color  - [place] appropriate cooling/warming therapies per order  - Administer medications as ordered  - Instruct and encourage patient and family to use good hand hygiene technique  - Identify and instruct in appropriate isolation precautions for identified infection/condition  Outcome: Progressing    Problem: DISCHARGE PLANNING  Goal: Discharge to home or other facility with appropriate resources  Description: INTERVENTIONS:  - Identify barriers to discharge w/patient and caregiver  - Arrange for needed discharge resources and transportation as appropriate  - Identify discharge learning needs (meds, wound care, etc )  - Arrange for interpretive services to assist at discharge as needed  - Refer to Case Management Department for coordinating discharge planning if the patient needs post-hospital services based on physician/advanced practitioner order or complex needs related to functional status, cognitive ability, or social support system  Outcome: Progressing     Problem: Knowledge Deficit  Goal: Patient/family/caregiver demonstrates understanding of disease process, treatment plan, medications, and discharge instructions  Description: Complete learning assessment and assess knowledge base    Interventions:  - Provide teaching at level of understanding  - Provide teaching via preferred learning methods  Outcome: Progressing     Problem: MUSCULOSKELETAL - ADULT  Goal: Maintain or return mobility to safest level of function  Description: INTERVENTIONS:  - Assess patient's ability to carry out ADLs; assess patient's baseline for ADL function and identify physical deficits which impact ability to perform ADLs (bathing, care of mouth/teeth, toileting, grooming, dressing, etc )  - Assess/evaluate cause of self-care deficits   - Assess range of motion  - Assess patient's mobility  - Assess patient's need for assistive devices and provide as appropriate  - Encourage maximum independence but intervene and supervise when necessary  - Involve family in performance of ADLs  - Assess for home care needs following discharge   - Consider OT consult to assist with ADL evaluation and planning for discharge  - Provide patient education as appropriate  Outcome: Progressing  Goal: Maintain proper alignment of affected body part  Description: INTERVENTIONS:  - Support, maintain and protect limb and body alignment  - Provide patient/ family with appropriate education  Outcome: Progressing

## 2022-08-18 NOTE — ASSESSMENT & PLAN NOTE
Background:  Presented to the ED as was noted to be vomiting and reported that she had not been getting proper care at home requesting placement     · Discharged to [facility] on 05/17/2022  · Case management consulted and following  · PT OT recommending short-term rehab

## 2022-08-18 NOTE — ASSESSMENT & PLAN NOTE
· Status post spinal epidural abscess in the setting of IV drug abuse  · Lives at home with 66-year-old grandmother who acts as primary caregiver  · Improved sleep paraplegia patient does have some lower extremity sensation and movement as well and has the ability to return to full function Ng status with appropriate rehab  · Frequent turns  · Assist with all feeding  · Initially had Gonzalez this has since been discontinued as patient was only utilizing this for vacation  · Continue urinary retention protocol  · Continue pain control with aqua K, baclofen, and gabapentin

## 2022-08-19 LAB
ANION GAP SERPL CALCULATED.3IONS-SCNC: 11 MMOL/L (ref 4–13)
BASOPHILS # BLD AUTO: 0.03 THOUSANDS/ΜL (ref 0–0.1)
BASOPHILS NFR BLD AUTO: 1 % (ref 0–1)
BUN SERPL-MCNC: 10 MG/DL (ref 5–25)
CALCIUM SERPL-MCNC: 9.1 MG/DL (ref 8.3–10.1)
CHLORIDE SERPL-SCNC: 106 MMOL/L (ref 96–108)
CO2 SERPL-SCNC: 24 MMOL/L (ref 21–32)
CREAT SERPL-MCNC: 0.5 MG/DL (ref 0.6–1.3)
EOSINOPHIL # BLD AUTO: 0.08 THOUSAND/ΜL (ref 0–0.61)
EOSINOPHIL NFR BLD AUTO: 1 % (ref 0–6)
ERYTHROCYTE [DISTWIDTH] IN BLOOD BY AUTOMATED COUNT: 13.3 % (ref 11.6–15.1)
GFR SERPL CREATININE-BSD FRML MDRD: 129 ML/MIN/1.73SQ M
GLUCOSE SERPL-MCNC: 97 MG/DL (ref 65–140)
HCT VFR BLD AUTO: 39.6 % (ref 34.8–46.1)
HGB BLD-MCNC: 13.5 G/DL (ref 11.5–15.4)
IMM GRANULOCYTES # BLD AUTO: 0.01 THOUSAND/UL (ref 0–0.2)
IMM GRANULOCYTES NFR BLD AUTO: 0 % (ref 0–2)
LYMPHOCYTES # BLD AUTO: 2.02 THOUSANDS/ΜL (ref 0.6–4.47)
LYMPHOCYTES NFR BLD AUTO: 37 % (ref 14–44)
MCH RBC QN AUTO: 30.6 PG (ref 26.8–34.3)
MCHC RBC AUTO-ENTMCNC: 34.1 G/DL (ref 31.4–37.4)
MCV RBC AUTO: 90 FL (ref 82–98)
MONOCYTES # BLD AUTO: 0.34 THOUSAND/ΜL (ref 0.17–1.22)
MONOCYTES NFR BLD AUTO: 6 % (ref 4–12)
NEUTROPHILS # BLD AUTO: 3.04 THOUSANDS/ΜL (ref 1.85–7.62)
NEUTS SEG NFR BLD AUTO: 55 % (ref 43–75)
NRBC BLD AUTO-RTO: 0 /100 WBCS
PLATELET # BLD AUTO: 220 THOUSANDS/UL (ref 149–390)
PMV BLD AUTO: 9.1 FL (ref 8.9–12.7)
POTASSIUM SERPL-SCNC: 3.8 MMOL/L (ref 3.5–5.3)
RBC # BLD AUTO: 4.41 MILLION/UL (ref 3.81–5.12)
SODIUM SERPL-SCNC: 141 MMOL/L (ref 135–147)
WBC # BLD AUTO: 5.52 THOUSAND/UL (ref 4.31–10.16)

## 2022-08-19 PROCEDURE — 80048 BASIC METABOLIC PNL TOTAL CA: CPT

## 2022-08-19 PROCEDURE — 85025 COMPLETE CBC W/AUTO DIFF WBC: CPT

## 2022-08-19 PROCEDURE — 99232 SBSQ HOSP IP/OBS MODERATE 35: CPT | Performed by: INTERNAL MEDICINE

## 2022-08-19 RX ORDER — HYDRALAZINE HYDROCHLORIDE 20 MG/ML
5 INJECTION INTRAMUSCULAR; INTRAVENOUS EVERY 6 HOURS PRN
Status: DISCONTINUED | OUTPATIENT
Start: 2022-08-19 | End: 2022-08-21

## 2022-08-19 RX ORDER — BUTALBITAL, ACETAMINOPHEN AND CAFFEINE 50; 325; 40 MG/1; MG/1; MG/1
1 TABLET ORAL ONCE AS NEEDED
Status: DISCONTINUED | OUTPATIENT
Start: 2022-08-19 | End: 2022-08-28 | Stop reason: HOSPADM

## 2022-08-19 RX ADMIN — BACLOFEN 10 MG: 10 TABLET ORAL at 17:02

## 2022-08-19 RX ADMIN — CLONAZEPAM 1 MG: 1 TABLET ORAL at 17:02

## 2022-08-19 RX ADMIN — QUETIAPINE FUMARATE 200 MG: 200 TABLET ORAL at 23:01

## 2022-08-19 RX ADMIN — CLONAZEPAM 1 MG: 1 TABLET ORAL at 10:00

## 2022-08-19 RX ADMIN — DULOXETINE HYDROCHLORIDE 60 MG: 60 CAPSULE, DELAYED RELEASE ORAL at 10:00

## 2022-08-19 RX ADMIN — Medication 400 MG: at 10:00

## 2022-08-19 RX ADMIN — Medication 400 MG: at 17:02

## 2022-08-19 RX ADMIN — GABAPENTIN 400 MG: 400 CAPSULE ORAL at 00:13

## 2022-08-19 RX ADMIN — ACETAMINOPHEN 975 MG: 325 TABLET, FILM COATED ORAL at 20:16

## 2022-08-19 RX ADMIN — SENNOSIDES AND DOCUSATE SODIUM 1 TABLET: 50; 8.6 TABLET ORAL at 23:01

## 2022-08-19 RX ADMIN — BACLOFEN 10 MG: 10 TABLET ORAL at 10:00

## 2022-08-19 RX ADMIN — GABAPENTIN 400 MG: 400 CAPSULE ORAL at 23:22

## 2022-08-19 RX ADMIN — GABAPENTIN 400 MG: 400 CAPSULE ORAL at 10:00

## 2022-08-19 RX ADMIN — BACLOFEN 10 MG: 10 TABLET ORAL at 20:16

## 2022-08-19 RX ADMIN — PRAZOSIN HYDROCHLORIDE 1 MG: 1 CAPSULE ORAL at 23:01

## 2022-08-19 RX ADMIN — GABAPENTIN 400 MG: 400 CAPSULE ORAL at 17:02

## 2022-08-19 NOTE — CASE MANAGEMENT
Case Management Progress Note    Patient name Wagner Kennedy  Location [address] [id number]/-[id number] MRN [id number]  : 1990 Date 2022       LOS (days): 15  Geometric Mean LOS (GMLOS) (days):   Days to GMLOS:        OBJECTIVE:        Current admission status: Inpatient  Preferred Pharmacy:   [facility] # [place], [address] [address]  Phone: [phone] Fax: [phone]    Primary Care Provider: Josefina Sidhu MD    Primary Insurance: Che Johnston  Secondary Insurance:     PROGRESS NOTE:  Attempted to call pt's  at [facility];  VM full, unable to leave [facility]

## 2022-08-19 NOTE — PROGRESS NOTES
[facility]  Progress Note - Silkelorena Benedict 1990, 32 y o  female MRN: [id number]  Unit/Bed#: -01 Encounter: [id number]  Primary Care Provider: Nelson Zaman MD   Date and time admitted to hospital: 8/2/2022 12:01 PM    * Inability to return to living situation  Assessment & Plan  Background:  Presented to the ED as was noted to be vomiting and reported that she had not been getting proper care at home requesting placement  · Discharged to [facility] on 05/17/2022  · Case management consulted and following  · PT OT recommending short-term rehab    Lower extremity edema  Assessment & Plan  · Chronic lower extremity edema greater in left and right   · Patient reports that she had a duplex of lower extremity was clear last year  · Repeat this admission Lower extremity Doppler negative for DVT  · Elevate extremities  · Patient was previously on Eliquis but states this medication was discontinued    Anxiety  Assessment & Plan  · Continue Xanax per PTA medication regimen as well as Ambien for sleep  · Mood stable    Quadriplegic spinal paralysis ([facility] [address] )  Assessment & Plan  · Status post spinal epidural abscess in the setting of IV drug abuse  · Lives at home with 51-year-old grandmother who acts as primary caregiver  · Improved sleep paraplegia patient does have some lower extremity sensation and movement as well and has the ability to return to full function Ng status with appropriate rehab  · Frequent turns  · Assist with all feeding  · Initially had Gonzalez this has since been discontinued as patient was only utilizing this for vacation  · Continue urinary retention protocol  · Continue pain control with aqua K, baclofen, and gabapentin        VTE Pharmacologic Prophylaxis: VTE Score: 3 Moderate Risk (Score 3-4) - Pharmacological DVT Prophylaxis Ordered: enoxaparin (Lovenox)  Patient Centered Rounds: I performed bedside rounds with nursing staff today    Discussions with Specialists or Other Care Team Provider: case management    Education and Discussions with Family / Patient: Patient declined call to   Time Spent for Care: 20 minutes  More than 50% of total time spent on counseling and coordination of care as described above  Current Length of Stay: 15 day(s)  Current Patient Status: Inpatient   Certification Statement: Patient medically stable; awaiting placement  Discharge Plan: Patient medically stable; awaiting placement    Code Status: Level 1 - Full Code    Subjective:   Patient resting comfortably on examination  Patient had no overnight events or complaints on exam this morning  Objective:     Vitals:   Temp (24hrs), Av 2 °F (36 8 °C), Min:98 °F (36 7 °C), Max:98 3 °F (36 8 °C)    Temp:  [98 °F (36 7 °C)-98 3 °F (36 8 °C)] 98 3 °F (36 8 °C)  HR:  [63-75] 75  Resp:  [16] 16  BP: (101-122)/(57-81) 101/57  SpO2:  [94 %-95 %] 94 %  Body mass index is 27 35 kg/m²  Input and Output Summary (last 24 hours): Intake/Output Summary (Last 24 hours) at 2022 0827  Last data filed at 2022 1746  Gross per 24 hour   Intake 140 ml   Output 850 ml   Net -710 ml       Physical Exam:   Physical Exam  Vitals and nursing note reviewed  Constitutional:       General: She is not in acute distress  Appearance: She is well-developed  HENT:      Head: Normocephalic and atraumatic  Eyes:      General: No scleral icterus  Conjunctiva/sclera: Conjunctivae normal    Cardiovascular:      Rate and Rhythm: Normal rate and regular rhythm  Heart sounds: Normal heart sounds  No murmur heard  No friction rub  No gallop  Pulmonary:      Effort: Pulmonary effort is normal  No respiratory distress  Breath sounds: Normal breath sounds  No wheezing or rales  Abdominal:      General: Bowel sounds are normal  There is no distension  Palpations: Abdomen is soft  Tenderness: There is no abdominal tenderness     Musculoskeletal:         General: Normal range of motion  Skin:     General: Skin is warm  Findings: No rash  Neurological:      Mental Status: She is alert and oriented to person, place, and time  Sensory: Sensory deficit present  Motor: Weakness present  Additional Data:     Labs:                            Lines/Drains:  Invasive Devices  Report    Drain  Duration           External Urinary Catheter 2 days                      Imaging: No pertinent imaging reviewed  Recent Cultures (last 7 days):         Last 24 Hours Medication List:   Current Facility-Administered Medications   Medication Dose Route Frequency Provider Last Rate    acetaminophen  975 mg Oral Q6H PRN Sade Pride PA-C      baclofen  10 mg Oral TID Braeden Barnes DO      bisacodyl  10 mg Rectal Daily PRN HOWARD Hughes      clonazePAM  1 mg Oral BID HOWARD Haas      Diclofenac Sodium  2 g Topical 4x Daily HOWARD Hughes      DULoxetine  60 mg Oral Daily HOWARD Haas      enoxaparin  40 mg Subcutaneous Daily HOWARD Haas      gabapentin  400 mg Oral [address] HOWARD Gomez      hydrOXYzine HCL  25 mg Oral Q6H PRN Nelson Irene MD      magnesium oxide  400 mg Oral BID Braeden Barnes DO      polyethylene glycol  17 g Oral Daily HOWARD Hughes      prazosin  1 mg Oral HS Ailin Nichols, HOWARD      QUEtiapine  200 mg Oral HS Noemí Jones PA-C      senna-docusate sodium  1 tablet Oral HS Charis Marcano, HOWARD      zolpidem  5 mg Oral HS PRN HOWARD Haas          Today, Patient Was Seen By: Braeden Barnes DO    **Please Note: This note may have been constructed using a voice recognition system  **

## 2022-08-19 NOTE — PLAN OF CARE
Problem: MOBILITY - ADULT  Goal: Maintain or return to baseline ADL function  Description: INTERVENTIONS:  -  Assess patient's ability to carry out ADLs; assess patient's baseline for ADL function and identify physical deficits which impact ability to perform ADLs (bathing, care of mouth/teeth, toileting, grooming, dressing, etc )  - Assess/evaluate cause of self-care deficits   - Assess range of motion  - Assess patient's mobility; develop plan if impaired  - Assess patient's need for assistive devices and provide as appropriate  - Encourage maximum independence but intervene and supervise when necessary  - Involve family in performance of ADLs  - Assess for home care needs following discharge   - Consider OT consult to assist with ADL evaluation and planning for discharge  - Provide patient education as appropriate  Outcome: Progressing  Goal: Maintains/Returns to pre admission functional level  Description: INTERVENTIONS:  - Perform BMAT or MOVE assessment daily    - Set and communicate daily mobility goal to care team and patient/family/caregiver  - Collaborate with rehabilitation services on mobility goals if consulted  - Perform Range of Motion 3  times a day  - Reposition patient every 2 hours    - Out of bed for toileting  - Record patient progress and toleration of activity level   Outcome: Progressing     Problem: PAIN - ADULT  Goal: Verbalizes/displays adequate comfort level or baseline comfort level  Description: Interventions:  - Encourage patient to monitor pain and request assistance  - Assess pain using appropriate pain scale  - Administer analgesics based on type and severity of pain and evaluate response  - Implement non-pharmacological measures as appropriate and evaluate response  - Consider cultural and social influences on pain and pain management  - Notify physician/advanced practitioner if interventions unsuccessful or patient reports new pain  Outcome: Progressing     Problem: Prexisting or High Potential for Compromised Skin Integrity  Goal: Skin integrity is maintained or improved  Description: INTERVENTIONS:  - Identify patients at risk for skin breakdown  - Assess and monitor skin integrity  - Assess and monitor nutrition and hydration status  - Monitor labs   - Assess for incontinence   - Turn and reposition patient  - Assist with mobility/ambulation  - Relieve pressure over bony prominences  - Avoid friction and shearing  - Provide appropriate hygiene as needed including keeping skin clean and dry  - Evaluate need for skin moisturizer/barrier cream  - Collaborate with interdisciplinary team   - Patient/family teaching  - Consider wound care consult   Outcome: Progressing

## 2022-08-20 PROBLEM — R33.9 URINARY RETENTION: Status: ACTIVE | Noted: 2022-08-20

## 2022-08-20 PROCEDURE — 99233 SBSQ HOSP IP/OBS HIGH 50: CPT | Performed by: FAMILY MEDICINE

## 2022-08-20 RX ADMIN — ACETAMINOPHEN 975 MG: 325 TABLET, FILM COATED ORAL at 18:20

## 2022-08-20 RX ADMIN — CLONAZEPAM 1 MG: 1 TABLET ORAL at 18:17

## 2022-08-20 RX ADMIN — Medication 400 MG: at 10:00

## 2022-08-20 RX ADMIN — QUETIAPINE FUMARATE 200 MG: 200 TABLET ORAL at 23:36

## 2022-08-20 RX ADMIN — DULOXETINE HYDROCHLORIDE 60 MG: 60 CAPSULE, DELAYED RELEASE ORAL at 10:00

## 2022-08-20 RX ADMIN — GABAPENTIN 400 MG: 400 CAPSULE ORAL at 23:36

## 2022-08-20 RX ADMIN — SENNOSIDES AND DOCUSATE SODIUM 1 TABLET: 50; 8.6 TABLET ORAL at 23:36

## 2022-08-20 RX ADMIN — BACLOFEN 10 MG: 10 TABLET ORAL at 18:17

## 2022-08-20 RX ADMIN — ZOLPIDEM TARTRATE 5 MG: 5 TABLET, COATED ORAL at 23:44

## 2022-08-20 RX ADMIN — CLONAZEPAM 1 MG: 1 TABLET ORAL at 10:00

## 2022-08-20 RX ADMIN — GABAPENTIN 400 MG: 400 CAPSULE ORAL at 18:17

## 2022-08-20 RX ADMIN — BACLOFEN 10 MG: 10 TABLET ORAL at 23:36

## 2022-08-20 RX ADMIN — Medication 400 MG: at 18:17

## 2022-08-20 RX ADMIN — GABAPENTIN 400 MG: 400 CAPSULE ORAL at 10:00

## 2022-08-20 RX ADMIN — ACETAMINOPHEN 975 MG: 325 TABLET, FILM COATED ORAL at 23:44

## 2022-08-20 RX ADMIN — BACLOFEN 10 MG: 10 TABLET ORAL at 10:00

## 2022-08-20 RX ADMIN — PRAZOSIN HYDROCHLORIDE 1 MG: 1 CAPSULE ORAL at 23:37

## 2022-08-20 NOTE — QUICK NOTE
Patient with /91, patient feels she is retaining urine, and feels this is the cause of her elevated BP  Patient w/ urinary retention protocol already ordered, bladder scan revealed 529mL, straight cath removed 1000mL  BP improved to 112/74 after urine removal  Patient reports typically taking flomax at home to assist with this issue (unknown dose), which she had not been receiving  Patient currently prescribed prazosin, will hold on flomax order at this time prior to physician evaluation  Will continue urinary retention protocol  Continue close monitoring

## 2022-08-20 NOTE — ASSESSMENT & PLAN NOTE
· Stable  · Chronic lower extremity edema greater in left and right   · Patient reports that she had a duplex of lower extremity was clear last year  · Repeat this admission Lower extremity Doppler negative for DVT  · Elevate extremities  · Patient was previously on Eliquis but states this medication was discontinued

## 2022-08-20 NOTE — PROGRESS NOTES
[facility]  Progress Note - Gabriella Davalos 1990, 32 y o  female MRN: [id number]  Unit/Bed#: -01 Encounter: [id number]  Primary Care Provider: Rosemarie Arreola MD   Date and time admitted to hospital: 8/2/2022 12:01 PM    * Inability to return to living situation  Assessment & Plan    · Background:  Presented to the ED as was noted to be vomiting and reported that she had not been getting proper care at home requesting placement     · Discharged to [facility] on 05/17/2022  · Case management consulted and following  · PT OT recommending short-term rehab  · No new complaints today    Quadriplegic spinal paralysis ([address] )  Assessment & Plan  · Status post spinal epidural abscess in the setting of IV drug abuse  · Lives at home with 80-year-old grandmother who acts as primary caregiver  · Improved sleep paraplegia patient does have some lower extremity sensation and movement as well and has the ability to return to full function Ng status with appropriate rehab  · Frequent turns  · Assist with all feeding  · Initially had Awan this has since been discontinued as patient was only utilizing this for vacation  · Continue urinary retention protocol  · Continue pain control with aqua K, baclofen, and gabapentin  · No worsening    Urinary retention  Assessment & Plan  · Pt does not want a awan catheter  · 1000cc straight cath yesterday  · Follow retention protocol    Lower extremity edema  Assessment & Plan  · Stable  · Chronic lower extremity edema greater in left and right   · Patient reports that she had a duplex of lower extremity was clear last year  · Repeat this admission Lower extremity Doppler negative for DVT  · Elevate extremities  · Patient was previously on Eliquis but states this medication was discontinued    Anxiety  Assessment & Plan  · Stable  · Continue Xanax per PTA medication regimen as well as Ambien for sleep  · Mood stable          VTE Pharmacologic Prophylaxis: VTE Score: 3 lovenox    Patient Centered Rounds: I performed bedside rounds with nursing staff today  Discussions with Specialists or Other Care Team Provider: n/a    Education and Discussions with Family / Patient: Patient declined call to   Time Spent for Care: 15 minutes  More than 50% of total time spent on counseling and coordination of care as described above  Current Length of Stay: 16 day(s)  Current Patient Status: Inpatient   Certification Statement: The patient will continue to require additional inpatient hospital stay due to pending placement  Discharge Plan: pending placement    Code Status: Level 1 - Full Code    Subjective:   Seen and examined at bedside  Pt is not cooperative with exam   Does not move the blanket from her face says she is cold  No BM x 5 days, but does not want laxatives  Significant urinary retention but does not want awan    Objective:     Vitals:   Temp (24hrs), Av 3 °F (36 8 °C), Min:98 1 °F (36 7 °C), Max:98 6 °F (37 °C)    Temp:  [98 1 °F (36 7 °C)-98 6 °F (37 °C)] 98 1 °F (36 7 °C)  HR:  [] 70  BP: ()/(58-91) 102/58  SpO2:  [91 %-96 %] 96 %  Body mass index is 27 35 kg/m²  Input and Output Summary (last 24 hours):      Intake/Output Summary (Last 24 hours) at 2022 1136  Last data filed at 2022 0912  Gross per 24 hour   Intake 720 ml   Output 2000 ml   Net -1280 ml       Physical Exam:   Physical Exam s1,2 (+) R  Lungs CTA  abd nt bs (+) infrequent  A&O x3      Additional Data:     Labs:  Results from last 7 days   Lab Units 22  2218   WBC Thousand/uL 5 52   HEMOGLOBIN g/dL 13 5   HEMATOCRIT % 39 6   PLATELETS Thousands/uL 220   NEUTROS PCT % 55   LYMPHS PCT % 37   MONOS PCT % 6   EOS PCT % 1     Results from last 7 days   Lab Units 22  2218   SODIUM mmol/L 141   POTASSIUM mmol/L 3 8   CHLORIDE mmol/L 106   CO2 mmol/L 24   BUN mg/dL 10   CREATININE mg/dL 0 50*   ANION GAP mmol/L 11   CALCIUM mg/dL 9 1   GLUCOSE RANDOM mg/dL 97                       Lines/Drains:  Invasive Devices  Report    Drain  Duration           External Urinary Catheter 4 days                      Imaging: No pertinent imaging reviewed  Recent Cultures (last 7 days):         Last 24 Hours Medication List:   Current Facility-Administered Medications   Medication Dose Route Frequency Provider Last Rate    acetaminophen  975 mg Oral Q6H PRN Sade Pride PA-C      baclofen  10 mg Oral TID Dora Alex, DO      bisacodyl  10 mg Rectal Daily PRN Jax Marshall, HOWARD      butalbital-acetaminophen-caffeine  1 tablet Oral Once PRN Britney Rodriguez PA-C      clonazePAM  1 mg Oral BID Rosaura Chan, HOWARD      Diclofenac Sodium  2 g Topical 4x Daily Jax Passey, CRNP      DULoxetine  60 mg Oral Daily Rosaura Hutch, CRNP      enoxaparin  40 mg Subcutaneous Daily Rosaura Hutch, CRNP      gabapentin  400 mg Oral [address]ner, HOWARD      hydrALAZINE  5 mg Intravenous Q6H PRN Nasima Negro [place]I      hydrOXYzine HCL  25 mg Oral Q6H PRN Justo Harris MD      magnesium oxide  400 mg Oral BID Dora Alex, DO      polyethylene glycol  17 g Oral Daily Jax Passey, CRNP      prazosin  1 mg Oral HS Aliin Nichols, CRNP      QUEtiapine  200 mg Oral HS Daniel Laughlin PA-C      senna-docusate sodium  1 tablet Oral HS Jax Passey, CRNP      zolpidem  5 mg Oral HS PRN Rosaura Giles, HOWARD          Today, Patient Was Seen By: Quyen Hamilton MD    **Please Note: This note may have been constructed using a voice recognition system  **

## 2022-08-20 NOTE — ASSESSMENT & PLAN NOTE
· Status post spinal epidural abscess in the setting of IV drug abuse  · Lives at home with 51-year-old grandmother who acts as primary caregiver  · Improved sleep paraplegia patient does have some lower extremity sensation and movement as well and has the ability to return to full function Ng status with appropriate rehab  · Frequent turns  · Assist with all feeding  · Initially had Gonzalez this has since been discontinued as patient was only utilizing this for vacation  · Continue urinary retention protocol  · Continue pain control with aqua K, baclofen, and gabapentin  · No worsening

## 2022-08-20 NOTE — NURSING NOTE
Pt educated on importance of routine bowel regimen  Declined suppository prior to end of shift secondary to c/o headache  PRN medication administered  Patient encouraged  to accept her laxative this evening  SLIM previously made aware of her refusal of Miralax earlier

## 2022-08-20 NOTE — ASSESSMENT & PLAN NOTE
· Background:  Presented to the ED as was noted to be vomiting and reported that she had not been getting proper care at home requesting placement     · Discharged to [facility] on 05/17/2022  · Case management consulted and following  · PT OT recommending short-term rehab  · No new complaints today

## 2022-08-20 NOTE — UTILIZATION REVIEW
Continued Stay Review    Date:  8/20/22                          Current Patient Class:    Inpatient  Current Level of Care:   Med surg    HPI:31 y o  female initially admitted on    8/4/22     Unsafe  Living  situation   Quadriplegic  Spinal paralysis    Assessment/Plan:   8/20    Continue  PT/OT  Needs  STR  Continue pain control as needed  Required  Straight cath  8/19  For  1000 cc, does not want  awan placed  Has significant  Urinary retention  Uncooperative with  Exam   No Bm  X  5 days  Continue current meds/treatment plan       Vital Signs:   98 8 °F (37 1 °C) -- -- 99/63 75 -- --   08/20/22 09:11:03 98 1 °F (36 7 °C) 70 -- 102/58 73 96 %         Pertinent Labs/Diagnostic Results:       Results from last 7 days   Lab Units 08/19/22  2218   WBC Thousand/uL 5 52   HEMOGLOBIN g/dL 13 5   HEMATOCRIT % 39 6   PLATELETS Thousands/uL 220   NEUTROS ABS Thousands/µL 3 04         Results from last 7 days   Lab Units 08/19/22  2218   SODIUM mmol/L 141   POTASSIUM mmol/L 3 8   CHLORIDE mmol/L 106   CO2 mmol/L 24   ANION GAP mmol/L 11   BUN mg/dL 10   CREATININE mg/dL 0 50*   EGFR ml/min/1 73sq m 129   CALCIUM mg/dL 9 1             Results from last 7 days   Lab Units 08/19/22  2218   GLUCOSE RANDOM mg/dL 97                           Medications:   Scheduled Medications:  baclofen, 10 mg, Oral, TID  clonazePAM, 1 mg, Oral, BID  Diclofenac Sodium, 2 g, Topical, 4x Daily  DULoxetine, 60 mg, Oral, Daily  enoxaparin, 40 mg, Subcutaneous, Daily  gabapentin, 400 mg, Oral, Q8H  magnesium oxide, 400 mg, Oral, BID  polyethylene glycol, 17 g, Oral, Daily  prazosin, 1 mg, Oral, HS  QUEtiapine, 200 mg, Oral, HS  senna-docusate sodium, 1 tablet, Oral, HS      Continuous IV Infusions:     PRN Meds:  acetaminophen, 975 mg, Oral, Q6H PRN  bisacodyl, 10 mg, Rectal, Daily PRN  butalbital-acetaminophen-caffeine, 1 tablet, Oral, Once PRN  hydrALAZINE, 5 mg, Intravenous, Q6H PRN  hydrOXYzine HCL, 25 mg, Oral, Q6H PRN  zolpidem, 5 mg, Oral, HS PRN        Discharge Plan:    TBD    Network Utilization Review Department  ATTENTION: Please call with any questions or concerns to [phone] and carefully listen to the prompts so that you are directed to the right person  All voicemails are confidential   Bennett Husain all requests for admission clinical reviews, approved or denied determinations and any other requests to dedicated fax number below belonging to the campus where the patient is receiving treatment   List of dedicated fax numbers for the Facilities:  [address] DENIALS (Administrative/Medical Necessity) [phone]   [address] (Maternity/NICU/Pediatrics) [phone]   [address]  [address] [facility] [place] [address] [address] [address] Anshul Stuart 1481 [address] [facility] [place] [address] [phone]

## 2022-08-21 PROCEDURE — 99232 SBSQ HOSP IP/OBS MODERATE 35: CPT | Performed by: FAMILY MEDICINE

## 2022-08-21 RX ORDER — HYDRALAZINE HYDROCHLORIDE 25 MG/1
25 TABLET, FILM COATED ORAL ONCE AS NEEDED
Status: DISCONTINUED | OUTPATIENT
Start: 2022-08-21 | End: 2022-08-28 | Stop reason: HOSPADM

## 2022-08-21 RX ADMIN — Medication 400 MG: at 12:08

## 2022-08-21 RX ADMIN — Medication 400 MG: at 17:22

## 2022-08-21 RX ADMIN — GABAPENTIN 400 MG: 400 CAPSULE ORAL at 21:53

## 2022-08-21 RX ADMIN — GABAPENTIN 400 MG: 400 CAPSULE ORAL at 23:23

## 2022-08-21 RX ADMIN — QUETIAPINE FUMARATE 200 MG: 200 TABLET ORAL at 21:53

## 2022-08-21 RX ADMIN — BACLOFEN 10 MG: 10 TABLET ORAL at 21:52

## 2022-08-21 RX ADMIN — PRAZOSIN HYDROCHLORIDE 1 MG: 1 CAPSULE ORAL at 21:52

## 2022-08-21 RX ADMIN — DULOXETINE HYDROCHLORIDE 60 MG: 60 CAPSULE, DELAYED RELEASE ORAL at 12:08

## 2022-08-21 RX ADMIN — ZOLPIDEM TARTRATE 5 MG: 5 TABLET, COATED ORAL at 23:23

## 2022-08-21 RX ADMIN — BACLOFEN 10 MG: 10 TABLET ORAL at 17:22

## 2022-08-21 RX ADMIN — BISACODYL 10 MG: 10 SUPPOSITORY RECTAL at 19:06

## 2022-08-21 RX ADMIN — CLONAZEPAM 1 MG: 1 TABLET ORAL at 12:09

## 2022-08-21 RX ADMIN — CLONAZEPAM 1 MG: 1 TABLET ORAL at 17:22

## 2022-08-21 RX ADMIN — GABAPENTIN 400 MG: 400 CAPSULE ORAL at 12:08

## 2022-08-21 RX ADMIN — SENNOSIDES AND DOCUSATE SODIUM 1 TABLET: 50; 8.6 TABLET ORAL at 21:52

## 2022-08-21 RX ADMIN — BACLOFEN 10 MG: 10 TABLET ORAL at 12:09

## 2022-08-21 NOTE — ASSESSMENT & PLAN NOTE
· Initially, presented to the ED on 8/2 with vomiting and reported that she had not been getting proper care at home requesting placement     · Case management consulted and following  · PT OT recommending short-term rehab  · No new complaints today

## 2022-08-21 NOTE — PROGRESS NOTES
[address] [facility]  Progress Note - Susanne Dural 1990, 32 y o  female MRN: [id number]  Unit/Bed#: -01 Encounter: [id number]  Primary Care Provider: Matt Chino MD   Date and time admitted to hospital: 8/2/2022 12:01 PM    * Inability to return to living situation  Assessment & Plan  · Initially, presented to the ED on 8/2 with vomiting and reported that she had not been getting proper care at home requesting placement     · Case management consulted and following  · PT OT recommending short-term rehab  · No new complaints today    Quadriplegic spinal paralysis ([address] )  Assessment & Plan  · Status post spinal epidural abscess in the setting of IV drug abuse  · Patient is essentially homeless and was staying with 24-year-old grandmother who acts as primary caregiver  · Improved sleep paraplegia patient does have some lower extremity sensation and movement as well and has the ability to return to full functioning status with appropriate rehab  · Frequent turns  · Assist with all feeding  · Initially had Awan this has since been discontinued as patient was only utilizing this for vacation  · Continue urinary retention protocol  · Continue pain control with aqua K, baclofen, and gabapentin  · No worsening    Urinary retention  Assessment & Plan  · Pt does not want a awan catheter  · Follow retention protocol    Lower extremity edema  Assessment & Plan  · Stable  · Chronic lower extremity edema greater in left and right   · Patient reports that she had a duplex of lower extremity was clear last year  · Repeat this admission Lower extremity Doppler negative for DVT  · Elevate extremities  · Patient was previously on Eliquis but states this medication was discontinued    Anxiety  Assessment & Plan  · Stable  · Continue Xanax per PTA medication regimen as well as Ambien for sleep  · Mood stable        VTE Pharmacologic Prophylaxis: VTE Score: 3 lovenox    Patient Centered Rounds: I performed bedside rounds with nursing staff today  Discussions with Specialists or Other Care Team Provider: n/a    Education and Discussions with Family / Patient: Patient declined call to   Time Spent for Care: 15 minutes  More than 50% of total time spent on counseling and coordination of care as described above  Current Length of Stay: 17 day(s)  Current Patient Status: Inpatient   Certification Statement: The patient will continue to require additional inpatient hospital stay due to pending placement  Discharge Plan: pending placement    Code Status: Level 1 - Full Code    Subjective:   Seen and xamined at bedside  No new complaints  Still hasnt had a bowel movement but continues to refuse medications for it  Denies abd pain or cp or sob    Objective:     Vitals:   Temp (24hrs), Av 8 °F (37 1 °C), Min:98 7 °F (37 1 °C), Max:98 8 °F (37 1 °C)    Temp:  [98 7 °F (37 1 °C)-98 8 °F (37 1 °C)] 98 7 °F (37 1 °C)  HR:  [77] 77  BP: ()/(63-72) 127/72  SpO2:  [97 %] 97 %  Body mass index is 27 35 kg/m²  Input and Output Summary (last 24 hours):      Intake/Output Summary (Last 24 hours) at 2022 1221  Last data filed at 2022 0403  Gross per 24 hour   Intake 360 ml   Output 800 ml   Net -440 ml       Physical Exam:   Physical Exam s1,2 (+), lungs CTA anteriorly, abd nt nd , no new focal neuro deficit, A&O x3    Additional Data:     Labs:  Results from last 7 days   Lab Units 22  2218   WBC Thousand/uL 5 52   HEMOGLOBIN g/dL 13 5   HEMATOCRIT % 39 6   PLATELETS Thousands/uL 220   NEUTROS PCT % 55   LYMPHS PCT % 37   MONOS PCT % 6   EOS PCT % 1     Results from last 7 days   Lab Units 22  2218   SODIUM mmol/L 141   POTASSIUM mmol/L 3 8   CHLORIDE mmol/L 106   CO2 mmol/L 24   BUN mg/dL 10   CREATININE mg/dL 0 50*   ANION GAP mmol/L 11   CALCIUM mg/dL 9 1   GLUCOSE RANDOM mg/dL 97                       Lines/Drains:  Invasive Devices  Report    Drain  Duration           External Urinary Catheter 5 days                      Imaging: No pertinent imaging reviewed  Recent Cultures (last 7 days):         Last 24 Hours Medication List:   Current Facility-Administered Medications   Medication Dose Route Frequency Provider Last Rate    acetaminophen  975 mg Oral Q6H PRN Sade Pride PA-C      baclofen  10 mg Oral TID Levon Cochran DO      bisacodyl  10 mg Rectal Daily PRN HOWARD Abdul      butalbital-acetaminophen-caffeine  1 tablet Oral Once PRN Umair Rogers PA-C      clonazePAM  1 mg Oral BID Real villalobos, HOWARD      Diclofenac Sodium  2 g Topical 4x Daily Jhon Nelson, HOWARD      DULoxetine  60 mg Oral Daily Real villalobos, HOWARD      enoxaparin  40 mg Subcutaneous Daily [facility], HOWARD      gabapentin  400 mg Oral [address]      hydrALAZINE  5 mg Intravenous Q6H PRN Nina Helton CroghanISAI      hydrOXYzine HCL  25 mg Oral Q6H PRN Leonid Colindres MD      magnesium oxide  400 mg Oral BID Levon Cochran DO      polyethylene glycol  17 g Oral Daily HOWARD Abdul      prazosin  1 mg Oral HS Ailin Nichols, HOWARD      QUEtiapine  200 mg Oral HS [facility]      senna-docusate sodium  1 tablet Oral HS HOWARD Abdul      zolpidem  5 mg Oral HS PRN HOWARD Ramirez          Today, Patient Was Seen By: Shaun Bonilla MD    **Please Note: This note may have been constructed using a voice recognition system  **

## 2022-08-21 NOTE — ASSESSMENT & PLAN NOTE
· Status post spinal epidural abscess in the setting of IV drug abuse  · Patient is essentially homeless and was staying with 70-year-old grandmother who acts as primary caregiver  · Improved sleep paraplegia patient does have some lower extremity sensation and movement as well and has the ability to return to full functioning status with appropriate rehab  · Frequent turns  · Assist with all feeding  · Initially had Gonzalez this has since been discontinued as patient was only utilizing this for vacation  · Continue urinary retention protocol  · Continue pain control with aqua K, baclofen, and gabapentin  · No worsening

## 2022-08-22 ENCOUNTER — APPOINTMENT (OUTPATIENT)
Dept: RADIOLOGY | Facility: HOSPITAL | Age: 32
DRG: 249 | End: 2022-08-22
Payer: COMMERCIAL

## 2022-08-22 LAB
ANION GAP SERPL CALCULATED.3IONS-SCNC: 9 MMOL/L (ref 4–13)
BASOPHILS # BLD AUTO: 0.04 THOUSANDS/ΜL (ref 0–0.1)
BASOPHILS NFR BLD AUTO: 0 % (ref 0–1)
BUN SERPL-MCNC: 10 MG/DL (ref 5–25)
CALCIUM SERPL-MCNC: 8.4 MG/DL (ref 8.3–10.1)
CHLORIDE SERPL-SCNC: 99 MMOL/L (ref 96–108)
CO2 SERPL-SCNC: 24 MMOL/L (ref 21–32)
CREAT SERPL-MCNC: 0.58 MG/DL (ref 0.6–1.3)
EOSINOPHIL # BLD AUTO: 0.03 THOUSAND/ΜL (ref 0–0.61)
EOSINOPHIL NFR BLD AUTO: 0 % (ref 0–6)
ERYTHROCYTE [DISTWIDTH] IN BLOOD BY AUTOMATED COUNT: 13.3 % (ref 11.6–15.1)
FLUAV RNA RESP QL NAA+PROBE: NEGATIVE
FLUBV RNA RESP QL NAA+PROBE: NEGATIVE
GFR SERPL CREATININE-BSD FRML MDRD: 123 ML/MIN/1.73SQ M
GLUCOSE SERPL-MCNC: 112 MG/DL (ref 65–140)
HCT VFR BLD AUTO: 35.7 % (ref 34.8–46.1)
HGB BLD-MCNC: 11.8 G/DL (ref 11.5–15.4)
IMM GRANULOCYTES # BLD AUTO: 0.04 THOUSAND/UL (ref 0–0.2)
IMM GRANULOCYTES NFR BLD AUTO: 0 % (ref 0–2)
LACTATE SERPL-SCNC: 1.7 MMOL/L (ref 0.5–2)
LYMPHOCYTES # BLD AUTO: 1.02 THOUSANDS/ΜL (ref 0.6–4.47)
LYMPHOCYTES NFR BLD AUTO: 9 % (ref 14–44)
MCH RBC QN AUTO: 30.2 PG (ref 26.8–34.3)
MCHC RBC AUTO-ENTMCNC: 33.1 G/DL (ref 31.4–37.4)
MCV RBC AUTO: 91 FL (ref 82–98)
MONOCYTES # BLD AUTO: 0.85 THOUSAND/ΜL (ref 0.17–1.22)
MONOCYTES NFR BLD AUTO: 7 % (ref 4–12)
NEUTROPHILS # BLD AUTO: 9.49 THOUSANDS/ΜL (ref 1.85–7.62)
NEUTS SEG NFR BLD AUTO: 84 % (ref 43–75)
NRBC BLD AUTO-RTO: 0 /100 WBCS
PLATELET # BLD AUTO: 183 THOUSANDS/UL (ref 149–390)
PMV BLD AUTO: 9.1 FL (ref 8.9–12.7)
POTASSIUM SERPL-SCNC: 3.8 MMOL/L (ref 3.5–5.3)
PROCALCITONIN SERPL-MCNC: 0.09 NG/ML
RBC # BLD AUTO: 3.91 MILLION/UL (ref 3.81–5.12)
RSV RNA RESP QL NAA+PROBE: NEGATIVE
SARS-COV-2 RNA RESP QL NAA+PROBE: NEGATIVE
SODIUM SERPL-SCNC: 132 MMOL/L (ref 135–147)
WBC # BLD AUTO: 11.47 THOUSAND/UL (ref 4.31–10.16)

## 2022-08-22 PROCEDURE — 71045 X-RAY EXAM CHEST 1 VIEW: CPT

## 2022-08-22 PROCEDURE — 0241U HB NFCT DS VIR RESP RNA 4 TRGT: CPT | Performed by: INTERNAL MEDICINE

## 2022-08-22 PROCEDURE — 85025 COMPLETE CBC W/AUTO DIFF WBC: CPT | Performed by: FAMILY MEDICINE

## 2022-08-22 PROCEDURE — 99231 SBSQ HOSP IP/OBS SF/LOW 25: CPT | Performed by: FAMILY MEDICINE

## 2022-08-22 PROCEDURE — 80048 BASIC METABOLIC PNL TOTAL CA: CPT | Performed by: FAMILY MEDICINE

## 2022-08-22 PROCEDURE — 84145 PROCALCITONIN (PCT): CPT | Performed by: FAMILY MEDICINE

## 2022-08-22 PROCEDURE — 83605 ASSAY OF LACTIC ACID: CPT | Performed by: FAMILY MEDICINE

## 2022-08-22 PROCEDURE — 87040 BLOOD CULTURE FOR BACTERIA: CPT | Performed by: INTERNAL MEDICINE

## 2022-08-22 RX ORDER — KETOROLAC TROMETHAMINE 30 MG/ML
15 INJECTION, SOLUTION INTRAMUSCULAR; INTRAVENOUS ONCE
Status: COMPLETED | OUTPATIENT
Start: 2022-08-22 | End: 2022-08-23

## 2022-08-22 RX ORDER — ACETAMINOPHEN 325 MG/1
325 TABLET ORAL ONCE
Status: COMPLETED | OUTPATIENT
Start: 2022-08-22 | End: 2022-08-22

## 2022-08-22 RX ORDER — CEFTRIAXONE 1 G/50ML
1000 INJECTION, SOLUTION INTRAVENOUS EVERY 24 HOURS
Status: DISCONTINUED | OUTPATIENT
Start: 2022-08-22 | End: 2022-08-26

## 2022-08-22 RX ADMIN — ZOLPIDEM TARTRATE 5 MG: 5 TABLET, COATED ORAL at 22:10

## 2022-08-22 RX ADMIN — GABAPENTIN 400 MG: 400 CAPSULE ORAL at 17:25

## 2022-08-22 RX ADMIN — DULOXETINE HYDROCHLORIDE 60 MG: 60 CAPSULE, DELAYED RELEASE ORAL at 09:40

## 2022-08-22 RX ADMIN — ACETAMINOPHEN 325 MG: 325 TABLET, FILM COATED ORAL at 22:59

## 2022-08-22 RX ADMIN — ACETAMINOPHEN 975 MG: 325 TABLET, FILM COATED ORAL at 17:30

## 2022-08-22 RX ADMIN — ACETAMINOPHEN 325 MG: 325 TABLET, FILM COATED ORAL at 23:00

## 2022-08-22 RX ADMIN — CLONAZEPAM 1 MG: 1 TABLET ORAL at 17:25

## 2022-08-22 RX ADMIN — CLONAZEPAM 1 MG: 1 TABLET ORAL at 09:40

## 2022-08-22 RX ADMIN — Medication 400 MG: at 17:25

## 2022-08-22 RX ADMIN — BACLOFEN 10 MG: 10 TABLET ORAL at 09:40

## 2022-08-22 RX ADMIN — GABAPENTIN 400 MG: 400 CAPSULE ORAL at 09:40

## 2022-08-22 RX ADMIN — BACLOFEN 10 MG: 10 TABLET ORAL at 21:26

## 2022-08-22 RX ADMIN — QUETIAPINE FUMARATE 200 MG: 200 TABLET ORAL at 21:26

## 2022-08-22 RX ADMIN — SENNOSIDES AND DOCUSATE SODIUM 1 TABLET: 50; 8.6 TABLET ORAL at 21:26

## 2022-08-22 RX ADMIN — BACLOFEN 10 MG: 10 TABLET ORAL at 17:25

## 2022-08-22 RX ADMIN — Medication 400 MG: at 09:40

## 2022-08-22 NOTE — ASSESSMENT & PLAN NOTE
· Stable, atarax prn  · Continue Xanax per PTA medication regimen as well as Ambien for sleep  · Mood stable

## 2022-08-22 NOTE — PLAN OF CARE
Problem: MOBILITY - ADULT  Goal: Maintain or return to baseline ADL function  Description: INTERVENTIONS:  -  Assess patient's ability to carry out ADLs; assess patient's baseline for ADL function and identify physical deficits which impact ability to perform ADLs (bathing, care of mouth/teeth, toileting, grooming, dressing, etc )  - Assess/evaluate cause of self-care deficits   - Assess range of motion  - Assess patient's mobility; develop plan if impaired  - Assess patient's need for assistive devices and provide as appropriate  - Encourage maximum independence but intervene and supervise when necessary  - Involve family in performance of ADLs  - Assess for home care needs following discharge   - Consider OT consult to assist with ADL evaluation and planning for discharge  - Provide patient education as appropriate  Outcome: Progressing  Goal: Maintains/Returns to pre admission functional level  Description: INTERVENTIONS:  - Perform BMAT or MOVE assessment daily    - Set and communicate daily mobility goal to care team and patient/family/caregiver  - Collaborate with rehabilitation services on mobility goals if consulted  - Perform Range of Motion 3  times a day  - Reposition patient every 2 hours    - Out of bed for toileting  - Record patient progress and toleration of activity level   Outcome: Progressing     Problem: Prexisting or High Potential for Compromised Skin Integrity  Goal: Skin integrity is maintained or improved  Description: INTERVENTIONS:  - Identify patients at risk for skin breakdown  - Assess and monitor skin integrity  - Assess and monitor nutrition and hydration status  - Monitor labs   - Assess for incontinence   - Turn and reposition patient  - Assist with mobility/ambulation  - Relieve pressure over bony prominences  - Avoid friction and shearing  - Provide appropriate hygiene as needed including keeping skin clean and dry  - Evaluate need for skin moisturizer/barrier cream  - Collaborate with interdisciplinary team   - Patient/family teaching  - Consider wound care consult   Outcome: Progressing     Problem: Potential for Falls  Goal: Patient will remain free of falls  Description: INTERVENTIONS:  - Educate patient/family on patient safety including physical limitations  - Instruct patient to call for assistance with activity   - Consult OT/PT to assist with strengthening/mobility   - Keep Call bell within reach  - Keep bed low and locked with side rails adjusted as appropriate  - Keep care items and personal belongings within reach  - Initiate and maintain comfort rounds  - Make Fall Risk Sign visible to staff  -- Apply yellow socks and bracelet for high fall risk patients  - Consider moving patient to room near nurses station  Outcome: Progressing     Problem: PAIN - ADULT  Goal: Verbalizes/displays adequate comfort level or baseline comfort level  Description: Interventions:  - Encourage patient to monitor pain and request assistance  - Assess pain using appropriate pain scale  - Administer analgesics based on type and severity of pain and evaluate response  - Implement non-pharmacological measures as appropriate and evaluate response  - Consider cultural and social influences on pain and pain management  - Notify physician/advanced practitioner if interventions unsuccessful or patient reports new pain  Outcome: Progressing     Problem: INFECTION - ADULT  Goal: Absence or prevention of progression during hospitalization  Description: INTERVENTIONS:  - Assess and monitor for signs and symptoms of infection  - Monitor lab/diagnostic results  - Monitor all insertion sites, i e  indwelling lines, tubes, and drains  - Monitor endotracheal if appropriate and nasal secretions for changes in amount and color  - [place] appropriate cooling/warming therapies per order  - Administer medications as ordered  - Instruct and encourage patient and family to use good hand hygiene technique  - Identify and instruct in appropriate isolation precautions for identified infection/condition  Outcome: Progressing     Problem: SAFETY ADULT  Goal: Maintain or return to baseline ADL function  Description: INTERVENTIONS:  -  Assess patient's ability to carry out ADLs; assess patient's baseline for ADL function and identify physical deficits which impact ability to perform ADLs (bathing, care of mouth/teeth, toileting, grooming, dressing, etc )  - Assess/evaluate cause of self-care deficits   - Assess range of motion  - Assess patient's mobility; develop plan if impaired  - Assess patient's need for assistive devices and provide as appropriate  - Encourage maximum independence but intervene and supervise when necessary  - Involve family in performance of ADLs  - Assess for home care needs following discharge   - Consider OT consult to assist with ADL evaluation and planning for discharge  - Provide patient education as appropriate  Outcome: Progressing  Goal: Maintains/Returns to pre admission functional level  Description: INTERVENTIONS:  - Perform BMAT or MOVE assessment daily    - Set and communicate daily mobility goal to care team and patient/family/caregiver  - Collaborate with rehabilitation services on mobility goals if consulted  - Perform Range of Motion 3  times a day  - Reposition patient every 2 hours    - Out of bed for toileting  - Record patient progress and toleration of activity level   Outcome: Progressing  Goal: Patient will remain free of falls  Description: INTERVENTIONS:  - Educate patient/family on patient safety including physical limitations  - Instruct patient to call for assistance with activity   - Consult OT/PT to assist with strengthening/mobility   - Keep Call bell within reach  - Keep bed low and locked with side rails adjusted as appropriate  - Keep care items and personal belongings within reach  - Initiate and maintain comfort rounds  - Make Fall Risk Sign visible to staff  -- Apply yellow socks and bracelet for high fall risk patients  - Consider moving patient to room near nurses station  Outcome: Progressing     Problem: DISCHARGE PLANNING  Goal: Discharge to home or other facility with appropriate resources  Description: INTERVENTIONS:  - Identify barriers to discharge w/patient and caregiver  - Arrange for needed discharge resources and transportation as appropriate  - Identify discharge learning needs (meds, wound care, etc )  - Arrange for interpretive services to assist at discharge as needed  - Refer to Case Management Department for coordinating discharge planning if the patient needs post-hospital services based on physician/advanced practitioner order or complex needs related to functional status, cognitive ability, or social support system  Outcome: Progressing     Problem: Knowledge Deficit  Goal: Patient/family/caregiver demonstrates understanding of disease process, treatment plan, medications, and discharge instructions  Description: Complete learning assessment and assess knowledge base    Interventions:  - Provide teaching at level of understanding  - Provide teaching via preferred learning methods  Outcome: Progressing     Problem: MUSCULOSKELETAL - ADULT  Goal: Maintain or return mobility to safest level of function  Description: INTERVENTIONS:  - Assess patient's ability to carry out ADLs; assess patient's baseline for ADL function and identify physical deficits which impact ability to perform ADLs (bathing, care of mouth/teeth, toileting, grooming, dressing, etc )  - Assess/evaluate cause of self-care deficits   - Assess range of motion  - Assess patient's mobility  - Assess patient's need for assistive devices and provide as appropriate  - Encourage maximum independence but intervene and supervise when necessary  - Involve family in performance of ADLs  - Assess for home care needs following discharge   - Consider OT consult to assist with ADL evaluation and planning for discharge  - Provide patient education as appropriate  Outcome: Progressing  Goal: Maintain proper alignment of affected body part  Description: INTERVENTIONS:  - Support, maintain and protect limb and body alignment  - Provide patient/ family with appropriate education  Outcome: Progressing

## 2022-08-22 NOTE — ASSESSMENT & PLAN NOTE
· Status post spinal epidural abscess in the setting of IV drug abuse  · Patient is essentially homeless and was staying with 63-year-old grandmother who acts as primary caregiver  · Improved sleep paraplegia patient does have some lower extremity sensation and movement as well and has the ability to return to full functioning status with appropriate rehab  · Frequent turns  · Assist with all feeding  · Initially had Gonzalez this has since been discontinued as patient was only utilizing this for vacation  · Continue urinary retention protocol  · Continue pain control with aqua K, baclofen, and gabapentin  · No worsening

## 2022-08-22 NOTE — NURSING NOTE
Patient was running a fever of 102 8  SLIM Provider Kaylan Klein made aware and assessed patient  Labs ordered and collected  Tylenol was given  Fluids were given for patient to drink  Patient is refusing ice packs to be applied in efforts to bring fever down

## 2022-08-22 NOTE — PROGRESS NOTES
[address] [facility]  Progress Note - Scarlett Marcelino 1990, 32 y o  female MRN: [id number]  Unit/Bed#: -01 Encounter: [id number]  Primary Care Provider: Bernice Jean-Baptiste MD   Date and time admitted to hospital: 8/2/2022 12:01 PM    * Inability to return to living situation  Assessment & Plan  · Initially, presented to the ED on 8/2 with vomiting and reported that she had not been getting proper care at home requesting placement     · Case management consulted and following  · PT OT recommending short-term rehab  · No new complaints today    Quadriplegic spinal paralysis ([address] )  Assessment & Plan  · Status post spinal epidural abscess in the setting of IV drug abuse  · Patient is essentially homeless and was staying with 72-year-old grandmother who acts as primary caregiver  · Improved sleep paraplegia patient does have some lower extremity sensation and movement as well and has the ability to return to full functioning status with appropriate rehab  · Frequent turns  · Assist with all feeding  · Initially had Awan this has since been discontinued as patient was only utilizing this for vacation  · Continue urinary retention protocol  · Continue pain control with aqua K, baclofen, and gabapentin  · No worsening    Urinary retention  Assessment & Plan  · Pt does not want a awan catheter  · Follow retention protocol    Lower extremity edema  Assessment & Plan  · Stable  · Chronic lower extremity edema greater in left and right   · Patient reports that she had a duplex of lower extremity was clear last year  · Repeat this admission Lower extremity Doppler negative for DVT  · Elevate extremities  · Patient was previously on Eliquis but states this medication was discontinued    Anxiety  Assessment & Plan  · Stable  · Continue Xanax per PTA medication regimen as well as Ambien for sleep  · Mood stable      VTE Pharmacologic Prophylaxis: VTE Score: 3 lovenox    Patient Centered Rounds: I performed bedside rounds with nursing staff today  Discussions with Specialists or Other Care Team Provider: n/a    Education and Discussions with Family / Patient: Patient declined call to   Time Spent for Care: 15 minutes  More than 50% of total time spent on counseling and coordination of care as described above  Current Length of Stay: 18 day(s)  Current Patient Status: Inpatient   Certification Statement: The patient will continue to require additional inpatient hospital stay due to pending placement  Discharge Plan: pending placement    Code Status: Level 1 - Full Code    Subjective:   Seen and examined at bedside  No new complaints  Not willing to take miralax  Objective:     Vitals:   Temp (24hrs), Av 7 °F (37 6 °C), Min:98 2 °F (36 8 °C), Max:101 2 °F (38 4 °C)    Temp:  [98 2 °F (36 8 °C)-101 2 °F (38 4 °C)] 98 2 °F (36 8 °C)  HR:  [] 105  Resp:  [17-18] 17  BP: ()/() 99/62  SpO2:  [93 %-97 %] 93 %  Body mass index is 27 35 kg/m²  Input and Output Summary (last 24 hours):      Intake/Output Summary (Last 24 hours) at 2022 1402  Last data filed at 2022 8224  Gross per 24 hour   Intake --   Output 2000 ml   Net -2000 ml       Physical Exam:   Physical Exam s1,2 (+), lungs CTA anteriorly, abd nt nd , no new focal neuro deficit, A&O x3    Additional Data:     Labs:  Results from last 7 days   Lab Units 22  2218   WBC Thousand/uL 5 52   HEMOGLOBIN g/dL 13 5   HEMATOCRIT % 39 6   PLATELETS Thousands/uL 220   NEUTROS PCT % 55   LYMPHS PCT % 37   MONOS PCT % 6   EOS PCT % 1     Results from last 7 days   Lab Units 22  2218   SODIUM mmol/L 141   POTASSIUM mmol/L 3 8   CHLORIDE mmol/L 106   CO2 mmol/L 24   BUN mg/dL 10   CREATININE mg/dL 0 50*   ANION GAP mmol/L 11   CALCIUM mg/dL 9 1   GLUCOSE RANDOM mg/dL 97                       Lines/Drains:  Invasive Devices  Report    Drain  Duration           External Urinary Catheter 6 days                      Imaging: No pertinent imaging reviewed  Recent Cultures (last 7 days):         Last 24 Hours Medication List:   Current Facility-Administered Medications   Medication Dose Route Frequency Provider Last Rate    acetaminophen  975 mg Oral Q6H PRN Saed Pride PA-C      baclofen  10 mg Oral TID Surendra Caban,       bisacodyl  10 mg Rectal Daily PRN HOWARD Chisholm      butalbital-acetaminophen-caffeine  1 tablet Oral Once PRN Jed Ham PA-C      clonazePAM  1 mg Oral BID Crystal Em, CRVEGA      Diclofenac Sodium  2 g Topical 4x Daily HOWARD Chisholm      DULoxetine  60 mg Oral Daily Crystal Em, CRVEGA      enoxaparin  40 mg Subcutaneous Daily Crystal Em, CRVEGA      gabapentin  400 mg Oral [address]      hydrALAZINE  25 mg Oral Once PRN Jed Ham PA-C      hydrOXYzine HCL  25 mg Oral Q6H PRN Edward Reed MD      magnesium oxide  400 mg Oral BID Surendra Caban,       polyethylene glycol  17 g Oral Daily HOWARD Chisholm      prazosin  1 mg Oral HS Ailin Nichols, HOWARD      QUEtiapine  200 mg Oral HS Floridalma Su PA-C      senna-docusate sodium  1 tablet Oral HS HOWARD Chisholm      zolpidem  5 mg Oral HS PRN Crystal Em, HOWARD          Today, Patient Was Seen By: Bayron Youssef MD    **Please Note: This note may have been constructed using a voice recognition system  **

## 2022-08-22 NOTE — CASE MANAGEMENT
Case Management Discharge Planning Note    Patient name Thong Sampson  Location /-47 MRN [id number]  : 1990 Date 2022       Current Admission Date: 2022  Current Admission Diagnosis:Inability to return to living situation   Patient Active Problem List    Diagnosis Date Noted    Urinary retention 2022    Inability to return to living situation 2022    Lower extremity edema 2022    Anxiety 2022    Insomnia 2022    Hepatitis C antibody positive in blood 2022    Substance abuse ([facility] [address] ) 05/15/2022    Failure to thrive in adult 2022    Quadriplegic spinal paralysis ([facility] [address] ) 2022    Gonzalez catheter in place 2022    Back pain 2022      LOS (days): 18  Geometric Mean LOS (GMLOS) (days):   Days to GMLOS:     OBJECTIVE:  Risk of Unplanned Readmission Score: 24 79         Current admission status: Inpatient   Preferred Pharmacy:   [facility] # [facility], [address] [place] [address]  Phone: [phone] Fax: [phone]    Primary Care Provider: Carlos Alberto Johansen MD    Primary Insurance: [address] [place] [address]  Secondary Insurance:     DISCHARGE DETAILS:                                          Other Referral/Resources/Interventions Provided:  Referral Comments: No bed offers at this time  Inquiry as to status of referral sent to all outstanding facilities via [address]  Awaiting responses

## 2022-08-23 PROBLEM — A41.9 SEPSIS (HCC): Status: ACTIVE | Noted: 2022-08-23

## 2022-08-23 LAB
ALBUMIN SERPL BCP-MCNC: 2.8 G/DL (ref 3.5–5)
ALP SERPL-CCNC: 66 U/L (ref 46–116)
ALT SERPL W P-5'-P-CCNC: 23 U/L (ref 12–78)
ANION GAP SERPL CALCULATED.3IONS-SCNC: 10 MMOL/L (ref 4–13)
AST SERPL W P-5'-P-CCNC: 15 U/L (ref 5–45)
BACTERIA UR QL AUTO: ABNORMAL /HPF
BILIRUB SERPL-MCNC: 0.53 MG/DL (ref 0.2–1)
BILIRUB UR QL STRIP: NEGATIVE
BUN SERPL-MCNC: 13 MG/DL (ref 5–25)
CALCIUM ALBUM COR SERPL-MCNC: 9.3 MG/DL (ref 8.3–10.1)
CALCIUM SERPL-MCNC: 8.3 MG/DL (ref 8.3–10.1)
CHLORIDE SERPL-SCNC: 100 MMOL/L (ref 96–108)
CLARITY UR: ABNORMAL
CO2 SERPL-SCNC: 23 MMOL/L (ref 21–32)
COLOR UR: ABNORMAL
CREAT SERPL-MCNC: 0.6 MG/DL (ref 0.6–1.3)
ERYTHROCYTE [DISTWIDTH] IN BLOOD BY AUTOMATED COUNT: 13.3 % (ref 11.6–15.1)
GFR SERPL CREATININE-BSD FRML MDRD: 121 ML/MIN/1.73SQ M
GLUCOSE SERPL-MCNC: 112 MG/DL (ref 65–140)
GLUCOSE UR STRIP-MCNC: NEGATIVE MG/DL
HCT VFR BLD AUTO: 34.5 % (ref 34.8–46.1)
HGB BLD-MCNC: 11.5 G/DL (ref 11.5–15.4)
HGB UR QL STRIP.AUTO: ABNORMAL
KETONES UR STRIP-MCNC: NEGATIVE MG/DL
LEUKOCYTE ESTERASE UR QL STRIP: ABNORMAL
MCH RBC QN AUTO: 30.4 PG (ref 26.8–34.3)
MCHC RBC AUTO-ENTMCNC: 33.3 G/DL (ref 31.4–37.4)
MCV RBC AUTO: 91 FL (ref 82–98)
NITRITE UR QL STRIP: POSITIVE
NON-SQ EPI CELLS URNS QL MICRO: ABNORMAL /HPF
PH UR STRIP.AUTO: 7 [PH]
PLATELET # BLD AUTO: 159 THOUSANDS/UL (ref 149–390)
PMV BLD AUTO: 9.2 FL (ref 8.9–12.7)
POTASSIUM SERPL-SCNC: 4 MMOL/L (ref 3.5–5.3)
PROCALCITONIN SERPL-MCNC: 0.24 NG/ML
PROT SERPL-MCNC: 6.8 G/DL (ref 6.4–8.4)
PROT UR STRIP-MCNC: ABNORMAL MG/DL
RBC # BLD AUTO: 3.78 MILLION/UL (ref 3.81–5.12)
RBC #/AREA URNS AUTO: ABNORMAL /HPF
SODIUM SERPL-SCNC: 133 MMOL/L (ref 135–147)
SP GR UR STRIP.AUTO: 1.01 (ref 1–1.03)
UROBILINOGEN UR QL STRIP.AUTO: 1 E.U./DL
WBC # BLD AUTO: 11.13 THOUSAND/UL (ref 4.31–10.16)
WBC #/AREA URNS AUTO: ABNORMAL /HPF

## 2022-08-23 PROCEDURE — 84145 PROCALCITONIN (PCT): CPT | Performed by: INTERNAL MEDICINE

## 2022-08-23 PROCEDURE — 87154 CUL TYP ID BLD PTHGN 6+ TRGT: CPT | Performed by: INTERNAL MEDICINE

## 2022-08-23 PROCEDURE — 99233 SBSQ HOSP IP/OBS HIGH 50: CPT | Performed by: FAMILY MEDICINE

## 2022-08-23 PROCEDURE — 87040 BLOOD CULTURE FOR BACTERIA: CPT | Performed by: INTERNAL MEDICINE

## 2022-08-23 PROCEDURE — 87086 URINE CULTURE/COLONY COUNT: CPT | Performed by: INTERNAL MEDICINE

## 2022-08-23 PROCEDURE — 87186 SC STD MICRODIL/AGAR DIL: CPT | Performed by: INTERNAL MEDICINE

## 2022-08-23 PROCEDURE — 81001 URINALYSIS AUTO W/SCOPE: CPT | Performed by: INTERNAL MEDICINE

## 2022-08-23 PROCEDURE — 85027 COMPLETE CBC AUTOMATED: CPT | Performed by: INTERNAL MEDICINE

## 2022-08-23 PROCEDURE — 80053 COMPREHEN METABOLIC PANEL: CPT | Performed by: INTERNAL MEDICINE

## 2022-08-23 PROCEDURE — 87077 CULTURE AEROBIC IDENTIFY: CPT | Performed by: INTERNAL MEDICINE

## 2022-08-23 RX ORDER — SODIUM CHLORIDE, SODIUM LACTATE, POTASSIUM CHLORIDE, CALCIUM CHLORIDE 600; 310; 30; 20 MG/100ML; MG/100ML; MG/100ML; MG/100ML
75 INJECTION, SOLUTION INTRAVENOUS CONTINUOUS
Status: DISCONTINUED | OUTPATIENT
Start: 2022-08-23 | End: 2022-08-28 | Stop reason: HOSPADM

## 2022-08-23 RX ORDER — IBUPROFEN 400 MG/1
400 TABLET ORAL EVERY 6 HOURS PRN
Status: DISCONTINUED | OUTPATIENT
Start: 2022-08-23 | End: 2022-08-28 | Stop reason: HOSPADM

## 2022-08-23 RX ADMIN — CEFTRIAXONE 1000 MG: 1 INJECTION, SOLUTION INTRAVENOUS at 01:34

## 2022-08-23 RX ADMIN — ZOLPIDEM TARTRATE 5 MG: 5 TABLET, COATED ORAL at 21:37

## 2022-08-23 RX ADMIN — SODIUM CHLORIDE 500 ML: 0.9 INJECTION, SOLUTION INTRAVENOUS at 01:41

## 2022-08-23 RX ADMIN — Medication 400 MG: at 17:52

## 2022-08-23 RX ADMIN — ACETAMINOPHEN 975 MG: 325 TABLET, FILM COATED ORAL at 20:37

## 2022-08-23 RX ADMIN — VANCOMYCIN HYDROCHLORIDE 1250 MG: 5 INJECTION, POWDER, LYOPHILIZED, FOR SOLUTION INTRAVENOUS at 20:37

## 2022-08-23 RX ADMIN — CLONAZEPAM 1 MG: 1 TABLET ORAL at 17:53

## 2022-08-23 RX ADMIN — BACLOFEN 10 MG: 10 TABLET ORAL at 17:52

## 2022-08-23 RX ADMIN — CEFTRIAXONE 1000 MG: 1 INJECTION, SOLUTION INTRAVENOUS at 22:31

## 2022-08-23 RX ADMIN — KETOROLAC TROMETHAMINE 15 MG: 30 INJECTION, SOLUTION INTRAMUSCULAR at 01:35

## 2022-08-23 RX ADMIN — VANCOMYCIN HYDROCHLORIDE 1250 MG: 5 INJECTION, POWDER, LYOPHILIZED, FOR SOLUTION INTRAVENOUS at 11:33

## 2022-08-23 RX ADMIN — BACLOFEN 10 MG: 10 TABLET ORAL at 21:37

## 2022-08-23 RX ADMIN — SENNOSIDES AND DOCUSATE SODIUM 1 TABLET: 50; 8.6 TABLET ORAL at 21:37

## 2022-08-23 RX ADMIN — ACETAMINOPHEN 975 MG: 325 TABLET, FILM COATED ORAL at 07:29

## 2022-08-23 RX ADMIN — Medication 400 MG: at 10:01

## 2022-08-23 RX ADMIN — DULOXETINE HYDROCHLORIDE 60 MG: 60 CAPSULE, DELAYED RELEASE ORAL at 10:01

## 2022-08-23 RX ADMIN — BACLOFEN 10 MG: 10 TABLET ORAL at 10:01

## 2022-08-23 RX ADMIN — CLONAZEPAM 1 MG: 1 TABLET ORAL at 10:01

## 2022-08-23 RX ADMIN — QUETIAPINE FUMARATE 200 MG: 200 TABLET ORAL at 21:37

## 2022-08-23 RX ADMIN — GABAPENTIN 400 MG: 400 CAPSULE ORAL at 17:52

## 2022-08-23 RX ADMIN — GABAPENTIN 400 MG: 400 CAPSULE ORAL at 10:01

## 2022-08-23 RX ADMIN — VANCOMYCIN HYDROCHLORIDE 1250 MG: 5 INJECTION, POWDER, LYOPHILIZED, FOR SOLUTION INTRAVENOUS at 03:16

## 2022-08-23 RX ADMIN — SODIUM CHLORIDE, SODIUM LACTATE, POTASSIUM CHLORIDE, AND CALCIUM CHLORIDE 125 ML/HR: .6; .31; .03; .02 INJECTION, SOLUTION INTRAVENOUS at 10:02

## 2022-08-23 NOTE — PLAN OF CARE
Problem: Potential for Falls  Goal: Patient will remain free of falls  Description: INTERVENTIONS:  - Educate patient/family on patient safety including physical limitations  - Instruct patient to call for assistance with activity   - Consult OT/PT to assist with strengthening/mobility   - Keep Call bell within reach  - Keep bed low and locked with side rails adjusted as appropriate  - Keep care items and personal belongings within reach  - Initiate and maintain comfort rounds  - Make Fall Risk Sign visible to staff  -- Apply yellow socks and bracelet for high fall risk patients  - Consider moving patient to room near nurses station  Outcome: Progressing     Problem: PAIN - ADULT  Goal: Verbalizes/displays adequate comfort level or baseline comfort level  Description: Interventions:  - Encourage patient to monitor pain and request assistance  - Assess pain using appropriate pain scale  - Administer analgesics based on type and severity of pain and evaluate response  - Implement non-pharmacological measures as appropriate and evaluate response  - Consider cultural and social influences on pain and pain management  - Notify physician/advanced practitioner if interventions unsuccessful or patient reports new pain  Outcome: Progressing     Problem: SAFETY ADULT  Goal: Patient will remain free of falls  Description: INTERVENTIONS:  - Educate patient/family on patient safety including physical limitations  - Instruct patient to call for assistance with activity   - Consult OT/PT to assist with strengthening/mobility   - Keep Call bell within reach  - Keep bed low and locked with side rails adjusted as appropriate  - Keep care items and personal belongings within reach  - Initiate and maintain comfort rounds  - Make Fall Risk Sign visible to staff  -- Apply yellow socks and bracelet for high fall risk patients  - Consider moving patient to room near nurses station  Outcome: Progressing     Problem: Knowledge Deficit  Goal: Patient/family/caregiver demonstrates understanding of disease process, treatment plan, medications, and discharge instructions  Description: Complete learning assessment and assess knowledge base    Interventions:  - Provide teaching at level of understanding  - Provide teaching via preferred learning methods  Outcome: Progressing

## 2022-08-23 NOTE — CASE MANAGEMENT
Case Management Discharge Planning Note    Patient name Cullen Russell  Location /-58 MRN [id number]  : 1990 Date 2022       Current Admission Date: 2022  Current Admission Diagnosis:Sepsis [facility])   Patient Active Problem List    Diagnosis Date Noted    Sepsis ([facility] [address] ) 2022    Urinary retention 2022    Inability to return to living situation 2022    Lower extremity edema 2022    Anxiety 2022    Insomnia 2022    Hepatitis C antibody positive in blood 2022    Substance abuse ([facility] [address] ) 05/15/2022    Failure to thrive in adult 2022    Quadriplegic spinal paralysis ([facility] ) 2022    Gonzalez catheter in place 2022    Back pain 2022      LOS (days): 19  Geometric Mean LOS (GMLOS) (days):   Days to GMLOS:     OBJECTIVE:  Risk of Unplanned Readmission Score: 19 9         Current admission status: Inpatient   Preferred Pharmacy:   [facility] # [facility], [address] [place] [address]  Phone: [phone] Fax: [phone]    Primary Care Provider: Serena Prado MD    [facility] Insurance: [facility]  Secondary Insurance:     DISCHARGE DETAILS:    Other Referral/Resources/Interventions Provided:  Interventions: Short Term Rehab  Referral Comments: CM called Philomena Mari from [address] requesting assistance with patient  Nicolettel Kamaljit reported that [facility] cannot accept due to psych and drug hx  CM then reviewed [facility] and found no accepting facilities, so CM called Raji Friday at [phone] and left a message for the admissions department  CM then called [facility] at [phone] and spoke to Rea Mccrary, the   Rea Mccrary reported that CM will need to speak with Juana Ornelas at [phone]  CM called Juana Ornelas at the number provided and left a message requesting a call back   CM then sent a TT to the PT/OT priority group chat to determine if patient is skillable  If not, CM will need to explore alternative living arrangements, such as ALFs vs  PCHs  CM reviewed with other network CCs for additional advice  Per CCs, CM should explore ALFs vs  inpatient drug and alcohol treatment if patient is fearful of relapse  If not fearful, psych should weigh in to determine if psych intervention is needed  CM informed CM Chris Thorpe of this update and asked her to discuss ALFs vs  drug and alcohol treatment with patient

## 2022-08-23 NOTE — PHYSICAL THERAPY NOTE
Physical Therapy Cancellation Note             08/23/22 1259   PT Last Visit   PT Visit Date 08/23/22   Note Type   Note Type Cancelled Session   Cancel Reasons Medical status  (PT session attempted   pt not medically appropriate for PT at this time per nsg  will cont to follow + provide PT interventions as appropriate )     Ricky Euceda, PT , DPT

## 2022-08-23 NOTE — SEPSIS NOTE
Sepsis Note   Karl Hull 32 y o  female MRN: [id number]  Unit/Bed#: -01 Encounter: [id number]       qSOFA     Row Name 08/22/22 15:10:49 08/22/22 0934 08/22/22 08:21:47 08/21/22 20:27:53 08/21/22 2000    Altered mental status GCS < 15 -- 0 -- -- --    Respiratory Rate > / =22 0 -- 0 -- --    Systolic BP < / =559 1 -- 1 0 0    Q Sofa Score 1 1 1 -- --    Row Name 08/21/22 15:33:34 08/21/22 1100 08/20/22 23:10:18          Altered mental status GCS < 15 -- 0 --      Respiratory Rate > / =22 0 -- --      Systolic BP < / =478 0 -- 0      Q Sofa Score 0 -- --                Contacted by nursing that patient with fever after tylenol administration about 2 hours ago  Upon chart review patient meeting sepsis criteria for fever (101 9F) and tachycardia ()  Unclear source of infection at this time  Mild leukocytosis on labs this afternoon (WBC 11 47)  Lactic and procalcitonin negative  Patient is complaining of some congestion and cough  She also reports some dysuria and urinary frequency       Plan:  Order toradol for fever  Obtain CXR, UA, COVID test, AM procalcitonin  Check BC x2  Give IV fluid bolus  Will start on IV ceftriaxone and vancomycin given patient has history of MRSA bacteremia

## 2022-08-23 NOTE — PROGRESS NOTES
[facility]  Progress Note - Dinh Lim 1990, 32 y o  female MRN: [id number]  Unit/Bed#: -01 Encounter: [id number]  Primary Care Provider: Randall North MD   Date and time admitted to hospital: 8/2/2022 12:01 PM    * Sepsis [facility])  Assessment & Plan  · Criteria met:  Fever, tachycardia, abnormal UA  · procal normal but elevated from yesterday  · Lactic acid normal  · XR shows no PNA  · Follow urine and blood cultures  · Antibiotics:  IV vancomycin and ceftriaxone    Inability to return to living situation  Assessment & Plan  · Initially, presented to the ED on 8/2 with vomiting and reported that she had not been getting proper care at home requesting placement     · Case management consulted and following  · PT OT recommending short-term rehab  · No new complaints today    Quadriplegic spinal paralysis ([address] )  Assessment & Plan  · Status post spinal epidural abscess in the setting of IV drug abuse  · Patient is essentially homeless and was staying with 55-year-old grandmother who acts as primary caregiver  · Improved sleep paraplegia patient does have some lower extremity sensation and movement as well and has the ability to return to full functioning status with appropriate rehab  · Frequent turns  · Assist with all feeding  · Initially had Awan this has since been discontinued as patient was only utilizing this for vacation  · Continue urinary retention protocol  · Continue pain control with aqua K, baclofen, and gabapentin  · No worsening    Urinary retention  Assessment & Plan  · Pt does not want a awan catheter  · Follow retention protocol    Lower extremity edema  Assessment & Plan  · Stable  · Chronic lower extremity edema greater in left and right   · Patient reports that she had a duplex of lower extremity was clear last year  · Repeat this admission Lower extremity Doppler negative for DVT  · Elevate extremities  · Patient was previously on Eliquis but states this medication was discontinued    Anxiety  Assessment & Plan  · Stable, atarax prn  · Continue Xanax per PTA medication regimen as well as Ambien for sleep  · Mood stable          VTE Pharmacologic Prophylaxis: VTE Score: 3 Moderate Risk (Score 3-4) - Pharmacological DVT Prophylaxis Ordered: enoxaparin (Lovenox)  Patient Centered Rounds: I performed bedside rounds with nursing staff today  Discussions with Specialists or Other Care Team Provider: n/a    Education and Discussions with Family / Patient: Patient declined call to   Time Spent for Care: 20 minutes  More than 50% of total time spent on counseling and coordination of care as described above  Current Length of Stay: 19 day(s)  Current Patient Status: Inpatient   Certification Statement: The patient will continue to require additional inpatient hospital stay due to need for treatment of sepsis  Discharge Plan: Anticipate discharge in 48-72 hrs to rehab facility  Code Status: Level 1 - Full Code    Subjective:   Seen and examined at bedside  Fevers overnight, chills  Denies dysuria but unreliable symptom due to quadriplegia  No cp or sob or abd pain ov NVD    Objective:     Vitals:   Temp (24hrs), Av 6 °F (38 7 °C), Min:99 2 °F (37 3 °C), Max:104 9 °F (40 5 °C)    Temp:  [99 2 °F (37 3 °C)-104 9 °F (40 5 °C)] 100 °F (37 8 °C)  HR:  [] 116  Resp:  [16-20] 16  BP: ()/(54-64) 109/60  SpO2:  [95 %-97 %] 96 %  Body mass index is 27 35 kg/m²  Input and Output Summary (last 24 hours):      Intake/Output Summary (Last 24 hours) at 2022 1314  Last data filed at 2022 1100  Gross per 24 hour   Intake 460 ml   Output 2600 ml   Net -2140 ml       Physical Exam:   Physical Exam General- Awake, alert and oriented x 3, looks comfortable  HEENT- Normocephalic, atraumatic, oral mucosa- moist  Neck- Supple, No carotid bruit, no JVD  CVS- Normal S1/ S2, Regular rate and rhythm, No murmur, No edema  Respiratory system- B/L clear breath sounds, no wheezing  Abdomen- Soft, Non distended, no tenderness, Bowel sound- present 4 quads  Genitourinary- No suprapubic tenderness, No CVA tenderness  Skin- No new bruise or rash  Musculoskeletal- No gross deformity  Psych- No acute psychosis  CNS- CN II- XII grossly intact, No acute focal neurologic deficit noted      Additional Data:     Labs:  Results from last 7 days   Lab Units 08/23/22  0547 08/22/22  1806   WBC Thousand/uL 11 13* 11 47*   HEMOGLOBIN g/dL 11 5 11 8   HEMATOCRIT % 34 5* 35 7   PLATELETS Thousands/uL 159 183   NEUTROS PCT %  --  84*   LYMPHS PCT %  --  9*   MONOS PCT %  --  7   EOS PCT %  --  0     Results from last 7 days   Lab Units 08/23/22  0547   SODIUM mmol/L 133*   POTASSIUM mmol/L 4 0   CHLORIDE mmol/L 100   CO2 mmol/L 23   BUN mg/dL 13   CREATININE mg/dL 0 60   ANION GAP mmol/L 10   CALCIUM mg/dL 8 3   ALBUMIN g/dL 2 8*   TOTAL BILIRUBIN mg/dL 0 53   ALK PHOS U/L 66   ALT U/L 23   AST U/L 15   GLUCOSE RANDOM mg/dL 112                 Results from last 7 days   Lab Units 08/23/22  0547 08/22/22  1806   LACTIC ACID mmol/L  --  1 7   PROCALCITONIN ng/ml 0 24 0 09       Lines/Drains:  Invasive Devices  Report    Peripheral Intravenous Line  Duration           Peripheral IV 08/23/22 Dorsal (posterior); Left Forearm <1 day          Drain  Duration           External Urinary Catheter <1 day                      Imaging: Reviewed radiology reports from this admission including: chest xray    Recent Cultures (last 7 days):   Results from last 7 days   Lab Units 08/23/22  0125 08/22/22  2207   BLOOD CULTURE  Received in Microbiology Lab  Culture in Progress  Received in Microbiology Lab  Culture in Progress         Last 24 Hours Medication List:   Current Facility-Administered Medications   Medication Dose Route Frequency Provider Last Rate    acetaminophen  975 mg Oral Q6H PRN Sade Pride PA-C      baclofen  10 mg Oral TID Connor Fraire DO      bisacodyl  10 mg Rectal Daily PRN HOWARD Abdul      butalbital-acetaminophen-caffeine  1 tablet Oral Once PRN Umair Rogers PA-C      cefTRIAXone  1,000 mg Intravenous Q24H Destiny Tee PA-C 1,000 mg (08/23/22 0134)    clonazePAM  1 mg Oral BID HOWARD Nick      Diclofenac Sodium  2 g Topical 4x Daily HOWARD Abdul      DULoxetine  60 mg Oral Daily HOWARD Ramirez      enoxaparin  40 mg Subcutaneous Daily HOWARD Ramirez      gabapentin  400 mg Oral [address] HOW[address] Gomez      hydrALAZINE  25 mg Oral Once PRN Umair Rogers PA-C      hydrOXYzine HCL  25 mg Oral Q6H PRN Leonid Colindres MD      ibuprofen  400 mg Oral Q6H PRN Shaun Bonilla MD      lactated ringers  125 mL/hr Intravenous Continuous Shaun Bonilla  mL/hr (08/23/22 1002)    magnesium oxide  400 mg Oral BID Levon Cochran DO      polyethylene glycol  17 g Oral Daily HOWARD Abdul      prazosin  1 mg Oral HS HOWARD Nick      QUEtiapine  200 mg Oral HS Jaswant Snider PA-C      senna-docusate sodium  1 tablet Oral HS HOWARD Abdul      vancomycin  15 mg/kg Intravenous Q8H Aleksandra Forman PA-C 1,250 mg (08/23/22 1133)    zolpidem  5 mg Oral HS PRN HOWARD Ramirez          Today, Patient Was Seen By: Shaun Bonilla MD    **Please Note: This note may have been constructed using a voice recognition system  **

## 2022-08-23 NOTE — PLAN OF CARE
Problem: MOBILITY - ADULT  Goal: Maintain or return to baseline ADL function  Description: INTERVENTIONS:  -  Assess patient's ability to carry out ADLs; assess patient's baseline for ADL function and identify physical deficits which impact ability to perform ADLs (bathing, care of mouth/teeth, toileting, grooming, dressing, etc )  - Assess/evaluate cause of self-care deficits   - Assess range of motion  - Assess patient's mobility; develop plan if impaired  - Assess patient's need for assistive devices and provide as appropriate  - Encourage maximum independence but intervene and supervise when necessary  - Involve family in performance of ADLs  - Assess for home care needs following discharge   - Consider OT consult to assist with ADL evaluation and planning for discharge  - Provide patient education as appropriate  Outcome: Progressing  Goal: Maintains/Returns to pre admission functional level  Description: INTERVENTIONS:  - Perform BMAT or MOVE assessment daily    - Set and communicate daily mobility goal to care team and patient/family/caregiver  - Collaborate with rehabilitation services on mobility goals if consulted  - Perform Range of Motion 3  times a day  - Reposition patient every 2 hours    - Out of bed for toileting  - Record patient progress and toleration of activity level   Outcome: Progressing     Problem: Prexisting or High Potential for Compromised Skin Integrity  Goal: Skin integrity is maintained or improved  Description: INTERVENTIONS:  - Identify patients at risk for skin breakdown  - Assess and monitor skin integrity  - Assess and monitor nutrition and hydration status  - Monitor labs   - Assess for incontinence   - Turn and reposition patient  - Assist with mobility/ambulation  - Relieve pressure over bony prominences  - Avoid friction and shearing  - Provide appropriate hygiene as needed including keeping skin clean and dry  - Evaluate need for skin moisturizer/barrier cream  - Collaborate with interdisciplinary team   - Patient/family teaching  - Consider wound care consult   Outcome: Progressing     Problem: Potential for Falls  Goal: Patient will remain free of falls  Description: INTERVENTIONS:  - Educate patient/family on patient safety including physical limitations  - Instruct patient to call for assistance with activity   - Consult OT/PT to assist with strengthening/mobility   - Keep Call bell within reach  - Keep bed low and locked with side rails adjusted as appropriate  - Keep care items and personal belongings within reach  - Initiate and maintain comfort rounds  - Make Fall Risk Sign visible to staff  -- Apply yellow socks and bracelet for high fall risk patients  - Consider moving patient to room near nurses station  Outcome: Progressing     Problem: PAIN - ADULT  Goal: Verbalizes/displays adequate comfort level or baseline comfort level  Description: Interventions:  - Encourage patient to monitor pain and request assistance  - Assess pain using appropriate pain scale  - Administer analgesics based on type and severity of pain and evaluate response  - Implement non-pharmacological measures as appropriate and evaluate response  - Consider cultural and social influences on pain and pain management  - Notify physician/advanced practitioner if interventions unsuccessful or patient reports new pain  Outcome: Progressing     Problem: INFECTION - ADULT  Goal: Absence or prevention of progression during hospitalization  Description: INTERVENTIONS:  - Assess and monitor for signs and symptoms of infection  - Monitor lab/diagnostic results  - Monitor all insertion sites, i e  indwelling lines, tubes, and drains  - Monitor endotracheal if appropriate and nasal secretions for changes in amount and color  - [place] appropriate cooling/warming therapies per order  - Administer medications as ordered  - Instruct and encourage patient and family to use good hand hygiene technique  - Identify and instruct in appropriate isolation precautions for identified infection/condition  Outcome: Progressing     Problem: SAFETY ADULT  Goal: Maintain or return to baseline ADL function  Description: INTERVENTIONS:  -  Assess patient's ability to carry out ADLs; assess patient's baseline for ADL function and identify physical deficits which impact ability to perform ADLs (bathing, care of mouth/teeth, toileting, grooming, dressing, etc )  - Assess/evaluate cause of self-care deficits   - Assess range of motion  - Assess patient's mobility; develop plan if impaired  - Assess patient's need for assistive devices and provide as appropriate  - Encourage maximum independence but intervene and supervise when necessary  - Involve family in performance of ADLs  - Assess for home care needs following discharge   - Consider OT consult to assist with ADL evaluation and planning for discharge  - Provide patient education as appropriate  Outcome: Progressing  Goal: Maintains/Returns to pre admission functional level  Description: INTERVENTIONS:  - Perform BMAT or MOVE assessment daily    - Set and communicate daily mobility goal to care team and patient/family/caregiver  - Collaborate with rehabilitation services on mobility goals if consulted  - Perform Range of Motion 3  times a day  - Reposition patient every 2 hours    - Out of bed for toileting  - Record patient progress and toleration of activity level   Outcome: Progressing  Goal: Patient will remain free of falls  Description: INTERVENTIONS:  - Educate patient/family on patient safety including physical limitations  - Instruct patient to call for assistance with activity   - Consult OT/PT to assist with strengthening/mobility   - Keep Call bell within reach  - Keep bed low and locked with side rails adjusted as appropriate  - Keep care items and personal belongings within reach  - Initiate and maintain comfort rounds  - Make Fall Risk Sign visible to staff  -- Apply yellow socks and bracelet for high fall risk patients  - Consider moving patient to room near nurses station  Outcome: Progressing     Problem: DISCHARGE PLANNING  Goal: Discharge to home or other facility with appropriate resources  Description: INTERVENTIONS:  - Identify barriers to discharge w/patient and caregiver  - Arrange for needed discharge resources and transportation as appropriate  - Identify discharge learning needs (meds, wound care, etc )  - Arrange for interpretive services to assist at discharge as needed  - Refer to Case Management Department for coordinating discharge planning if the patient needs post-hospital services based on physician/advanced practitioner order or complex needs related to functional status, cognitive ability, or social support system  Outcome: Progressing     Problem: Knowledge Deficit  Goal: Patient/family/caregiver demonstrates understanding of disease process, treatment plan, medications, and discharge instructions  Description: Complete learning assessment and assess knowledge base    Interventions:  - Provide teaching at level of understanding  - Provide teaching via preferred learning methods  Outcome: Progressing     Problem: MUSCULOSKELETAL - ADULT  Goal: Maintain or return mobility to safest level of function  Description: INTERVENTIONS:  - Assess patient's ability to carry out ADLs; assess patient's baseline for ADL function and identify physical deficits which impact ability to perform ADLs (bathing, care of mouth/teeth, toileting, grooming, dressing, etc )  - Assess/evaluate cause of self-care deficits   - Assess range of motion  - Assess patient's mobility  - Assess patient's need for assistive devices and provide as appropriate  - Encourage maximum independence but intervene and supervise when necessary  - Involve family in performance of ADLs  - Assess for home care needs following discharge   - Consider OT consult to assist with ADL evaluation and planning for discharge  - Provide patient education as appropriate  Outcome: Progressing  Goal: Maintain proper alignment of affected body part  Description: INTERVENTIONS:  - Support, maintain and protect limb and body alignment  - Provide patient/ family with appropriate education  Outcome: Progressing

## 2022-08-23 NOTE — NURSING NOTE
Pt with a fever greater than 101   notified and ordered fluid, IV toradol, blood work, covid testing  X ray  Attempted IV access x 2 and bld work  Pt is a very hard stick  Charge nurse in ICU notified to try  Covd swab performed and xray being performed now  Will endorse to next shift RN

## 2022-08-23 NOTE — ASSESSMENT & PLAN NOTE
· Criteria met:  Fever, tachycardia, abnormal UA  · procal normal but elevated from yesterday  · Lactic acid normal  · XR shows no PNA  · Follow urine and blood cultures  · Antibiotics:  IV vancomycin and ceftriaxone

## 2022-08-23 NOTE — CASE MANAGEMENT
Case Management Progress Note    Patient name Siddhartha Rene  Location [address] 303/-[id number] MRN [id number]  : 1990 Date 2022       LOS (days): 19  Geometric Mean LOS (GMLOS) (days):   Days to GMLOS:        OBJECTIVE:        Current admission status: Inpatient  Preferred Pharmacy:   [facility] [place], [address] [address]  Phone: [phone] Fax: [phone]    Primary Care Provider: Donato Gallagher MD    Primary Insurance: [employer]  Secondary Insurance:     PROGRESS NOTE:  Pt now w sepsis; febrile, tachycardic w abnormal U/A  Placed on IV abx  Will follow

## 2022-08-23 NOTE — PROGRESS NOTES
Vancomycin Assessment    Christian Salazar is a 32 y o  female who is currently receiving vancomycin 1250mg IV q12h for other sepsis   Relevant clinical data and objective history reviewed:  Creatinine   Date Value Ref Range Status   08/22/2022 0 58 (L) 0 60 - 1 30 mg/dL Final     Comment:     Standardized to IDMS reference method   08/19/2022 0 50 (L) 0 60 - 1 30 mg/dL Final     Comment:     Standardized to IDMS reference method   08/03/2022 0 61 0 60 - 1 30 mg/dL Final     Comment:     Standardized to IDMS reference method     BP (!) 88/54   Pulse (!) 110   Temp 99 9 °F (37 7 °C)   Resp 20   Ht 5' 8" (1 727 m)   Wt 81 6 kg (179 lb 14 3 oz)   SpO2 96%   BMI 27 35 kg/m²   I/O last 3 completed shifts: In: 1080 [P O :1080]  Out: 2900 [Urine:2900]  Lab Results   Component Value Date/Time    BUN 10 08/22/2022 06:06 PM    WBC 11 47 (H) 08/22/2022 06:06 PM    HGB 11 8 08/22/2022 06:06 PM    HCT 35 7 08/22/2022 06:06 PM    MCV 91 08/22/2022 06:06 PM     08/22/2022 06:06 PM     Temp Readings from Last 3 Encounters:   08/23/22 99 9 °F (37 7 °C)   07/12/22 (!) 97 3 °F (36 3 °C)   05/17/22 98 2 °F (36 8 °C) (Oral)     Vancomycin Days of Therapy: 1    Assessment/Plan  The patient is currently on vancomycin utilizing scheduled dosing based on actual body weight  Baseline risks associated with therapy include: concomitant nephrotoxic medications and dehydration  The patient is currently receiving 1250mg IV q12h and after clinical evaluation will be changed to 1250mg IV q8h  Pharmacy will also follow closely for s/sx of nephrotoxicity, infusion reactions, and appropriateness of therapy  BMP and CBC will be ordered per protocol  Plan for trough as patient approaches steady state, prior to the 5th  dose at approximately 0915 on 8/24/22  Due to infection severity, will target a trough of 15-20 (appropriate for most indications)     Pharmacy will continue to follow the patients culture results and clinical progress daily      Sharlene Woo, Pharmacist

## 2022-08-23 NOTE — NURSING NOTE
Patient fever 104 9  SLIM Provider Carlito Pena made aware  Tylenol administered to patient  Patient agreed to have blankets removed from her  Carlos Smart also turned on for patient

## 2022-08-23 NOTE — OCCUPATIONAL THERAPY NOTE
Occupational Therapy         Patient Name: Cj Kuo  SSROP'W Date: 8/23/2022 08/23/22 1100   OT Last Visit   OT Visit Date 08/23/22   Note Type   Note Type Treatment   Cancel Reasons Other  (OT order received and chart review performed  @ this time, OT evaluation cancelled as pt significantly lethargic; various attempts  OT will follow and evaluate in efforts to provide skilled interventions as appropriate   Nsg aware )           Wilhemena Brine OTR/L

## 2022-08-24 LAB
ALBUMIN SERPL BCP-MCNC: 2.4 G/DL (ref 3.5–5)
ALP SERPL-CCNC: 58 U/L (ref 46–116)
ALT SERPL W P-5'-P-CCNC: 19 U/L (ref 12–78)
ANION GAP SERPL CALCULATED.3IONS-SCNC: 10 MMOL/L (ref 4–13)
AST SERPL W P-5'-P-CCNC: 18 U/L (ref 5–45)
BASOPHILS # BLD AUTO: 0.03 THOUSANDS/ΜL (ref 0–0.1)
BASOPHILS NFR BLD AUTO: 0 % (ref 0–1)
BILIRUB SERPL-MCNC: 0.29 MG/DL (ref 0.2–1)
BUN SERPL-MCNC: 10 MG/DL (ref 5–25)
CALCIUM ALBUM COR SERPL-MCNC: 9.8 MG/DL (ref 8.3–10.1)
CALCIUM SERPL-MCNC: 8.5 MG/DL (ref 8.3–10.1)
CHLORIDE SERPL-SCNC: 111 MMOL/L (ref 96–108)
CO2 SERPL-SCNC: 23 MMOL/L (ref 21–32)
CREAT SERPL-MCNC: 0.74 MG/DL (ref 0.6–1.3)
EOSINOPHIL # BLD AUTO: 0.06 THOUSAND/ΜL (ref 0–0.61)
EOSINOPHIL NFR BLD AUTO: 1 % (ref 0–6)
ERYTHROCYTE [DISTWIDTH] IN BLOOD BY AUTOMATED COUNT: 13.4 % (ref 11.6–15.1)
GFR SERPL CREATININE-BSD FRML MDRD: 108 ML/MIN/1.73SQ M
GLUCOSE SERPL-MCNC: 96 MG/DL (ref 65–140)
HCT VFR BLD AUTO: 31.6 % (ref 34.8–46.1)
HGB BLD-MCNC: 10.6 G/DL (ref 11.5–15.4)
IMM GRANULOCYTES # BLD AUTO: 0.03 THOUSAND/UL (ref 0–0.2)
IMM GRANULOCYTES NFR BLD AUTO: 0 % (ref 0–2)
LYMPHOCYTES # BLD AUTO: 1.54 THOUSANDS/ΜL (ref 0.6–4.47)
LYMPHOCYTES NFR BLD AUTO: 19 % (ref 14–44)
MCH RBC QN AUTO: 30.4 PG (ref 26.8–34.3)
MCHC RBC AUTO-ENTMCNC: 33.5 G/DL (ref 31.4–37.4)
MCV RBC AUTO: 91 FL (ref 82–98)
MONOCYTES # BLD AUTO: 0.96 THOUSAND/ΜL (ref 0.17–1.22)
MONOCYTES NFR BLD AUTO: 12 % (ref 4–12)
NEUTROPHILS # BLD AUTO: 5.44 THOUSANDS/ΜL (ref 1.85–7.62)
NEUTS SEG NFR BLD AUTO: 68 % (ref 43–75)
NRBC BLD AUTO-RTO: 0 /100 WBCS
PLATELET # BLD AUTO: 144 THOUSANDS/UL (ref 149–390)
PMV BLD AUTO: 9.4 FL (ref 8.9–12.7)
POTASSIUM SERPL-SCNC: 3.9 MMOL/L (ref 3.5–5.3)
PROCALCITONIN SERPL-MCNC: 0.9 NG/ML
PROT SERPL-MCNC: 6.2 G/DL (ref 6.4–8.4)
RBC # BLD AUTO: 3.49 MILLION/UL (ref 3.81–5.12)
SODIUM SERPL-SCNC: 144 MMOL/L (ref 135–147)
VANCOMYCIN TROUGH SERPL-MCNC: 15.7 UG/ML (ref 10–20)
VANCOMYCIN TROUGH SERPL-MCNC: 27.9 UG/ML (ref 10–20)
WBC # BLD AUTO: 8.06 THOUSAND/UL (ref 4.31–10.16)

## 2022-08-24 PROCEDURE — 80053 COMPREHEN METABOLIC PANEL: CPT | Performed by: FAMILY MEDICINE

## 2022-08-24 PROCEDURE — 84145 PROCALCITONIN (PCT): CPT | Performed by: FAMILY MEDICINE

## 2022-08-24 PROCEDURE — 85025 COMPLETE CBC W/AUTO DIFF WBC: CPT | Performed by: FAMILY MEDICINE

## 2022-08-24 PROCEDURE — 99233 SBSQ HOSP IP/OBS HIGH 50: CPT | Performed by: FAMILY MEDICINE

## 2022-08-24 PROCEDURE — 80202 ASSAY OF VANCOMYCIN: CPT | Performed by: INTERNAL MEDICINE

## 2022-08-24 RX ADMIN — DULOXETINE HYDROCHLORIDE 60 MG: 60 CAPSULE, DELAYED RELEASE ORAL at 10:10

## 2022-08-24 RX ADMIN — CEFTRIAXONE 1000 MG: 1 INJECTION, SOLUTION INTRAVENOUS at 22:05

## 2022-08-24 RX ADMIN — ZOLPIDEM TARTRATE 5 MG: 5 TABLET, COATED ORAL at 21:58

## 2022-08-24 RX ADMIN — BACLOFEN 10 MG: 10 TABLET ORAL at 10:10

## 2022-08-24 RX ADMIN — QUETIAPINE FUMARATE 200 MG: 200 TABLET ORAL at 21:56

## 2022-08-24 RX ADMIN — VANCOMYCIN HYDROCHLORIDE 1250 MG: 5 INJECTION, POWDER, LYOPHILIZED, FOR SOLUTION INTRAVENOUS at 10:55

## 2022-08-24 RX ADMIN — SENNOSIDES AND DOCUSATE SODIUM 1 TABLET: 50; 8.6 TABLET ORAL at 21:56

## 2022-08-24 RX ADMIN — CLONAZEPAM 1 MG: 1 TABLET ORAL at 17:12

## 2022-08-24 RX ADMIN — CLONAZEPAM 1 MG: 1 TABLET ORAL at 10:10

## 2022-08-24 RX ADMIN — Medication 400 MG: at 10:10

## 2022-08-24 RX ADMIN — VANCOMYCIN HYDROCHLORIDE 1250 MG: 5 INJECTION, POWDER, LYOPHILIZED, FOR SOLUTION INTRAVENOUS at 20:30

## 2022-08-24 RX ADMIN — SODIUM CHLORIDE, SODIUM LACTATE, POTASSIUM CHLORIDE, AND CALCIUM CHLORIDE 125 ML/HR: .6; .31; .03; .02 INJECTION, SOLUTION INTRAVENOUS at 10:18

## 2022-08-24 RX ADMIN — GABAPENTIN 400 MG: 400 CAPSULE ORAL at 10:10

## 2022-08-24 RX ADMIN — VANCOMYCIN HYDROCHLORIDE 1250 MG: 5 INJECTION, POWDER, LYOPHILIZED, FOR SOLUTION INTRAVENOUS at 03:40

## 2022-08-24 RX ADMIN — BACLOFEN 10 MG: 10 TABLET ORAL at 21:56

## 2022-08-24 RX ADMIN — Medication 400 MG: at 17:12

## 2022-08-24 RX ADMIN — BACLOFEN 10 MG: 10 TABLET ORAL at 17:12

## 2022-08-24 RX ADMIN — GABAPENTIN 400 MG: 400 CAPSULE ORAL at 17:12

## 2022-08-24 NOTE — ASSESSMENT & PLAN NOTE
· Discussed with case management  · Initially, presented to the ED on 8/2 with vomiting and reported that she had not been getting proper care at home requesting placement     · Case management consulted and following  · PT OT recommending short-term rehab  · No new complaints today

## 2022-08-24 NOTE — PLAN OF CARE
Problem: MOBILITY - ADULT  Goal: Maintain or return to baseline ADL function  Description: INTERVENTIONS:  -  Assess patient's ability to carry out ADLs; assess patient's baseline for ADL function and identify physical deficits which impact ability to perform ADLs (bathing, care of mouth/teeth, toileting, grooming, dressing, etc )  - Assess/evaluate cause of self-care deficits   - Assess range of motion  - Assess patient's mobility; develop plan if impaired  - Assess patient's need for assistive devices and provide as appropriate  - Encourage maximum independence but intervene and supervise when necessary  - Involve family in performance of ADLs  - Assess for home care needs following discharge   - Consider OT consult to assist with ADL evaluation and planning for discharge  - Provide patient education as appropriate  Outcome: Progressing  Goal: Maintains/Returns to pre admission functional level  Description: INTERVENTIONS:  - Perform BMAT or MOVE assessment daily    - Set and communicate daily mobility goal to care team and patient/family/caregiver  - Collaborate with rehabilitation services on mobility goals if consulted  - Perform Range of Motion 3  times a day  - Reposition patient every 2 hours    - Out of bed for toileting  - Record patient progress and toleration of activity level   Outcome: Progressing     Problem: Prexisting or High Potential for Compromised Skin Integrity  Goal: Skin integrity is maintained or improved  Description: INTERVENTIONS:  - Identify patients at risk for skin breakdown  - Assess and monitor skin integrity  - Assess and monitor nutrition and hydration status  - Monitor labs   - Assess for incontinence   - Turn and reposition patient  - Assist with mobility/ambulation  - Relieve pressure over bony prominences  - Avoid friction and shearing  - Provide appropriate hygiene as needed including keeping skin clean and dry  - Evaluate need for skin moisturizer/barrier cream  - Collaborate with interdisciplinary team   - Patient/family teaching  - Consider wound care consult   Outcome: Progressing     Problem: Potential for Falls  Goal: Patient will remain free of falls  Description: INTERVENTIONS:  - Educate patient/family on patient safety including physical limitations  - Instruct patient to call for assistance with activity   - Consult OT/PT to assist with strengthening/mobility   - Keep Call bell within reach  - Keep bed low and locked with side rails adjusted as appropriate  - Keep care items and personal belongings within reach  - Initiate and maintain comfort rounds  - Make Fall Risk Sign visible to staff  -- Apply yellow socks and bracelet for high fall risk patients  - Consider moving patient to room near nurses station  Outcome: Progressing     Problem: PAIN - ADULT  Goal: Verbalizes/displays adequate comfort level or baseline comfort level  Description: Interventions:  - Encourage patient to monitor pain and request assistance  - Assess pain using appropriate pain scale  - Administer analgesics based on type and severity of pain and evaluate response  - Implement non-pharmacological measures as appropriate and evaluate response  - Consider cultural and social influences on pain and pain management  - Notify physician/advanced practitioner if interventions unsuccessful or patient reports new pain  Outcome: Progressing     Problem: INFECTION - ADULT  Goal: Absence or prevention of progression during hospitalization  Description: INTERVENTIONS:  - Assess and monitor for signs and symptoms of infection  - Monitor lab/diagnostic results  - Monitor all insertion sites, i e  indwelling lines, tubes, and drains  - Monitor endotracheal if appropriate and nasal secretions for changes in amount and color  - [place] appropriate cooling/warming therapies per order  - Administer medications as ordered  - Instruct and encourage patient and family to use good hand hygiene technique  - Identify and instruct in appropriate isolation precautions for identified infection/condition  Outcome: Progressing     Problem: SAFETY ADULT  Goal: Maintain or return to baseline ADL function  Description: INTERVENTIONS:  -  Assess patient's ability to carry out ADLs; assess patient's baseline for ADL function and identify physical deficits which impact ability to perform ADLs (bathing, care of mouth/teeth, toileting, grooming, dressing, etc )  - Assess/evaluate cause of self-care deficits   - Assess range of motion  - Assess patient's mobility; develop plan if impaired  - Assess patient's need for assistive devices and provide as appropriate  - Encourage maximum independence but intervene and supervise when necessary  - Involve family in performance of ADLs  - Assess for home care needs following discharge   - Consider OT consult to assist with ADL evaluation and planning for discharge  - Provide patient education as appropriate  Outcome: Progressing  Goal: Maintains/Returns to pre admission functional level  Description: INTERVENTIONS:  - Perform BMAT or MOVE assessment daily    - Set and communicate daily mobility goal to care team and patient/family/caregiver  - Collaborate with rehabilitation services on mobility goals if consulted  - Perform Range of Motion 3  times a day  - Reposition patient every 2 hours    - Out of bed for toileting  - Record patient progress and toleration of activity level   Outcome: Progressing  Goal: Patient will remain free of falls  Description: INTERVENTIONS:  - Educate patient/family on patient safety including physical limitations  - Instruct patient to call for assistance with activity   - Consult OT/PT to assist with strengthening/mobility   - Keep Call bell within reach  - Keep bed low and locked with side rails adjusted as appropriate  - Keep care items and personal belongings within reach  - Initiate and maintain comfort rounds  - Make Fall Risk Sign visible to staff  -- Apply yellow socks and bracelet for high fall risk patients  - Consider moving patient to room near nurses station  Outcome: Progressing     Problem: DISCHARGE PLANNING  Goal: Discharge to home or other facility with appropriate resources  Description: INTERVENTIONS:  - Identify barriers to discharge w/patient and caregiver  - Arrange for needed discharge resources and transportation as appropriate  - Identify discharge learning needs (meds, wound care, etc )  - Arrange for interpretive services to assist at discharge as needed  - Refer to Case Management Department for coordinating discharge planning if the patient needs post-hospital services based on physician/advanced practitioner order or complex needs related to functional status, cognitive ability, or social support system  Outcome: Progressing     Problem: Knowledge Deficit  Goal: Patient/family/caregiver demonstrates understanding of disease process, treatment plan, medications, and discharge instructions  Description: Complete learning assessment and assess knowledge base    Interventions:  - Provide teaching at level of understanding  - Provide teaching via preferred learning methods  Outcome: Progressing     Problem: MUSCULOSKELETAL - ADULT  Goal: Maintain or return mobility to safest level of function  Description: INTERVENTIONS:  - Assess patient's ability to carry out ADLs; assess patient's baseline for ADL function and identify physical deficits which impact ability to perform ADLs (bathing, care of mouth/teeth, toileting, grooming, dressing, etc )  - Assess/evaluate cause of self-care deficits   - Assess range of motion  - Assess patient's mobility  - Assess patient's need for assistive devices and provide as appropriate  - Encourage maximum independence but intervene and supervise when necessary  - Involve family in performance of ADLs  - Assess for home care needs following discharge   - Consider OT consult to assist with ADL evaluation and planning for discharge  - Provide patient education as appropriate  Outcome: Progressing  Goal: Maintain proper alignment of affected body part  Description: INTERVENTIONS:  - Support, maintain and protect limb and body alignment  - Provide patient/ family with appropriate education  Outcome: Progressing

## 2022-08-24 NOTE — ASSESSMENT & PLAN NOTE
· Status post spinal epidural abscess in the setting of IV drug abuse  · Patient is essentially homeless and was staying with 44-year-old grandmother who acts as primary caregiver  · Improved sleep paraplegia patient does have some lower extremity sensation and movement as well and has the ability to return to full functioning status with appropriate rehab  · Frequent turns  · Assist with all feeding  · Initially had Gonzalez this has since been discontinued as patient was only utilizing this for vacation  · Continue urinary retention protocol  · Continue pain control with aqua K, baclofen, and gabapentin  · No worsening

## 2022-08-24 NOTE — ASSESSMENT & PLAN NOTE
· Resolved as of now  ·  Criteria met:  Fever, tachycardia, abnormal UA  · Procalcitonin elevated  · Blood culture shows Staph epidermidis in 1 of the 2 sets  · Lactic acid normal  · XR shows no PNA  · Follow urine and blood cultures  · Antibiotics:  IV vancomycin and ceftriaxone, day 3  · IV fluids:  LR at 125 cc/hour

## 2022-08-24 NOTE — PROGRESS NOTES
[facility]  Progress Note - Medardo Lizama 1990, 32 y o  female MRN: [id number]  Unit/Bed#: -01 Encounter: [id number]  Primary Care Provider: Rolando Chavez MD   Date and time admitted to hospital: 8/2/2022 12:01 PM    * Sepsis [facility])  Assessment & Plan  · Resolved as of now  ·  Criteria met:  Fever, tachycardia, abnormal UA  · Procalcitonin elevated  · Blood culture shows Staph epidermidis in 1 of the 2 sets  · Lactic acid normal  · XR shows no PNA  · Follow urine and blood cultures  · Antibiotics:  IV vancomycin and ceftriaxone, day 3  · IV fluids:  LR at 125 cc/hour    Inability to return to living situation  Assessment & Plan  · Discussed with case management  · Initially, presented to the ED on 8/2 with vomiting and reported that she had not been getting proper care at home requesting placement     · Case management consulted and following  · PT OT recommending short-term rehab  · No new complaints today    Quadriplegic spinal paralysis ([address] )  Assessment & Plan  · Status post spinal epidural abscess in the setting of IV drug abuse  · Patient is essentially homeless and was staying with 80-year-old grandmother who acts as primary caregiver  · Improved sleep paraplegia patient does have some lower extremity sensation and movement as well and has the ability to return to full functioning status with appropriate rehab  · Frequent turns  · Assist with all feeding  · Initially had Awan this has since been discontinued as patient was only utilizing this for vacation  · Continue urinary retention protocol  · Continue pain control with aqua K, baclofen, and gabapentin  · No worsening    Urinary retention  Assessment & Plan  · Pt does not want a awan catheter  · Follow retention protocol    Lower extremity edema  Assessment & Plan  · Stable  · Chronic lower extremity edema greater in left and right   · Patient reports that she had a duplex of lower extremity was clear last year  · Repeat this admission Lower extremity Doppler negative for DVT  · Elevate extremities  · Patient was previously on Eliquis but states this medication was discontinued    Anxiety  Assessment & Plan  · Stable, atarax prn  · Continue Xanax per PTA medication regimen as well as Ambien for sleep  · Mood stable        VTE Pharmacologic Prophylaxis: VTE Score: 3 Lovenox    Patient Centered Rounds: I performed bedside rounds with nursing staff today  Discussions with Specialists or Other Care Team Provider:  Case management, nursing    Education and Discussions with Family / Patient: Discussed with the patient  Time Spent for Care: 20 minutes  More than 50% of total time spent on counseling and coordination of care as described above  Current Length of Stay: 20 day(s)  Current Patient Status: Inpatient   Certification Statement: The patient will continue to require additional inpatient hospital stay due to Need for blood cultures  Discharge Plan: Anticipate discharge in 48 hrs to Case management working on at    Code Status: Level 1 - Full Code    Subjective:   Seen and examined at bedside  No new complaints    Objective:     Vitals:   Temp (24hrs), Av 2 °F (37 3 °C), Min:97 2 °F (36 2 °C), Max:102 8 °F (39 3 °C)    Temp:  [97 2 °F (36 2 °C)-102 8 °F (39 3 °C)] 97 2 °F (36 2 °C)  HR:  [] 89  Resp:  [14-18] 16  BP: ()/(57-91) 97/57  SpO2:  [92 %-95 %] 94 %  Body mass index is 27 35 kg/m²  Input and Output Summary (last 24 hours):      Intake/Output Summary (Last 24 hours) at 2022 1157  Last data filed at 2022 1018  Gross per 24 hour   Intake 1000 ml   Output 2550 ml   Net -1550 ml       Physical Exam:   Physical Exam S1-S2 audible, regular, lungs clear to auscultation, no pedal edema, no CV angle tenderness, no new focal neuro deficit, alert and oriented time place and person    Additional Data:     Labs:  Results from last 7 days   Lab Units 22  0607   WBC Thousand/uL 8 06   HEMOGLOBIN g/dL 10 6*   HEMATOCRIT % 31 6*   PLATELETS Thousands/uL 144*   NEUTROS PCT % 68   LYMPHS PCT % 19   MONOS PCT % 12   EOS PCT % 1     Results from last 7 days   Lab Units 08/24/22  0607   SODIUM mmol/L 144   POTASSIUM mmol/L 3 9   CHLORIDE mmol/L 111*   CO2 mmol/L 23   BUN mg/dL 10   CREATININE mg/dL 0 74   ANION GAP mmol/L 10   CALCIUM mg/dL 8 5   ALBUMIN g/dL 2 4*   TOTAL BILIRUBIN mg/dL 0 29   ALK PHOS U/L 58   ALT U/L 19   AST U/L 18   GLUCOSE RANDOM mg/dL 96                 Results from last 7 days   Lab Units 08/24/22  0607 08/23/22  0547 08/22/22  1806   LACTIC ACID mmol/L  --   --  1 7   PROCALCITONIN ng/ml 0 90* 0 24 0 09       Lines/Drains:  Invasive Devices  Report    Peripheral Intravenous Line  Duration           Peripheral IV 08/23/22 Dorsal (posterior); Left Forearm 1 day          Drain  Duration           External Urinary Catheter 1 day                      Imaging: No pertinent imaging reviewed  Recent Cultures (last 7 days):   Results from last 7 days   Lab Units 08/23/22  0224 08/23/22  0125 08/22/22  2207   BLOOD CULTURE   --   --  No Growth at 24 hrs     GRAM STAIN RESULT   --  Gram positive cocci in clusters*  --    URINE CULTURE  Culture too young- will reincubate  --   --        Last 24 Hours Medication List:   Current Facility-Administered Medications   Medication Dose Route Frequency Provider Last Rate    acetaminophen  975 mg Oral Q6H PRN Sade Pride PA-C      baclofen  10 mg Oral TID Tana Stroud DO      bisacodyl  10 mg Rectal Daily PRN HOWARD Crespo      butalbital-acetaminophen-caffeine  1 tablet Oral Once PRN Dinesh Johnson PA-C      cefTRIAXone  1,000 mg Intravenous Q24H Macy Chan PA-C 1,000 mg (08/23/22 2231)    clonazePAM  1 mg Oral BID HOWARD Nick      Diclofenac Sodium  2 g Topical 4x Daily HOWARD Crespo      DULoxetine  60 mg Oral Daily HOWARD Nick      enoxaparin  40 mg Subcutaneous Daily Dayday Chua HOWARD Nichols      gabapentin  400 mg Oral [address] HOWARD Trammell      hydrALAZINE  25 mg Oral Once PRN Ary Tejada PA-C      hydrOXYzine HCL  25 mg Oral Q6H PRN Mary Sanchez MD      ibuprofen  400 mg Oral Q6H PRN Krystal Tellez MD      lactated ringers  125 mL/hr Intravenous Continuous Krystal Tellez  mL/hr (08/24/22 1018)    magnesium oxide  400 mg Oral BID Jamal Currie DO      polyethylene glycol  17 g Oral Daily HOWARD Richter      prazosin  1 mg Oral HS HOWARD Nick      QUEtiapine  200 mg Oral HS Kari Alvarez PA-C      senna-docusate sodium  1 tablet Oral HS HOWARD Richter      vancomycin  15 mg/kg Intravenous Q8H Aleksandra Forman PA-C 1,250 mg (08/24/22 1055)    zolpidem  5 mg Oral HS PRN HOWARD Chaparro          Today, Patient Was Seen By: Krystal Tellez MD    **Please Note: This note may have been constructed using a voice recognition system  **

## 2022-08-24 NOTE — CASE MANAGEMENT
Case Management Discharge Planning Note    Patient name Ira Potter  Location /[address] MRN [id number]  : 1990 Date 2022       Current Admission Date: 2022  Current Admission Diagnosis:Sepsis [facility])   Patient Active Problem List    Diagnosis Date Noted    Sepsis ([facility] ) 2022    Urinary retention 2022    Inability to return to living situation 2022    Lower extremity edema 2022    Anxiety 2022    Insomnia 2022    Hepatitis C antibody positive in blood 2022    Substance abuse ([facility] ) 05/15/2022    Failure to thrive in adult 2022    Quadriplegic spinal paralysis ([facility] ) 2022    Gonzalez catheter in place 2022    Back pain 2022      LOS (days): 20  Geometric Mean LOS (GMLOS) (days):   Days to GMLOS:     OBJECTIVE:  Risk of Unplanned Readmission Score: 21 94         Current admission status: Inpatient   Preferred Pharmacy:   [email or web address] [place], [address] [address]  Phone: [phone] Fax: [phone]    Primary Care Provider: Mari Ocasio MD    Primary Insurance: [address] [place] [address]  Secondary Insurance:     DISCHARGE DETAILS:    Discharge planning discussed with[de-identified] patient  Freedom of Choice: Yes  Comments - Freedom of Choice: Another message left for pt's  Jazzy Odonnell at [facility] ;  c/b requested  Met w pt to discuss d/c plan  Pt currently being seen by nurse;  refusing to allow nurse to examine a reported rash on pt's back  Pt has head/face covered by sheet, room dark  Made pt aware that CM efforts to find a SNF placement for pt have been unsuccessful, and a new d/c plan has to be developed  Pt reports she doesn't want to do anything until she s/w family  Offered to call family member;  pt declined  Discussed home w services and AL/PCF w services    Pt reports she cannot go back to grandmother's home  Left resource list of AL/PCF at bedside  Suggested that pt attempt to reach her  Gwen at [facility]  to discuss situation;  pt was agreeable and reports she has contact number  CM reports she will visit pt again this afternoon to check on phone calls;  pt was agreeable  Left VMM for Julius Oreilly in Admission at [facility];  requested C/B to discuss admissions process to their long term care unit  Awaiting same    CM contacted family/caregiver?: No- see comments (pt declined)

## 2022-08-24 NOTE — PROGRESS NOTES
Vancomycin IV Pharmacy-to-Dose Consultation    Medardo Lizama is a 32 y o  female who is currently receiving Vancomycin IV with management by the Pharmacy Consult service  Assessment/Plan:  The patient was reviewed  Renal function is stable and no signs or symptoms of nephrotoxicity and/or infusion reactions were documented in the chart  Based on todays assessment, continue current vancomycin (day # 2) dosing of vancomycin 1250 mg IV q8h  Trough supratherapeutic due to dose administered prior to trough being drawn  Plan for repeat trough today at 1830  We will continue to follow the patients culture results and clinical progress daily      Natasha Alvarez, Pharmacist

## 2022-08-25 LAB
ANION GAP SERPL CALCULATED.3IONS-SCNC: 8 MMOL/L (ref 4–13)
BASOPHILS # BLD AUTO: 0.03 THOUSANDS/ΜL (ref 0–0.1)
BASOPHILS NFR BLD AUTO: 1 % (ref 0–1)
BUN SERPL-MCNC: 9 MG/DL (ref 5–25)
CALCIUM SERPL-MCNC: 8.3 MG/DL (ref 8.3–10.1)
CHLORIDE SERPL-SCNC: 107 MMOL/L (ref 96–108)
CO2 SERPL-SCNC: 26 MMOL/L (ref 21–32)
CREAT SERPL-MCNC: 0.5 MG/DL (ref 0.6–1.3)
EOSINOPHIL # BLD AUTO: 0.12 THOUSAND/ΜL (ref 0–0.61)
EOSINOPHIL NFR BLD AUTO: 2 % (ref 0–6)
ERYTHROCYTE [DISTWIDTH] IN BLOOD BY AUTOMATED COUNT: 13.4 % (ref 11.6–15.1)
GFR SERPL CREATININE-BSD FRML MDRD: 129 ML/MIN/1.73SQ M
GLUCOSE SERPL-MCNC: 91 MG/DL (ref 65–140)
HCT VFR BLD AUTO: 30.3 % (ref 34.8–46.1)
HGB BLD-MCNC: 10 G/DL (ref 11.5–15.4)
IMM GRANULOCYTES # BLD AUTO: 0.01 THOUSAND/UL (ref 0–0.2)
IMM GRANULOCYTES NFR BLD AUTO: 0 % (ref 0–2)
LYMPHOCYTES # BLD AUTO: 1.68 THOUSANDS/ΜL (ref 0.6–4.47)
LYMPHOCYTES NFR BLD AUTO: 28 % (ref 14–44)
MCH RBC QN AUTO: 29.9 PG (ref 26.8–34.3)
MCHC RBC AUTO-ENTMCNC: 33 G/DL (ref 31.4–37.4)
MCV RBC AUTO: 91 FL (ref 82–98)
MONOCYTES # BLD AUTO: 0.49 THOUSAND/ΜL (ref 0.17–1.22)
MONOCYTES NFR BLD AUTO: 8 % (ref 4–12)
NEUTROPHILS # BLD AUTO: 3.62 THOUSANDS/ΜL (ref 1.85–7.62)
NEUTS SEG NFR BLD AUTO: 61 % (ref 43–75)
NRBC BLD AUTO-RTO: 0 /100 WBCS
PLATELET # BLD AUTO: 155 THOUSANDS/UL (ref 149–390)
PMV BLD AUTO: 9.6 FL (ref 8.9–12.7)
POTASSIUM SERPL-SCNC: 3.9 MMOL/L (ref 3.5–5.3)
PROCALCITONIN SERPL-MCNC: 0.54 NG/ML
RBC # BLD AUTO: 3.34 MILLION/UL (ref 3.81–5.12)
SODIUM SERPL-SCNC: 141 MMOL/L (ref 135–147)
WBC # BLD AUTO: 5.95 THOUSAND/UL (ref 4.31–10.16)

## 2022-08-25 PROCEDURE — 99233 SBSQ HOSP IP/OBS HIGH 50: CPT | Performed by: FAMILY MEDICINE

## 2022-08-25 PROCEDURE — 84145 PROCALCITONIN (PCT): CPT | Performed by: FAMILY MEDICINE

## 2022-08-25 PROCEDURE — 85025 COMPLETE CBC W/AUTO DIFF WBC: CPT | Performed by: FAMILY MEDICINE

## 2022-08-25 PROCEDURE — 80048 BASIC METABOLIC PNL TOTAL CA: CPT | Performed by: FAMILY MEDICINE

## 2022-08-25 RX ADMIN — CEFTRIAXONE 1000 MG: 1 INJECTION, SOLUTION INTRAVENOUS at 21:35

## 2022-08-25 RX ADMIN — BACLOFEN 10 MG: 10 TABLET ORAL at 21:34

## 2022-08-25 RX ADMIN — CLONAZEPAM 1 MG: 1 TABLET ORAL at 17:23

## 2022-08-25 RX ADMIN — QUETIAPINE FUMARATE 200 MG: 200 TABLET ORAL at 21:34

## 2022-08-25 RX ADMIN — Medication 400 MG: at 17:24

## 2022-08-25 RX ADMIN — VANCOMYCIN HYDROCHLORIDE 1250 MG: 5 INJECTION, POWDER, LYOPHILIZED, FOR SOLUTION INTRAVENOUS at 03:49

## 2022-08-25 RX ADMIN — GABAPENTIN 400 MG: 400 CAPSULE ORAL at 17:23

## 2022-08-25 RX ADMIN — BACLOFEN 10 MG: 10 TABLET ORAL at 11:02

## 2022-08-25 RX ADMIN — SENNOSIDES AND DOCUSATE SODIUM 1 TABLET: 50; 8.6 TABLET ORAL at 21:34

## 2022-08-25 RX ADMIN — VANCOMYCIN HYDROCHLORIDE 1250 MG: 5 INJECTION, POWDER, LYOPHILIZED, FOR SOLUTION INTRAVENOUS at 11:02

## 2022-08-25 RX ADMIN — CLONAZEPAM 1 MG: 1 TABLET ORAL at 11:02

## 2022-08-25 RX ADMIN — ZOLPIDEM TARTRATE 5 MG: 5 TABLET, COATED ORAL at 21:34

## 2022-08-25 RX ADMIN — BACLOFEN 10 MG: 10 TABLET ORAL at 17:23

## 2022-08-25 RX ADMIN — DULOXETINE HYDROCHLORIDE 60 MG: 60 CAPSULE, DELAYED RELEASE ORAL at 11:02

## 2022-08-25 RX ADMIN — Medication 400 MG: at 11:02

## 2022-08-25 RX ADMIN — GABAPENTIN 400 MG: 400 CAPSULE ORAL at 11:02

## 2022-08-25 RX ADMIN — SODIUM CHLORIDE, SODIUM LACTATE, POTASSIUM CHLORIDE, AND CALCIUM CHLORIDE 125 ML/HR: .6; .31; .03; .02 INJECTION, SOLUTION INTRAVENOUS at 11:04

## 2022-08-25 NOTE — ASSESSMENT & PLAN NOTE
· Status post spinal epidural abscess in the setting of IV drug abuse  · Patient is essentially homeless and was staying with 54-year-old grandmother who acts as primary caregiver  · Improved sleep paraplegia patient does have some lower extremity sensation and movement as well and has the ability to return to full functioning status with appropriate rehab  · Frequent turns  · Assist with all feeding  · Initially had Gonzalez this has since been discontinued as patient was only utilizing this for vacation  · Continue urinary retention protocol  · Continue pain control with aqua K, baclofen, and gabapentin  · No worsening

## 2022-08-25 NOTE — PHYSICAL THERAPY NOTE
PT Cancellation Note    PT attempted to see pt  At 940AM  Pt  refused PT services stating "I have sepsis, I don't really feel up to it " and "They can't find me placement so I am going home anyway"  PT educated pt  That regardless of dc disposition she would benefit from participating with therapy services for improved functional mobility as she was completing transfers at home independently previously  Max encouragement provided, pt  Not receptive to education at this time  She continues to decline       Huyen Castañeda, PT

## 2022-08-25 NOTE — PROGRESS NOTES
Vancomycin Assessment    Wilman Santillan is a 32 y o  female who is currently receiving vancomycin 1250mg q8h for MRSA confirmed sepsis   Relevant clinical data and objective history reviewed:  Creatinine   Date Value Ref Range Status   08/24/2022 0 74 0 60 - 1 30 mg/dL Final     Comment:     Standardized to IDMS reference method   08/23/2022 0 60 0 60 - 1 30 mg/dL Final     Comment:     Standardized to IDMS reference method   08/22/2022 0 58 (L) 0 60 - 1 30 mg/dL Final     Comment:     Standardized to IDMS reference method     BP 90/54   Pulse 87   Temp 99 7 °F (37 6 °C)   Resp 18   Ht 5' 8" (1 727 m)   Wt 81 6 kg (179 lb 14 3 oz)   SpO2 94%   BMI 27 35 kg/m²   I/O last 3 completed shifts: In: 2060 [P O :1060; I V :1000]  Out: 4500 [Urine:4500]  Lab Results   Component Value Date/Time    BUN 10 08/24/2022 06:07 AM    WBC 8 06 08/24/2022 06:07 AM    HGB 10 6 (L) 08/24/2022 06:07 AM    HCT 31 6 (L) 08/24/2022 06:07 AM    MCV 91 08/24/2022 06:07 AM     (L) 08/24/2022 06:07 AM     Temp Readings from Last 3 Encounters:   08/24/22 99 7 °F (37 6 °C)   07/12/22 (!) 97 3 °F (36 3 °C)   05/17/22 98 2 °F (36 8 °C) (Oral)     Vancomycin Days of Therapy: 3    Assessment/Plan  The patient is currently on vancomycin utilizing scheduled dosing  The patient is receiving 1250mg q8h with the most recent vancomycin level being at steady-state and therapeutic based on a goal of 15-20 (appropriate for most indications) ; therefore, is clinically appropriate and dose will be continued   Pharmacy will continue to follow closely for s/sx of nephrotoxicity, infusion reactions, and appropriateness of therapy  BMP and CBC will be ordered per protocol  Plan for repeat trough in approx 7 days on 8/31/22 unless clinical status changes  Pharmacy will continue to follow the patients culture results and clinical progress daily      Ani Suarez, Pharmacist

## 2022-08-25 NOTE — ASSESSMENT & PLAN NOTE
· resolved  ·  Criteria met:  Fever, tachycardia, abnormal UA  · Procalcitonin elevated but downtrending  · Blood culture shows Staph epidermidis in 1 of the 2 sets  · Lactic acid normal  · XR shows no PNA  · Follow urine and blood cultures  · Antibiotics:  IV vancomycin and ceftriaxone, day 3  Discontinued vancomycin today as 24hr culture negative   Continue ceftriaxone till urine cultures are back  · IV fluids:  Reduce LR at 75 cc/hour

## 2022-08-25 NOTE — OCCUPATIONAL THERAPY NOTE
Occupational Therapy         Patient Name: Carlos Alberto Yuen  RCWCT'I Date: 8/25/2022 08/25/22 1020   OT Last Visit   OT Visit Date 08/25/22   Note Type   Note type Cancelled Session   Cancel Reasons Refusal   Additional Comments OT order received and chart review performed  @ this time, OT evaluation cancelled d/t to pt refusing services  Benefits of improved cardiovascular/musculoskeletal health via therapeutic engagement provided; pt continues c refusal  OT will follow and evaluate in efforts to provide skilled interventions as appropriate + as pt agreeable  Nsg aware      Mouna Moffett OTR/L             Ladonna Brady OTR/L

## 2022-08-25 NOTE — CASE MANAGEMENT
Case Management Discharge Planning Note    Patient name Cj Counter  Location /-35 MRN [id number]  : 1990 Date 2022       Current Admission Date: 2022  Current Admission Diagnosis:Sepsis [facility])   Patient Active Problem List    Diagnosis Date Noted    Sepsis ([facility] [address] ) 2022    Urinary retention 2022    Inability to return to living situation 2022    Lower extremity edema 2022    Anxiety 2022    Insomnia 2022    Hepatitis C antibody positive in blood 2022    Substance abuse ([facility] [address] ) 05/15/2022    Failure to thrive in adult 2022    Quadriplegic spinal paralysis ([facility] ) 2022    Gonzalez catheter in place 2022    Back pain 2022      LOS (days): 21  Geometric Mean LOS (GMLOS) (days):   Days to GMLOS:     OBJECTIVE:  Risk of Unplanned Readmission Score: 22 24         Current admission status: Inpatient   Preferred Pharmacy:   [facility] # [place], [address] [address]  Phone: [phone] Fax: [phone]    Primary Care Provider: Roel Wang MD    Primary Insurance: Génesis Schultz  Secondary Insurance:     DISCHARGE DETAILS:    Discharge planning discussed with[de-identified] met w pt at bedside     Comments - Freedom of Choice: Visited pt yesterday afternoon but she was receiving care  Met w her at bedside this AM to discuss d/c  She reports she s/w her family and she is not welcome at grandmother's home  Pt denies having other family or friends she can stay with  Discussed assisted living facilities;  pt reports she does not have any money to pay for anything  Pt reports she gets a little over $500 per month from , and she pays $200 per month for child support, and $200 per month for her phone bill, which she feels is her only connection to family and friends    Discussed D/A programs if she feels vunerable to relapse; pt became angry and agitated  Denies fear of losing sobriety;  says her father's use of D/A at home leads to "chaos, and that's not a good environment for me"  When asked if pt reached out to  at [employer], she responded "why would I?  what is she going to do for me?"  CM explained that pt may have benefits that would help her situation, and that  may have suggestions  Pt reported she called insurance and left a message  Pt feels she will be here for at least two more days, and will think about d/c plan during that time  Encouraged her to do so

## 2022-08-25 NOTE — PLAN OF CARE
Problem: MOBILITY - ADULT  Goal: Maintain or return to baseline ADL function  Description: INTERVENTIONS:  -  Assess patient's ability to carry out ADLs; assess patient's baseline for ADL function and identify physical deficits which impact ability to perform ADLs (bathing, care of mouth/teeth, toileting, grooming, dressing, etc )  - Assess/evaluate cause of self-care deficits   - Assess range of motion  - Assess patient's mobility; develop plan if impaired  - Assess patient's need for assistive devices and provide as appropriate  - Encourage maximum independence but intervene and supervise when necessary  - Involve family in performance of ADLs  - Assess for home care needs following discharge   - Consider OT consult to assist with ADL evaluation and planning for discharge  - Provide patient education as appropriate  Outcome: Progressing  Goal: Maintains/Returns to pre admission functional level  Description: INTERVENTIONS:  - Perform BMAT or MOVE assessment daily    - Set and communicate daily mobility goal to care team and patient/family/caregiver  - Collaborate with rehabilitation services on mobility goals if consulted  - Perform Range of Motion 3  times a day  - Reposition patient every 2 hours    - Out of bed for toileting  - Record patient progress and toleration of activity level   Outcome: Progressing     Problem: Prexisting or High Potential for Compromised Skin Integrity  Goal: Skin integrity is maintained or improved  Description: INTERVENTIONS:  - Identify patients at risk for skin breakdown  - Assess and monitor skin integrity  - Assess and monitor nutrition and hydration status  - Monitor labs   - Assess for incontinence   - Turn and reposition patient  - Assist with mobility/ambulation  - Relieve pressure over bony prominences  - Avoid friction and shearing  - Provide appropriate hygiene as needed including keeping skin clean and dry  - Evaluate need for skin moisturizer/barrier cream  - Collaborate with interdisciplinary team   - Patient/family teaching  - Consider wound care consult   Outcome: Progressing     Problem: Potential for Falls  Goal: Patient will remain free of falls  Description: INTERVENTIONS:  -  Assess patient's ability to carry out ADLs; assess patient's baseline for ADL function and identify physical deficits which impact ability to perform ADLs (bathing, care of mouth/teeth, toileting, grooming, dressing, etc )  - Assess/evaluate cause of self-care deficits   - Assess range of motion  - Assess patient's mobility; develop plan if impaired  - Assess patient's need for assistive devices and provide as appropriate  - Encourage maximum independence but intervene and supervise when necessary  - Involve family in performance of ADLs  - Assess for home care needs following discharge   - Consider OT consult to assist with ADL evaluation and planning for discharge  - Provide patient education as appropriate  Outcome: Progressing     Problem: PAIN - ADULT  Goal: Verbalizes/displays adequate comfort level or baseline comfort level  Description: Interventions:  - Encourage patient to monitor pain and request assistance  - Assess pain using appropriate pain scale  - Administer analgesics based on type and severity of pain and evaluate response  - Implement non-pharmacological measures as appropriate and evaluate response  - Consider cultural and social influences on pain and pain management  - Notify physician/advanced practitioner if interventions unsuccessful or patient reports new pain  Outcome: Progressing     Problem: INFECTION - ADULT  Goal: Absence or prevention of progression during hospitalization  Description: INTERVENTIONS:  - Assess and monitor for signs and symptoms of infection  - Monitor lab/diagnostic results  - Monitor all insertion sites, i e  indwelling lines, tubes, and drains  - Monitor endotracheal if appropriate and nasal secretions for changes in amount and color  - Hustle appropriate cooling/warming therapies per order  - Administer medications as ordered  - Instruct and encourage patient and family to use good hand hygiene technique  - Identify and instruct in appropriate isolation precautions for identified infection/condition  Outcome: Progressing     Problem: SAFETY ADULT  Goal: Maintain or return to baseline ADL function  Description: INTERVENTIONS:  -  Assess patient's ability to carry out ADLs; assess patient's baseline for ADL function and identify physical deficits which impact ability to perform ADLs (bathing, care of mouth/teeth, toileting, grooming, dressing, etc )  - Assess/evaluate cause of self-care deficits   - Assess range of motion  - Assess patient's mobility; develop plan if impaired  - Assess patient's need for assistive devices and provide as appropriate  - Encourage maximum independence but intervene and supervise when necessary  - Involve family in performance of ADLs  - Assess for home care needs following discharge   - Consider OT consult to assist with ADL evaluation and planning for discharge  - Provide patient education as appropriate  Outcome: Progressing  Goal: Maintains/Returns to pre admission functional level  Description: INTERVENTIONS:  - Perform BMAT or MOVE assessment daily    - Set and communicate daily mobility goal to care team and patient/family/caregiver  - Collaborate with rehabilitation services on mobility goals if consulted  - Perform Range of Motion 3  times a day  - Reposition patient every 2 hours    - Out of bed for toileting  - Record patient progress and toleration of activity level   Outcome: Progressing  Goal: Patient will remain free of falls  Description: INTERVENTIONS:  -  Assess patient's ability to carry out ADLs; assess patient's baseline for ADL function and identify physical deficits which impact ability to perform ADLs (bathing, care of mouth/teeth, toileting, grooming, dressing, etc )  - Assess/evaluate cause of self-care deficits   - Assess range of motion  - Assess patient's mobility; develop plan if impaired  - Assess patient's need for assistive devices and provide as appropriate  - Encourage maximum independence but intervene and supervise when necessary  - Involve family in performance of ADLs  - Assess for home care needs following discharge   - Consider OT consult to assist with ADL evaluation and planning for discharge  - Provide patient education as appropriate  Outcome: Progressing     Problem: DISCHARGE PLANNING  Goal: Discharge to home or other facility with appropriate resources  Description: INTERVENTIONS:  - Identify barriers to discharge w/patient and caregiver  - Arrange for needed discharge resources and transportation as appropriate  - Identify discharge learning needs (meds, wound care, etc )  - Arrange for interpretive services to assist at discharge as needed  - Refer to Case Management Department for coordinating discharge planning if the patient needs post-hospital services based on physician/advanced practitioner order or complex needs related to functional status, cognitive ability, or social support system  Outcome: Progressing     Problem: Knowledge Deficit  Goal: Patient/family/caregiver demonstrates understanding of disease process, treatment plan, medications, and discharge instructions  Description: Complete learning assessment and assess knowledge base    Interventions:  - Provide teaching at level of understanding  - Provide teaching via preferred learning methods  Outcome: Progressing     Problem: MUSCULOSKELETAL - ADULT  Goal: Maintain or return mobility to safest level of function  Description: INTERVENTIONS:  - Assess patient's ability to carry out ADLs; assess patient's baseline for ADL function and identify physical deficits which impact ability to perform ADLs (bathing, care of mouth/teeth, toileting, grooming, dressing, etc )  - Assess/evaluate cause of self-care deficits - Assess range of motion  - Assess patient's mobility  - Assess patient's need for assistive devices and provide as appropriate  - Encourage maximum independence but intervene and supervise when necessary  - Involve family in performance of ADLs  - Assess for home care needs following discharge   - Consider OT consult to assist with ADL evaluation and planning for discharge  - Provide patient education as appropriate  Outcome: Progressing  Goal: Maintain proper alignment of affected body part  Description: INTERVENTIONS:  - Support, maintain and protect limb and body alignment  - Provide patient/ family with appropriate education  Outcome: Progressing

## 2022-08-25 NOTE — PROGRESS NOTES
[facility]  Progress Note - Jose Luis Jauregui 1990, 32 y o  female MRN: [id number]  Unit/Bed#: -01 Encounter: [id number]  Primary Care Provider: Hair Anthony MD   Date and time admitted to hospital: 8/2/2022 12:01 PM    * Sepsis [facility])  Assessment & Plan  · resolved  ·  Criteria met:  Fever, tachycardia, abnormal UA  · Procalcitonin elevated but downtrending  · Blood culture shows Staph epidermidis in 1 of the 2 sets  · Lactic acid normal  · XR shows no PNA  · Follow urine and blood cultures  · Antibiotics:  IV vancomycin and ceftriaxone, day 3  Discontinued vancomycin today as 24hr culture negative  Continue ceftriaxone till urine cultures are back  · IV fluids:  Reduce LR at 75 cc/hour    Inability to return to living situation  Assessment & Plan  · Discussed with case management  Difficult placement  · Initially, presented to the ED on 8/2 with vomiting and reported that she had not been getting proper care at home requesting placement     · Case management consulted and following  · PT OT recommending short-term rehab  · No new complaints today    Quadriplegic spinal paralysis ([address] )  Assessment & Plan  · Status post spinal epidural abscess in the setting of IV drug abuse  · Patient is essentially homeless and was staying with 43-year-old grandmother who acts as primary caregiver  · Improved sleep paraplegia patient does have some lower extremity sensation and movement as well and has the ability to return to full functioning status with appropriate rehab  · Frequent turns  · Assist with all feeding  · Initially had Awan this has since been discontinued as patient was only utilizing this for vacation  · Continue urinary retention protocol  · Continue pain control with aqua K, baclofen, and gabapentin  · No worsening    Urinary retention  Assessment & Plan  · Pt does not want a awan catheter  · Follow retention protocol        VTE Pharmacologic Prophylaxis: VTE Score: 3 Moderate Risk (Score 3-4) - Pharmacological DVT Prophylaxis Ordered: enoxaparin (Lovenox)  Patient Centered Rounds: I performed bedside rounds with nursing staff today  Discussions with Specialists or Other Care Team Provider: [place]    Education and Discussions with Family / Patient: Patient declined call to   Time Spent for Care: 15 minutes  More than 50% of total time spent on counseling and coordination of care as described above  Current Length of Stay: 21 day(s)  Current Patient Status: Inpatient   Certification Statement: The patient will continue to require additional inpatient hospital stay due to need for blood cultures and urine cultures  Discharge Plan: Anticipate discharge in 24-48 hrs to as per cm    Code Status: Level 1 - Full Code    Subjective:   Seen and examined at bedside  No new complaints  No cp or fever or cough or sob or dysuria    Objective:     Vitals:   Temp (24hrs), Av 2 °F (37 3 °C), Min:98 6 °F (37 °C), Max:99 7 °F (37 6 °C)    Temp:  [98 6 °F (37 °C)-99 7 °F (37 6 °C)] 98 6 °F (37 °C)  HR:  [87-94] 94  Resp:  [18-19] 19  BP: ()/(54-80) 120/80  SpO2:  [94 %-95 %] 95 %  Body mass index is 27 35 kg/m²  Input and Output Summary (last 24 hours):      Intake/Output Summary (Last 24 hours) at 2022 1230  Last data filed at 2022 1022  Gross per 24 hour   Intake 1000 ml   Output 5100 ml   Net -4100 ml       Physical Exam:   Physical Exam s1,2 (+) R  Lungs CTA  Quadriplegia  No new focal neuro deficits  A&Ox3  abd nt nd bs (+) 4 quads    Additional Data:     Labs:  Results from last 7 days   Lab Units 22  0520   WBC Thousand/uL 5 95   HEMOGLOBIN g/dL 10 0*   HEMATOCRIT % 30 3*   PLATELETS Thousands/uL 155   NEUTROS PCT % 61   LYMPHS PCT % 28   MONOS PCT % 8   EOS PCT % 2     Results from last 7 days   Lab Units 22  0520 22  0607   SODIUM mmol/L 141 144   POTASSIUM mmol/L 3 9 3 9   CHLORIDE mmol/L 107 111*   CO2 mmol/L 26 23   BUN mg/dL 9 10 CREATININE mg/dL 0 50* 0 74   ANION GAP mmol/L 8 10   CALCIUM mg/dL 8 3 8 5   ALBUMIN g/dL  --  2 4*   TOTAL BILIRUBIN mg/dL  --  0 29   ALK PHOS U/L  --  58   ALT U/L  --  19   AST U/L  --  18   GLUCOSE RANDOM mg/dL 91 96                 Results from last 7 days   Lab Units 08/25/22  0520 08/24/22  0607 08/23/22  0547 08/22/22  1806   LACTIC ACID mmol/L  --   --   --  1 7   PROCALCITONIN ng/ml 0 54* 0 90* 0 24 0 09       Lines/Drains:  Invasive Devices  Report    Peripheral Intravenous Line  Duration           Peripheral IV 08/23/22 Dorsal (posterior); Left Forearm 2 days          Drain  Duration           External Urinary Catheter 2 days                      Imaging: No pertinent imaging reviewed  Recent Cultures (last 7 days):   Results from last 7 days   Lab Units 08/23/22  0224 08/23/22  0125 08/22/22  2207   BLOOD CULTURE   --  Staphylococcus coagulase negative* No Growth at 48 hrs     GRAM STAIN RESULT   --  Gram positive cocci in clusters*  --    URINE CULTURE  Culture too young- will reincubate  --   --        Last 24 Hours Medication List:   Current Facility-Administered Medications   Medication Dose Route Frequency Provider Last Rate    acetaminophen  975 mg Oral Q6H PRN Sade Pride PA-C      baclofen  10 mg Oral TID Rubi Gerber DO      bisacodyl  10 mg Rectal Daily PRN HOWARD Saeg      butalbital-acetaminophen-caffeine  1 tablet Oral Once PRN Clearance ISAI King      cefTRIAXone  1,000 mg Intravenous Q24H Real Hernandez PA-C 1,000 mg (08/24/22 2205)    clonazePAM  1 mg Oral BID HOWARD Nick      Diclofenac Sodium  2 g Topical 4x Daily HOWARD Sage      [facility]ox[facility]  60 [facility] Daily [facility]      enoxaparin  40 mg Subcutaneous Daily [facility]      gabapentin  400 mg Oral Q8H HOWARD Loera      hydrALAZINE  25 mg Oral Once PRN Clearance ISAI King      hydrOXYzine HCL  25 mg Oral Q6H PRN Hardy Adler Evans Bynum MD      ibuprofen  400 mg Oral Q6H PRN Remberto Doran MD      lactated ringers  75 mL/hr Intravenous Continuous Remberto Doran  mL/hr (08/25/22 1104)    magnesium oxide  400 mg Oral BID William Cardenas DO      polyethylene glycol  17 g Oral Daily Moralessam Lozano, HOWARD      prazosin  1 mg Oral HS Ailin Nichols, HOWARD      QUEtiapine  200 mg Oral HS John Garcia PA-C      senna-docusate sodium  1 tablet Oral HS Moralessam Lozano, CRNP      zolpidem  5 mg Oral HS PRN HOWARD Rust          Today, Patient Was Seen By: Remberto Doran MD    **Please Note: This note may have been constructed using a voice recognition system  **

## 2022-08-25 NOTE — PROGRESS NOTES
Vancomycin IV Pharmacy-to-Dose Consultation    Vladimir Goodman is a 32 y o  female who is currently receiving Vancomycin IV with management by the Pharmacy Consult service  Assessment/Plan:  The patient was reviewed  Renal function is stable and no signs or symptoms of nephrotoxicity and/or infusion reactions were documented in the chart  Based on todays assessment, continue current vancomycin (day # 3) dosing of vancomycin 1250 mg IV q8h, with a plan for trough to be drawn on 8/31/22  We will continue to follow the patients culture results and clinical progress daily      Arelis Waddell, Pharmacist

## 2022-08-25 NOTE — ASSESSMENT & PLAN NOTE
· Discussed with case management  Difficult placement  · Initially, presented to the ED on 8/2 with vomiting and reported that she had not been getting proper care at home requesting placement     · Case management consulted and following  · PT OT recommending short-term rehab  · No new complaints today

## 2022-08-25 NOTE — PLAN OF CARE
Problem: INFECTION - ADULT  Goal: Absence or prevention of progression during hospitalization  Description: INTERVENTIONS:  - Assess and monitor for signs and symptoms of infection  - Monitor lab/diagnostic results  - Monitor all insertion sites, i e  indwelling lines, tubes, and drains  - Monitor endotracheal if appropriate and nasal secretions for changes in amount and color  - Benld appropriate cooling/warming therapies per order  - Administer medications as ordered  - Instruct and encourage patient and family to use good hand hygiene technique  - Identify and instruct in appropriate isolation precautions for identified infection/condition  Outcome: Progressing   Pt afebrile  Vanco level therapeutic per, pharmacy  Patient states she feels better

## 2022-08-26 LAB
BACTERIA BLD CULT: ABNORMAL
GRAM STN SPEC: ABNORMAL
MECA+MECC ISLT/SPM QL: DETECTED
S EPIDERMIDIS DNA BLD POS QL NAA+NON-PRB: DETECTED

## 2022-08-26 PROCEDURE — 99232 SBSQ HOSP IP/OBS MODERATE 35: CPT | Performed by: FAMILY MEDICINE

## 2022-08-26 RX ORDER — CEFEPIME HYDROCHLORIDE 2 G/50ML
2000 INJECTION, SOLUTION INTRAVENOUS EVERY 12 HOURS
Status: DISCONTINUED | OUTPATIENT
Start: 2022-08-26 | End: 2022-08-28 | Stop reason: HOSPADM

## 2022-08-26 RX ADMIN — PRAZOSIN HYDROCHLORIDE 1 MG: 1 CAPSULE ORAL at 22:15

## 2022-08-26 RX ADMIN — SENNOSIDES AND DOCUSATE SODIUM 1 TABLET: 50; 8.6 TABLET ORAL at 22:14

## 2022-08-26 RX ADMIN — CEFEPIME HYDROCHLORIDE 2000 MG: 2 INJECTION, SOLUTION INTRAVENOUS at 10:03

## 2022-08-26 RX ADMIN — BACLOFEN 10 MG: 10 TABLET ORAL at 10:05

## 2022-08-26 RX ADMIN — ZOLPIDEM TARTRATE 5 MG: 5 TABLET, COATED ORAL at 22:24

## 2022-08-26 RX ADMIN — CLONAZEPAM 1 MG: 1 TABLET ORAL at 18:38

## 2022-08-26 RX ADMIN — BACLOFEN 10 MG: 10 TABLET ORAL at 22:14

## 2022-08-26 RX ADMIN — QUETIAPINE FUMARATE 200 MG: 200 TABLET ORAL at 22:14

## 2022-08-26 RX ADMIN — GABAPENTIN 400 MG: 400 CAPSULE ORAL at 18:37

## 2022-08-26 RX ADMIN — BACLOFEN 10 MG: 10 TABLET ORAL at 18:37

## 2022-08-26 RX ADMIN — GABAPENTIN 400 MG: 400 CAPSULE ORAL at 10:12

## 2022-08-26 RX ADMIN — DULOXETINE HYDROCHLORIDE 60 MG: 60 CAPSULE, DELAYED RELEASE ORAL at 10:04

## 2022-08-26 RX ADMIN — Medication 400 MG: at 10:05

## 2022-08-26 RX ADMIN — Medication 400 MG: at 18:38

## 2022-08-26 RX ADMIN — CEFEPIME HYDROCHLORIDE 2000 MG: 2 INJECTION, SOLUTION INTRAVENOUS at 22:14

## 2022-08-26 RX ADMIN — CLONAZEPAM 1 MG: 1 TABLET ORAL at 10:05

## 2022-08-26 NOTE — CASE MANAGEMENT
Case Management Discharge Planning Note    Patient name Efrain Azul  Location /-98 MRN [id number]  : 1990 Date 2022       Current Admission Date: 2022  Current Admission Diagnosis:Sepsis [facility])   Patient Active Problem List    Diagnosis Date Noted    Sepsis ([facility] [address] ) 2022    Urinary retention 2022    Inability to return to living situation 2022    Lower extremity edema 2022    Anxiety 2022    Insomnia 2022    Hepatitis C antibody positive in blood 2022    Substance abuse ([facility] [address] ) 05/15/2022    Failure to thrive in adult 2022    Quadriplegic spinal paralysis ([facility] [address] ) 2022    Gonzalez catheter in place 2022    Back pain 2022      LOS (days): 22  Geometric Mean LOS (GMLOS) (days):   Days to GMLOS:     OBJECTIVE:  Risk of Unplanned Readmission Score: 22 38         Current admission status: Inpatient   Preferred Pharmacy:   [facility] [place], [address] [address]  Phone: [phone] Fax: [phone]    Primary Care Provider: Darian Otoole MD    Primary Insurance: [address] [place] [address]  Secondary Insurance:     DISCHARGE DETAILS:                                          Other Referral/Resources/Interventions Provided:  Referral Comments: Left another message for Mariah Mitchell ([phone]) at [facility] Admissions to get info on admissions process and waiting list;  left VMM requesting c/b  T/C made to pt's  Alberta Figueroa ([phone]) at [address];  left VMM  Gwen's VMM provided two emergency numbers  Called [phone] and reached Mary Jo  Explained lack of response from pt's S C ;  Mary Jo reports she will escalate situation to higher level, and supervisor will call this CM today  Awaiting same           Treatment Team Recommendation: Short Term Rehab, SNF  Discharge Destination Plan[de-identified] Short Term Rehab, SNF (pt reports she is homeless and needs long term placement as her family cannot adequately take care of her)  Transport at Discharge : [facility]

## 2022-08-26 NOTE — ASSESSMENT & PLAN NOTE
· Secondary  To UTI, growing enterobacter and pseudomonas  Start on cefepime  DC vancomycin  · Sepsis has resolved  ·  Criteria met:  Fever, tachycardia, abnormal UA  · Procalcitonin elevated but downtrending  · Blood culture shows Staph epidermidis in 1 of the 2 sets  · Lactic acid normal  · XR shows no PNA  · Blood culture 1/2 coag negative staph - represents contaminant  2/2: no growth 72 hours  · Antibiotics: previously on IV vancomycin #3 and ceftriaxone, #4  Discontinued vancomycin 8/25 as 24hr culture negative   Started on cefepime today  · IV fluids:  LR at 75 cc/hr

## 2022-08-26 NOTE — CASE MANAGEMENT
Case Management Discharge Planning Note    Patient name Wilman Santillan  Location /-47 MRN [id number]  : 1990 Date 2022       Current Admission Date: 2022  Current Admission Diagnosis:Sepsis [facility])   Patient Active Problem List    Diagnosis Date Noted    Sepsis ([facility] [address] ) 2022    Urinary retention 2022    Inability to return to living situation 2022    Lower extremity edema 2022    Anxiety 2022    Insomnia 2022    Hepatitis C antibody positive in blood 2022    Substance abuse ([facility] [address] ) 05/15/2022    Failure to thrive in adult 2022    [facility]    Gonzalez catheter in place 2022    Back pain 2022      LOS (days): 22  Geometric Mean LOS (GMLOS) (days):   Days to GMLOS:     OBJECTIVE:  Risk of Unplanned Readmission Score: 22 43         Current admission status: Inpatient   Preferred Pharmacy:   [facility] [address], [address] [address]  Phone: [phone] Fax: [phone]    Primary Care Provider: Rey Wheat MD    Primary Insurance: [address] [place] [address]  Secondary Insurance:     DISCHARGE DETAILS:    Discharge planning discussed with[de-identified] met w pt at bedside     Comments - Freedom of Choice: Met w pt to inform her of CM discussions w Gwen from [facility] ;  pt says she was sleeping all afternoon and probably missed Gwen's call  Offered pt resource list of homeless shelters;  she refused, reports "I have everything worked out, I don't need it"  Asked re: pt's plan;  she reports she is going to go home  Discussed home health;  pt reports she had services w [facility] prior to admission and would like to continue w [facility]  Pt's friend at bedside reports agency has been calling her and asking about pt's status and location    Asked about transportation to home;  pt reports she will need transportation arranged for her  Pt agreeable to receiving any services offered by insurer;  will communicate w [employer] on next business day  CM contacted family/caregiver?: No- see comments (pt declined)                  Requested [address]         Is the patient interested in [place] [address] at discharge?: Yes  [address] requested[de-identified] [address], Medical Social Work, Occupational Therapy, Nursing, Physical Therapy, Other (comment)  [facility] Name[de-identified] [address] Provider[de-identified] PCP  [address] Needed[de-identified] Evaluate Functional Status and Safety, Gait/ADL Training, Strengthening/Theraputic Exercises to Improve Function, Wound/Ostomy Care  Supporting Clincal Findings[de-identified] Fatigues Easliy in Short Distances, Dyspnea with Exertion, Bed Bound or Wheelchair Bound    DME Referral Provided  Referral made for DME?: No    Other Referral/Resources/Interventions Provided:  Interventions: [facility]  Referral Comments: Pt willing to return home w home health;  reports she is current w [facility]  Referral made to [facility] and other agencies;  awaiting responses           Treatment Team Recommendation: Short Term Rehab, SNF  Discharge Destination Plan[de-identified] Home with [place] at Discharge : [facility]

## 2022-08-26 NOTE — PROGRESS NOTES
[facility]  Progress Note - Carlos Alberto Yuen 1990, 32 y o  female MRN: [id number]  Unit/Bed#: -01 Encounter: [id number]  Primary Care Provider: Nelson Huggins MD   Date and time admitted to hospital: 8/2/2022 12:01 PM    * Sepsis [facility])  Assessment & Plan  · Secondary  To UTI, growing enterobacter and pseudomonas  Start on cefepime  DC vancomycin  · Sepsis has resolved  ·  Criteria met:  Fever, tachycardia, abnormal UA  · Procalcitonin elevated but downtrending  · Blood culture shows Staph epidermidis in 1 of the 2 sets  · Lactic acid normal  · XR shows no PNA  · Blood culture 1/2 coag negative staph - represents contaminant  2/2: no growth 72 hours  · Antibiotics: previously on IV vancomycin #3 and ceftriaxone, #4  Discontinued vancomycin 8/25 as 24hr culture negative  Started on cefepime today  · IV fluids:  LR at 75 cc/hr    Inability to return to living situation  Assessment & Plan  · Discussed with case management  Difficult placement  · Initially, presented to the ED on 8/2 with vomiting and reported that she had not been getting proper care at home requesting placement     · Case management consulted and following  · PT OT recommending short-term rehab  · No new complaints today    Quadriplegic spinal paralysis ([address] )  Assessment & Plan  · Status post spinal epidural abscess in the setting of IV drug abuse  · Patient is essentially homeless and was staying with 15-year-old grandmother who acts as primary caregiver  · Improved sleep paraplegia patient does have some lower extremity sensation and movement as well and has the ability to return to full functioning status with appropriate rehab  · Frequent turns  · Assist with all feeding  · Initially had Gonzalez this has since been discontinued as patient was only utilizing this for vacation  · Continue urinary retention protocol  · Continue pain control with aqua K, baclofen, and gabapentin  · No worsening    Urinary retention  Assessment & Plan  · Pt does not want a awan catheter  · Follow retention protocol    Anxiety  Assessment & Plan  · Stable, atarax prn  · Continue Xanax per PTA medication regimen as well as Ambien for sleep  · Mood stable          VTE Pharmacologic Prophylaxis: VTE Score: 3 Moderate Risk (Score 3-4) - Pharmacological DVT Prophylaxis Ordered: enoxaparin (Lovenox)  Patient Centered Rounds: I performed bedside rounds with nursing staff today  Discussions with Specialists or Other Care Team Provider: n/a    Education and Discussions with Family / Patient: Patient declined call to   Time Spent for Care: 15 minutes  More than 50% of total time spent on counseling and coordination of care as described above  Current Length of Stay: 22 day(s)  Current Patient Status: Inpatient   Certification Statement: The patient will continue to require additional inpatient hospital stay due to IV antibiotics  Discharge Plan: Anticipate discharge tomorrow to as recommended by CM    Code Status: Level 1 - Full Code    Subjective:   Seen and examined at bedside  No new complaints  Pt sleeping on my arrival, does not want to be examined    Objective:     Vitals:   Temp (24hrs), Av 4 °F (36 9 °C), Min:97 3 °F (36 3 °C), Max:99 3 °F (37 4 °C)    Temp:  [97 3 °F (36 3 °C)-99 3 °F (37 4 °C)] 97 3 °F (36 3 °C)  HR:  [76-93] 76  Resp:  [15-21] 21  BP: ()/(59-80) 97/79  SpO2:  [92 %-97 %] 97 %  Body mass index is 27 35 kg/m²  Input and Output Summary (last 24 hours):      Intake/Output Summary (Last 24 hours) at 2022 1017  Last data filed at 2022 1147  Gross per 24 hour   Intake 790 ml   Output 3400 ml   Net -2610 ml       Physical Exam:   Physical Exam   Did not allow for exam today as she was sleepy    Additional Data:     Labs:  Results from last 7 days   Lab Units 22  0520   WBC Thousand/uL 5 95   HEMOGLOBIN g/dL 10 0*   HEMATOCRIT % 30 3*   PLATELETS Thousands/uL 155   NEUTROS PCT % 61   LYMPHS PCT % 28   MONOS PCT % 8   EOS PCT % 2     Results from last 7 days   Lab Units 08/25/22  0520 08/24/22  0607   SODIUM mmol/L 141 144   POTASSIUM mmol/L 3 9 3 9   CHLORIDE mmol/L 107 111*   CO2 mmol/L 26 23   BUN mg/dL 9 10   CREATININE mg/dL 0 50* 0 74   ANION GAP mmol/L 8 10   CALCIUM mg/dL 8 3 8 5   ALBUMIN g/dL  --  2 4*   TOTAL BILIRUBIN mg/dL  --  0 29   ALK PHOS U/L  --  58   ALT U/L  --  19   AST U/L  --  18   GLUCOSE RANDOM mg/dL 91 96                 Results from last 7 days   Lab Units 08/25/22  0520 08/24/22  0607 08/23/22  0547 08/22/22  1806   LACTIC ACID mmol/L  --   --   --  1 7   PROCALCITONIN ng/ml 0 54* 0 90* 0 24 0 09       Lines/Drains:  Invasive Devices  Report    Peripheral Intravenous Line  Duration           Peripheral IV 08/23/22 Dorsal (posterior); Left Forearm 3 days          Drain  Duration           External Urinary Catheter 2 days                      Imaging: No pertinent imaging reviewed  Recent Cultures (last 7 days):   Results from last 7 days   Lab Units 08/23/22  0224 08/23/22  0125 08/22/22  2207   BLOOD CULTURE   --  Staphylococcus coagulase negative* No Growth at 72 hrs     GRAM STAIN RESULT   --  Gram positive cocci in clusters*  --    URINE CULTURE  >100,000 cfu/ml Enterobacter cloacae*  >100,000 cfu/ml Pseudomonas aeruginosa*  <10,000 cfu/ml   --   --        Last 24 Hours Medication List:   Current Facility-Administered Medications   Medication Dose Route Frequency Provider Last Rate    acetaminophen  975 mg Oral Q6H PRN Sade Pride PA-C      baclofen  10 mg Oral TID William Cardenas DO      bisacodyl  10 mg Rectal Daily PRN HOWARD Lorenzo      butalbital-acetaminophen-caffeine  1 tablet Oral Once PRN Tomer Quiñonez PA-C      cefepime  2,000 mg Intravenous Q12H Remberto Doran MD 2,000 mg (08/26/22 1003)    clonazePAM  1 mg Oral BID HOWARD Nick      Diclofenac Sodium  2 g Topical 4x Daily HOWARD Lorenzo      DULoxetine  60 mg Oral Daily HOWARD Nick      enoxaparin  40 mg Subcutaneous Daily HOWARD Ramirez      gabapentin  400 mg Oral [address]      hydrALAZINE  25 mg Oral Once PRN Monserrat Carpenter PA-C      hydrOXYzine HCL  25 mg Oral Q6H PRN Yazmin Donis MD      ibuprofen  400 mg Oral Q6H PRN Jeff Montalvo MD      lactated ringers  75 mL/hr Intravenous Continuous Jeff Montalvo MD 75 mL/hr (08/25/22 1405)    magnesium oxide  400 mg Oral BID Maria Esther Castro DO      polyethylene glycol  17 g Oral Daily Chichi Lavmariajose, HOWARD      prazosin  1 mg Oral HS HOWARD Nick      QUEtiapine  200 mg Oral HS Bell Juarez PA-C      senna-docusate sodium  1 tablet Oral HS Chichifernando Marks, HOWARD      zolpidem  5 mg Oral HS PRN HOWARD Ramirez          Today, Patient Was Seen By: Jeff Montalvo MD    **Please Note: This note may have been constructed using a voice recognition system  **

## 2022-08-26 NOTE — CASE MANAGEMENT
Case Management Discharge Planning Note    Patient name Cj Counter  Location /-28 MRN [id number]  : 1990 Date 2022       Current Admission Date: 2022  Current Admission Diagnosis:Sepsis [facility])   Patient Active Problem List    Diagnosis Date Noted    Sepsis ([facility] [address] ) 2022    Urinary retention 2022    Inability to return to living situation 2022    Lower extremity edema 2022    Anxiety 2022    Insomnia 2022    Hepatitis C antibody positive in blood 2022    Substance abuse ([facility] ) 05/15/2022    Failure to thrive in adult 2022    Quadriplegic spinal paralysis ([facility] [address] ) 2022    Gonzalez catheter in place 2022    Back pain 2022      LOS (days): 22  Geometric Mean LOS (GMLOS) (days):   Days to GMLOS:     OBJECTIVE:  Risk of Unplanned Readmission Score: 22 38         Current admission status: Inpatient   [facility]   [facility], [address] [place] [address]  Phone: [phone] Fax: [phone]    Primary Care Provider: Roel Wang MD    Primary Insurance: [facility]  Secondary Insurance:     DISCHARGE DETAILS:                                          Other Referral/Resources/Interventions Provided:  Referral Comments: Per AM rounds, pt may be discharged once appropriate oral abx are recommended by ID  Pt w/o place to go after d/c  T/C to [facility] ([phone]);  they are a cold weather shelter that operates - , offering sleeping space only w guests expected to leave by 0600  T/C to [facility] [employer] ([phone]);  they do not have availability and do not accept pt's w physical limitations per Violet, as they are difficult to transition out of the shelter  T/C to [place] [address] ([phone]);  left M requesting c/b to discuss pt's situation    T/C to [employer] ([phone]);  left VMM requesting c/b  Treatment Team Recommendation: Short Term Rehab  Discharge Destination Plan[de-identified] Other (TBD)  Transport at Discharge :  Other (Comment) (TBD)

## 2022-08-26 NOTE — ASSESSMENT & PLAN NOTE
· Status post spinal epidural abscess in the setting of IV drug abuse  · Patient is essentially homeless and was staying with 80-year-old grandmother who acts as primary caregiver  · Improved sleep paraplegia patient does have some lower extremity sensation and movement as well and has the ability to return to full functioning status with appropriate rehab  · Frequent turns  · Assist with all feeding  · Initially had Gonzalez this has since been discontinued as patient was only utilizing this for vacation  · Continue urinary retention protocol  · Continue pain control with aqua K, baclofen, and gabapentin  · No worsening

## 2022-08-26 NOTE — PLAN OF CARE
Problem: MOBILITY - ADULT  Goal: Maintain or return to baseline ADL function  Description: INTERVENTIONS:  -  Assess patient's ability to carry out ADLs; assess patient's baseline for ADL function and identify physical deficits which impact ability to perform ADLs (bathing, care of mouth/teeth, toileting, grooming, dressing, etc )  - Assess/evaluate cause of self-care deficits   - Assess range of motion  - Assess patient's mobility; develop plan if impaired  - Assess patient's need for assistive devices and provide as appropriate  - Encourage maximum independence but intervene and supervise when necessary  - Involve family in performance of ADLs  - Assess for home care needs following discharge   - Consider OT consult to assist with ADL evaluation and planning for discharge  - Provide patient education as appropriate  Outcome: Progressing  Goal: Maintains/Returns to pre admission functional level  Description: INTERVENTIONS:  - Perform BMAT or MOVE assessment daily    - Set and communicate daily mobility goal to care team and patient/family/caregiver  - Collaborate with rehabilitation services on mobility goals if consulted  - Perform Range of Motion 3  times a day  - Reposition patient every 2 hours    - Out of bed for toileting  - Record patient progress and toleration of activity level   Outcome: Progressing

## 2022-08-26 NOTE — CASE MANAGEMENT
Case Management Discharge Planning Note    Patient name Nasreen Stiles  Location /-28 MRN [id number]  : 1990 Date 2022       Current Admission Date: 2022  Current Admission Diagnosis:Sepsis [facility])   Patient Active Problem List    Diagnosis Date Noted    Sepsis ([facility] [address] ) 2022    Urinary retention 2022    Inability to return to living situation 2022    Lower extremity edema 2022    Anxiety 2022    Insomnia 2022    Hepatitis C antibody positive in blood 2022    Substance abuse ([facility] [address] ) 05/15/2022    Failure to thrive in adult 2022    [facility]    Gonzalez catheter in place 2022    Back pain 2022      LOS (days): 22  Geometric Mean LOS (GMLOS) (days):   Days to GMLOS:     OBJECTIVE:  Risk of Unplanned Readmission Score: 22 43         Current admission status: Inpatient   Preferred Pharmacy:   [facility] [address], [address] [address]  Phone: [phone] Fax: [phone]    Primary Care Provider: Mika Peng MD    Primary Insurance: [facility]  Secondary Insurance:     DISCHARGE DETAILS:                                          Other Referral/Resources/Interventions Provided:  Referral Comments: C/B from Jenae Kuhn at [address] [place] [address]  Case discussed  Gwen reports that services provided by [facility] are related to care at home;  she does not have a solution to pt's homelessness  She plans on sending an email to her supervisor, asking for any suggestions  She will send this CM a copy as well  Gwen also plans to s/w pt, to see if she can get her to disclose reasons why she cannot return to home, other than "chaos"

## 2022-08-27 ENCOUNTER — HOME HEALTH ADMISSION (OUTPATIENT)
Dept: HOME HEALTH SERVICES | Facility: HOME HEALTHCARE | Age: 32
End: 2022-08-27

## 2022-08-27 LAB
BACTERIA UR CULT: ABNORMAL

## 2022-08-27 PROCEDURE — 99239 HOSP IP/OBS DSCHRG MGMT >30: CPT | Performed by: FAMILY MEDICINE

## 2022-08-27 RX ORDER — DULOXETIN HYDROCHLORIDE 60 MG/1
60 CAPSULE, DELAYED RELEASE ORAL DAILY
Qty: 30 CAPSULE | Refills: 0 | Status: SHIPPED | OUTPATIENT
Start: 2022-08-27

## 2022-08-27 RX ORDER — POLYETHYLENE GLYCOL 3350 17 G/17G
17 POWDER, FOR SOLUTION ORAL DAILY
Refills: 0
Start: 2022-08-28

## 2022-08-27 RX ORDER — CIPROFLOXACIN 500 MG/1
500 TABLET, FILM COATED ORAL EVERY 12 HOURS SCHEDULED
Qty: 10 TABLET | Refills: 0 | Status: SHIPPED | OUTPATIENT
Start: 2022-08-27 | End: 2022-09-01

## 2022-08-27 RX ORDER — PRAZOSIN HYDROCHLORIDE 1 MG/1
1 CAPSULE ORAL
Qty: 30 CAPSULE | Refills: 0 | Status: SHIPPED | OUTPATIENT
Start: 2022-08-27 | End: 2022-09-26

## 2022-08-27 RX ORDER — GABAPENTIN 400 MG/1
400 CAPSULE ORAL EVERY 8 HOURS
Qty: 90 CAPSULE | Refills: 0 | Status: SHIPPED | OUTPATIENT
Start: 2022-08-27 | End: 2022-09-26

## 2022-08-27 RX ORDER — AMOXICILLIN 250 MG
1 CAPSULE ORAL
Refills: 0
Start: 2022-08-27

## 2022-08-27 RX ORDER — QUETIAPINE FUMARATE 50 MG/1
200 TABLET, FILM COATED ORAL
Qty: 30 TABLET | Refills: 0 | Status: SHIPPED | OUTPATIENT
Start: 2022-08-27

## 2022-08-27 RX ORDER — KETOCONAZOLE 20 MG/ML
1 SHAMPOO TOPICAL 2 TIMES WEEKLY
Qty: 4 ML | Refills: 0 | Status: SHIPPED | OUTPATIENT
Start: 2022-08-29 | End: 2022-09-09

## 2022-08-27 RX ORDER — ACETAMINOPHEN 325 MG/1
975 TABLET ORAL EVERY 6 HOURS PRN
Refills: 0
Start: 2022-08-27

## 2022-08-27 RX ORDER — TRIAMCINOLONE ACETONIDE 1 MG/G
CREAM TOPICAL 2 TIMES DAILY
Qty: 30 G | Refills: 0 | Status: SHIPPED | OUTPATIENT
Start: 2022-08-27

## 2022-08-27 RX ORDER — HYDROXYZINE HYDROCHLORIDE 25 MG/1
25 TABLET, FILM COATED ORAL EVERY 6 HOURS PRN
Qty: 10 TABLET | Refills: 0 | Status: SHIPPED | OUTPATIENT
Start: 2022-08-27

## 2022-08-27 RX ADMIN — CLONAZEPAM 1 MG: 1 TABLET ORAL at 09:26

## 2022-08-27 RX ADMIN — BACLOFEN 10 MG: 10 TABLET ORAL at 09:27

## 2022-08-27 RX ADMIN — ZOLPIDEM TARTRATE 5 MG: 5 TABLET, COATED ORAL at 22:10

## 2022-08-27 RX ADMIN — SENNOSIDES AND DOCUSATE SODIUM 1 TABLET: 50; 8.6 TABLET ORAL at 22:10

## 2022-08-27 RX ADMIN — Medication 400 MG: at 09:26

## 2022-08-27 RX ADMIN — BACLOFEN 10 MG: 10 TABLET ORAL at 17:26

## 2022-08-27 RX ADMIN — Medication 400 MG: at 17:26

## 2022-08-27 RX ADMIN — CEFEPIME HYDROCHLORIDE 2000 MG: 2 INJECTION, SOLUTION INTRAVENOUS at 22:11

## 2022-08-27 RX ADMIN — QUETIAPINE FUMARATE 200 MG: 200 TABLET ORAL at 22:10

## 2022-08-27 RX ADMIN — CLONAZEPAM 1 MG: 1 TABLET ORAL at 17:26

## 2022-08-27 RX ADMIN — SODIUM CHLORIDE, SODIUM LACTATE, POTASSIUM CHLORIDE, AND CALCIUM CHLORIDE 75 ML/HR: .6; .31; .03; .02 INJECTION, SOLUTION INTRAVENOUS at 00:11

## 2022-08-27 RX ADMIN — BACLOFEN 10 MG: 10 TABLET ORAL at 22:00

## 2022-08-27 RX ADMIN — DULOXETINE HYDROCHLORIDE 60 MG: 60 CAPSULE, DELAYED RELEASE ORAL at 09:26

## 2022-08-27 RX ADMIN — GABAPENTIN 400 MG: 400 CAPSULE ORAL at 09:26

## 2022-08-27 RX ADMIN — CEFEPIME HYDROCHLORIDE 2000 MG: 2 INJECTION, SOLUTION INTRAVENOUS at 09:24

## 2022-08-27 RX ADMIN — GABAPENTIN 400 MG: 400 CAPSULE ORAL at 17:26

## 2022-08-27 NOTE — CASE MANAGEMENT
Case Management Discharge Planning Note    Patient name Ayanna Castaneda  Location /-99 MRN [id number]  : 1990 Date 2022       Current Admission Date: 2022  Current Admission Diagnosis:Sepsis [facility])   Patient Active Problem List    Diagnosis Date Noted    Sepsis ([facility] [facility] ) 2022    Urinary retention 2022    Inability to return to living situation 2022    Lower extremity edema 2022    Anxiety 2022    Insomnia 2022    Hepatitis C antibody positive in blood 2022    Substance abuse ([facility] ) 05/15/2022    Failure to thrive in adult 2022    Quadriplegic spinal paralysis ([facility] ) 2022    Gonzalez catheter in place 2022    Back pain 2022      LOS (days): 23  Geometric Mean LOS (GMLOS) (days):   Days to GMLOS:     OBJECTIVE:  Risk of Unplanned Readmission Score: 24 26         Current admission status: Inpatient   Preferred Pharmacy:   [facility] [address], [address] [place] [address]  Phone: [phone] Fax: [phone]    Primary Care Provider: Jesse Chino MD    Primary Insurance: [address] [place] [address]  Secondary Insurance:     DISCHARGE DETAILS:    Discharge planning discussed with[de-identified] patient  Freedom of Choice: Yes  Comments - Freedom of Choice: Pt was to have been discharged home today, but CM unable to obtain transport until tomorrow at 12:45  SLIM and nursing informed                               Other Referral/Resources/Interventions Provided:  Interventions: [facility]  Referral Comments: CM tried to obtain transport for pt to go home today, but was unable  Transport arranged for tomorrow via BLS at 12:45    [facility] for [address]    Would you like to participate in our [address] service program?  : No - Declined    Treatment Team Recommendation: Short Term Rehab  Discharge Destination Plan[de-identified] Home with Sean at Discharge : [facility]  Dispatcher Contacted: Yes     Transported by [employer] and Unit #): Unable to obtain transport for today  SLETs can provide for tomorrow at 12:45    Lewis snow  ETA of Transport (Date): 08/28/22  ETA of Transport (Time): 0687

## 2022-08-27 NOTE — PLAN OF CARE
Problem: MOBILITY - ADULT  Goal: Maintain or return to baseline ADL function  Description: INTERVENTIONS:  -  Assess patient's ability to carry out ADLs; assess patient's baseline for ADL function and identify physical deficits which impact ability to perform ADLs (bathing, care of mouth/teeth, toileting, grooming, dressing, etc )  - Assess/evaluate cause of self-care deficits   - Assess range of motion  - Assess patient's mobility; develop plan if impaired  - Assess patient's need for assistive devices and provide as appropriate  - Encourage maximum independence but intervene and supervise when necessary  - Involve family in performance of ADLs  - Assess for home care needs following discharge   - Consider OT consult to assist with ADL evaluation and planning for discharge  - Provide patient education as appropriate  Outcome: Progressing  Goal: Maintains/Returns to pre admission functional level  Description: INTERVENTIONS:  - Perform BMAT or MOVE assessment daily    - Set and communicate daily mobility goal to care team and patient/family/caregiver  - Collaborate with rehabilitation services on mobility goals if consulted  - Perform Range of Motion 3  times a day  - Reposition patient every 2 hours    - Out of bed for toileting  - Record patient progress and toleration of activity level   Outcome: Progressing     Problem: Prexisting or High Potential for Compromised Skin Integrity  Goal: Skin integrity is maintained or improved  Description: INTERVENTIONS:  - Identify patients at risk for skin breakdown  - Assess and monitor skin integrity  - Assess and monitor nutrition and hydration status  - Monitor labs   - Assess for incontinence   - Turn and reposition patient  - Assist with mobility/ambulation  - Relieve pressure over bony prominences  - Avoid friction and shearing  - Provide appropriate hygiene as needed including keeping skin clean and dry  - Evaluate need for skin moisturizer/barrier cream  - Collaborate with interdisciplinary team   - Patient/family teaching  - Consider wound care consult   Outcome: Progressing     Problem: Potential for Falls  Goal: Patient will remain free of falls  Description: INTERVENTIONS:  - Educate patient/family on patient safety including physical limitations  - Instruct patient to call for assistance with activity   - Consult OT/PT to assist with strengthening/mobility   - Keep Call bell within reach  - Keep bed low and locked with side rails adjusted as appropriate  - Keep care items and personal belongings within reach  - Initiate and maintain comfort rounds  - Make Fall Risk Sign visible to staff  -- Apply yellow socks and bracelet for high fall risk patients  - Consider moving patient to room near nurses station  Outcome: Progressing     Problem: PAIN - ADULT  Goal: Verbalizes/displays adequate comfort level or baseline comfort level  Description: Interventions:  - Encourage patient to monitor pain and request assistance  - Assess pain using appropriate pain scale  - Administer analgesics based on type and severity of pain and evaluate response  - Implement non-pharmacological measures as appropriate and evaluate response  - Consider cultural and social influences on pain and pain management  - Notify physician/advanced practitioner if interventions unsuccessful or patient reports new pain  Outcome: Progressing     Problem: INFECTION - ADULT  Goal: Absence or prevention of progression during hospitalization  Description: INTERVENTIONS:  - Assess and monitor for signs and symptoms of infection  - Monitor lab/diagnostic results  - Monitor all insertion sites, i e  indwelling lines, tubes, and drains  - Monitor endotracheal if appropriate and nasal secretions for changes in amount and color  - [place] appropriate cooling/warming therapies per order  - Administer medications as ordered  - Instruct and encourage patient and family to use good hand hygiene technique  - Identify and instruct in appropriate isolation precautions for identified infection/condition  Outcome: Progressing     Problem: SAFETY ADULT  Goal: Maintain or return to baseline ADL function  Description: INTERVENTIONS:  -  Assess patient's ability to carry out ADLs; assess patient's baseline for ADL function and identify physical deficits which impact ability to perform ADLs (bathing, care of mouth/teeth, toileting, grooming, dressing, etc )  - Assess/evaluate cause of self-care deficits   - Assess range of motion  - Assess patient's mobility; develop plan if impaired  - Assess patient's need for assistive devices and provide as appropriate  - Encourage maximum independence but intervene and supervise when necessary  - Involve family in performance of ADLs  - Assess for home care needs following discharge   - Consider OT consult to assist with ADL evaluation and planning for discharge  - Provide patient education as appropriate  Outcome: Progressing  Goal: Maintains/Returns to pre admission functional level  Description: INTERVENTIONS:  - Perform BMAT or MOVE assessment daily    - Set and communicate daily mobility goal to care team and patient/family/caregiver  - Collaborate with rehabilitation services on mobility goals if consulted  - Perform Range of Motion 3  times a day  - Reposition patient every 2 hours    - Out of bed for toileting  - Record patient progress and toleration of activity level   Outcome: Progressing  Goal: Patient will remain free of falls  Description: INTERVENTIONS:  - Educate patient/family on patient safety including physical limitations  - Instruct patient to call for assistance with activity   - Consult OT/PT to assist with strengthening/mobility   - Keep Call bell within reach  - Keep bed low and locked with side rails adjusted as appropriate  - Keep care items and personal belongings within reach  - Initiate and maintain comfort rounds  - Make Fall Risk Sign visible to staff  -- Apply yellow socks and bracelet for high fall risk patients  - Consider moving patient to room near nurses station  Outcome: Progressing     Problem: DISCHARGE PLANNING  Goal: Discharge to home or other facility with appropriate resources  Description: INTERVENTIONS:  - Identify barriers to discharge w/patient and caregiver  - Arrange for needed discharge resources and transportation as appropriate  - Identify discharge learning needs (meds, wound care, etc )  - Arrange for interpretive services to assist at discharge as needed  - Refer to Case Management Department for coordinating discharge planning if the patient needs post-hospital services based on physician/advanced practitioner order or complex needs related to functional status, cognitive ability, or social support system  Outcome: Progressing     Problem: Knowledge Deficit  Goal: Patient/family/caregiver demonstrates understanding of disease process, treatment plan, medications, and discharge instructions  Description: Complete learning assessment and assess knowledge base    Interventions:  - Provide teaching at level of understanding  - Provide teaching via preferred learning methods  Outcome: Progressing     Problem: MUSCULOSKELETAL - ADULT  Goal: Maintain or return mobility to safest level of function  Description: INTERVENTIONS:  - Assess patient's ability to carry out ADLs; assess patient's baseline for ADL function and identify physical deficits which impact ability to perform ADLs (bathing, care of mouth/teeth, toileting, grooming, dressing, etc )  - Assess/evaluate cause of self-care deficits   - Assess range of motion  - Assess patient's mobility  - Assess patient's need for assistive devices and provide as appropriate  - Encourage maximum independence but intervene and supervise when necessary  - Involve family in performance of ADLs  - Assess for home care needs following discharge   - Consider OT consult to assist with ADL evaluation and planning for discharge  - Provide patient education as appropriate  Outcome: Progressing  Goal: Maintain proper alignment of affected body part  Description: INTERVENTIONS:  - Support, maintain and protect limb and body alignment  - Provide patient/ family with appropriate education  Outcome: Progressing

## 2022-08-27 NOTE — ASSESSMENT & PLAN NOTE
· Secondary  To UTI, growing enterobacter and pseudomonas  Start on cefepime  DC vancomycin  Transition to PO cipro at DC  · Sepsis has resolved  ·  Criteria met:  Fever, tachycardia, abnormal UA  · Procalcitonin elevated but downtrending  · Blood culture shows Staph epidermidis in 1 of the 2 sets  · Lactic acid normal  · XR shows no PNA  · Blood culture 1/2 coag negative staph - represents contaminant  2/2: no growth 72 hours  · Antibiotics: previously on IV vancomycin #3 and ceftriaxone, #4  Discontinued vancomycin 8/25 as 24hr culture negative   Started on cefepime today  · IV fluids:  LR at 75 cc/hr

## 2022-08-27 NOTE — DISCHARGE SUMMARY
[facility]  Discharge- Gayle Leiva 1990, 32 y o  female MRN: [id number]  Unit/Bed#: -01 Encounter: [id number]  Primary Care Provider: Aishwarya Moralez MD   Date and time admitted to hospital: 8/2/2022 12:01 PM      Discharge date:  08/28/2022  * Sepsis [facility])  Assessment & Plan  · Secondary  To UTI, growing enterobacter and pseudomonas  Start on cefepime  DC vancomycin  Transition to PO cipro at DC  · Sepsis has resolved  ·  Criteria met:  Fever, tachycardia, abnormal UA  · Procalcitonin elevated but downtrending  · Blood culture shows Staph epidermidis in 1 of the 2 sets  · Lactic acid normal  · XR shows no PNA  · Blood culture 1/2 coag negative staph - represents contaminant  2/2: no growth 72 hours  · Antibiotics: previously on IV vancomycin #3 and ceftriaxone, #4  Discontinued vancomycin 8/25 as 24hr culture negative  Started on cefepime today  · IV fluids:  LR at 75 cc/hr    Inability to return to living situation  Assessment & Plan  · Discussed with case management  Difficult placement  · Initially, presented to the ED on 8/2 with vomiting and reported that she had not been getting proper care at home requesting placement     · Case management consulted and following  · PT OT recommending short-term rehab  · No new complaints today    Quadriplegic spinal paralysis ([address] )  Assessment & Plan  · Status post spinal epidural abscess in the setting of IV drug abuse  · Patient is essentially homeless and was staying with 25-year-old grandmother who acts as primary caregiver  · Improved sleep paraplegia patient does have some lower extremity sensation and movement as well and has the ability to return to full functioning status with appropriate rehab  · Frequent turns  · Assist with all feeding  · Initially had Gonzalez this has since been discontinued as patient was only utilizing this for vacation  · Continue urinary retention protocol  · Continue pain control with aqua K, baclofen, and gabapentin  · No worsening    Urinary retention  Assessment & Plan  · Pt does not want a awan catheter  · Follow retention protocol    Lower extremity edema  Assessment & Plan  · Stable  · Chronic lower extremity edema greater in left and right   · Patient reports that she had a duplex of lower extremity was clear last year  · Repeat this admission Lower extremity Doppler negative for DVT  · Elevate extremities  · Patient was previously on Eliquis but states this medication was discontinued    Anxiety  Assessment & Plan  · Stable, atarax prn  · Continue Xanax per PTA medication regimen as well as Ambien for sleep  · Mood stable      Medical Problems             Resolved Problems  Date Reviewed: 8/27/2022   None               Discharging Physician / Practitioner: Kim Romo MD  PCP: Jesse Chino MD  Admission Date:   Admission Orders (From admission, onward)     Ordered        08/04/22 1138  Inpatient Admission  Once            08/02/22 1531  Place in Observation  Once                      Discharge Date: 08/27/22    Consultations During Hospital Stay:  IP CONSULT TO CASE MANAGEMENT  · IP CONSULT TO PHARMACY      Procedures Performed:   XR chest portable   Final Result by Hipolito Donis MD (08/23 1446)      No acute cardiopulmonary disease                    Workstation performed: [id number]         VAS lower limb venous duplex study, unilateral/limited   Final Result by Worthy Soulier, MD (08/02 2002)      ·       Significant Findings / Test Results:   · UTI : enterobacter and pseudomonas    Incidental Findings:   · none     Test Results Pending at Discharge (will require follow up):   · none     Outpatient Tests Requested:  · none    Complications:  none    Reason for Admission: Ayanna Castaneda is a 32 y o  female with past medical history as noted in table above who presents with Nausea (Patient states that nausea and vomiting since this am  States that the last time she had food was at 0500 this am  Also wishes to have a consult to be placed in a nursing facility because she is not getting the proper care at home  Paraplegic  Patient also states that she randomly shaved her hair yesterday because "I have not been feeling well"/)     However the true concerns of the patient is that she lives with her grandmother who has not been taking proper care of her  Noah Gutierrez has a friend that has been helping but she is moving away and she feels like she has nowhere to go which she can get the help that she needs  Noah Gutierrez feels unsafe going back home and she can not take care of herself  Noah Gutierrez does have a visiting nurse that comes about once a week  [facility] is requesting evaluation and treatment for placement purposes      She has chronic lower extremity edema but the left is much more swollen than the right and this is acute on chronic        Hospital Course:   Elli Gambino is a 32 y o  female patient who originally presented to the hospital on 8/2/2022 due to social issues at home and wanting placement  Pt has quadriplegic spinal paralysis s/p spinal epidural abscess in setting of IV drug abuse  Pt has episodes of urinary retention and was straight cathd  Refused awan catheter here  Then she had sepsis from UTI due to pseudomonas and enterobacter  Treated with IV cefepime and vanco initially and tailored as per sensitivities  She is being discharged on cipro to finish her antibiotic course  Case management tried to place her but were unable to find a SNF for her  Pt will be discharged  [facility] the [facility]  She also has seborrheic dermatitis on her face and has been recommended to use ketoconazole shampoo for the same and followup with PCP as OP    Please see above list of diagnoses and related plan for additional information  Condition at Discharge: stable    Discharge Day Visit / Exam:   Subjective:  "I am fine   I am ready to go home today"  Vitals: Blood Pressure: 96/62 (08/27/22 5636)  Pulse: 76 (08/27/22 9458)  Temperature: 98 1 °F (36 7 °C) (08/26/22 2308)  Temp Source: Oral (08/23/22 0715)  Respirations: 21 (08/26/22 0722)  Height: 5' 8" (172 7 cm) (08/03/22 2120)  Weight - Scale: 81 6 kg (179 lb 14 3 oz) (08/03/22 2120)  SpO2: 96 % (08/27/22 0820)  Exam:   Physical Exam s1,2 (+) R  Lungs CTA  Seborrheic dermatitis rash on the face and dandruff in the scalp  abd nt nd bs (+) 4 quads  No new focal neuro deficits   A&Ox3    Discussion with Family: Patient declined call to   Discharge instructions/Information to patient and family:   See after visit summary for information provided to patient and family  Provisions for Follow-Up Care:  See after visit summary for information related to follow-up care and any pertinent home health orders  Disposition:   Home with [facility] Services (Reminder: Complete face to face encounter)    Planned Readmission: no     Discharge Statement:  I spent 35 minutes discharging the patient  This time was spent on the day of discharge  I had direct contact with the patient on the day of discharge  Greater than 50% of the total time was spent examining patient, answering all patient questions, arranging and discussing plan of care with patient as well as directly providing post-discharge instructions  Additional time then spent on discharge activities  Discharge Medications:  See after visit summary for reconciled discharge medications provided to patient and/or family        **Please Note: This note may have been constructed using a voice recognition system**

## 2022-08-27 NOTE — ASSESSMENT & PLAN NOTE
· Status post spinal epidural abscess in the setting of IV drug abuse  · Patient is essentially homeless and was staying with 71-year-old grandmother who acts as primary caregiver  · Improved sleep paraplegia patient does have some lower extremity sensation and movement as well and has the ability to return to full functioning status with appropriate rehab  · Frequent turns  · Assist with all feeding  · Initially had Gonzalez this has since been discontinued as patient was only utilizing this for vacation  · Continue urinary retention protocol  · Continue pain control with aqua K, baclofen, and gabapentin  · No worsening

## 2022-08-28 VITALS
HEIGHT: 68 IN | BODY MASS INDEX: 27.26 KG/M2 | OXYGEN SATURATION: 95 % | WEIGHT: 179.9 LBS | HEART RATE: 94 BPM | SYSTOLIC BLOOD PRESSURE: 99 MMHG | TEMPERATURE: 100 F | RESPIRATION RATE: 17 BRPM | DIASTOLIC BLOOD PRESSURE: 60 MMHG

## 2022-08-28 LAB — BACTERIA BLD CULT: NORMAL

## 2022-08-28 RX ADMIN — DULOXETINE HYDROCHLORIDE 60 MG: 60 CAPSULE, DELAYED RELEASE ORAL at 08:33

## 2022-08-28 RX ADMIN — Medication 400 MG: at 08:33

## 2022-08-28 RX ADMIN — CLONAZEPAM 1 MG: 1 TABLET ORAL at 08:33

## 2022-08-28 RX ADMIN — BACLOFEN 10 MG: 10 TABLET ORAL at 08:33

## 2022-08-28 RX ADMIN — GABAPENTIN 400 MG: 400 CAPSULE ORAL at 08:32

## 2022-08-28 RX ADMIN — CEFEPIME HYDROCHLORIDE 2000 MG: 2 INJECTION, SOLUTION INTRAVENOUS at 10:53

## 2022-08-28 NOTE — PLAN OF CARE
Problem: INFECTION - ADULT  Goal: Absence or prevention of progression during hospitalization  Description: INTERVENTIONS:  - Assess and monitor for signs and symptoms of infection  - Monitor lab/diagnostic results  - Monitor all insertion sites, i e  indwelling lines, tubes, and drains  - Monitor endotracheal if appropriate and nasal secretions for changes in amount and color  - [place] appropriate cooling/warming therapies per order  - Administer medications as ordered  - Instruct and encourage patient and family to use good hand hygiene technique  - Identify and instruct in appropriate isolation precautions for identified infection/condition  Outcome: Progressing     Problem: Prexisting or High Potential for Compromised Skin Integrity  Goal: Skin integrity is maintained or improved  Description: INTERVENTIONS:  - Identify patients at risk for skin breakdown  - Assess and monitor skin integrity  - Assess and monitor nutrition and hydration status  - Monitor labs   - Assess for incontinence   - Turn and reposition patient  - Assist with mobility/ambulation  - Relieve pressure over bony prominences  - Avoid friction and shearing  - Provide appropriate hygiene as needed including keeping skin clean and dry  - Evaluate need for skin moisturizer/barrier cream  - Collaborate with interdisciplinary team   - Patient/family teaching  - Consider wound care consult   Outcome: Progressing     Problem: SAFETY ADULT  Goal: Patient will remain free of falls  Description: INTERVENTIONS:  - Educate patient/family on patient safety including physical limitations  - Instruct patient to call for assistance with activity   - Consult OT/PT to assist with strengthening/mobility   - Keep Call bell within reach  - Keep bed low and locked with side rails adjusted as appropriate  - Keep care items and personal belongings within reach  - Initiate and maintain comfort rounds  - Make Fall Risk Sign visible to staff  - Consider moving patient to room near nurses station  Outcome: Progressing

## 2022-08-28 NOTE — PLAN OF CARE
Problem: Prexisting or High Potential for Compromised Skin Integrity  Goal: Skin integrity is maintained or improved  Description: INTERVENTIONS:  - Identify patients at risk for skin breakdown  - Assess and monitor skin integrity  - Assess and monitor nutrition and hydration status  - Monitor labs   - Assess for incontinence   - Turn and reposition patient  - Assist with mobility/ambulation  - Relieve pressure over bony prominences  - Avoid friction and shearing  - Provide appropriate hygiene as needed including keeping skin clean and dry  - Evaluate need for skin moisturizer/barrier cream  - Collaborate with interdisciplinary team   - Patient/family teaching  - Consider wound care consult   Outcome: Progressing     Problem: INFECTION - ADULT  Goal: Absence or prevention of progression during hospitalization  Description: INTERVENTIONS:  - Assess and monitor for signs and symptoms of infection  - Monitor lab/diagnostic results  - Monitor all insertion sites, i e  indwelling lines, tubes, and drains  - Monitor endotracheal if appropriate and nasal secretions for changes in amount and color  - [place] appropriate cooling/warming therapies per order  - Administer medications as ordered  - Instruct and encourage patient and family to use good hand hygiene technique  - Identify and instruct in appropriate isolation precautions for identified infection/condition  Outcome: Progressing     Problem: Knowledge Deficit  Goal: Patient/family/caregiver demonstrates understanding of disease process, treatment plan, medications, and discharge instructions  Description: Complete learning assessment and assess knowledge base    Interventions:  - Provide teaching at level of understanding  - Provide teaching via preferred learning methods  Outcome: Progressing

## 2022-08-28 NOTE — PROGRESS NOTES
AVS reviewed with pt, instructions for taking discharge medications as well as side effects explained  Pt's qustions and concerns answered   Pt transport in stretcher  With the transport team

## 2022-09-09 NOTE — UTILIZATION REVIEW
Lakshmi Hernandes MD   Physician   Hospitalist   Discharge Summary       Addendum   Date of Service:  8/28/2022 12:15 PM                       Show:Clear all  [x]Manual[x]Template[x]Copied    Added by:  [x]Jose Riley MD      []Ben for details    [address] [facility]  Discharge- Delbra Flatter 1990, 32 y o  female MRN: [id number]  Unit/Bed#: -01 Encounter: [id number]  Primary Care Provider: Jorge Oliver MD   Date and time admitted to hospital: 8/2/2022 12:01 PM                                                  Discharge date:  08/28/2022  * Sepsis [facility])  Assessment & Plan  · Secondary  To UTI, growing enterobacter and pseudomonas  Start on cefepime  DC vancomycin  Transition to PO cipro at DC  · Sepsis has resolved  ·  Criteria met:  Fever, tachycardia, abnormal UA  · Procalcitonin elevated but downtrending  · Blood culture shows Staph epidermidis in 1 of the 2 sets  · Lactic acid normal  · XR shows no PNA  · Blood culture 1/2 coag negative staph - represents contaminant  2/2: no growth 72 hours  · Antibiotics: previously on IV vancomycin #3 and ceftriaxone, #4  Discontinued vancomycin 8/25 as 24hr culture negative  Started on cefepime today  · IV fluids:  LR at 75 cc/hr     Inability to return to living situation  Assessment & Plan  · Discussed with case management  Difficult placement  · Initially, presented to the ED on 8/2 with vomiting and reported that she had not been getting proper care at home requesting placement     · Case management consulted and following  · PT OT recommending short-term rehab  · No new complaints today     Quadriplegic spinal paralysis (HCC)  Assessment & Plan  · Status post spinal epidural abscess in the setting of IV drug abuse  · Patient is essentially homeless and was staying with 80-year-old grandmother who acts as primary caregiver  · Improved sleep paraplegia patient does have some lower extremity sensation and movement as well and has the ability to return to full functioning status with appropriate rehab  · Frequent turns  · Assist with all feeding  · Initially had Awan this has since been discontinued as patient was only utilizing this for vacation  · Continue urinary retention protocol  · Continue pain control with aqua K, baclofen, and gabapentin  · No worsening     Urinary retention  Assessment & Plan  · Pt does not want a awan catheter  · Follow retention protocol     Lower extremity edema  Assessment & Plan  · Stable  · Chronic lower extremity edema greater in left and right   · Patient reports that she had a duplex of lower extremity was clear last year  · Repeat this admission Lower extremity Doppler negative for DVT  · Elevate extremities  · Patient was previously on Eliquis but states this medication was discontinued     Anxiety  Assessment & Plan  · Stable, atarax prn  · Continue Xanax per PTA medication regimen as well as Ambien for sleep  · Mood stable            Medical Problems                  Resolved Problems  Date Reviewed: 8/27/2022   None                   Discharging Physician / Practitioner: Carlos Alberto Trejo MD  PCP: Ge Morales MD  Admission Date:       Admission Orders (From admission, onward)              Ordered          08/04/22 1138   Inpatient Admission  Once              08/02/22 1531   Place in Observation  Once                          Discharge Date: 08/27/22     Consultations During Hospital Stay:  · IP CONSULT TO CASE MANAGEMENT  · IP CONSULT TO PHARMACY        Procedures Performed:   · XR chest portable ·   Final Result by Otilio Pierre MD (08/23 1446) ·     ·   No acute cardiopulmonary disease   ·     ·     ·     ·     ·     ·   Workstation performed: [id number] ·     ·     ·   VAS lower limb venous duplex study, unilateral/limited ·   Final Result by Koffi Jha MD (08/02 2002) ·     ·            Significant Findings / Test Results:   · UTI : enterobacter and pseudomonas     Incidental Findings:   · none      Test Results Pending at Discharge (will require follow up):   · none     Outpatient Tests Requested:  · none     Complications:  none     Reason for Admission: Melburn Hashimoto a 32 y o  female with past medical history as noted in table above who presents with Nausea (Patient states that nausea and vomiting since this am  States that the last time she had food was at 0500 this am  Also wishes to have a consult to be placed in a nursing facility because she is not getting the proper care at home  Paraplegic  Patient also states that she randomly shaved her hair yesterday because "I have not been feeling well"/)     However the true concerns of the patient is that she lives with her grandmother who has not been taking proper care of her  Bijan Kaufman has a friend that has been helping but she is moving away and she feels like she has nowhere to go which she can get the help that she needs  Bijan Kaufman feels unsafe going back home and she can not take care of herself  Bijanbenson Kaufman does have a visiting nurse that comes about once a week  Lynnann Dakin is requesting evaluation and treatment for placement purposes      She has chronic lower extremity edema but the left is much more swollen than the right and this is acute on chronic         Hospital Course:   Siddhartha Rene is a 32 y o  female patient who originally presented to the hospital on 8/2/2022 due to social issues at home and wanting placement  Pt has quadriplegic spinal paralysis s/p spinal epidural abscess in setting of IV drug abuse  Pt has episodes of urinary retention and was straight cathd  Refused awan catheter here  Then she had sepsis from UTI due to pseudomonas and enterobacter  Treated with IV cefepime and vanco initially and tailored as per sensitivities  She is being discharged on cipro to finish her antibiotic course  Case management tried to place her but were unable to find a SNF for her  Pt will be discharged  Back home under the care of her family and with [facility] [place]   She also has seborrheic dermatitis on her face and has been recommended to use ketoconazole shampoo for the same and followup with PCP as OP     Please see above list of diagnoses and related plan for additional information       Condition at Discharge: stable     Discharge Day Visit / Exam:   Subjective:  "I am fine  I am ready to go home today"  Vitals: Blood Pressure: 96/62 (08/27/22 4604)  Pulse: 76 (08/27/22 0633)  Temperature: 98 1 °F (36 7 °C) (08/26/22 2308)  Temp Source: Oral (08/23/22 0715)  Respirations: 21 (08/26/22 0722)  Height: 5' 8" (172 7 cm) (08/03/22 2120)  Weight - Scale: 81 6 kg (179 lb 14 3 oz) (08/03/22 2120)  SpO2: 96 % (08/27/22 0820)  Exam:   Physical Exam s1,2 (+) R  Lungs CTA  Seborrheic dermatitis rash on the face and dandruff in the scalp  abd nt nd bs (+) 4 quads  No new focal neuro deficits   A&Ox3     Discussion with Family: Patient declined call to        Discharge instructions/Information to patient and family:   See after visit summary for information provided to patient and family        Provisions for Follow-Up Care:  See after visit summary for information related to follow-up care and any pertinent home health orders  Disposition:   Home with VNA Services (Reminder: Complete face to face encounter)     Planned Readmission: no     Discharge Statement:  I spent 35 minutes discharging the patient  This time was spent on the day of discharge  I had direct contact with the patient on the day of discharge  Greater than 50% of the total time was spent examining patient, answering all patient questions, arranging and discussing plan of care with patient as well as directly providing post-discharge instructions    Additional time then spent on discharge activities      Discharge Medications:  See after visit summary for reconciled discharge medications provided to patient and/or family        **Please Note: This note may have been constructed using a voice recognition system**    Revision History

## 2022-10-22 PROBLEM — A41.9 SEPSIS (HCC): Status: RESOLVED | Noted: 2022-08-23 | Resolved: 2022-10-22

## 2023-01-18 NOTE — TELEPHONE ENCOUNTER
AP visit first
I spoke to facility and Wilber Clarence will be calling back  If she want to reschedule the 6/9 apt then pt can be rescheduled for next available non emergent new patient AP appt  
I spoke to facility and pt is scheduled for 6/9 
Inna Cherry called to reschedule NP appointment on 06/09/22  Please review and see when patient can be seen  They will keep appointment on the books until a call back to see availability      Inna Espitia can be reached at [phone]
Lashonda Acosta from [facility] called to verify if patient had a follow up appointment  Patient is being discharged today  No appointment in the system for patient but she is to schedule a ct scan of abdomen/pelvis to evaluate her kidney stones  Lashonda Acosta will let patient know to schedule out patient testing 
Patient with history of paraplegia r/t a paraspinal abscess  Has neurogenic bladder  Called and spoke with [facility] nurse who first said patient is retaining per her AP's  She then said that patient has been voiding volitionally and empties her bladder but has no feeling of having to urinate and can be incontinent  Patient has previously had virtual visit with [facility] urology  Currently the [facility] NP wants patient to get urodynamic study  Explained she would most likely have to come in for a NP appt first to be evaluated  Left it that I would send to APs for advisement and then call them back    Nurse in agreement
Please Triage  New Patient    What is the reason for the patients appointment? Urodynamic study  Stevan Evans from [employer] [facility] in [facility]  Pt has an active awan and has acute urinary retention     What office location does the patient prefer? [place]    Imaging/Lab Results: lab work done at the nursing home    Do we accept the patient's insurance or is the patient Self-Pay? Insurance Provider: [employer]  Plan Type/Number:  Member ID#: Has the patient had any previous Urologist(s)? [facility]    Have patient records been requested? If not are records showing in Epic: No    Has the patient had any outside testing done? No     Does the patient have a personal history of cancer?  No     Stevan Evans at [facility] [phone]
Ramon Pascual from [facility] called back and they will keep appointment for 06/09/22 since next available appointment is in August 
on the discharge service for the patient. I have reviewed and made amendments to the documentation where necessary.

## 2023-03-30 ENCOUNTER — NURSING HOME VISIT (OUTPATIENT)
Dept: POST ACUTE CARE | Facility: EXTERNAL LOCATION | Age: 33
End: 2023-03-30
Payer: COMMERCIAL

## 2023-03-30 VITALS
HEART RATE: 87 BPM | RESPIRATION RATE: 16 BRPM | DIASTOLIC BLOOD PRESSURE: 75 MMHG | OXYGEN SATURATION: 98 % | TEMPERATURE: 97 F | SYSTOLIC BLOOD PRESSURE: 121 MMHG | WEIGHT: 212 LBS

## 2023-03-30 DIAGNOSIS — M25.511 CHRONIC RIGHT SHOULDER PAIN: ICD-10-CM

## 2023-03-30 DIAGNOSIS — G89.29 CHRONIC RIGHT SHOULDER PAIN: ICD-10-CM

## 2023-03-30 DIAGNOSIS — M62.838 MUSCLE SPASMS OF BOTH LOWER EXTREMITIES: Primary | ICD-10-CM

## 2023-03-30 DIAGNOSIS — Z72.0 CURRENT NICOTINE USE: ICD-10-CM

## 2023-03-30 PROCEDURE — 99308 SBSQ NF CARE LOW MDM 20: CPT | Performed by: NURSE PRACTITIONER

## 2023-03-30 NOTE — ASSESSMENT & PLAN NOTE
Wants to quit Nicotine Vape  Nicotine 21 mg patch- apply new patch daily   Do Not apply if continues to vape

## 2023-03-30 NOTE — LETTER
Patient: Girma Simmons  : 1990    Encounter Date: 2023    Patient ID: Girma Simmons is a 32 y.o. female who is long term resident being seen and evaluated for multiple medical problems.  [id number]   1990    /75   Pulse 87   Temp 36.1 °C (97 °F)   Resp 16   Wt 96.2 kg (212 lb)   SpO2 98%     Assessment/Plan  Problem List Items Addressed This Visit          Musculoskeletal    Chronic right shoulder pain     Pain with passive ROM  Schedule apt with Dr De La Paz (Orthopedic)   PT  Acetaminophen 650 mg by mouth every 4 hrs as needed           Muscle spasms of both lower extremities - Primary     Increase baclofen to 15 mg by mouth TID            Other    Current nicotine use     Wants to quit Nicotine Vape  Nicotine 21 mg patch- apply new patch daily   Do Not apply if continues to vape              HPI: Client wants to quit using Nicotine Vape. Client C/O right shoulder pain with PT. Client C/O worsening muscle spasms to BLE. Client denies HA, dizziness, or lightheadedness. Denies CP, SOB, Cough, or increased edema. Denies abdominal pain, N/V, diarrhea, or constipation. Denies dysuria. Denies change in appetite. Denies insomnia.     Review of Systems  ROS as described in in HPI     Physical Exam  Constitutional:       Comments: No distress    HENT:      Head: Normocephalic.      Mouth/Throat:      Mouth: Mucous membranes are moist.   Cardiovascular:      Rate and Rhythm: Normal rate and regular rhythm.   Pulmonary:      Effort: Pulmonary effort is normal.      Comments: RA  Abdominal:      General: Bowel sounds are normal.   Genitourinary:     Comments: Denies current dysuria   Musculoskeletal:      Cervical back: Neck supple.      Comments: BLE weakness  Power Chair   PT   Skin:     General: Skin is warm and dry.   Neurological:      Mental Status: She is alert and oriented to person, place, and time.   Psychiatric:         Mood and Affect: Mood normal.      Comments: No current heightened anxiety                     Electronically Signed By: MANUEL Cullen-CNP   3/30/23  5:21 PM

## 2023-03-30 NOTE — ASSESSMENT & PLAN NOTE
Pain with passive ROM  Schedule apt with Dr De La Paz (Orthopedic)   PT  Acetaminophen 650 mg by mouth every 4 hrs as needed

## 2023-03-30 NOTE — PROGRESS NOTES
Patient ID: Girma Simmons is a 32 y.o. female who is long term resident being seen and evaluated for multiple medical problems.  [id number]   1990    /75   Pulse 87   Temp 36.1 °C (97 °F)   Resp 16   Wt 96.2 kg (212 lb)   SpO2 98%     Assessment/Plan  Problem List Items Addressed This Visit          Musculoskeletal    Chronic right shoulder pain     Pain with passive ROM  Schedule apt with Dr De La Paz (Orthopedic)   PT  Acetaminophen 650 mg by mouth every 4 hrs as needed           Muscle spasms of both lower extremities - Primary     Increase baclofen to 15 mg by mouth TID            Other    Current nicotine use     Wants to quit Nicotine Vape  Nicotine 21 mg patch- apply new patch daily   Do Not apply if continues to vape              HPI: Client wants to quit using Nicotine Vape. Client C/O right shoulder pain with PT. Client C/O worsening muscle spasms to BLE. Client denies HA, dizziness, or lightheadedness. Denies CP, SOB, Cough, or increased edema. Denies abdominal pain, N/V, diarrhea, or constipation. Denies dysuria. Denies change in appetite. Denies insomnia.     Review of Systems  ROS as described in in HPI     Physical Exam  Constitutional:       Comments: No distress    HENT:      Head: Normocephalic.      Mouth/Throat:      Mouth: Mucous membranes are moist.   Cardiovascular:      Rate and Rhythm: Normal rate and regular rhythm.   Pulmonary:      Effort: Pulmonary effort is normal.      Comments: RA  Abdominal:      General: Bowel sounds are normal.   Genitourinary:     Comments: Denies current dysuria   Musculoskeletal:      Cervical back: Neck supple.      Comments: BLE weakness  Power Chair   PT   Skin:     General: Skin is warm and dry.   Neurological:      Mental Status: She is alert and oriented to person, place, and time.   Psychiatric:         Mood and Affect: Mood normal.      Comments: No current heightened anxiety

## 2023-05-17 ENCOUNTER — HOSPITAL ENCOUNTER (INPATIENT)
Facility: HOSPITAL | Age: 33
LOS: 13 days | Discharge: NURSING, REHAB OR LONG-TERM CARE | End: 2023-06-01
Attending: EMERGENCY MEDICINE | Admitting: INTERNAL MEDICINE

## 2023-05-17 ENCOUNTER — APPOINTMENT (EMERGENCY)
Dept: RADIOLOGY | Facility: HOSPITAL | Age: 33
End: 2023-05-17

## 2023-05-17 DIAGNOSIS — Z65.9 POOR SOCIAL SITUATION: ICD-10-CM

## 2023-05-17 DIAGNOSIS — S82.51XA DISPLACED FRACTURE OF MEDIAL MALLEOLUS OF RIGHT TIBIA, INITIAL ENCOUNTER FOR CLOSED FRACTURE: ICD-10-CM

## 2023-05-17 DIAGNOSIS — N39.0 UTI (URINARY TRACT INFECTION): Primary | ICD-10-CM

## 2023-05-17 DIAGNOSIS — K08.89 TOOTHACHE: ICD-10-CM

## 2023-05-17 DIAGNOSIS — K59.00 CONSTIPATION: ICD-10-CM

## 2023-05-17 DIAGNOSIS — G82.50 QUADRIPLEGIC SPINAL PARALYSIS (HCC): ICD-10-CM

## 2023-05-17 DIAGNOSIS — S99.911A RIGHT ANKLE INJURY: ICD-10-CM

## 2023-05-17 DIAGNOSIS — R62.7 FAILURE TO THRIVE IN ADULT: ICD-10-CM

## 2023-05-17 DIAGNOSIS — S82.891A CLOSED FRACTURE OF RIGHT ANKLE, INITIAL ENCOUNTER: ICD-10-CM

## 2023-05-17 LAB
ALBUMIN SERPL BCP-MCNC: 4.1 G/DL (ref 3.5–5)
ALP SERPL-CCNC: 71 U/L (ref 34–104)
ALT SERPL W P-5'-P-CCNC: 26 U/L (ref 7–52)
ANION GAP SERPL CALCULATED.3IONS-SCNC: 10 MMOL/L (ref 4–13)
APTT PPP: 25 SECONDS (ref 23–37)
AST SERPL W P-5'-P-CCNC: 30 U/L (ref 13–39)
BASOPHILS # BLD AUTO: 0.04 THOUSANDS/ÂΜL (ref 0–0.1)
BASOPHILS NFR BLD AUTO: 1 % (ref 0–1)
BILIRUB SERPL-MCNC: 0.46 MG/DL (ref 0.2–1)
BUN SERPL-MCNC: 9 MG/DL (ref 5–25)
CALCIUM SERPL-MCNC: 9.1 MG/DL (ref 8.4–10.2)
CARDIAC TROPONIN I PNL SERPL HS: <2 NG/L
CHLORIDE SERPL-SCNC: 106 MMOL/L (ref 96–108)
CO2 SERPL-SCNC: 23 MMOL/L (ref 21–32)
CREAT SERPL-MCNC: 0.44 MG/DL (ref 0.6–1.3)
EOSINOPHIL # BLD AUTO: 0.09 THOUSAND/ÂΜL (ref 0–0.61)
EOSINOPHIL NFR BLD AUTO: 2 % (ref 0–6)
ERYTHROCYTE [DISTWIDTH] IN BLOOD BY AUTOMATED COUNT: 14.6 % (ref 11.6–15.1)
GFR SERPL CREATININE-BSD FRML MDRD: 133 ML/MIN/1.73SQ M
GLUCOSE SERPL-MCNC: 102 MG/DL (ref 65–140)
HCT VFR BLD AUTO: 38.2 % (ref 34.8–46.1)
HGB BLD-MCNC: 12.7 G/DL (ref 11.5–15.4)
IMM GRANULOCYTES # BLD AUTO: 0.01 THOUSAND/UL (ref 0–0.2)
IMM GRANULOCYTES NFR BLD AUTO: 0 % (ref 0–2)
INR PPP: 1.01 (ref 0.84–1.19)
LACTATE SERPL-SCNC: 2.1 MMOL/L (ref 0.5–2)
LYMPHOCYTES # BLD AUTO: 2.2 THOUSANDS/ÂΜL (ref 0.6–4.47)
LYMPHOCYTES NFR BLD AUTO: 41 % (ref 14–44)
MCH RBC QN AUTO: 30.1 PG (ref 26.8–34.3)
MCHC RBC AUTO-ENTMCNC: 33.2 G/DL (ref 31.4–37.4)
MCV RBC AUTO: 91 FL (ref 82–98)
MONOCYTES # BLD AUTO: 0.33 THOUSAND/ÂΜL (ref 0.17–1.22)
MONOCYTES NFR BLD AUTO: 6 % (ref 4–12)
NEUTROPHILS # BLD AUTO: 2.67 THOUSANDS/ÂΜL (ref 1.85–7.62)
NEUTS SEG NFR BLD AUTO: 50 % (ref 43–75)
NRBC BLD AUTO-RTO: 0 /100 WBCS
PLATELET # BLD AUTO: 199 THOUSANDS/UL (ref 149–390)
PMV BLD AUTO: 9 FL (ref 8.9–12.7)
POTASSIUM SERPL-SCNC: 4.1 MMOL/L (ref 3.5–5.3)
PROCALCITONIN SERPL-MCNC: <0.05 NG/ML
PROT SERPL-MCNC: 7.5 G/DL (ref 6.4–8.4)
PROTHROMBIN TIME: 13.1 SECONDS (ref 11.6–14.5)
RBC # BLD AUTO: 4.22 MILLION/UL (ref 3.81–5.12)
SODIUM SERPL-SCNC: 139 MMOL/L (ref 135–147)
WBC # BLD AUTO: 5.34 THOUSAND/UL (ref 4.31–10.16)

## 2023-05-17 RX ORDER — IBUPROFEN 600 MG/1
TABLET ORAL EVERY 6 HOURS PRN
COMMUNITY
End: 2023-05-18

## 2023-05-17 RX ORDER — KETOROLAC TROMETHAMINE 30 MG/ML
15 INJECTION, SOLUTION INTRAMUSCULAR; INTRAVENOUS ONCE
Status: COMPLETED | OUTPATIENT
Start: 2023-05-17 | End: 2023-05-17

## 2023-05-17 RX ORDER — TAMSULOSIN HYDROCHLORIDE 0.4 MG/1
0.4 CAPSULE ORAL
COMMUNITY

## 2023-05-17 RX ORDER — QUETIAPINE FUMARATE 100 MG/1
500 TABLET, FILM COATED ORAL
COMMUNITY

## 2023-05-17 RX ADMIN — KETOROLAC TROMETHAMINE 15 MG: 30 INJECTION, SOLUTION INTRAMUSCULAR; INTRAVENOUS at 23:20

## 2023-05-17 RX ADMIN — SODIUM CHLORIDE 1000 ML: 0.9 INJECTION, SOLUTION INTRAVENOUS at 23:37

## 2023-05-18 PROBLEM — N39.0 UTI (URINARY TRACT INFECTION): Status: ACTIVE | Noted: 2023-05-18

## 2023-05-18 PROBLEM — F11.11 NONDEPENDENT OPIOID ABUSE IN REMISSION (HCC): Status: ACTIVE | Noted: 2021-10-15

## 2023-05-18 PROBLEM — E87.20 LACTIC ACIDOSIS: Status: ACTIVE | Noted: 2023-05-18

## 2023-05-18 PROBLEM — S93.401A SPRAIN OF RIGHT ANKLE: Status: ACTIVE | Noted: 2023-05-18

## 2023-05-18 LAB
ATRIAL RATE: 89 BPM
BACTERIA UR QL AUTO: ABNORMAL /HPF
BILIRUB UR QL STRIP: NEGATIVE
BILIRUB UR QL STRIP: NEGATIVE
CLARITY UR: CLEAR
CLARITY UR: CLEAR
COLOR UR: COLORLESS
COLOR UR: COLORLESS
GLUCOSE UR STRIP-MCNC: NEGATIVE MG/DL
GLUCOSE UR STRIP-MCNC: NEGATIVE MG/DL
HCG SERPL QL: NEGATIVE
HGB UR QL STRIP.AUTO: NEGATIVE
HGB UR QL STRIP.AUTO: NEGATIVE
KETONES UR STRIP-MCNC: NEGATIVE MG/DL
KETONES UR STRIP-MCNC: NEGATIVE MG/DL
LACTATE SERPL-SCNC: 1.8 MMOL/L (ref 0.5–2)
LEUKOCYTE ESTERASE UR QL STRIP: NEGATIVE
LEUKOCYTE ESTERASE UR QL STRIP: NEGATIVE
NITRITE UR QL STRIP: NEGATIVE
NITRITE UR QL STRIP: NEGATIVE
NON-SQ EPI CELLS URNS QL MICRO: ABNORMAL /HPF
P AXIS: 56 DEGREES
PH UR STRIP.AUTO: 5.5 [PH]
PH UR STRIP.AUTO: 5.5 [PH]
PR INTERVAL: 162 MS
PROT UR STRIP-MCNC: NEGATIVE MG/DL
PROT UR STRIP-MCNC: NEGATIVE MG/DL
QRS AXIS: 76 DEGREES
QRSD INTERVAL: 78 MS
QT INTERVAL: 384 MS
QTC INTERVAL: 467 MS
RBC #/AREA URNS AUTO: ABNORMAL /HPF
SP GR UR STRIP.AUTO: 1 (ref 1–1.03)
SP GR UR STRIP.AUTO: 1 (ref 1–1.03)
T WAVE AXIS: -12 DEGREES
UROBILINOGEN UR STRIP-ACNC: <2 MG/DL
UROBILINOGEN UR STRIP-ACNC: <2 MG/DL
VENTRICULAR RATE: 89 BPM
WBC #/AREA URNS AUTO: ABNORMAL /HPF

## 2023-05-18 RX ORDER — LIDOCAINE 50 MG/G
2 PATCH TOPICAL DAILY PRN
Status: DISCONTINUED | OUTPATIENT
Start: 2023-05-18 | End: 2023-06-01 | Stop reason: HOSPADM

## 2023-05-18 RX ORDER — BACLOFEN 10 MG/1
10 TABLET ORAL 3 TIMES DAILY
Status: DISCONTINUED | OUTPATIENT
Start: 2023-05-18 | End: 2023-06-01 | Stop reason: HOSPADM

## 2023-05-18 RX ORDER — AMOXICILLIN 250 MG
1 CAPSULE ORAL
Status: DISCONTINUED | OUTPATIENT
Start: 2023-05-18 | End: 2023-06-01 | Stop reason: HOSPADM

## 2023-05-18 RX ORDER — ENOXAPARIN SODIUM 100 MG/ML
40 INJECTION SUBCUTANEOUS DAILY
Status: DISCONTINUED | OUTPATIENT
Start: 2023-05-18 | End: 2023-06-01 | Stop reason: HOSPADM

## 2023-05-18 RX ORDER — GABAPENTIN 300 MG/1
300 CAPSULE ORAL ONCE
Status: DISCONTINUED | OUTPATIENT
Start: 2023-05-18 | End: 2023-06-01 | Stop reason: HOSPADM

## 2023-05-18 RX ORDER — BISACODYL 10 MG
10 SUPPOSITORY, RECTAL RECTAL DAILY PRN
Status: DISCONTINUED | OUTPATIENT
Start: 2023-05-18 | End: 2023-06-01 | Stop reason: HOSPADM

## 2023-05-18 RX ORDER — POLYETHYLENE GLYCOL 3350 17 G/17G
17 POWDER, FOR SOLUTION ORAL DAILY
Status: DISCONTINUED | OUTPATIENT
Start: 2023-05-18 | End: 2023-05-29

## 2023-05-18 RX ORDER — TAMSULOSIN HYDROCHLORIDE 0.4 MG/1
0.4 CAPSULE ORAL
Status: DISCONTINUED | OUTPATIENT
Start: 2023-05-18 | End: 2023-06-01 | Stop reason: HOSPADM

## 2023-05-18 RX ORDER — ACETAMINOPHEN 325 MG/1
975 TABLET ORAL EVERY 8 HOURS PRN
Status: DISCONTINUED | OUTPATIENT
Start: 2023-05-18 | End: 2023-06-01 | Stop reason: HOSPADM

## 2023-05-18 RX ORDER — GABAPENTIN 300 MG/1
300 CAPSULE ORAL 3 TIMES DAILY
Status: DISCONTINUED | OUTPATIENT
Start: 2023-05-18 | End: 2023-06-01 | Stop reason: HOSPADM

## 2023-05-18 RX ORDER — MAGNESIUM HYDROXIDE/ALUMINUM HYDROXICE/SIMETHICONE 120; 1200; 1200 MG/30ML; MG/30ML; MG/30ML
30 SUSPENSION ORAL EVERY 6 HOURS PRN
Status: DISCONTINUED | OUTPATIENT
Start: 2023-05-18 | End: 2023-06-01 | Stop reason: HOSPADM

## 2023-05-18 RX ORDER — KETOROLAC TROMETHAMINE 30 MG/ML
15 INJECTION, SOLUTION INTRAMUSCULAR; INTRAVENOUS EVERY 6 HOURS PRN
Status: DISCONTINUED | OUTPATIENT
Start: 2023-05-18 | End: 2023-05-19

## 2023-05-18 RX ADMIN — QUETIAPINE FUMARATE 500 MG: 200 TABLET ORAL at 04:17

## 2023-05-18 RX ADMIN — BACLOFEN 10 MG: 10 TABLET ORAL at 04:19

## 2023-05-18 RX ADMIN — TAMSULOSIN HYDROCHLORIDE 0.4 MG: 0.4 CAPSULE ORAL at 04:17

## 2023-05-18 RX ADMIN — TAMSULOSIN HYDROCHLORIDE 0.4 MG: 0.4 CAPSULE ORAL at 22:36

## 2023-05-18 RX ADMIN — PRAZOSIN HYDROCHLORIDE 5 MG: 1 CAPSULE ORAL at 04:17

## 2023-05-18 RX ADMIN — BACLOFEN 10 MG: 10 TABLET ORAL at 17:36

## 2023-05-18 RX ADMIN — CEFEPIME 2000 MG: 2 INJECTION, POWDER, FOR SOLUTION INTRAVENOUS at 14:51

## 2023-05-18 RX ADMIN — SENNOSIDES AND DOCUSATE SODIUM 1 TABLET: 50; 8.6 TABLET ORAL at 22:37

## 2023-05-18 RX ADMIN — GABAPENTIN 300 MG: 300 CAPSULE ORAL at 22:36

## 2023-05-18 RX ADMIN — BACLOFEN 10 MG: 10 TABLET ORAL at 22:36

## 2023-05-18 RX ADMIN — BACLOFEN 10 MG: 10 TABLET ORAL at 08:58

## 2023-05-18 RX ADMIN — SENNOSIDES AND DOCUSATE SODIUM 1 TABLET: 50; 8.6 TABLET ORAL at 04:17

## 2023-05-18 RX ADMIN — CEFEPIME 2000 MG: 2 INJECTION, POWDER, FOR SOLUTION INTRAVENOUS at 04:27

## 2023-05-18 RX ADMIN — KETOROLAC TROMETHAMINE 15 MG: 30 INJECTION, SOLUTION INTRAMUSCULAR; INTRAVENOUS at 04:17

## 2023-05-18 NOTE — ASSESSMENT & PLAN NOTE
· Patient admits to foul odor in urine, secondary to spinal cord injury patient unable to determine if any pain with urination  · UA positive for moderate bacteria  · We will continue IV cefepime 2 g every 12 hours as patient with history of Pseudomonas and Enterobacter in urine  · Urine culture pending

## 2023-05-18 NOTE — PHYSICAL THERAPY NOTE
Physical Therapy Evaluation     Patient's Name: Radhika Hopper    Admitting Diagnosis  Known medical problems [Z78 9]  Ankle injury [P84 220P]    Problem List  Patient Active Problem List   Diagnosis    Failure to thrive in adult    Quadriplegic spinal paralysis ([facility] )    Gonzalez catheter in place    Back pain    Substance abuse ([facility] )    Anxiety    Insomnia    Hepatitis C antibody positive in blood    Inability to return to living situation    Lower extremity edema    Urinary retention    Nondependent opioid abuse in remission ([facility] )    Sprain of right ankle    UTI (urinary tract infection)    Lactic acidosis     Past Medical History  Past Medical History:   Diagnosis Date    Abscess     to the spine aug 10 2021    Cancer ([facility] )     ovarian     Past Surgical History  Past Surgical History:   Procedure Laterality Date    LUMBAR LAMINECTOMY  08/10/2021    evacuation of spinal epidural abcess    OVARIAN CYST REMOVAL      POSTERIOR FUSION CERVICAL SPINE      secondary to evacuation of spinal abcess s/p IVDU      05/18/23 0801   PT Last Visit   PT Visit Date 05/18/23   Note Type   Note type Evaluation   Pain Assessment   Pain Assessment Tool 0-10   Pain Score 3   Pain Location/Orientation Location: Generalized   Hospital Pain Intervention(s) Repositioned   Restrictions/Precautions   Weight Bearing Precautions Per Order No   Braces or Orthoses Other (Comment)  (ankle support brace)   Other Precautions Chair Alarm; Bed Alarm;Multiple lines;Telemetry; Fall Risk   Home Living   Type of [address] [place] [address] One level; Able to live on main level with bedroom/bathroom; Performs ADLs on one level;Ramped entrance  ([address] at front; ramp at back)   Bathroom Shower/Tub Tub/shower unit   Bathroom Toilet Standard  (unable to use)   [address] chair   [address] bed; Wheelchair-manual;Walker   Additional Comments Pt performs pivot from bed to w/c with assist    Prior Function "  Level of [place] Needs assistance with functional mobility; Needs assistance with ADLs; Needs assistance with IADLS   Lives With Family  (grandmother and sister)   Mary Armor Help From Family;Home health  (per patient family unable to care for her at this time; home PT 1x/week)   IADLs Family/Friend/Other provides transportation; Family/Friend/Other provides meals; Family/Friend/Other provides medication management   Falls in the last 6 months 1 to 4  (1 fall;)   General   Family/Caregiver Present No   Cognition   Overall Cognitive Status WFL   Arousal/Participation Alert   Orientation Level Oriented X4   Memory Within functional limits   Following Commands Follows all commands and directions without difficulty   Comments Pt agreeable to PT  Subjective   Subjective \"I'm groggy  \"   RLE Assessment   RLE Assessment X   Strength RLE   R Hip Flexion 2-/5   R Knee Extension 2/5   R Ankle Dorsiflexion 3-/5   Tone RLE   RLE Tone Hypertonic   LLE Assessment   LLE Assessment X   Strength LLE   L Hip Flexion 2-/5   L Knee Extension 2/5   L Ankle Dorsiflexion 3-/5   Tone LLE   LLE Tone Hypertonic   Light Touch   RLE Light Touch Impaired   RLE Light Touch Comments numbness and tingling   LLE Light Touch Grossly intact   Bed Mobility   Rolling R 2  Maximal assistance   Additional items Assist x 2;HOB elevated; Bedrails; Increased time required;Verbal cues;LE management   Additional Comments Pt declined further mobility secondary to fatigue   Endurance Deficit   Endurance Deficit Yes   Endurance Deficit Description decreased activity tolerance   Activity Tolerance   Activity Tolerance Patient limited by fatigue   Medical Staff Made Aware OT Ileana Mcnulty  (Co-evaluation performed with OT secondary to complex medical condition of patient and regression of functional status from baseline  PT/OT goals were addressed separately )   Assessment   Prognosis Good   Problem List Decreased strength;Decreased endurance; Impaired balance;Decreased " mobility; Impaired sensation; Impaired tone;Pain   Assessment Pt is 28year old female seen for PT evaluation s/p admit to [facility] on 5/17/2023 with Failure to thrive in adult  PT consulted to assess pt's functional mobility and discharge needs  Order placed for PT evaluation and treatment, with up and out of bed as tolerated order  Comorbidities affecting pt's physical performance at time of assessment include quadriplegic spinal paralysis, sprain of right ankle, UTI, and lactic acidosi  Prior to hospitalization, pt was requiring assist for mobility  Pt performs stand pivot transfers from bed to w/c with assist  Pt resides with her family, in a one level house with a ramped entrance  Personal factors affecting pt at time of initial evaluation include inability to navigate level surfaces without external assistance, limited home support, inability to live alone, positive fall history, difficulty performing ADLs, and inability to perform IADLs  Please find objective findings from PT assessment regarding body systems outlined above with impairments and limitations including weakness, impaired balance, decreased endurance, pain, decreased activity tolerance, decreased functional mobility tolerance, altered sensation, fall risk, and impaired tone  The following objective measures were performed on initial evaluation Barthel Index: 20/100, Modified [place]: 4 (moderate/severe disability) and AM-PAC 6-Clicks: 0/05  Pt's clinical presentation is currently unstable/unpredictable seen in pt's presentation of need for ongoing medical management/monitoring, pt is a fall risk, and pt requires cues and assist of two for safety with functional mobility  Pt to benefit from continued PT treatment to address deficits as defined above and maximize pt's level of function and independence with mobility   From a PT standpoint, recommendation at time of discharge would be STR pending pt's progress in order to facilitate return to "prior level of function  Barriers to Discharge Inaccessible home environment;Decreased caregiver support   Goals   Patient Goals \"be able to move more  \"   [facility] Expiration Date 05/28/23   Short Term Goal #1 In 10 days: Increase bilateral LE strength 1/2 grade to facilitate independent mobility, Perform all bed mobility tasks with min A of 1 to decrease caregiver burden and PT to see and establish goals for transfers and balance when appropriate   Plan   Treatment/Interventions Functional transfer training;LE strengthening/ROM; Endurance training; Therapeutic exercise;Patient/family training;Bed mobility;Continued evaluation;OT   PT Frequency 3-5x/wk   Recommendation   PT Discharge Recommendation Post acute rehabilitation services   AM-PAC Basic Mobility Inpatient   Turning in Flat Bed Without Bedrails 1   Lying on Back to Sitting on Edge of Flat Bed Without Bedrails 1   Moving Bed to Chair 1   Standing Up From Chair Using Arms 1   Walk in Room 1   Climb 3-5 Stairs With Railing 1   Basic Mobility Inpatient Raw Score 6   Turning Head Towards Sound 4   Follow Simple Instructions 4   Low Function Basic Mobility Raw Score  14   Low Function Basic Mobility Standardized Score  22 01   Highest Level Of Mobility   -[facility] Goal 2: Bed activities/Dependent transfer   -[facility] Achieved 2: Bed activities/Dependent transfer   Modified [place] Scale   Modified [place] Scale 4   Barthel Index   Feeding 5   Bathing 0   Grooming Score 0   Dressing Score 5   Bladder Score 5   Bowels Score 5   Toilet Use Score 0   Transfers (Bed/Chair) Score 0   Mobility (Level Surface) Score 0   Stairs Score 0   Barthel Index Score 20     PT Evaluation Time: 2098-1790  Fly Chua, PT, DPT    "

## 2023-05-18 NOTE — PLAN OF CARE
Problem: PHYSICAL THERAPY ADULT  Goal: Performs mobility at highest level of function for planned discharge setting  See evaluation for individualized goals  Description: Treatment/Interventions: Functional transfer training, LE strengthening/ROM, Endurance training, Therapeutic exercise, Patient/family training, Bed mobility, Continued evaluation, OT          See flowsheet documentation for full assessment, interventions and recommendations  Note: Prognosis: Good  Problem List: Decreased strength, Decreased endurance, Impaired balance, Decreased mobility, Impaired sensation, Impaired tone, Pain  Assessment: Pt is 28year old female seen for PT evaluation s/p admit to [facility] on 5/17/2023 with Failure to thrive in adult  PT consulted to assess pt's functional mobility and discharge needs  Order placed for PT evaluation and treatment, with up and out of bed as tolerated order  Comorbidities affecting pt's physical performance at time of assessment include quadriplegic spinal paralysis, sprain of right ankle, UTI, and lactic acidosi  Prior to hospitalization, pt was requiring assist for mobility  Pt performs stand pivot transfers from bed to w/c with assist  Pt resides with her family, in a one level house with a ramped entrance  Personal factors affecting pt at time of initial evaluation include inability to navigate level surfaces without external assistance, limited home support, inability to live alone, positive fall history, difficulty performing ADLs, and inability to perform IADLs  Please find objective findings from PT assessment regarding body systems outlined above with impairments and limitations including weakness, impaired balance, decreased endurance, pain, decreased activity tolerance, decreased functional mobility tolerance, altered sensation, fall risk, and impaired tone   The following objective measures were performed on initial evaluation Barthel Index: 20/100, Modified [employer]: 4 (moderate/severe disability) and AM-PAC 6-Clicks: 8/04  Pt's clinical presentation is currently unstable/unpredictable seen in pt's presentation of need for ongoing medical management/monitoring, pt is a fall risk, and pt requires cues and assist of two for safety with functional mobility  Pt to benefit from continued PT treatment to address deficits as defined above and maximize pt's level of function and independence with mobility  From a PT standpoint, recommendation at time of discharge would be STR pending pt's progress in order to facilitate return to prior level of function  Barriers to Discharge: Inaccessible home environment, Decreased caregiver support     PT Discharge Recommendation: Post acute rehabilitation services    See flowsheet documentation for full assessment

## 2023-05-18 NOTE — ASSESSMENT & PLAN NOTE
· Elderly grandmother unable to fully care for her  · Patient requesting long-term care facility, due to quadriplegia unable to fully care for self  · PT/OT  · Case management

## 2023-05-18 NOTE — ASSESSMENT & PLAN NOTE
· Admits she had home PT once a week and about 2 weeks ago fell with physical therapist and rolled ankle, admits to some sensation of pain in right ankle  · X-ray right ankle negative for fracture, most likely sprain due to ecchymosis and edema  · Aircast ordered in ED  · Elevate right lower extremity  · As needed ice, Tylenol, IV Toradol for pain

## 2023-05-18 NOTE — OCCUPATIONAL THERAPY NOTE
Occupational Therapy Evaluation        Patient Name: Rosaline Mehta  IQWLT'P Date: 5/18/2023 05/18/23 0825   OT Last Visit   OT Visit Date 05/18/23   Note Type   Note type Evaluation   Pain Assessment   Pain Assessment Tool 0-10   Pain Score 3   Pain Location/Orientation Location: Generalized   Hospital Pain Intervention(s) Repositioned   Restrictions/Precautions   Weight Bearing Precautions Per Order No   Braces or Orthoses Other (Comment)  (ankle support brace)   Other Precautions Chair Alarm; Bed Alarm;Multiple lines;Telemetry; Fall Risk   Home Living   Type of [address] [place] [address] One level; Able to live on main level with bedroom/bathroom; Performs ADLs on one level;Ramped entrance   Bathroom Shower/Tub Tub/shower unit   Bathroom Toilet Standard  (unable to use)   [address] chair   [address] bed; Wheelchair-manual;Walker   Additional Comments Pt performs pivot from bed to w/c with assist    Prior Function   Level of [place] Needs assistance with functional mobility; Needs assistance with ADLs; Needs assistance with IADLS   Lives With Family  (grandmother and sister)   Marianne Martinezen Help From Family;Home health  (per patient family unable to care for her at this time; home PT 1x/week)   IADLs Family/Friend/Other provides transportation; Family/Friend/Other provides meals; Family/Friend/Other provides medication management   Falls in the last 6 months 1 to 4  (1 fall;)   Lifestyle   Autonomy Patient lives with her grandmother in a one story house with ramped entrance  Pt performs pivot from bed to w/c with assist  Patient is mostly bed bound and had been receiving PT home services  Patient had one recent fall during PT session and reported that grandmother is unable to care for her at home     Reciprocal Relationships Supportive family   General   Family/Caregiver Present No   ADL   Eating Assistance 4  Minimal Assistance   Grooming Assistance 4  Minimal Assistance   UB Bathing Assistance 2  Maximal Assistance   LB Bathing Assistance 1  Total Assistance   UB Dressing Assistance 2  Maximal Assistance   LB Dressing Assistance 1  Total Assistance   Toileting Assistance  1  Total Assistance   Functional Assistance 2  Maximal Assistance   Bed Mobility   Rolling R 2  Maximal assistance   Additional items Assist x 2;HOB elevated; Bedrails; Increased time required;Verbal cues;LE management   Additional Comments Pt declined further mobility secondary to fatigue   Activity Tolerance   Activity Tolerance Patient limited by fatigue   Medical Staff Made Aware Co-evaluation performed with PT secondary to complex medical condition of patient and regression of functional status from baseline  RUE Assessment   RUE Assessment X   RUE Overall AROM   R Shoulder Flexion minimal , ~ 10- 20 degrees, requires hand over hand assistance   R Mass Grasp grossly functional with partial , incorporating thumb, limited isolated finger movements  LUE Assessment   LUE Assessment X   LUE Overall AROM   L Shoulder Flexion ~50 degrees of shoulder flexion, , wrist extended with increased tone , unable to make fist actievely   L Wrist Extension neutral position only , no active movement elicited  Hand Function   Gross Motor Coordination Impaired   Fine Motor Coordination Impaired   Sensation   Light Touch Partial deficits in the RUE;Partial deficits in the LUE;Partial deficits in the RLE;Partial deficits in the LLE   Vision-Basic Assessment   Current Vision Does not wear glasses   Cognition   Overall Cognitive Status [facility]   Arousal/Participation Alert; Responsive; Cooperative   Attention Within functional limits   Orientation Level Oriented X4   Memory Within functional limits   Following Commands Follows all commands and directions without difficulty   Assessment   Limitation Decreased ADL status; Decreased UE ROM; Decreased UE strength;Decreased Safe judgement during ADL;Decreased endurance;Decreased fine motor control;Decreased self-care trans;Decreased high-level ADLs   Prognosis Good   Assessment Patient is a 28 y o  female seen for OT evaluation s/p admit to [id number] [place] [address] on 5/17/2023 w/Failure to thrive in adult  Commorbidities affecting patient's functional performance at time of assessment include:  quadriplegic spinal paralysis, sprain of right ankle, UTI, and lactic acidosis  Orders placed for OT evaluation and treatment  Performed at least two patient identifiers during session including name and wristband  Prior to admission, Patient lives with her grandmother in a one story house with ramped entrance  Pt performs pivot from bed to w/c with assist  Patient is mostly bed bound and had been receiving PT home services  Patient had one recent fall during PT session and reported that grandmother is unable to care for her at home  Personal factors affecting patient at time of initial evaluation include: decreased initiation and engagement, difficulty performing ADLs and difficulty performing IADLs  Upon evaluation, patient requires maximal assist for UB ADLs, total assist for LB ADLs Occupational performance is affected by the following deficits: decreased functional use of BUEs, in hand manipulation deficit with impaired reach, grasp and coordination, limited active ROM, decreased muscle strength, degenerative arthritic joint changes, impaired gross motor coordination, impaired fine motor coordination, dynamic sit/ stand balance deficit with poor standing tolerance time for self care and functional mobility, decreased activity tolerance and postural control and postural alignment deficit, requiring external assistance to complete transitional movements  Patient to benefit from continued Occupational Therapy treatment while in the hospital to address deficits as defined above and maximize level of functional independence with ADLs and functional mobility   Occupational "Performance areas to address include: eating, grooming , bathing/ shower, dressing, transfer to all surfaces, functional mobility, emergency response, health maintenance, IADLs: safety procedures and Leisure Participation  From OT standpoint, recommendation at time of d/c would be Short Term Rehab  Goals   Patient Goals \"be able to move more  \"   Plan   Treatment Interventions ADL retraining;Functional transfer training;UE strengthening/ROM; Endurance training;Patient/family training;Equipment evaluation/education; Fine motor coordination activities; Compensatory technique education;Continued evaluation; Energy conservation; Activityengagement   Goal Expiration Date 06/01/23   OT Frequency 3-5x/wk   Recommendation   OT Discharge Recommendation Post acute rehabilitation services   AM-PAC Daily Activity Inpatient   Lower Body Dressing 1   Bathing 1   Toileting 1   Upper Body Dressing 2   Grooming 2   Eating 3   Daily Activity Raw Score 10   Turning Head Towards Sound 3   Follow Simple Instructions 4   Low Function Daily Activity Raw Score 17   Low Function Daily Activity Standardized Score  28 95   AM-PAC Applied Cognition Inpatient   Following a Speech/Presentation 4   Understanding Ordinary Conversation 4   Taking Medications 3   Remembering Where Things Are Placed or Put Away 4   Remembering List of 4-5 Errands 4   Taking Care of Complicated Tasks 4   Applied Cognition Raw Score 23   Applied Cognition Standardized Score 53 08   Barthel Index   Feeding 5   Bathing 0   Grooming Score 0   Dressing Score 5   Bladder Score 5   Bowels Score 5   Toilet Use Score 0   Transfers (Bed/Chair) Score 0   Mobility (Level Surface) Score 0   Stairs Score 0   Barthel Index Score 20           Occupational Therapy goals:  In 5-7 days:  1- Patient will complete self feeding task with set up assist  2- Patient will complete rolling to R/L with use of side rail and mod assist x1    3- Patient will increase OOB/ sitting tolerance to 2-4 hours " per day for increased participation in self care and leisure tasks with no s/s of exertion  4- Patient will verbalize and demonstrate weight shifting technique in bed and sitting position with 10% verbal cues  5- Patient will complete grooming task with set up assist  6-Patient/ Family  will demonstrate competency with UE Home Exercise Program   7- Patient will complete Interest checklist to explore participation in play/ leisure tasks for increased satisfaction and quality of life

## 2023-05-18 NOTE — ASSESSMENT & PLAN NOTE
· Spinal cord injury 2 years ago  · Patient reports she now has sensation and able to move toes and arms, starting to feel some sensation in feet and arms  · P500 specialty bed ordered  · Turn patient every 2 hours  · Patient admits to chronic severe neck/spinal pain  · Encourage patient to follow-up outpatient with pain management   · For now for pain we will add as needed aqua K-pad, lidocaine patches, Tylenol, IV Toradol for moderate to severe pain  · Will also continue home baclofen 3 times daily  · Start gabapentin 300 mg 3 times daily

## 2023-05-18 NOTE — PLAN OF CARE
Pt resting in bed reporting she was awake all night  Repositioned by staff  Voided 700 on one occurrence  Denies pain to right ankle

## 2023-05-18 NOTE — ED PROVIDER NOTES
"History  Chief Complaint   Patient presents with   • Medical Problem     Patient brought in from home- patient reports her family isnt able to take care of her at home  Patient had PT today when patient fell while transferring to bed  Patient reports she rolled her right ankle  Patient also thinks she has a UTI     77-year-old female with a history of quadriplegia presents to the emergency room with multiple complaints  Patient's main complaint is that she is living with her elderly grandmother who also takes care of a disabled grandchild and is not capable of taking care of the patient  Patient has no safe place for her to live and she does not get appropriate care at home because the patient's elderly grandmother is not capable of caring for her  Patient has limited visiting caregivers though patient is incapable of performing most activities of daily living secondary to her quadriplegia and effectively requires 24-hour care  Patient notes that during physical therapy, they \"rolled my ankle\" referring to the right ankle  Patient affirms two episodes, one was 2 weeks ago when she was walking with a walker (she is 2 person maximal assist with this) and she reportedly slipped on gravel  A second episode occurred last week when they were attempting to get her up from bed and she states \"they dropped me  \"  Patient is unsure as to which episode caused her symptoms  Patient does not have any pain as she does not have any pain receptors but she has swelling and ecchymosis over the medial aspect of the ankle  Patient also states \"I have a UTI\" as she states her urine has a foul odor to it  Patient states this has been ongoing for some time and has happened recurrently previously  Impression and plan: 1 )  Inability to care for self  It appears as though this is been building for some time but worsening to the point that there is no safe discharge for the patient at this time    Patient amenable to " placement in any safe location including a SNF  Patient will likely need long-term care as she has no other assistance  2 )  Right ankle swelling after an inversion injury  I do not believe this was intentional based on patient's history whom I interviewed without any family present  We will obtain plain film imaging to evaluate for fracture but on clinical examination appears most consistent with ligamentous injury  3 )  Concerns for urinary tract infection  Patient has a neurogenic bladder with prior urinary tract infections leading to sepsis  Fortunately patient is afebrile at present  Will obtain urinalysis and septic evaluation, per the medical record, patient has had pseudomonal infections previously  We will evaluate continued treatment and evaluation for this after initial evaluation is completed  Prior to Admission Medications   Prescriptions Last Dose Informant Patient Reported? Taking?    Diclofenac Sodium (VOLTAREN) 1 %   No No   Sig: Apply 2 g topically 4 (four) times a day   QUEtiapine (SEROquel) 100 mg tablet   Yes Yes   Sig: Take 500 mg by mouth daily at bedtime Take five tablets by mouth nightly   acetaminophen (TYLENOL) 325 mg tablet   No No   Sig: Take 3 tablets (975 mg total) by mouth every 6 (six) hours as needed for mild pain   baclofen 10 mg tablet   Yes Yes   Sig: Take 10 mg by mouth 3 (three) times a day   bisacodyl (FLEET) 10 MG/30ML ENEM   Yes Yes   Sig: Insert 10 mg into the rectum daily as needed for constipation As needed for constipation   gabapentin (NEURONTIN) 400 mg capsule   No No   Sig: Take 1 capsule (400 mg total) by mouth every 8 (eight) hours   ketoconazole (NIZORAL) 2 % shampoo   No No   Sig: Apply 1 application topically 2 (two) times a week for 4 doses   polyethylene glycol (MIRALAX) 17 g packet   No No   Sig: Take 17 g by mouth daily   prazosin (MINIPRESS) 1 mg capsule   No Yes   Sig: Take 1 capsule (1 mg total) by mouth daily at bedtime   Patient taking differently: Take 5 mg by mouth daily at bedtime   senna-docusate sodium (SENOKOT S) 8 6-50 mg per tablet   No No   Sig: Take 1 tablet by mouth daily at bedtime   tamsulosin (FLOMAX) 0 4 mg   Yes Yes   Sig: Take 0 4 mg by mouth daily at bedtime      Facility-Administered Medications: None       Past Medical History:   Diagnosis Date   • Abscess     to the spine aug 10 2021   • Cancer ([facility] [address] )     ovarian       Past Surgical History:   Procedure Laterality Date   • LUMBAR LAMINECTOMY  08/10/2021    evacuation of spinal epidural abcess   • OVARIAN CYST REMOVAL     • POSTERIOR FUSION CERVICAL SPINE      secondary to evacuation of spinal abcess s/p IVDU       History reviewed  No pertinent family history  I have reviewed and agree with the history as documented  E-Cigarette/Vaping   • E-Cigarette Use Never User      E-Cigarette/Vaping Substances   • Nicotine Yes      Social History     Tobacco Use   • Smoking status: Former     Packs/day: 1 00     Types: Cigarettes   • Smokeless tobacco: Never   Vaping Use   • Vaping Use: Never used   Substance Use Topics   • Alcohol use: Yes     Comment: socially   • Drug use: No     Comment: 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       Review of Systems    Physical Exam  Physical Exam  Vitals reviewed  HENT:      Head: Atraumatic  Eyes:      Pupils: Pupils are equal, round, and reactive to light  Cardiovascular:      Rate and Rhythm: Normal rate  Pulses: Normal pulses  Abdominal:      General: There is no distension  Tenderness: There is no abdominal tenderness  There is no guarding or rebound  Musculoskeletal:         General: No deformity  Cervical back: Neck supple  Skin:     General: Skin is warm and dry  Neurological:      Mental Status: She is alert and oriented to person, place, and time  Mental status is at baseline  Psychiatric:         Mood and Affect: Affect is tearful           Vital Signs  ED Triage Vitals   Temperature Pulse Respirations Blood Pressure SpO2   05/17/23 2153 05/17/23 2153 05/17/23 2153 05/17/23 2153 05/17/23 2153   97 8 °F (36 6 °C) 98 18 102/59 95 %      Temp Source Heart Rate Source Patient Position - Orthostatic VS BP Location FiO2 (%)   05/19/23 0712 05/18/23 0300 05/17/23 2153 05/17/23 2153 --   Oral Monitor Sitting Left arm       Pain Score       05/17/23 2320       5           Vitals:    05/21/23 0730 05/21/23 0944 05/21/23 1454 05/21/23 2330   BP: 103/62 103/62 101/64 97/58   Pulse: 79 76 68 81   Patient Position - Orthostatic VS:             Visual Acuity      ED Medications  Medications   acetaminophen (TYLENOL) tablet 975 mg (975 mg Oral Given 5/21/23 1816)   baclofen tablet 10 mg (10 mg Oral Given 5/21/23 2214)   bisacodyl (DULCOLAX) rectal suppository 10 mg (has no administration in time range)   Diclofenac Sodium (VOLTAREN) 1 % topical gel 2 g (has no administration in time range)   polyethylene glycol (MIRALAX) packet 17 g (17 g Oral Refused 5/21/23 0904)   prazosin (MINIPRESS) capsule 5 mg (5 mg Oral Given 5/21/23 2216)   QUEtiapine (SEROquel) tablet 500 mg (500 mg Oral Given 5/21/23 2217)   senna-docusate sodium (SENOKOT S) 8 6-50 mg per tablet 1 tablet (1 tablet Oral Refused 5/21/23 2215)   tamsulosin (FLOMAX) capsule 0 4 mg (0 4 mg Oral Given 5/21/23 2213)   aluminum-magnesium hydroxide-simethicone (MYLANTA) oral suspension 30 mL (has no administration in time range)   enoxaparin (LOVENOX) subcutaneous injection 40 mg (40 mg Subcutaneous Refused 5/21/23 0904)   gabapentin (NEURONTIN) capsule 300 mg (300 mg Oral Not Given 5/18/23 0424)   gabapentin (NEURONTIN) capsule 300 mg (300 mg Oral Given 5/21/23 2213)   lidocaine (LIDODERM) 5 % patch 2 patch (has no administration in time range)   oxyCODONE (ROXICODONE) IR tablet 5 mg (5 mg Oral Given 5/21/23 2214)     Or   oxyCODONE (ROXICODONE) immediate release tablet 10 mg ( Oral See Alternative 5/21/23 2214)   ketorolac (TORADOL) injection 15 mg (15 mg Intravenous Given 5/17/23 2320)   sodium chloride 0 9 % bolus 1,000 mL (0 mL Intravenous Stopped 5/18/23 0301)       Diagnostic Studies  Results Reviewed     Procedure Component Value Units Date/Time    Blood culture #1 [[id number]] Collected: 05/17/23 2245    Lab Status: Preliminary result Specimen: Blood from Hand, Left Updated: 05/21/23 1201     Blood Culture No Growth at 72 hrs  Blood culture #2 [[id number]] Collected: 05/17/23 2332    Lab Status: Preliminary result Specimen: Blood from Arm, Left Updated: 05/21/23 1201     Blood Culture No Growth at 72 hrs      Urine culture [[id number]]  (Abnormal)  (Susceptibility) Collected: 05/18/23 0101    Lab Status: Final result Specimen: Urine, Other Updated: 05/21/23 0939     Urine Culture 20,000-29,000 cfu/ml Morganella morganii      20,000-29,000 cfu/ml Aerococcus species    Narrative:      Aerococcus sanguinicola    Susceptibility     Morganella morganii (1)     Antibiotic Interpretation Microscan   Method Status    ZID Performed  Yes  ARDEN Final    Amikacin ($$) Susceptible <=16 ug/ml ARDEN Final    Amoxicillin + Clavulanate Resistant >16/8 ug/ml ARDEN Final    Ampicillin ($$) Resistant >16 00 ug/ml ARDEN Final    Ampicillin + Sulbactam ($) Resistant >16/8 ug/ml ARDEN Final    Aztreonam ($$$)  Susceptible <=4 ug/ml ARDEN Final    Cefazolin ($) Resistant >16 00 ug/ml ARDEN Final    Cefepime ($) Susceptible <=2 00 ug/ml ARDEN Final    Ceftazidime ($$) Susceptible 4 ug/ml ARDEN Final    Ceftriaxone ($$) Resistant 32 00 ug/ml ARDEN Final    Cefuroxime ($$) Resistant >16 ug/ml ARDEN Final    Ciprofloxacin ($)  Resistant 2 00 ug/ml ARDEN Final    Ertapenem ($$$) Susceptible <=0 5 ug/ml ARDEN Final    Gentamicin ($$) Resistant >8 ug/ml ARDEN Final    Levofloxacin ($) Resistant 2 00 ug/ml ARDEN Final    Nitrofurantoin Resistant >64 ug/ml ADREN Final    Piperacillin + Tazobactam ($$$) Susceptible <=8 ug/ml ARDEN Final    Tetracycline Resistant >8 ug/ml ARDEN Final    Tobramycin ($) Susceptible 4 ug/ml ARDEN Final    Trimethoprim + Sulfamethoxazole ($$$) Resistant >2/38 ug/ml ARDEN Final          Aerococcus species (2)     Antibiotic Interpretation Microscan   Method Status    ZID Performed  Yes  ARDEN Final                   Lactic acid 2 Hours [[id number]]  (Normal) Collected: 05/18/23 0151    Lab Status: Final result Specimen: Blood from Hand, Left Updated: 05/18/23 0222     LACTIC ACID 1 8 mmol/L     Narrative:      Result may be elevated if tourniquet was used during collection      Pregnancy Test (HCG Qualitative) [[id number]]  (Normal) Collected: 05/17/23 2245    Lab Status: Final result Specimen: Blood from Arm, Left Updated: 05/18/23 0211     Preg, Serum Negative    Urinalysis with microscopic [[id number]]  (Abnormal) Collected: 05/18/23 0127    Lab Status: Final result Specimen: Urine, Straight Cath Updated: 05/18/23 0136     Color, UA Colorless     Clarity, UA Clear     Specific [place], UA 1 005     pH, UA 5 5     Leukocytes, UA Negative     Nitrite, UA Negative     Protein, UA Negative mg/dl      Glucose, UA Negative mg/dl      Ketones, UA Negative mg/dl      Urobilinogen, UA <2 0 mg/dl      Bilirubin, UA Negative     Occult Blood, UA Negative     RBC, UA 1-2 /hpf      WBC, UA 1-2 /hpf      Epithelial Cells Occasional /hpf      Bacteria, UA Moderate /hpf     UA w Reflex to Microscopic w Reflex to Culture [[id number]] Collected: 05/18/23 0027    Lab Status: Final result Specimen: Urine, Clean Catch Updated: 05/18/23 0033     Color, UA Colorless     Clarity, UA Clear     Specific Gravity, UA 1 004     pH, UA 5 5     Leukocytes, UA Negative     Nitrite, UA Negative     Protein, UA Negative mg/dl      Glucose, UA Negative mg/dl      Ketones, UA Negative mg/dl      Urobilinogen, UA <2 0 mg/dl      Bilirubin, UA Negative     Occult Blood, UA Negative    Protime-INR [[id number]]  (Normal) Collected: 05/17/23 2332    Lab Status: Final result Specimen: Blood from Arm, Left Updated: 05/17/23 2350     Protime 13 1 seconds      INR 1 01    APTT [[id number]]  (Normal) Collected: 05/17/23 2332    Lab Status: Final result Specimen: Blood from Arm, Left Updated: 05/17/23 2350     PTT 25 seconds     Lactic acid [[id number]]  (Abnormal) Collected: 05/17/23 2245    Lab Status: Final result Specimen: Blood from Heel, Left Updated: 05/17/23 2329     LACTIC ACID 2 1 mmol/L     Narrative:      Result may be elevated if tourniquet was used during collection      Comprehensive metabolic panel [[id number]]  (Abnormal) Collected: 05/17/23 2245    Lab Status: Final result Specimen: Blood from Arm, Left Updated: 05/17/23 2323     Sodium 139 mmol/L      Potassium 4 1 mmol/L      Chloride 106 mmol/L      CO2 23 mmol/L      ANION GAP 10 mmol/L      BUN 9 mg/dL      Creatinine 0 44 mg/dL      Glucose 102 mg/dL      Calcium 9 1 mg/dL      AST 30 U/L      ALT 26 U/L      Alkaline Phosphatase 71 U/L      Total Protein 7 5 g/dL      Albumin 4 1 g/dL      Total Bilirubin 0 46 mg/dL      eGFR 133 ml/min/1 73sq m     Narrative:      [facility] guidelines for Chronic Kidney Disease (CKD):   •  Stage 1 with normal or high GFR (GFR > 90 mL/min/1 73 square meters)  •  Stage 2 Mild CKD (GFR = 60-89 mL/min/1 73 square meters)  •  Stage 3A Moderate CKD (GFR = 45-59 mL/min/1 73 square meters)  •  Stage 3B Moderate CKD (GFR = 30-44 mL/min/1 73 square meters)  •  Stage 4 Severe CKD (GFR = 15-29 mL/min/1 73 square meters)  •  Stage 5 End Stage CKD (GFR <15 mL/min/1 73 square meters)  Note: GFR calculation is accurate only with a steady state creatinine    Procalcitonin [[id number]]  (Normal) Collected: 05/17/23 2245    Lab Status: Final result Specimen: Blood from Arm, Left Updated: 05/17/23 2321     Procalcitonin <0 05 ng/ml     HS Troponin 0hr (reflex protocol) [[id number]]  (Normal) Collected: 05/17/23 2245    Lab Status: Final result Specimen: Blood from Arm, Left Updated: 05/17/23 2313     hs TnI 0hr <2 ng/L     CBC and differential [[id number]] Collected: 05/17/23 1576 Lab Status: Final result Specimen: Blood from Hand, Left Updated: 05/17/23 2253     WBC 5 34 Thousand/uL      RBC 4 22 Million/uL      Hemoglobin 12 7 g/dL      Hematocrit 38 2 %      MCV 91 fL      MCH 30 1 pg      MCHC 33 2 g/dL      RDW 14 6 %      MPV 9 0 fL      Platelets 304 Thousands/uL      nRBC 0 /100 WBCs      Neutrophils Relative 50 %      Immat GRANS % 0 %      Lymphocytes Relative 41 %      Monocytes Relative 6 %      Eosinophils Relative 2 %      Basophils Relative 1 %      Neutrophils Absolute 2 67 Thousands/µL      Immature Grans Absolute 0 01 Thousand/uL      Lymphocytes Absolute 2 20 Thousands/µL      Monocytes Absolute 0 33 Thousand/µL      Eosinophils Absolute 0 09 Thousand/µL      Basophils Absolute 0 04 Thousands/µL                  XR ankle 3+ views RIGHT   ED Interpretation by Jaycee Mathew MD (05/17 2312)   Osteophyte in the medial malleolus that appears chronic though patient's pain and echymosis directly in this region, could represent avulsion fx  Final Result by Sandip Sanchez MD (05/18 1202)      Medial malleolus displaced fracture      The study was marked in EPIC for significant notification  Workstation performed: [id number]         XR shoulder 2+ vw right    (Results Pending)              Procedures  Procedures         ED Course  ED Course as of 05/22/23 0127   Wed May 17, 2023   2315 Patient's x-ray with a well-corticated appearing osteophyte at the medial malleolus, likely chronic though this is directly in the region of patient's ecchymosis so we will treat with Aircast as patient is of limited mobility  Discussed nonweightbearing until further cleared with the patient  0570 Patient does not meet SIRS criteria, minimally elevated lactate, will treat with fluids though I do not believe patient is septic    Concerns for potential urinary tract infection, awaiting urinalysis considering patient likely will require water spectrum antibiotics considering history of pseudomonal infection  Will monitor and reassess  Thu May 18, 2023   0001 EKG demonstrates normal sinus rhythm with no acute ST segment elevation  There are nonspecific findings  The QTc is 467    0204 Multiple urinalysis with patient's with symptomatic findings came back with negative UA's  Discussed with lab who is rerunning them, microscopy for this patient came back with moderate bacteria  Concerns for Pseudomonas infection considering prior UAs, will start patient on cefepime and admit for continued monitoring and evaluation  SBIRT 20yo+    Flowsheet Row Most Recent Value   Initial Alcohol Screen: US AUDIT-C     1  How often do you have a drink containing alcohol? 0 Filed at: 05/18/2023 0105   2  How many drinks containing alcohol do you have on a typical day you are drinking? 0 Filed at: 05/18/2023 0105   3b  FEMALE Any Age, or MALE 65+: How often do you have 4 or more drinks on one occassion? 0 Filed at: 05/18/2023 0105   Audit-C Score 0 Filed at: 05/18/2023 0105   MINA: How many times in the past year have you    Used an illegal drug or used a prescription medication for non-medical reasons?  Never Filed at: 05/18/2023 0105                    MDM    Disposition  Final diagnoses:   UTI (urinary tract infection)   Poor social situation   Right ankle injury     Time reflects when diagnosis was documented in both MDM as applicable and the Disposition within this note     Time User Action Codes Description Comment    5/18/2023  1:56 AM Ebenezer Screws Add [N39 0] UTI (urinary tract infection)     5/18/2023  1:56 AM Ebenezer Screws Add [Z65 9] Poor social situation     5/18/2023  1:59 AM Ebenezer Screws Add [C93 638G] Right ankle injury     5/18/2023  3:04 AM Valerie Beavers Add [R62 7] Failure to thrive in adult     5/19/2023 10:00 AM Marielena Morales Add [S82 51XA] Displaced fracture of medial malleolus of right tibia, initial encounter for closed fracture       ED Disposition     ED Disposition   Admit    Condition   Stable    Date/Time   Thu May 18, 2023  1:56 AM    Comment   Case was discussed with YAMILET and the patient's admission status was agreed to be Admission Status: observation status to the service of Dr Carter Ge     Follow up With Specialties Details Why Jessica France MD Orthopedic Surgery Follow up in 2 week(s)  [address]  [facility] [address]  [phone]            Current Discharge Medication List      CONTINUE these medications which have NOT CHANGED    Details   baclofen 10 mg tablet Take 10 mg by mouth 3 (three) times a day      bisacodyl (FLEET) 10 MG/30ML ENEM Insert 10 mg into the rectum daily as needed for constipation As needed for constipation      prazosin (MINIPRESS) 1 mg capsule Take 1 capsule (1 mg total) by mouth daily at bedtime  Qty: 30 capsule, Refills: 0    Associated Diagnoses: Anxiety      QUEtiapine (SEROquel) 100 mg tablet Take 500 mg by mouth daily at bedtime Take five tablets by mouth nightly      tamsulosin (FLOMAX) 0 4 mg Take 0 4 mg by mouth daily at bedtime      acetaminophen (TYLENOL) 325 mg tablet Take 3 tablets (975 mg total) by mouth every 6 (six) hours as needed for mild pain  Refills: 0    Associated Diagnoses: Chronic back pain, unspecified back location, unspecified back pain laterality      Diclofenac Sodium (VOLTAREN) 1 % Apply 2 g topically 4 (four) times a day  Qty: 2 g, Refills: 0    Associated Diagnoses: Chronic back pain, unspecified back location, unspecified back pain laterality      gabapentin (NEURONTIN) 400 mg capsule Take 1 capsule (400 mg total) by mouth every 8 (eight) hours  Qty: 90 capsule, Refills: 0    Associated Diagnoses: Chronic back pain, unspecified back location, unspecified back pain laterality      ketoconazole (NIZORAL) 2 % shampoo Apply 1 application topically 2 (two) times a week for 4 doses  Qty: 4 mL, Refills: 0    Associated Diagnoses: Seborrheic dermatitis      polyethylene glycol (MIRALAX) 17 g packet Take 17 g by mouth daily  Refills: 0    Associated Diagnoses: Constipation      senna-docusate sodium (SENOKOT S) 8 6-50 mg per tablet Take 1 tablet by mouth daily at bedtime  Refills: 0    Associated Diagnoses: Constipation             No discharge procedures on file      PDMP Review       Value Time User    PDMP Reviewed  Yes 5/21/2023  9:46 AM HOWARD Dunlap          ED Provider  Electronically Signed by           Rad Chavarria MD  05/22/23 1538

## 2023-05-18 NOTE — ASSESSMENT & PLAN NOTE
· Lactic acid very mildly elevated 2 1, currently not meeting sepsis criteria  · Repeat lactic now 1 8 and has resolved

## 2023-05-18 NOTE — PLAN OF CARE
Problem: OCCUPATIONAL THERAPY ADULT  Goal: Performs self-care activities at highest level of function for planned discharge setting  See evaluation for individualized goals  Description: Treatment Interventions: ADL retraining, Functional transfer training, UE strengthening/ROM, Endurance training, Patient/family training, Equipment evaluation/education, Fine motor coordination activities, Compensatory technique education, Continued evaluation, Energy conservation, Activityengagement          See flowsheet documentation for full assessment, interventions and recommendations  Note: Limitation: Decreased ADL status, Decreased UE ROM, Decreased UE strength, Decreased Safe judgement during ADL, Decreased endurance, Decreased fine motor control, Decreased self-care trans, Decreased high-level ADLs  Prognosis: Good  Assessment: Patient is a 28 y o  female seen for OT evaluation s/p admit to [address] [facility] on 5/17/2023 w/Failure to thrive in adult  Commorbidities affecting patient's functional performance at time of assessment include:  quadriplegic spinal paralysis, sprain of right ankle, UTI, and lactic acidosis  Orders placed for OT evaluation and treatment  Performed at least two patient identifiers during session including name and wristband  Prior to admission, Patient lives with her grandmother in a one story house with ramped entrance  Pt performs pivot from bed to w/c with assist  Patient is mostly bed bound and had been receiving PT home services  Patient had one recent fall during PT session and reported that grandmother is unable to care for her at home  Personal factors affecting patient at time of initial evaluation include: decreased initiation and engagement, difficulty performing ADLs and difficulty performing IADLs   Upon evaluation, patient requires maximal assist for UB ADLs, total assist for LB ADLs Occupational performance is affected by the following deficits: decreased functional use del [place], in hand manipulation deficit with impaired reach, grasp and coordination, limited active ROM, decreased muscle strength, degenerative arthritic joint changes, impaired gross motor coordination, impaired fine motor coordination, dynamic sit/ stand balance deficit with poor standing tolerance time for self care and functional mobility, decreased activity tolerance and postural control and postural alignment deficit, requiring external assistance to complete transitional movements  Patient to benefit from continued Occupational Therapy treatment while in the hospital to address deficits as defined above and maximize level of functional independence with ADLs and functional mobility  Occupational Performance areas to address include: eating, grooming , bathing/ shower, dressing, transfer to all surfaces, functional mobility, emergency response, health maintenance, IADLs: safety procedures and Leisure Participation  From OT standpoint, recommendation at time of d/c would be Short Term Rehab       OT Discharge Recommendation: Post acute rehabilitation services

## 2023-05-18 NOTE — H&P
[address] [place] [address]  H&P  Name: aJya Meredith 28 y o  female I MRN: [id number]  Unit/Bed#: ED 15 I Date of Admission: 5/17/2023   Date of Service: 5/18/2023 I Hospital Day: 0      Assessment/Plan   * Failure to thrive in adult  Assessment & Plan  · Elderly grandmother unable to fully care for her  · Patient requesting long-term care facility, due to quadriplegia unable to fully care for self  · PT/OT  · Case management     Lactic acidosis  Assessment & Plan  · Lactic acid very mildly elevated 2 1, currently not meeting sepsis criteria  · Repeat lactic now 1 8 and has resolved    UTI (urinary tract infection)  Assessment & Plan  · Patient admits to foul odor in urine, secondary to spinal cord injury patient unable to determine if any pain with urination  · UA positive for moderate bacteria  · We will continue IV cefepime 2 g every 12 hours as patient with history of Pseudomonas and Enterobacter in urine  · Urine culture pending    Sprain of right ankle  Assessment & Plan  · Admits she had home PT once a week and about 2 weeks ago fell with physical therapist and rolled ankle, admits to some sensation of pain in right ankle  · X-ray right ankle negative for fracture, most likely sprain due to ecchymosis and edema  · Aircast ordered in ED  · Elevate right lower extremity  · As needed ice, Tylenol, IV Toradol for pain    Quadriplegic spinal paralysis ([address] )  Assessment & Plan  · Spinal cord injury 2 years ago  · Patient reports she now has sensation and able to move toes and arms, starting to feel some sensation in feet and arms  · P500 specialty bed ordered  · Turn patient every 2 hours  · Patient admits to chronic severe neck/spinal pain  · Encourage patient to follow-up outpatient with pain management   · For now for pain we will add as needed aqua K-pad, lidocaine patches, Tylenol, IV Toradol for moderate to severe pain  · Will also continue home baclofen 3 times daily  · Start gabapentin 300 mg 3 times "daily          VTE Pharmacologic Prophylaxis: VTE Score: 4 Moderate Risk (Score 3-4) - Pharmacological DVT Prophylaxis Ordered: enoxaparin (Lovenox)  Code Status: Level 1 - Full Code   Discussion with family: pt  Anticipated Length of Stay: Patient will be admitted on an observation basis with an anticipated length of stay of less than 2 midnights secondary to see above  Total Time Spent on Date of Encounter in care of patient: 75 minutes This time was spent on one or more of the following: performing physical exam; counseling and coordination of care; obtaining or reviewing history; documenting in the medical record; reviewing/ordering tests, medications or procedures; communicating with other healthcare professionals and discussing with patient's family/caregivers  Chief Complaint:    Chief Complaint   Patient presents with   • Medical Problem     Patient brought in from home- patient reports her family isnt able to take care of her at home  Patient had PT today when patient fell while transferring to bed  Patient reports she rolled her right ankle  Patient also thinks she has a UTI       History of Present Illness:  Wendi Burns is a 28 y o  female with a PMH of quadriplegia, anxiety, history of opioid abuse who presents with complaint of family being unable to attend for her and care for her at home  Patient reports her grandmother is elderly and her sister has disabilities, so they try to take care of her the best they can, but she reports its too much for them  Patient reports sometimes she is in bed for over 12 hours and cannot take care of herself or change herself  She currently reports she noticed a foul urine order and reports \"my urine smells like chemicals  \"  Due to quadriplegia she cannot feel any pain in her bladder    She also admits that she does have chronic neck pain due to a spinal cord injury and she has tried to see pain management and did have an appointment set up with them soon as " she reports her baclofen can only help so much  Patient also admits about 2 weeks ago she had home PT come and reports she needs help with assistance x2, but the therapist attempted to help her x1 and patient fell and rolled her right ankle  She reports she does have some sensation in her right ankle, but not much but she does report it feels different  Review of Systems:  Review of Systems   Constitutional: Positive for fatigue  Respiratory: Negative for shortness of breath  Musculoskeletal: Positive for arthralgias and neck pain  Past Medical and Surgical History:   Past Medical History:   Diagnosis Date   • Abscess     to the spine aug 10 2021   • Cancer ([facility] [address] )     ovarian       Past Surgical History:   Procedure Laterality Date   • LUMBAR LAMINECTOMY  08/10/2021    evacuation of spinal epidural abcess   • OVARIAN CYST REMOVAL     • POSTERIOR FUSION CERVICAL SPINE      secondary to evacuation of spinal abcess s/p IVDU       Meds/Allergies:  Prior to Admission medications    Medication Sig Start Date End Date Taking?  Authorizing Provider   baclofen 10 mg tablet Take 10 mg by mouth 3 (three) times a day   Yes Historical Provider, MD   bisacodyl (FLEET) 10 MG/30ML ENEM Insert 10 mg into the rectum daily as needed for constipation As needed for constipation   Yes Historical Provider, MD   prazosin (MINIPRESS) 1 mg capsule Take 1 capsule (1 mg total) by mouth daily at bedtime  Patient taking differently: Take 5 mg by mouth daily at bedtime 8/27/22 5/17/23 Yes Jacek Horn MD   QUEtiapine (SEROquel) 100 mg tablet Take 500 mg by mouth daily at bedtime Take five tablets by mouth nightly   Yes Historical Provider, MD   tamsulosin (FLOMAX) 0 4 mg Take 0 4 mg by mouth daily at bedtime   Yes Historical Provider, MD   ibuprofen (MOTRIN) 600 mg tablet Take by mouth every 6 (six) hours as needed for mild pain  5/18/23 Yes Historical Provider, MD   acetaminophen (TYLENOL) 325 mg tablet Take 3 tablets (975 mg total) by mouth every 6 (six) hours as needed for mild pain 8/27/22   Allayne Bosworth, MD   Diclofenac Sodium (VOLTAREN) 1 % Apply 2 g topically 4 (four) times a day 8/27/22   Allayne Bosworth, MD   gabapentin (NEURONTIN) 400 mg capsule Take 1 capsule (400 mg total) by mouth every 8 (eight) hours 8/27/22 9/26/22  Allayne Bosworth, MD   ketoconazole (NIZORAL) 2 % shampoo Apply 1 application topically 2 (two) times a week for 4 doses 8/29/22 9/9/22  Allayne Bosworth, MD   polyethylene glycol (MIRALAX) 17 g packet Take 17 g by mouth daily 8/28/22   Allayne Bosworth, MD   senna-docusate sodium (SENOKOT S) 8 6-50 mg per tablet Take 1 tablet by mouth daily at bedtime 8/27/22   Allayne Bosworth, MD   carbamide peroxide [facility]) 6 5 % otic solution Administer 5 drops into ears 2 (two) times a day  Patient not taking: Reported on 7/12/2022 5/4/22 5/18/23  Rachna Ceballos, DO   clonazePAM (KlonoPIN) 2 mg tablet Take 0 5 tablets (1 mg total) by mouth in the morning and 0 5 tablets (1 mg total) in the evening  5/17/22 5/18/23  Marisol Wolf MD   DULoxetine (CYMBALTA) 60 mg delayed release capsule Take 1 capsule (60 mg total) by mouth daily 8/27/22 5/18/23  Allayne Bosworth, MD   hydrOXYzine HCL (ATARAX) 25 mg tablet Take 1 tablet (25 mg total) by mouth every 6 (six) hours as needed for itching 8/27/22 5/18/23  Allayne Bosworth, MD   QUEtiapine (SEROquel) 50 mg tablet Take 4 tablets (200 mg total) by mouth daily at bedtime 8/27/22 5/18/23  Allayne Bosworth, MD   selenium sulfide (SELSUN) 2 5 % shampoo Apply topically daily as needed for dandruff 5/4/22 5/18/23  Rachna Done, DO   triamcinolone (KENALOG) 0 1 % cream Apply topically 2 (two) times a day 8/27/22 5/18/23  Allayne Bosworth, MD   zolpidem (Ambien) 5 mg tablet Take 1 tablet (5 mg total) by mouth daily at bedtime as needed for sleep 5/17/22 5/18/23  Marisol Wolf MD     I have reviewed her medications per review of chart and PDMP  Allergies:    Allergies   Allergen Reactions   • Coconut Oil - Food Allergy Anaphylaxis   • Suboxone [Buprenorphine Hcl-Naloxone Hcl] Anaphylaxis       Social History:  Marital Status: Single     Patient Pre-hospital Living Situation: Home  Patient Pre-hospital Level of Mobility: non-ambulatory/bed bound  Patient Pre-hospital Diet Restrictions: n/a  Substance Use History:   Social History     Substance and Sexual Activity   Alcohol Use Yes    Comment: socially     Social History     Tobacco Use   Smoking Status Former   • Packs/day: 1 00   • Types: Cigarettes   Smokeless Tobacco Never     Social History     Substance and Sexual Activity   Drug Use No    Comment: 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       Family History:  History reviewed  No pertinent family history  Physical Exam:     Vitals:   Blood Pressure: 113/69 (05/18/23 0300)  Pulse: 92 (05/18/23 0300)  Temperature: 97 8 °F (36 6 °C) (05/17/23 2153)  Respirations: 18 (05/18/23 0300)  SpO2: 94 % (05/18/23 0300)    Physical Exam  Vitals and nursing note reviewed  Constitutional:       General: She is not in acute distress  HENT:      Head: Normocephalic  Mouth/Throat:      Comments: Poor dentition  Cardiovascular:      Rate and Rhythm: Normal rate and regular rhythm  Heart sounds: Normal heart sounds  Pulmonary:      Effort: Pulmonary effort is normal  No respiratory distress  Breath sounds: Normal breath sounds  Abdominal:      General: Abdomen is flat  Bowel sounds are normal       Palpations: Abdomen is soft  Musculoskeletal:         General: Tenderness present  Right lower leg: No edema  Left lower leg: No edema  Comments: Edema noted in the right ankle, patient reports some sensation of right ankle and does report pain with palpation   Skin:     General: Skin is warm and dry  Neurological:      Mental Status: She is alert and oriented to person, place, and time  Psychiatric:         Thought Content:  Thought content normal       Comments: Anxious mood          Additional Data:     Lab Results:  Results from last 7 days   Lab Units 05/17/23  2245   WBC Thousand/uL 5 34   HEMOGLOBIN g/dL 12 7   HEMATOCRIT % 38 2   PLATELETS Thousands/uL 199   NEUTROS PCT % 50   LYMPHS PCT % 41   MONOS PCT % 6   EOS PCT % 2     Results from last 7 days   Lab Units 05/17/23  2245   SODIUM mmol/L 139   POTASSIUM mmol/L 4 1   CHLORIDE mmol/L 106   CO2 mmol/L 23   BUN mg/dL 9   CREATININE mg/dL 0 44*   ANION GAP mmol/L 10   CALCIUM mg/dL 9 1   ALBUMIN g/dL 4 1   TOTAL BILIRUBIN mg/dL 0 46   ALK PHOS U/L 71   ALT U/L 26   AST U/L 30   GLUCOSE RANDOM mg/dL 102     Results from last 7 days   Lab Units 05/17/23  2332   INR  1 01             Results from last 7 days   Lab Units 05/18/23  0151 05/17/23  2245   LACTIC ACID mmol/L 1 8 2 1*   PROCALCITONIN ng/ml  --  <0 05       Lines/Drains:  Invasive Devices     Peripheral Intravenous Line  Duration           Peripheral IV 05/17/23 Dorsal (posterior); Left Hand <1 day          Drain  Duration           External Urinary Catheter 267 days                    Imaging: Personally reviewed the following imaging: xray(s)  XR ankle 3+ views RIGHT   ED Interpretation by Kat Cali MD (05/17 8144)   Osteophyte in the medial malleolus that appears chronic though patient's pain and echymosis directly in this region, could represent avulsion fx  EKG and Other Studies Reviewed on Admission:   · EKG: Normal sinus rhythm  ** Please Note: This note has been constructed using a voice recognition system   **

## 2023-05-19 PROBLEM — F19.10 SUBSTANCE ABUSE (HCC): Status: RESOLVED | Noted: 2022-05-15 | Resolved: 2023-05-19

## 2023-05-19 PROBLEM — M54.9 BACK PAIN: Status: RESOLVED | Noted: 2022-05-14 | Resolved: 2023-05-19

## 2023-05-19 PROBLEM — Z97.8 FOLEY CATHETER IN PLACE: Status: RESOLVED | Noted: 2022-05-14 | Resolved: 2023-05-19

## 2023-05-19 RX ORDER — HYDROMORPHONE HCL IN WATER/PF 6 MG/30 ML
0.2 PATIENT CONTROLLED ANALGESIA SYRINGE INTRAVENOUS EVERY 6 HOURS PRN
Status: DISCONTINUED | OUTPATIENT
Start: 2023-05-19 | End: 2023-05-19

## 2023-05-19 RX ORDER — KETOROLAC TROMETHAMINE 30 MG/ML
15 INJECTION, SOLUTION INTRAMUSCULAR; INTRAVENOUS EVERY 6 HOURS PRN
Status: DISCONTINUED | OUTPATIENT
Start: 2023-05-19 | End: 2023-05-20

## 2023-05-19 RX ORDER — OXYCODONE HYDROCHLORIDE 5 MG/1
5 TABLET ORAL EVERY 4 HOURS PRN
Status: DISCONTINUED | OUTPATIENT
Start: 2023-05-19 | End: 2023-05-20

## 2023-05-19 RX ADMIN — PRAZOSIN HYDROCHLORIDE 5 MG: 1 CAPSULE ORAL at 00:28

## 2023-05-19 RX ADMIN — GABAPENTIN 300 MG: 300 CAPSULE ORAL at 16:28

## 2023-05-19 RX ADMIN — BACLOFEN 10 MG: 10 TABLET ORAL at 21:37

## 2023-05-19 RX ADMIN — ACETAMINOPHEN 975 MG: 325 TABLET, FILM COATED ORAL at 23:10

## 2023-05-19 RX ADMIN — OXYCODONE HYDROCHLORIDE 5 MG: 5 TABLET ORAL at 20:24

## 2023-05-19 RX ADMIN — KETOROLAC TROMETHAMINE 15 MG: 30 INJECTION, SOLUTION INTRAMUSCULAR; INTRAVENOUS at 00:34

## 2023-05-19 RX ADMIN — CEFEPIME 2000 MG: 2 INJECTION, POWDER, FOR SOLUTION INTRAVENOUS at 02:56

## 2023-05-19 RX ADMIN — PRAZOSIN HYDROCHLORIDE 5 MG: 1 CAPSULE ORAL at 21:38

## 2023-05-19 RX ADMIN — BACLOFEN 10 MG: 10 TABLET ORAL at 08:37

## 2023-05-19 RX ADMIN — SENNOSIDES AND DOCUSATE SODIUM 1 TABLET: 50; 8.6 TABLET ORAL at 21:37

## 2023-05-19 RX ADMIN — QUETIAPINE FUMARATE 500 MG: 200 TABLET ORAL at 21:38

## 2023-05-19 RX ADMIN — BACLOFEN 10 MG: 10 TABLET ORAL at 16:29

## 2023-05-19 RX ADMIN — TAMSULOSIN HYDROCHLORIDE 0.4 MG: 0.4 CAPSULE ORAL at 21:37

## 2023-05-19 RX ADMIN — QUETIAPINE FUMARATE 500 MG: 200 TABLET ORAL at 00:28

## 2023-05-19 RX ADMIN — GABAPENTIN 300 MG: 300 CAPSULE ORAL at 21:37

## 2023-05-19 NOTE — CONSULTS
Orthopedics   Page Risk 28 y o  female MRN: [id number]  Unit/Bed#: -02      Chief Complaint:   right ankle pain    HPI:  28 y  o female quadriplegia, anxiety, history of opioid abuse presents with right ankle pain  Patient came to the ED yesterday with complaint of family being unable to care for her at home  Patient report she has history of quadriplegia but has some sensation in her right ankle and is able to bear weight on the right lower extremity and ambulate with assistance  Patient reports that she had a fall that resulted in her right ankle twisting on April 21, 20223  Patient states she injured the ankle again last week when she fell while her sister was attempting to help her stand  Patient states she doesn't feel pain to a normal extent, but she states the right ankle feels different with increased spasms  Patient mentions she has been unable to stand and ambulate normally since the falls  Patient denies any fevers, chills, shortness of breath, chest pain, lightheadedness, dizziness, nausea, and vomiting  Patient offers no additional complaints  Review Of Systems:   · Skin: Normal  · Neuro: See HPI  · Musculoskeletal: See HPI  · 14 point review of systems negative except as stated above     Past Medical History:   Past Medical History:   Diagnosis Date   • Abscess     to the spine aug 10 2021   • Cancer ([facility] [address] )     ovarian       Past Surgical History:   Past Surgical History:   Procedure Laterality Date   • LUMBAR LAMINECTOMY  08/10/2021    evacuation of spinal epidural abcess   • OVARIAN CYST REMOVAL     • POSTERIOR FUSION CERVICAL SPINE      secondary to evacuation of spinal abcess s/p IVDU       Family History:  Family history reviewed and non-contributory  History reviewed  No pertinent family history      Social History:  Social History     Socioeconomic History   • Marital status: Single     Spouse name: None   • Number of children: None   • Years of education: None   • Highest education level: None   Occupational History   • None   Tobacco Use   • Smoking status: Former     Packs/day: 1 00     Types: Cigarettes   • Smokeless tobacco: Never   Vaping Use   • Vaping Use: Never used   Substance and Sexual Activity   • Alcohol use: Yes     Comment: socially   • Drug use: No     Comment: 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   • Sexual activity: Not Currently   Other Topics Concern   • None   Social History Narrative   • None     Social Determinants of Health     Financial Resource Strain: Not on file   Food Insecurity: No Food Insecurity   • Worried About Running Out of Food in the Last Year: Never true   • Ran Out of Food in the Last Year: Never true   Transportation Needs: No Transportation Needs   • Lack of Transportation (Medical): No   • Lack of Transportation (Non-Medical): No   Physical Activity: Not on file   Stress: Not on file   Social Connections: Not on file   Intimate Partner Violence: Not on file   Housing Stability: Low Risk    • Unable to Pay for Housing in the Last Year: No   • Number of Places Lived in the Last Year: 1   • Unstable Housing in the Last Year: No       Allergies:    Allergies   Allergen Reactions   • Coconut Oil - Food Allergy Anaphylaxis   • Suboxone [Buprenorphine Hcl-Naloxone Hcl] Anaphylaxis           Labs:  0   Lab Value Date/Time    HCT 38 2 05/17/2023 2245    HCT 30 3 (L) 08/25/2022 0520    HCT 31 6 (L) 08/24/2022 0607    HGB 12 7 05/17/2023 2245    HGB 10 0 (L) 08/25/2022 0520    HGB 10 6 (L) 08/24/2022 0607    INR 1 01 05/17/2023 2332    WBC 5 34 05/17/2023 2245    WBC 5 95 08/25/2022 0520    WBC 8 06 08/24/2022 0607       Meds:    Current Facility-Administered Medications:   •  acetaminophen (TYLENOL) tablet 975 mg, 975 mg, Oral, Q8H PRN, Jose Raul Pipes, PA-C  •  aluminum-magnesium hydroxide-simethicone (MYLANTA) oral suspension 30 mL, 30 mL, Oral, Q6H PRN, Jose Raul Pipes, PA-C  •  baclofen tablet 10 mg, 10 mg, Oral, TID, Jose Raul Pipes, PA-C, 10 mg at 05/19/23 5555  • "bisacodyl (DULCOLAX) rectal suppository 10 mg, 10 mg, Rectal, Daily PRN, Ángel Garay PA-C  •  Diclofenac Sodium (VOLTAREN) 1 % topical gel 2 g, 2 g, Topical, 4x Daily PRN, Ángel Garay PA-C  •  enoxaparin (LOVENOX) subcutaneous injection 40 mg, 40 mg, Subcutaneous, Daily, Claire Tipton PA-C  •  gabapentin (NEURONTIN) capsule 300 mg, 300 mg, Oral, Once, Ángel Garay PA-C  •  gabapentin (NEURONTIN) capsule 300 mg, 300 mg, Oral, TID, Claire Tipton PA-C, 300 mg at 05/18/23 2236  •  ketorolac (TORADOL) injection 15 mg, 15 mg, Intravenous, Q6H PRN, Ángel Garay PA-C, 15 mg at 05/19/23 0034  •  lidocaine (LIDODERM) 5 % patch 2 patch, 2 patch, Topical, Daily PRN, Ángel Garay PA-C  •  polyethylene glycol (MIRALAX) packet 17 g, 17 g, Oral, Daily, Claire Tipton PA-C  •  prazosin (MINIPRESS) capsule 5 mg, 5 mg, Oral, HS, Claire Tipton PA-C, 5 mg at 05/19/23 0028  •  QUEtiapine (SEROquel) tablet 500 mg, 500 mg, Oral, HS, Claire Tipton PA-C, 500 mg at 05/19/23 0028  •  senna-docusate sodium (SENOKOT S) 8 6-50 mg per tablet 1 tablet, 1 tablet, Oral, HS, Claire Tipton PA-C, 1 tablet at 05/18/23 2237  •  tamsulosin (FLOMAX) capsule 0 4 mg, 0 4 mg, Oral, HS, Claire Tipton PA-C, 0 4 mg at 05/18/23 2236    Blood Culture:   Lab Results   Component Value Date    BLOODCX No Growth at 24 hrs  05/17/2023       Wound Culture:   No results found for: WOUNDCULT    Ins and Outs:  I/O last 24 hours:   In: 480 [P O :480]  Out: 2400 [Urine:2400]          Physical Exam:   /64 (BP Location: Right arm)   Pulse 67   Temp 97 6 °F (36 4 °C) (Oral)   Resp 16   Ht 5' 8\" (1 727 m)   Wt 94 7 kg (208 lb 12 4 oz)   LMP 05/15/2023   SpO2 92%   BMI 31 74 kg/m²   Gen: No acute distress, resting comfortably in bed  HEENT: Eyes clear, moist mucus membranes, hearing intact  Respiratory: No audible wheezing or stridor  Cardiovascular: Well Perfused peripherally, 2+ distal pulse  Abdomen: nondistended, no peritoneal " signs  Musculoskeletal: right lower extremity  · Patient is a 27-year-old female laying comfortably in hospital bed in no acute distress  · Skin: Extremity appears well perfused overall without acute abnormalities  · Mild tender to palpation over medial and lateral malleoli  · Sensation intact DP/SP/Tib/Hayley/Saph  · Positive ankle dorsi/plantar flexion, EHL/FHL  · 2+ DP and PT pulses  · Leg lengths equal  · Musculature is soft and compressible, no pain with passive stretch    Radiology:   I personally reviewed the films  X-rays right ankle show medial malleolus fracture that appears chronic in nature          _*_*_*_*_*_*_*_*_*_*_*_*_*_*_*_*_*_*_*_*_*_*_*_*_*_*_*_*_*_*_*_*_*_*_*_*_*_*_*_*_*    Assessment:  28 y  o female with right ankle medial malleolus fracture    Plan:   · WBAT right lower extremity in CAM boot  · Analgesics for pain per primary team  · DVT ppx per primary team   · PT/OT   · Body mass index is 31 74 kg/m²  mildly obese  Recommend behavior modifications  · Dispo: Imaging was reviewed with patient showing medial malleolus fracture  Fracture appears more chronic in nature given well rounded appearance  Recommend CAM boot and WBAT right lower extremity  No immediate orthopedic interventions planned at this time  Ortho signing off  If there are any additional questions or concerns, please reach out       Joo Hernandez PA-C

## 2023-05-19 NOTE — PROGRESS NOTES
"Progress Note - Page Risk 28 y o  female MRN: [id number]    Unit/Bed#: -02 Encounter: [id number]      Assessment & Plan:  80-year-old  female with history of quadriplegic spinal paralysis, chronic neck pain, positive hepatitis C antibody testing, and anxiety who presented to the hospital due to innapropriate care at home and failure to thrive  Apparently, her family is not able to provide full care for her  Also, she endorsed experiencing a fall during physical therapy and about 2 weeks ago, after that fall she developed worsening right ankle pain associated with swelling and decreased ROM  # Right medial malleolus displaced fracture   # Pathologic fracture  Post-fall  Seems to be chronic  Seen on XRs  Patient endorses localized pain  Will consult orthopedic surgery, likely to require non-surgical management  Will check osteoporosis laboratories (PTH, TSH, Vitamin D levels    # Failure to thrive  PT/OT/Care management consult for placement  Will need long-term placement    # Quadriplegic spinal paralysis  Recovering  Specialty bed and frequent mobilization  Ensure adequate bowel regimen and pain management  Continue multimodal analgesia with gabentin, baclofen  Avoid opioids given prior history of dependence  Continue prazosin and tamsulosin, able to urinate by herself, has not used a Gonzalez in a long time    # Abnormal urine analysis, contaminated, no SIRS or sepsis, just trivial lactic acid elevation, no pyuria, no symptoms of UTI  Will stop antibiotics  Chemical DVT prophylaxis: enoxaparin    Subjective:   Neck pain, ankle pain, insomnia causing fatigue today  Otherwise negative 10 points ROS    Objective:     Vitals: Blood pressure 101/64, pulse 67, temperature 97 6 °F (36 4 °C), temperature source Oral, resp  rate 16, height 5' 8\" (1 727 m), weight 94 7 kg (208 lb 12 4 oz), last menstrual period 05/15/2023, SpO2 92 %  ,Body mass index is 31 74 kg/m²        Intake/Output Summary (Last 24 " hours) at 5/19/2023 1251  Last data filed at 5/19/2023 3093  Gross per 24 hour   Intake 480 ml   Output 1700 ml   Net -1220 ml       Physical Exam: No exam performed today, patient refused exam      Invasive Devices     Peripheral Intravenous Line  Duration           Peripheral IV 05/17/23 Dorsal (posterior); Left Hand 1 day          Drain  Duration           External Urinary Catheter 269 days                Lab, Imaging and other studies: I have personally reviewed pertinent reports      VTE Pharmacologic Prophylaxis: Enoxaparin (Lovenox)  VTE Mechanical Prophylaxis: reason for no mechanical VTE prophylaxis , already on chemical prophylaxis

## 2023-05-19 NOTE — PLAN OF CARE
Problem: Potential for Falls  Goal: Patient will remain free of falls  Description: INTERVENTIONS:  - Educate patient/family on patient safety including physical limitations  - Instruct patient to call for assistance with activity   - Consult OT/PT to assist with strengthening/mobility   - Keep Call bell within reach  - Keep bed low and locked with side rails adjusted as appropriate  - Keep care items and personal belongings within reach  - Initiate and maintain comfort rounds  - Make Fall Risk Sign visible to staff  - Offer Toileting every 2 Hours, in advance of need  - Initiate/Maintain bed alarm  - Obtain necessary fall risk management equipment:   - Apply yellow socks and bracelet for high fall risk patients  - Consider moving patient to room near nurses station  Outcome: Progressing     Problem: MOBILITY - ADULT  Goal: Maintain or return to baseline ADL function  Description: INTERVENTIONS:  -  Assess patient's ability to carry out ADLs; assess patient's baseline for ADL function and identify physical deficits which impact ability to perform ADLs (bathing, care of mouth/teeth, toileting, grooming, dressing, etc )  - Assess/evaluate cause of self-care deficits   - Assess range of motion  - Assess patient's mobility; develop plan if impaired  - Assess patient's need for assistive devices and provide as appropriate  - Encourage maximum independence but intervene and supervise when necessary  - Involve family in performance of ADLs  - Assess for home care needs following discharge   - Consider OT consult to assist with ADL evaluation and planning for discharge  - Provide patient education as appropriate  Outcome: Progressing  Goal: Maintains/Returns to pre admission functional level  Description: INTERVENTIONS:  - Perform BMAT or MOVE assessment daily    - Set and communicate daily mobility goal to care team and patient/family/caregiver     - Collaborate with rehabilitation services on mobility goals if consulted  - Perform Range of Motion 3 times a day  - Reposition patient every 2 hours    - Dangle patient 3 times a day  - Stand patient 3 times a day  - Ambulate patient 3 times a day  - Out of bed to chair 3 times a day   - Out of bed for meals 3 times a day  - Out of bed for toileting  - Record patient progress and toleration of activity level   Outcome: Progressing     Problem: PAIN - ADULT  Goal: Verbalizes/displays adequate comfort level or baseline comfort level  Description: Interventions:  - Encourage patient to monitor pain and request assistance  - Assess pain using appropriate pain scale  - Administer analgesics based on type and severity of pain and evaluate response  - Implement non-pharmacological measures as appropriate and evaluate response  - Consider cultural and social influences on pain and pain management  - Notify physician/advanced practitioner if interventions unsuccessful or patient reports new pain  Outcome: Progressing     Problem: INFECTION - ADULT  Goal: Absence or prevention of progression during hospitalization  Description: INTERVENTIONS:  - Assess and monitor for signs and symptoms of infection  - Monitor lab/diagnostic results  - Monitor all insertion sites, i e  indwelling lines, tubes, and drains  - Monitor endotracheal if appropriate and nasal secretions for changes in amount and color  - Portal appropriate cooling/warming therapies per order  - Administer medications as ordered  - Instruct and encourage patient and family to use good hand hygiene technique  - Identify and instruct in appropriate isolation precautions for identified infection/condition  Outcome: Progressing  Goal: Absence of fever/infection during neutropenic period  Description: INTERVENTIONS:  - Monitor WBC    Outcome: Progressing     Problem: SAFETY ADULT  Goal: Patient will remain free of falls  Description: INTERVENTIONS:  - Educate patient/family on patient safety including physical limitations  - Instruct patient to call for assistance with activity   - Consult OT/PT to assist with strengthening/mobility   - Keep Call bell within reach  - Keep bed low and locked with side rails adjusted as appropriate  - Keep care items and personal belongings within reach  - Initiate and maintain comfort rounds  - Make Fall Risk Sign visible to staff  - Offer Toileting every 2 Hours, in advance of need  - Initiate/Maintain bed alarm  - Obtain necessary fall risk management equipment:   - Apply yellow socks and bracelet for high fall risk patients  - Consider moving patient to room near nurses station  Outcome: Progressing  Goal: Maintain or return to baseline ADL function  Description: INTERVENTIONS:  -  Assess patient's ability to carry out ADLs; assess patient's baseline for ADL function and identify physical deficits which impact ability to perform ADLs (bathing, care of mouth/teeth, toileting, grooming, dressing, etc )  - Assess/evaluate cause of self-care deficits   - Assess range of motion  - Assess patient's mobility; develop plan if impaired  - Assess patient's need for assistive devices and provide as appropriate  - Encourage maximum independence but intervene and supervise when necessary  - Involve family in performance of ADLs  - Assess for home care needs following discharge   - Consider OT consult to assist with ADL evaluation and planning for discharge  - Provide patient education as appropriate  Outcome: Progressing  Goal: Maintains/Returns to pre admission functional level  Description: INTERVENTIONS:  - Perform BMAT or MOVE assessment daily    - Set and communicate daily mobility goal to care team and patient/family/caregiver  - Collaborate with rehabilitation services on mobility goals if consulted  - Perform Range of Motion 3 times a day  - Reposition patient every 2 hours    - Dangle patient 3 times a day  - Stand patient 3 times a day  - Ambulate patient 3 times a day  - Out of bed to chair 3 times a day   - Out of bed for meals 3 times a day  - Out of bed for toileting  - Record patient progress and toleration of activity level   Outcome: Progressing     Problem: DISCHARGE PLANNING  Goal: Discharge to home or other facility with appropriate resources  Description: INTERVENTIONS:  - Identify barriers to discharge w/patient and caregiver  - Arrange for needed discharge resources and transportation as appropriate  - Identify discharge learning needs (meds, wound care, etc )  - Arrange for interpretive services to assist at discharge as needed  - Refer to Case Management Department for coordinating discharge planning if the patient needs post-hospital services based on physician/advanced practitioner order or complex needs related to functional status, cognitive ability, or social support system  Outcome: Progressing     Problem: Knowledge Deficit  Goal: Patient/family/caregiver demonstrates understanding of disease process, treatment plan, medications, and discharge instructions  Description: Complete learning assessment and assess knowledge base    Interventions:  - Provide teaching at level of understanding  - Provide teaching via preferred learning methods  Outcome: Progressing     Problem: Prexisting or High Potential for Compromised Skin Integrity  Goal: Skin integrity is maintained or improved  Description: INTERVENTIONS:  - Identify patients at risk for skin breakdown  - Assess and monitor skin integrity  - Assess and monitor nutrition and hydration status  - Monitor labs   - Assess for incontinence   - Turn and reposition patient  - Assist with mobility/ambulation  - Relieve pressure over bony prominences  - Avoid friction and shearing  - Provide appropriate hygiene as needed including keeping skin clean and dry  - Evaluate need for skin moisturizer/barrier cream  - Collaborate with interdisciplinary team   - Patient/family teaching  - Consider wound care consult   Outcome: Progressing

## 2023-05-19 NOTE — UTILIZATION REVIEW
Initial Clinical Review      OBS order 5/18 0204 converted to IP on 5/19 1442 for continued treatment of UTI and a safe DC plan     Admission: Date/Time/Statement:   Admission Orders (From admission, onward)     Ordered        05/19/23 1442  Inpatient Admission  Once            05/18/23 0204  Place in Observation  Once                       Inpatient Admission     Standing Status:   Standing     Number of Occurrences:   1     Order Specific Question:   Level of Care     Answer:   Med Surg [16]     Order Specific Question:   Estimated length of stay     Answer:   More than 2 Midnights     Order Specific Question:   Certification     Answer:   I certify that inpatient services are medically necessary for this patient for a duration of greater than two midnights  See H&P and MD Progress Notes for additional information about the patient's course of treatment  ED Arrival Information     Expected   -    Arrival   5/17/2023 21:48    Acuity   Urgent            Means of arrival   Ambulance    Escorted by   [facility]    Service   Hospitalist    Admission type   Emergency            Arrival complaint   -           Chief Complaint   Patient presents with   • Medical Problem     Patient brought in from home- patient reports her family isnt able to take care of her at home  Patient had PT today when patient fell while transferring to bed  Patient reports she rolled her right ankle  Patient also thinks she has a UTI       Initial Presentation: 28 y o  female to ED from home , family unable to care for her   Pt is sometimes in bed for 12 hours and cannot care for herslef or change herself   2 weeks ago PT was at home and attempted to help her x1 , pt fell and rolled her R ankle   PMHX quadriplegia, anxiety, history of opioid abuse   Pt is requesting long term care facility   Lactic acid 2 1 , repeat 1 8   ua + mod bacteria   sprain R ankle   Admitted OBS status plan for PT OT eval , CM consult    IV cefepime for UTI , urine cx pending   Elevate RLE , prn ice , tylenol , toradol prn   Baclofen , start gabapentin   PE: edema R ankle , anxious mood   ED Triage Vitals   Temperature Pulse Respirations Blood Pressure SpO2   05/17/23 2153 05/17/23 2153 05/17/23 2153 05/17/23 2153 05/17/23 2153   97 8 °F (36 6 °C) 98 18 102/59 95 %      Temp Source Heart Rate Source Patient Position - Orthostatic VS BP Location FiO2 (%)   05/19/23 0712 05/18/23 0300 05/17/23 2153 05/17/23 2153 --   Oral Monitor Sitting Left arm       Pain Score       05/17/23 2320       5          Wt Readings from Last 1 Encounters:   05/18/23 94 7 kg (208 lb 12 4 oz)     Additional Vital Signs:   05/19/23 07:12:06 97 6 °F (36 4 °C) 67 16 101/64 76 92 % None (Room air) Lying   05/19/23 00:28:48 -- 84 -- 129/79 96 95 % -- --   05/19/23 0028 -- -- -- 129/79 -- -- -- --   05/18/23 22:46:26 -- 71 -- 132/81 98 97 % -- --   05/18/23 22:46:11 -- -- 18 132/81 98 -- -- --   05/18/23 19:52:59 98 1 °F (36 7 °C) 83 18 127/79 95 94 % -- --   05/18/23 17:03:42 97 9 °F (36 6 °C) 71 20 127/79 95 97 % -- --   05/18/23 1100 -- -- -- -- -- -- None (Room air) --   05/18/23 1000 -- 87 18 98/50 70 96 % None (Room air) Lying   05/18/23 0600 -- 103 17 116/56 81 95 % None (Room air) Lying   05/18/23 0430 -- 105 17 116/58 81 94 % None (Room air) Lying   05/18/23 0417 -- -- -- 116/58 -- -- -- --   05/18/23 0300 -- 92 18 113/69 86 94 % None (Room air) Lying       Pertinent Labs/Diagnostic Test Results:   5/17 EKG Normal sinus rhythm  Nonspecific T wave abnormality  Abnormal ECG  XR ankle 3+ views RIGHT   ED Interpretation by Sarmad Willard MD (05/17 3769)   Osteophyte in the medial malleolus that appears chronic though patient's pain and echymosis directly in this region, could represent avulsion fx  Final Result by Ghazal Payne MD (05/18 9826)      Medial malleolus displaced fracture      The study was marked in EPIC for significant notification           Workstation performed: [id number]               Results from last 7 days   Lab Units 05/17/23  2245   WBC Thousand/uL 5 34   HEMOGLOBIN g/dL 12 7   HEMATOCRIT % 38 2   PLATELETS Thousands/uL 199   NEUTROS ABS Thousands/µL 2 67         Results from last 7 days   Lab Units 05/17/23  2245   SODIUM mmol/L 139   POTASSIUM mmol/L 4 1   CHLORIDE mmol/L 106   CO2 mmol/L 23   ANION GAP mmol/L 10   BUN mg/dL 9   CREATININE mg/dL 0 44*   EGFR ml/min/1 73sq m 133   CALCIUM mg/dL 9 1     Results from last 7 days   Lab Units 05/17/23  2245   AST U/L 30   ALT U/L 26   ALK PHOS U/L 71   TOTAL PROTEIN g/dL 7 5   ALBUMIN g/dL 4 1   TOTAL BILIRUBIN mg/dL 0 46         Results from last 7 days   Lab Units 05/17/23  2245   GLUCOSE RANDOM mg/dL 102     Results from last 7 days   Lab Units 05/17/23  2245   HS TNI 0HR ng/L <2         Results from last 7 days   Lab Units 05/17/23  2332   PROTIME seconds 13 1   INR  1 01   PTT seconds 25     Results from last 7 days   Lab Units 05/17/23  2245   PROCALCITONIN ng/ml <0 05     Results from last 7 days   Lab Units 05/18/23  0151 05/17/23  2245   LACTIC ACID mmol/L 1 8 2 1*     Results from last 7 days   Lab Units 05/18/23  0127 05/18/23  0027   CLARITY UA  Clear Clear   COLOR UA  Colorless Colorless   SPEC GRAV UA  1 005 1 004   PH UA  5 5 5 5   GLUCOSE UA mg/dl Negative Negative   KETONES UA mg/dl Negative Negative   BLOOD UA  Negative Negative   PROTEIN UA mg/dl Negative Negative   NITRITE UA  Negative Negative   BILIRUBIN UA  Negative Negative   UROBILINOGEN UA (BE) mg/dl <2 0 <2 0   LEUKOCYTES UA  Negative Negative   WBC UA /hpf 1-2  --    RBC UA /hpf 1-2  --    BACTERIA UA /hpf Moderate*  --    EPITHELIAL CELLS WET PREP /hpf Occasional  --      Results from last 7 days   Lab Units 05/18/23  0101 05/17/23  2332 05/17/23  2245   BLOOD CULTURE   --  No Growth at 24 hrs  No Growth at 24 hrs     URINE CULTURE  20,000-29,000 cfu/ml Morganella morganii*  20,000-29,000 cfu/ml Aerococcus species*  --   --            ED Treatment:   Medication Administration from 05/17/2023 2148 to 05/18/2023 1657       Date/Time Order Dose Route Action     05/17/2023 2320 EDT ketorolac (TORADOL) injection 15 mg 15 mg Intravenous Given     05/17/2023 2337 EDT sodium chloride 0 9 % bolus 1,000 mL 1,000 mL Intravenous New Bag     05/18/2023 0858 EDT baclofen tablet 10 mg 10 mg Oral Given     05/18/2023 0419 EDT baclofen tablet 10 mg 10 mg Oral Given     05/18/2023 0417 EDT ketorolac (TORADOL) injection 15 mg 15 mg Intravenous Given     05/18/2023 0417 EDT prazosin (MINIPRESS) capsule 5 mg 5 mg Oral Given     05/18/2023 0417 EDT QUEtiapine (SEROquel) tablet 500 mg 500 mg Oral Given     05/18/2023 0417 EDT senna-docusate sodium (SENOKOT S) 8 6-50 mg per tablet 1 tablet 1 tablet Oral Given     05/18/2023 0417 EDT tamsulosin (FLOMAX) capsule 0 4 mg 0 4 mg Oral Given     05/18/2023 1451 EDT cefepime (MAXIPIME) 2 g/50 mL dextrose IVPB 2,000 mg Intravenous New Bag     05/18/2023 0427 EDT cefepime (MAXIPIME) 2 g/50 mL dextrose IVPB 2,000 mg Intravenous New Bag        Past Medical History:   Diagnosis Date   • Abscess     to the spine aug 10 2021   • Cancer ([facility] [address] )     ovarian     Present on Admission:  • Failure to thrive in adult  • Sprain of right ankle  • UTI (urinary tract infection)  • Lactic acidosis  • Quadriplegic spinal paralysis (HCC)      Admitting Diagnosis: Ankle injury [S99 919A]  UTI (urinary tract infection) [N39 0]  Failure to thrive in adult [R62 7]  Right ankle injury [S99 911A]  Poor social situation [Z65 9]  Known medical problems [Z78 9]  Age/Sex: 28 y o  female  Admission Orders:  Scheduled Medications:  baclofen, 10 mg, Oral, TID  enoxaparin, 40 mg, Subcutaneous, Daily  gabapentin, 300 mg, Oral, Once  gabapentin, 300 mg, Oral, TID  polyethylene glycol, 17 g, Oral, Daily  prazosin, 5 mg, Oral, HS  QUEtiapine, 500 mg, Oral, HS  senna-docusate sodium, 1 tablet, Oral, HS  tamsulosin, 0 4 mg, Oral, HS      Continuous IV Infusions:     PRN Meds:  acetaminophen, 975 mg, Oral, Q8H PRN  aluminum-magnesium hydroxide-simethicone, 30 mL, Oral, Q6H PRN  bisacodyl, 10 mg, Rectal, Daily PRN  Diclofenac Sodium, 2 g, Topical, 4x Daily PRN  ketorolac, 15 mg, Intravenous, Q6H PRN  5/18  x1  5/19  x1  lidocaine, 2 patch, Topical, Daily PRN    PT OT eval   Elevate ext   Up and OOB     IP CONSULT TO CASE MANAGEMENT  IP CONSULT TO ORTHOPEDIC SURGERY    Network Utilization Review Department  ATTENTION: Please call with any questions or concerns to [phone] and carefully listen to the prompts so that you are directed to the right person  All voicemails are confidential   Amalia Goodwin all requests for admission clinical reviews, approved or denied determinations and any other requests to dedicated fax number below belonging to the campus where the patient is receiving treatment   List of dedicated fax numbers for the Facilities:  [address] DENIALS (Administrative/Medical Necessity) [phone]   [address] (Maternity/NICU/Pediatrics) [phone]   910 Roberta Lowe [phone]   [facility] [phone]   [address] [address] [facility] [phone]   [facility]9 [facility] Oneida Douglas [facility] 134 815 [facility] [phone]

## 2023-05-19 NOTE — PLAN OF CARE
Problem: Potential for Falls  Goal: Patient will remain free of falls  Description: INTERVENTIONS:  - Educate patient/family on patient safety including physical limitations  - Instruct patient to call for assistance with activity   - Consult OT/PT to assist with strengthening/mobility   - Keep Call bell within reach  - Keep bed low and locked with side rails adjusted as appropriate  - Keep care items and personal belongings within reach  - Initiate and maintain comfort rounds  - Make Fall Risk Sign visible to staff  - Offer Toileting every  Hours, in advance of need  - Initiate/Maintain alarm  - Obtain necessary fall risk management equipment:   - Apply yellow socks and bracelet for high fall risk patients  - Consider moving patient to room near nurses station  Outcome: Progressing     Problem: MOBILITY - ADULT  Goal: Maintain or return to baseline ADL function  Description: INTERVENTIONS:  -  Assess patient's ability to carry out ADLs; assess patient's baseline for ADL function and identify physical deficits which impact ability to perform ADLs (bathing, care of mouth/teeth, toileting, grooming, dressing, etc )  - Assess/evaluate cause of self-care deficits   - Assess range of motion  - Assess patient's mobility; develop plan if impaired  - Assess patient's need for assistive devices and provide as appropriate  - Encourage maximum independence but intervene and supervise when necessary  - Involve family in performance of ADLs  - Assess for home care needs following discharge   - Consider OT consult to assist with ADL evaluation and planning for discharge  - Provide patient education as appropriate  Outcome: Progressing  Goal: Maintains/Returns to pre admission functional level  Description: INTERVENTIONS:  - Perform BMAT or MOVE assessment daily    - Set and communicate daily mobility goal to care team and patient/family/caregiver     - Collaborate with rehabilitation services on mobility goals if consulted  - Perform Range of Motion  times a day  - Reposition patient every  hours    - Dangle patient  times a day  - Stand patient  times a day  - Ambulate patient  times a day  - Out of bed to chair  times a day   - Out of bed for meals  times a day  - Out of bed for toileting  - Record patient progress and toleration of activity level   Outcome: Progressing     Problem: PAIN - ADULT  Goal: Verbalizes/displays adequate comfort level or baseline comfort level  Description: Interventions:  - Encourage patient to monitor pain and request assistance  - Assess pain using appropriate pain scale  - Administer analgesics based on type and severity of pain and evaluate response  - Implement non-pharmacological measures as appropriate and evaluate response  - Consider cultural and social influences on pain and pain management  - Notify physician/advanced practitioner if interventions unsuccessful or patient reports new pain  Outcome: Progressing     Problem: INFECTION - ADULT  Goal: Absence or prevention of progression during hospitalization  Description: INTERVENTIONS:  - Assess and monitor for signs and symptoms of infection  - Monitor lab/diagnostic results  - Monitor all insertion sites, i e  indwelling lines, tubes, and drains  - Monitor endotracheal if appropriate and nasal secretions for changes in amount and color  - [place] appropriate cooling/warming therapies per order  - Administer medications as ordered  - Instruct and encourage patient and family to use good hand hygiene technique  - Identify and instruct in appropriate isolation precautions for identified infection/condition  Outcome: Progressing  Goal: Absence of fever/infection during neutropenic period  Description: INTERVENTIONS:  - Monitor WBC    Outcome: Progressing     Problem: SAFETY ADULT  Goal: Patient will remain free of falls  Description: INTERVENTIONS:  - Educate patient/family on patient safety including physical limitations  - Instruct patient to call for assistance with activity   - Consult OT/PT to assist with strengthening/mobility   - Keep Call bell within reach  - Keep bed low and locked with side rails adjusted as appropriate  - Keep care items and personal belongings within reach  - Initiate and maintain comfort rounds  - Make Fall Risk Sign visible to staff  - Offer Toileting every  Hours, in advance of need  - Initiate/Maintain alarm  - Obtain necessary fall risk management equipment:   - Apply yellow socks and bracelet for high fall risk patients  - Consider moving patient to room near nurses station  Outcome: Progressing  Goal: Maintain or return to baseline ADL function  Description: INTERVENTIONS:  -  Assess patient's ability to carry out ADLs; assess patient's baseline for ADL function and identify physical deficits which impact ability to perform ADLs (bathing, care of mouth/teeth, toileting, grooming, dressing, etc )  - Assess/evaluate cause of self-care deficits   - Assess range of motion  - Assess patient's mobility; develop plan if impaired  - Assess patient's need for assistive devices and provide as appropriate  - Encourage maximum independence but intervene and supervise when necessary  - Involve family in performance of ADLs  - Assess for home care needs following discharge   - Consider OT consult to assist with ADL evaluation and planning for discharge  - Provide patient education as appropriate  Outcome: Progressing  Goal: Maintains/Returns to pre admission functional level  Description: INTERVENTIONS:  - Perform BMAT or MOVE assessment daily    - Set and communicate daily mobility goal to care team and patient/family/caregiver  - Collaborate with rehabilitation services on mobility goals if consulted  - Perform Range of Motion  times a day  - Reposition patient every  hours    - Dangle patient  times a day  - Stand patient  times a day  - Ambulate patient  times a day  - Out of bed to chair  times a day   - Out of bed for meals times a day  - Out of bed for toileting  - Record patient progress and toleration of activity level   Outcome: Progressing     Problem: DISCHARGE PLANNING  Goal: Discharge to home or other facility with appropriate resources  Description: INTERVENTIONS:  - Identify barriers to discharge w/patient and caregiver  - Arrange for needed discharge resources and transportation as appropriate  - Identify discharge learning needs (meds, wound care, etc )  - Arrange for interpretive services to assist at discharge as needed  - Refer to Case Management Department for coordinating discharge planning if the patient needs post-hospital services based on physician/advanced practitioner order or complex needs related to functional status, cognitive ability, or social support system  Outcome: Progressing     Problem: Knowledge Deficit  Goal: Patient/family/caregiver demonstrates understanding of disease process, treatment plan, medications, and discharge instructions  Description: Complete learning assessment and assess knowledge base    Interventions:  - Provide teaching at level of understanding  - Provide teaching via preferred learning methods  Outcome: Progressing     Problem: Prexisting or High Potential for Compromised Skin Integrity  Goal: Skin integrity is maintained or improved  Description: INTERVENTIONS:  - Identify patients at risk for skin breakdown  - Assess and monitor skin integrity  - Assess and monitor nutrition and hydration status  - Monitor labs   - Assess for incontinence   - Turn and reposition patient  - Assist with mobility/ambulation  - Relieve pressure over bony prominences  - Avoid friction and shearing  - Provide appropriate hygiene as needed including keeping skin clean and dry  - Evaluate need for skin moisturizer/barrier cream  - Collaborate with interdisciplinary team   - Patient/family teaching  - Consider wound care consult   Outcome: Progressing

## 2023-05-20 ENCOUNTER — APPOINTMENT (INPATIENT)
Dept: RADIOLOGY | Facility: HOSPITAL | Age: 33
End: 2023-05-20

## 2023-05-20 LAB
MAGNESIUM SERPL-MCNC: 2.2 MG/DL (ref 1.9–2.7)
PHOSPHATE SERPL-MCNC: 3.7 MG/DL (ref 2.7–4.5)
TSH SERPL DL<=0.05 MIU/L-ACNC: 2.47 UIU/ML (ref 0.45–4.5)

## 2023-05-20 RX ADMIN — TAMSULOSIN HYDROCHLORIDE 0.4 MG: 0.4 CAPSULE ORAL at 21:32

## 2023-05-20 RX ADMIN — GABAPENTIN 300 MG: 300 CAPSULE ORAL at 09:12

## 2023-05-20 RX ADMIN — BACLOFEN 10 MG: 10 TABLET ORAL at 09:12

## 2023-05-20 RX ADMIN — GABAPENTIN 300 MG: 300 CAPSULE ORAL at 17:15

## 2023-05-20 RX ADMIN — QUETIAPINE FUMARATE 500 MG: 200 TABLET ORAL at 21:28

## 2023-05-20 RX ADMIN — OXYCODONE HYDROCHLORIDE 5 MG: 5 TABLET ORAL at 09:19

## 2023-05-20 RX ADMIN — GABAPENTIN 300 MG: 300 CAPSULE ORAL at 21:27

## 2023-05-20 RX ADMIN — PRAZOSIN HYDROCHLORIDE 5 MG: 1 CAPSULE ORAL at 21:28

## 2023-05-20 RX ADMIN — BACLOFEN 10 MG: 10 TABLET ORAL at 17:15

## 2023-05-20 RX ADMIN — ACETAMINOPHEN 975 MG: 325 TABLET, FILM COATED ORAL at 21:27

## 2023-05-20 RX ADMIN — BACLOFEN 10 MG: 10 TABLET ORAL at 21:28

## 2023-05-20 RX ADMIN — SENNOSIDES AND DOCUSATE SODIUM 1 TABLET: 50; 8.6 TABLET ORAL at 21:27

## 2023-05-20 NOTE — DISCHARGE INSTR - AVS FIRST PAGE
Discharge Instructions - Orthopedics  Babardebora Warren 28 y o  female MRN: [id number]  Unit/Bed#: -02    Weight Bearing Status:                                           Weight bearing as tolerated right lower extremity in cam walker for standing  /  transferring    DVT prophylaxis  N/A     Pain:  Continue analgesics as directed    Dressing Instructions:   Please keep clean, dry and intact until follow up     Appt Instructions: If you do not have your appointment, please call the clinic at [phone] t  Otherwise followup as scheduled     Contact the office sooner if you experience any increased numbness/tingling in the extremities  Miscellaneous:   May remove cam walker for shower, sleep and while in bed

## 2023-05-20 NOTE — UTILIZATION REVIEW
Continued Stay Review    Date:    5/20/23                          Current Patient Class:    Inpatient  Current Level of Care:   Med surg    HPI:32 y o  female initially admitted on OBS order 5/18 0204 converted to IP on 5/19 1442 for continued treatment of UTI and a safe DC plan      Assessment/Plan:   5/20   Continue  PT/OT  Remains in  Cam boot  RLE  Complains  Of new  Right shoulder pain  Continue pain control as needed  Continue current meds/treatment plan        Vital Signs:   98 4-64-16      152/98       Pertinent Labs/Diagnostic Results:       Results from last 7 days   Lab Units 05/17/23  2245   WBC Thousand/uL 5 34   HEMOGLOBIN g/dL 12 7   HEMATOCRIT % 38 2   PLATELETS Thousands/uL 199   NEUTROS ABS Thousands/µL 2 67         Results from last 7 days   Lab Units 05/20/23  1353 05/17/23  2245   SODIUM mmol/L  --  139   POTASSIUM mmol/L  --  4 1   CHLORIDE mmol/L  --  106   CO2 mmol/L  --  23   ANION GAP mmol/L  --  10   BUN mg/dL  --  9   CREATININE mg/dL  --  0 44*   EGFR ml/min/1 73sq m  --  133   CALCIUM mg/dL  --  9 1   MAGNESIUM mg/dL 2 2  --    PHOSPHORUS mg/dL 3 7  --      Results from last 7 days   Lab Units 05/17/23  2245   AST U/L 30   ALT U/L 26   ALK PHOS U/L 71   TOTAL PROTEIN g/dL 7 5   ALBUMIN g/dL 4 1   TOTAL BILIRUBIN mg/dL 0 46         Results from last 7 days   Lab Units 05/17/23  2245   GLUCOSE RANDOM mg/dL 102               Results from last 7 days   Lab Units 05/17/23  2245   HS TNI 0HR ng/L <2         Results from last 7 days   Lab Units 05/17/23  2332   PROTIME seconds 13 1   INR  1 01   PTT seconds 25     Results from last 7 days   Lab Units 05/20/23  1353   TSH 3RD GENERATON uIU/mL 2 472     Results from last 7 days   Lab Units 05/17/23  2245   PROCALCITONIN ng/ml <0 05     Results from last 7 days   Lab Units 05/18/23  0151 05/17/23  2245   LACTIC ACID mmol/L 1 8 2 1*               Results from last 7 days   Lab Units 05/18/23  0127 05/18/23  0027   CLARITY UA  Clear Clear COLOR UA  Colorless Colorless   SPEC GRAV UA  1 005 1 004   PH UA  5 5 5 5   GLUCOSE UA mg/dl Negative Negative   KETONES UA mg/dl Negative Negative   BLOOD UA  Negative Negative   PROTEIN UA mg/dl Negative Negative   NITRITE UA  Negative Negative   BILIRUBIN UA  Negative Negative   UROBILINOGEN UA (BE) mg/dl <2 0 <2 0   LEUKOCYTES UA  Negative Negative   WBC UA /hpf 1-2  --    RBC UA /hpf 1-2  --    BACTERIA UA /hpf Moderate*  --    EPITHELIAL CELLS WET PREP /hpf Occasional  --                                  Results from last 7 days   Lab Units 05/18/23  0101 05/17/23  2332 05/17/23  2245   BLOOD CULTURE   --  No Growth at 48 hrs  No Growth at 48 hrs  URINE CULTURE  20,000-29,000 cfu/ml Morganella morganii*  20,000-29,000 cfu/ml Aerococcus species*  --   --                  Medications:   Scheduled Medications:  baclofen, 10 mg, Oral, TID  enoxaparin, 40 mg, Subcutaneous, Daily  gabapentin, 300 mg, Oral, Once  gabapentin, 300 mg, Oral, TID  polyethylene glycol, 17 g, Oral, Daily  prazosin, 5 mg, Oral, HS  QUEtiapine, 500 mg, Oral, HS  senna-docusate sodium, 1 tablet, Oral, HS  tamsulosin, 0 4 mg, Oral, HS      Continuous IV Infusions:     PRN Meds:  acetaminophen, 975 mg, Oral, Q8H PRN  aluminum-magnesium hydroxide-simethicone, 30 mL, Oral, Q6H PRN  bisacodyl, 10 mg, Rectal, Daily PRN  Diclofenac Sodium, 2 g, Topical, 4x Daily PRN  lidocaine, 2 patch, Topical, Daily PRN        Discharge Plan:   D    Network Utilization Review Department  ATTENTION: Please call with any questions or concerns to [phone] and carefully listen to the prompts so that you are directed to the right person  All voicemails are confidential   Amalia Goodwin all requests for admission clinical reviews, approved or denied determinations and any other requests to dedicated fax number below belonging to the campus where the patient is receiving treatment   List of dedicated fax numbers for the Facilities:  [facility] Emergency Department ADMISSION DENIALS (Administrative/Medical Necessity) [phone]   [address] (Maternity/NICU/Pediatrics) [place] [address] Chrissy Jose  [phone]   [address] [place] [place] [address] [facility] [address] [facility] [place] [address] [facility] [phone]

## 2023-05-20 NOTE — PROGRESS NOTES
Progress Note - Connor Alexander 28 y o  female MRN: [id number]    Unit/Bed#: -02 Encounter: [id number]      Assessment & Plan:  33-years-old  female with history of recovering quadriplegic spinal paralysis, chronic neck pain, positive hepatitis C antibody testing, and anxiety who presented to the hospital due to innapropriate care at home and failure to thrive  Purportedly, her family is not able to provide adequate care for her  Experienced a fall during physical therapy about 2 weeks ago, after that fall she developed worsening right ankle pain associated with swelling and decreased ROM     # Right medial malleolus displaced fracture   # Pathologic fracture  Chronic, post-fall, seen on XRs  Seen by orthopedic surgery, non-operatory management with CAM boot for walking and transfer  Refused osteoporosis laboratories (PTH, TSH, Vitamin D level, etc) this morning, pending results  Continue PT/OT/WBAT  Supportive treatment with multimodal analgesia, bowel and urinary regimen  DVT prophylaxis  Awaiting for placement at this point       # Failure to thrive  PT/OT/Care management consult for placement  Will need long-term placement      # Quadriplegic spinal paralysis  Recovering  Specialty bed and frequent mobilization  Ensure adequate bowel regimen and pain management  Continue multimodal analgesia with gabentin, baclofen  Avoid opioids given prior history of dependence  Continue prazosin and tamsulosin, able to urinate by herself, has not used a Gonzalez in a long time     # Abnormal urine analysis, solved, contaminated, no SIRS or sepsis, just trivial lactic acid elevation, no pyuria, no symptoms of UTI  [place] Martin # Newly reported right shoulder pain  Post-falls during home physical therapy, will obtain XRs  She did have XRs back in 2022 that were normal       Chemical DVT prophylaxis: Enoxaparin    Subjective:  As documented above, pain has improved, requesting to use a sling while in bed   Reported new moderate "right shoulder pain during external and internal rotation of her arm    Vitals: Blood pressure 111/64, pulse 66, temperature 98 2 °F (36 8 °C), resp  rate 16, height 5' 8\" (1 727 m), weight 94 7 kg (208 lb 12 4 oz), last menstrual period 05/15/2023, SpO2 93 %  ,Body mass index is 31 74 kg/m²  Intake/Output Summary (Last 24 hours) at 5/20/2023 1406  Last data filed at 5/20/2023 0501  Gross per 24 hour   Intake 840 ml   Output 1200 ml   Net -360 ml       Physical Exam: No exam performed today, patient refused exam      Invasive Devices     Peripheral Intravenous Line  Duration           Peripheral IV 05/17/23 Dorsal (posterior); Left Hand 2 days          Drain  Duration           External Urinary Catheter 270 days                Lab, Imaging and other studies: I have personally reviewed pertinent reports      VTE Pharmacologic Prophylaxis: Enoxaparin (Lovenox)  VTE Mechanical Prophylaxis: reason for no mechanical VTE prophylaxis , already on chemical prophylaxis   " no dysuria, no frequency, and no hematuria.

## 2023-05-21 PROBLEM — E87.20 LACTIC ACIDOSIS: Status: RESOLVED | Noted: 2023-05-18 | Resolved: 2023-05-21

## 2023-05-21 LAB
25(OH)D3 SERPL-MCNC: 22 NG/ML (ref 30–100)
BACTERIA UR CULT: ABNORMAL
BACTERIA UR CULT: ABNORMAL
CALCIUM 5H P 500 MG CA PO UR-SCNC: 211.2 MG/PERIOD
PERIOD: 24 HOURS
PTH-INTACT SERPL-MCNC: 33.5 PG/ML (ref 12–88)
SPECIMEN VOL UR: 2640 ML

## 2023-05-21 RX ORDER — OXYCODONE HYDROCHLORIDE 5 MG/1
5 TABLET ORAL EVERY 4 HOURS PRN
Status: DISCONTINUED | OUTPATIENT
Start: 2023-05-21 | End: 2023-05-22

## 2023-05-21 RX ORDER — OXYCODONE HYDROCHLORIDE 10 MG/1
10 TABLET ORAL EVERY 4 HOURS PRN
Status: DISCONTINUED | OUTPATIENT
Start: 2023-05-21 | End: 2023-05-22

## 2023-05-21 RX ADMIN — GABAPENTIN 300 MG: 300 CAPSULE ORAL at 17:15

## 2023-05-21 RX ADMIN — BACLOFEN 10 MG: 10 TABLET ORAL at 09:04

## 2023-05-21 RX ADMIN — BACLOFEN 10 MG: 10 TABLET ORAL at 22:14

## 2023-05-21 RX ADMIN — ACETAMINOPHEN 975 MG: 325 TABLET, FILM COATED ORAL at 18:16

## 2023-05-21 RX ADMIN — QUETIAPINE FUMARATE 500 MG: 200 TABLET ORAL at 22:17

## 2023-05-21 RX ADMIN — PRAZOSIN HYDROCHLORIDE 5 MG: 1 CAPSULE ORAL at 22:16

## 2023-05-21 RX ADMIN — GABAPENTIN 300 MG: 300 CAPSULE ORAL at 09:03

## 2023-05-21 RX ADMIN — TAMSULOSIN HYDROCHLORIDE 0.4 MG: 0.4 CAPSULE ORAL at 22:13

## 2023-05-21 RX ADMIN — BACLOFEN 10 MG: 10 TABLET ORAL at 17:15

## 2023-05-21 RX ADMIN — GABAPENTIN 300 MG: 300 CAPSULE ORAL at 22:13

## 2023-05-21 RX ADMIN — ACETAMINOPHEN 975 MG: 325 TABLET, FILM COATED ORAL at 09:47

## 2023-05-21 RX ADMIN — OXYCODONE HYDROCHLORIDE 5 MG: 5 TABLET ORAL at 14:17

## 2023-05-21 RX ADMIN — OXYCODONE HYDROCHLORIDE 5 MG: 5 TABLET ORAL at 22:14

## 2023-05-21 NOTE — ASSESSMENT & PLAN NOTE
· Spinal cord injury 2 years ago  · Patient reports she now has sensation and able to move toes and arms, starting to feel some sensation in feet and arms  · P500 specialty bed ordered  · Turn patient every 2 hours  · Patient admits to chronic severe neck/spinal pain  · Encourage patient to follow-up outpatient with pain management   · Continue with Tylenol; Lidocaine;  Aqua-K  · Will also continue home baclofen 3 times daily  · Continue Gabapentin 300 mg 3 times daily

## 2023-05-21 NOTE — ASSESSMENT & PLAN NOTE
· Patient admits to foul odor in urine, secondary to spinal cord injury patient unable to determine if any pain with urination  · UA positive for moderate bacteria  · Urine culture positive for morganella morganii MDRO; aerococcus species  · ID consult in setting of concern for true infection; abx rec's

## 2023-05-21 NOTE — ASSESSMENT & PLAN NOTE
· Reports home PT once a week;  · Fell approx 2 weeks ago physical therapist and rolled ankle, admits to some sensation of pain in right ankle  · X-ray right ankle negative for fracture, most likely sprain due to ecchymosis and edema  · Aircast ordered in ED  · Elevate right lower extremity  · As needed ice, Tylenol, IV Toradol for pain

## 2023-05-21 NOTE — PROGRESS NOTES
[address] [place] [address]  Progress Note  Name: Rosalia Meeks  MRN: [id number]  Unit/Bed#: -02 I Date of Admission: 5/17/2023   Date of Service: 5/21/2023 I Hospital Day: 2    Assessment/Plan   * Failure to thrive in adult  Assessment & Plan  · Elderly grandmother unable to fully care for her  · Patient requesting long-term care facility, due to quadriplegia unable to fully care for self  · PT/OT  · Case management     UTI (urinary tract infection)  Assessment & Plan  · Patient admits to foul odor in urine, secondary to spinal cord injury patient unable to determine if any pain with urination  · UA positive for moderate bacteria  · Urine culture positive for morganella morganii MDRO; aerococcus species  · ID consult in setting of concern for true infection; abx rec's    Sprain of right ankle  Assessment & Plan  · Reports home PT once a week;  · Fell approx 2 weeks ago physical therapist and rolled ankle, admits to some sensation of pain in right ankle  · X-ray right ankle negative for fracture, most likely sprain due to ecchymosis and edema  · Aircast ordered in ED  · Elevate right lower extremity  · As needed ice, Tylenol, IV Toradol for pain    Quadriplegic spinal paralysis ([address] )  Assessment & Plan  · Spinal cord injury 2 years ago  · Patient reports she now has sensation and able to move toes and arms, starting to feel some sensation in feet and arms  · P500 specialty bed ordered  · Turn patient every 2 hours  · Patient admits to chronic severe neck/spinal pain  · Encourage patient to follow-up outpatient with pain management   · Continue with Tylenol; Lidocaine; Aqua-K  · Will also continue home baclofen 3 times daily  · Continue Gabapentin 300 mg 3 times daily           VTE Pharmacologic Prophylaxis: VTE Score: 4 Moderate Risk (Score 3-4) - Pharmacological DVT Prophylaxis Ordered: enoxaparin (Lovenox)  Patient Centered Rounds: I performed bedside rounds with nursing staff today    Discussions with Specialists or Other Care Team Provider:     Education and Discussions with Family / Patient: Patient declined call to   Total Time Spent on Date of Encounter in care of patient: 35 minutes This time was spent on one or more of the following: performing physical exam; counseling and coordination of care; obtaining or reviewing history; documenting in the medical record; reviewing/ordering tests, medications or procedures; communicating with other healthcare professionals and discussing with patient's family/caregivers  Current Length of Stay: 2 day(s)  Current Patient Status: Inpatient   Certification Statement: The patient will continue to require additional inpatient hospital stay due to ongoing treatment in setting of need for placement for safe discharge plan  Discharge Plan: Anticipate discharge tomorrow to rehab facility  Code Status: Level 1 - Full Code    Subjective:   Patient resting in bed, offers no complaints during exam   Denies chest pain, shortness of breath, fever, or chills  Objective:     Vitals:   Temp (24hrs), Av 2 °F (36 8 °C), Min:97 9 °F (36 6 °C), Max:98 4 °F (36 9 °C)    Temp:  [97 9 °F (36 6 °C)-98 4 °F (36 9 °C)] 98 3 °F (36 8 °C)  HR:  [64-79] 76  BP: (103-152)/(62-98) 103/62  SpO2:  [93 %-96 %] 95 %  Body mass index is 31 74 kg/m²  Input and Output Summary (last 24 hours): Intake/Output Summary (Last 24 hours) at 2023 6916  Last data filed at 2023 0501  Gross per 24 hour   Intake --   Output 700 ml   Net -700 ml       Physical Exam:   Physical Exam  Vitals and nursing note reviewed  Constitutional:       General: She is not in acute distress  Appearance: She is normal weight  She is ill-appearing  Cardiovascular:      Rate and Rhythm: Normal rate  Pulses: Normal pulses  Heart sounds: Normal heart sounds  Pulmonary:      Effort: Pulmonary effort is normal       Breath sounds: Normal breath sounds     Abdominal: General: Bowel sounds are normal       Palpations: Abdomen is soft  Skin:     General: Skin is warm and dry  Neurological:      Mental Status: She is alert and oriented to person, place, and time  Psychiatric:         Mood and Affect: Mood normal           Additional Data:     Labs:  Results from last 7 days   Lab Units 05/17/23  2245   WBC Thousand/uL 5 34   HEMOGLOBIN g/dL 12 7   HEMATOCRIT % 38 2   PLATELETS Thousands/uL 199   NEUTROS PCT % 50   LYMPHS PCT % 41   MONOS PCT % 6   EOS PCT % 2     Results from last 7 days   Lab Units 05/17/23  2245   SODIUM mmol/L 139   POTASSIUM mmol/L 4 1   CHLORIDE mmol/L 106   CO2 mmol/L 23   BUN mg/dL 9   CREATININE mg/dL 0 44*   ANION GAP mmol/L 10   CALCIUM mg/dL 9 1   ALBUMIN g/dL 4 1   TOTAL BILIRUBIN mg/dL 0 46   ALK PHOS U/L 71   ALT U/L 26   AST U/L 30   GLUCOSE RANDOM mg/dL 102     Results from last 7 days   Lab Units 05/17/23  2332   INR  1 01             Results from last 7 days   Lab Units 05/18/23  0151 05/17/23  2245   LACTIC ACID mmol/L 1 8 2 1*   PROCALCITONIN ng/ml  --  <0 05       Lines/Drains:  Invasive Devices     Peripheral Intravenous Line  Duration           Peripheral IV 05/17/23 Dorsal (posterior); Left Hand 3 days          Drain  Duration           External Urinary Catheter 270 days                Imaging: No pertinent imaging reviewed  Recent Cultures (last 7 days):   Results from last 7 days   Lab Units 05/18/23  0101 05/17/23  2332 05/17/23  2245   BLOOD CULTURE   --  No Growth at 48 hrs  No Growth at 48 hrs     URINE CULTURE  20,000-29,000 cfu/ml Morganella morganii*  20,000-29,000 cfu/ml Aerococcus species*  --   --        Last 24 Hours Medication List:   Current Facility-Administered Medications   Medication Dose Route Frequency Provider Last Rate   • acetaminophen  975 mg Oral Q8H PRN Jones Leyden, PA-C     • aluminum-magnesium hydroxide-simethicone  30 mL Oral Q6H PRN Jones Leyden, PA-C     • baclofen  10 mg Oral TID Jones Leyden, PA-C     • bisacodyl  10 mg Rectal Daily PRN Payton Flavors, PA-C     • Diclofenac Sodium  2 g Topical 4x Daily PRN Payton Flavors, PA-C     • enoxaparin  40 mg Subcutaneous Daily Payton Flavors, PA-C     • gabapentin  300 mg Oral Once Payton Flavors, PA-C     • gabapentin  300 mg Oral TID Payton Flavors, PA-C     • lidocaine  2 patch Topical Daily PRN Payton Flavors, PAYaseminC     • oxyCODONE  5 mg Oral Q4H PRN HOWARD Flores      Or   • oxyCODONE  10 mg Oral Q4H PRN HOWARD Florse     • polyethylene glycol  17 g Oral Daily Payton Flavors, PA-RAFAEL     • prazosin  5 mg Oral HS Payton Flavors, ISAI     • QUEtiapine  500 mg Oral HS Payton Flavors, ISAI     • senna-docusate sodium  1 tablet Oral HS Payton Flavors, ISAI     • tamsulosin  0 4 mg Oral HS Payton Flavors, ISAI          Today, Patient Was Seen By: HOWARD Flores    **Please Note: This note may have been constructed using a voice recognition system  **

## 2023-05-22 PROBLEM — S82.891A CLOSED RIGHT ANKLE FRACTURE, INITIAL ENCOUNTER: Status: ACTIVE | Noted: 2023-05-18

## 2023-05-22 RX ADMIN — OXYCODONE HYDROCHLORIDE 5 MG: 5 TABLET ORAL at 08:54

## 2023-05-22 RX ADMIN — SENNOSIDES AND DOCUSATE SODIUM 1 TABLET: 50; 8.6 TABLET ORAL at 22:11

## 2023-05-22 RX ADMIN — PRAZOSIN HYDROCHLORIDE 5 MG: 1 CAPSULE ORAL at 22:11

## 2023-05-22 RX ADMIN — BACLOFEN 10 MG: 10 TABLET ORAL at 22:10

## 2023-05-22 RX ADMIN — GABAPENTIN 300 MG: 300 CAPSULE ORAL at 08:55

## 2023-05-22 RX ADMIN — GABAPENTIN 300 MG: 300 CAPSULE ORAL at 15:21

## 2023-05-22 RX ADMIN — BACLOFEN 10 MG: 10 TABLET ORAL at 08:56

## 2023-05-22 RX ADMIN — Medication 7.5 MG: at 22:10

## 2023-05-22 RX ADMIN — TAMSULOSIN HYDROCHLORIDE 0.4 MG: 0.4 CAPSULE ORAL at 22:09

## 2023-05-22 RX ADMIN — BACLOFEN 10 MG: 10 TABLET ORAL at 15:22

## 2023-05-22 RX ADMIN — ACETAMINOPHEN 975 MG: 325 TABLET, FILM COATED ORAL at 18:43

## 2023-05-22 RX ADMIN — GABAPENTIN 300 MG: 300 CAPSULE ORAL at 22:10

## 2023-05-22 RX ADMIN — QUETIAPINE FUMARATE 500 MG: 200 TABLET ORAL at 22:12

## 2023-05-22 NOTE — OCCUPATIONAL THERAPY NOTE
Occupational Therapy Treatment Note        Patient Name: Wendi Burns  [id number] Date: 5/22/2023 05/22/23 1152   OT Last Visit   OT Visit Date 05/22/23   Note Type   Note Type Treatment   Pain Assessment   Pain Assessment Tool 0-10   Pain Score 6   Pain Location/Orientation Orientation: Lower; Location: Back;Orientation: Left; Other (Comment)  (ankle)   Patient's Stated Pain Goal No pain   Hospital Pain Intervention(s) Ambulation/increased activity; Emotional support   Restrictions/Precautions   Weight Bearing Precautions Per Order Yes   RLE Weight Bearing Per Order WBAT  (WBAT right lower extremity in CAM boot per ortho)   Braces or Orthoses CAM Boot  (CAM boot for walking/transfers only, do not wear in bed per orthp)   Other Precautions Chair Alarm; Bed Alarm;Multiple lines; Fall Risk;Pain   Bed Mobility   Rolling R 2  Maximal assistance   Additional items Assist x 2; Increased time required;Verbal cues;LE management; Bedrails   Rolling L 2  Maximal assistance   Additional items Assist x 2; Increased time required;Verbal cues;LE management; Bedrails   Supine to Sit 2  Maximal assistance   Additional items Assist x 2; Increased time required;Verbal cues;LE management; Bedrails   Sit to Supine 2  Maximal assistance   Additional items Assist x 2; Increased time required;Verbal cues;LE management; Bedrails   Additional Comments BP at start of session 104/61   Transfers   Sit to Stand Unable to assess  (pt declined STS attempt from EOB stating too fatigued)   Cognition   Overall Cognitive Status WFL   Arousal/Participation Alert   Attention Within functional limits   Orientation Level Oriented X4   Memory Within functional limits   Following Commands Follows all commands and directions without difficulty   Activity Tolerance   Activity Tolerance Patient limited by fatigue   Assessment   Assessment Patient participated in Skilled OT session this date with interventions consisting of therapeutic exercise to: increase functional use of BUEs, increase BUE muscle strength ,  therapeutic activities to: increase activity tolerance, increase cardiovascular endurance , increase dynamic sit/ stand balance during functional activity , increase postural control, increase trunk control and increase OOB/ sitting tolerance   Patient agreeable to OT treatment session, upon arrival patient was found supine in bed, alert and responsive   In comparison to previous session, patient with improvements in overall endurance with ability to sit at edge of bed for an average of 5 minutes, required assist of 2 supine to sit, maintained unsupported sitting with min assist of one paerson  Patient requiring verbal cues for correct technique  Patient continues to be functioning below baseline level, occupational performance remains limited secondary to factors listed above and increased risk for falls and injury  From OT standpoint, recommendation at time of d/c would be Short Term Rehab  Patient to benefit from continued Occupational Therapy treatment while in the hospital to address deficits as defined above and maximize level of functional independence with ADLs and functional mobility  Plan   Treatment Interventions ADL retraining;Functional transfer training;UE strengthening/ROM; Endurance training;Patient/family training;Equipment evaluation/education; Compensatory technique education;Continued evaluation; Energy conservation; Activityengagement   Goal Expiration Date 06/01/23   OT Treatment Day 1   OT Frequency 3-5x/wk   Recommendation   OT Discharge Recommendation Post acute rehabilitation services   AM-PAC Daily Activity Inpatient   Lower Body Dressing 1   Bathing 1   Toileting 1   Upper Body Dressing 2   Grooming 2   Eating 3   Daily Activity Raw Score 10   Turning Head Towards Sound 3   Follow Simple Instructions 4   Low Function Daily Activity Raw Score 17   Low Function Daily Activity Standardized Score  28 95   AM-PAC Applied Cognition Inpatient   Following a Speech/Presentation 4   Understanding Ordinary Conversation 4   Taking Medications 4   Remembering Where Things Are Placed or Put Away 4   Remembering List of 4-5 Errands 4   Taking Care of Complicated Tasks 4   Applied Cognition Raw Score 24   Applied Cognition Standardized Score 62 21

## 2023-05-22 NOTE — PHYSICAL THERAPY NOTE
PHYSICAL THERAPY TREATMENT  NAME:  Jw Menezes  DATE: 05/22/23    AGE:   28 y o  Mrn:   [id number]  ADMIT DX:  Ankle injury [S99 919A]  UTI (urinary tract infection) [N39 0]  Failure to thrive in adult [R62 7]  Right ankle injury [S99 911A]  Poor social situation [Z65 9]  Known medical problems [Z78 9]  Problem List:   Patient Active Problem List   Diagnosis   • Failure to thrive in adult   • Quadriplegic spinal paralysis ([facility] [address] )   • Anxiety   • Insomnia   • Hepatitis C antibody positive in blood   • Inability to return to living situation   • Lower extremity edema   • Urinary retention   • Nondependent opioid abuse in remission [facility])   • Closed right ankle fracture   • UTI (urinary tract infection)       Past Medical History  Past Medical History:   Diagnosis Date   • Abscess     to the spine aug 10 2021   • Cancer ([facility] [address] )     ovarian       Past Surgical History  Past Surgical History:   Procedure Laterality Date   • LUMBAR LAMINECTOMY  08/10/2021    evacuation of spinal epidural abcess   • OVARIAN CYST REMOVAL     • POSTERIOR FUSION CERVICAL SPINE      secondary to evacuation of spinal abcess s/p IVDU       Length Of Stay: 3  Performed at least 2 patient identifiers during session: Name and Birthday       05/22/23 1120   PT Last Visit   PT Visit Date 05/22/23   Note Type   Note Type Treatment   Pain Assessment   Pain Assessment Tool 0-10   Pain Score 6   Pain Location/Orientation Orientation: Lower; Location: Back;Orientation: Left; Other (Comment)  (ankle)   Patient's Stated Pain Goal No pain   Hospital Pain Intervention(s) Ambulation/increased activity; Emotional support   Restrictions/Precautions   Weight Bearing Precautions Per Order Yes   RLE Weight Bearing Per Order WBAT  (WBAT right lower extremity in CAM boot per ortho)   Braces or Orthoses CAM Boot  (CAM boot for walking/transfers only, do not wear in bed per ortho)   Other Precautions Chair Alarm; Bed Alarm;Multiple lines; Fall Risk;Pain   General   Chart "Reviewed Yes   Response to Previous Treatment Patient with no complaints from previous session  Family/Caregiver Present No   Cognition   Overall Cognitive Status WFL   Arousal/Participation Alert   Attention Within functional limits   Orientation Level Oriented X4   Memory Within functional limits   Following Commands Follows all commands and directions without difficulty   Comments Pt agreeable to PT session   Subjective   Subjective \"I just haven't been able to sleep\"   Bed Mobility   Rolling R 2  Maximal assistance   Additional items Assist x 2; Increased time required;Verbal cues;LE management; Bedrails   Rolling L 2  Maximal assistance   Additional items Assist x 2; Increased time required;Verbal cues;LE management; Bedrails   Supine to Sit 2  Maximal assistance   Additional items Assist x 2; Increased time required;Verbal cues;LE management; Bedrails   Sit to Supine 2  Maximal assistance   Additional items Assist x 2; Increased time required;Verbal cues;LE management; Bedrails   Additional Comments BP at start of session 104/61   Transfers   Sit to Stand   (pt declined STS attempt from EOB stating too fatigued)   Ambulation/Elevation   Ambulation/Elevation Additional Comments pt reports PTA, she had progressed to \"taking a few steps\" with assist   Balance   Static Sitting Fair -   Dynamic Sitting Poor   Endurance Deficit   Endurance Deficit Yes   Endurance Deficit Description decreased activity tolerance   Activity Tolerance   Activity Tolerance Patient limited by fatigue   Medical Staff Made Aware Co treatment with RICHARD Gerard secondary to complex medical condition of pt, possible A of 2 required to achieve and maintain transitional movements, requiring the need of skilled therapeutic intervention of 2 therapists to achieve delivery of services  Assessment   Prognosis Fair   Problem List Decreased strength;Decreased range of motion;Decreased endurance; Impaired balance;Decreased mobility;Pain   Assessment Pt seen for " PT treatment session this date, consisting of ther act focused on bed mobility and safety with use of CAM boot for transfers/ambulation  Since previous session, pt has made good progress in terms of progressing to sitting EOB  Patient greeted supine in bed and agreeable to PT session; however, from start of session, pt declined STS attempt due to reports of fatigue and ankle instability  PT discussed and reviewed CAM boot with patient and donned to RLE with maxA  Patient required maxAx2 for rolling, sup <> sit and tolerated sitting EOB ~4 minutes with [place]/CGA for balance before requesting to return to bed due to fatigue/nausea; RN notified  Pertinent barriers during this session include pain and fatigue  Current goals and POC remain appropriate, pt continues to have rehab potential and is making fair progress towards STGs  Pt prognosis for achieving goals is fair, pending pt progress with hospitalization/medical status improvements, and indicated by ability to follow directions, motivation, previous response to intervention and responsive to cues/strategies  Pt limited d/t the presence of intractable pain, fear of pain provocation and avoidance behaviors  PT recommends post acute rehabilitation services upon discharge  Pt continues to be functioning below baseline level, and remains limited 2* factors listed above  PT will continue to see pt during current hospitalization in order to address the deficits listed above and provide interventions consistent w/ POC in effort to achieve STGs  Barriers to Discharge Decreased caregiver support; Inaccessible home environment   Goals   Patient Goals to go to rehab and get therapy   STG Expiration Date 05/28/23   Short Term Goal #1 In 10 days: Increase bilateral LE strength 1/2 grade to facilitate independent mobility, Perform all bed mobility tasks with min A of 1 to decrease caregiver burden and PT to see and establish goals for transfers and balance when appropriate   PT Treatment Day 1   Plan   Treatment/Interventions Functional transfer training; Therapeutic exercise;Patient/family training;Bed mobility;Spoke to nursing;Continued evaluation;OT   Progress Slow progress, medical status limitations   PT Frequency 3-5x/wk   Recommendation   PT Discharge Recommendation Post acute rehabilitation services   AM-PAC Basic Mobility Inpatient   Turning in Flat Bed Without Bedrails 1   Lying on Back to Sitting on Edge of Flat Bed Without Bedrails 1   Moving Bed to Chair 1   Standing Up From Chair Using Arms 1   Walk in Room 1   Climb 3-5 Stairs With Railing 1   Basic Mobility Inpatient Raw Score 6   Turning Head Towards Sound 4   Follow Simple Instructions 4   Low Function Basic Mobility Raw Score  14   Low Function Basic Mobility Standardized Score  22 01   Highest Level Of Mobility   JH-HLM Goal 2: Bed activities/Dependent transfer   JH-HLM Achieved 3: Sit at edge of bed   Education   Education Provided Mobility training; Other  (LE positioning with CAM boot when WB and to avoid excessive ankle PF in supine, discussed podus boots)   Patient Reinforcement needed   End of Consult   Patient Position at End of Consult Bed/Chair alarm activated; All needs within reach; Supine       Time In: 1118  Time Out: 1152  Total Treatment Minutes: [place], [place]

## 2023-05-22 NOTE — PLAN OF CARE
Problem: PHYSICAL THERAPY ADULT  Goal: Performs mobility at highest level of function for planned discharge setting  See evaluation for individualized goals  Description: Treatment/Interventions: Functional transfer training, LE strengthening/ROM, Endurance training, Therapeutic exercise, Patient/family training, Bed mobility, Continued evaluation, OT          See flowsheet documentation for full assessment, interventions and recommendations  Outcome: Progressing  Note: Prognosis: Fair  Problem List: Decreased strength, Decreased range of motion, Decreased endurance, Impaired balance, Decreased mobility, Pain  Assessment: Pt seen for PT treatment session this date, consisting of ther act focused on bed mobility and safety with use of CAM boot for transfers/ambulation  Since previous session, pt has made good progress in terms of progressing to sitting EOB  Patient greeted supine in bed and agreeable to PT session; however, from start of session, pt declined STS attempt due to reports of fatigue and ankle instability  PT discussed and reviewed CAM boot with patient and donned to RLE with maxA  Patient required maxAx2 for rolling, sup <> sit and tolerated sitting EOB ~4 minutes with [place]/CGA for balance before requesting to return to bed due to fatigue/nausea; RN notified  Pertinent barriers during this session include pain and fatigue  Current goals and POC remain appropriate, pt continues to have rehab potential and is making fair progress towards STGs  Pt prognosis for achieving goals is fair, pending pt progress with hospitalization/medical status improvements, and indicated by ability to follow directions, motivation, previous response to intervention and responsive to cues/strategies  Pt limited d/t the presence of intractable pain, fear of pain provocation and avoidance behaviors  PT recommends post acute rehabilitation services upon discharge   Pt continues to be functioning below baseline level, and remains limited 2* factors listed above  PT will continue to see pt during current hospitalization in order to address the deficits listed above and provide interventions consistent w/ POC in effort to achieve STGs  Barriers to Discharge: Decreased caregiver support, Inaccessible home environment     PT Discharge Recommendation: Post acute rehabilitation services    See flowsheet documentation for full assessment     Bronwyn [place]; PT, DPT

## 2023-05-22 NOTE — ASSESSMENT & PLAN NOTE
· Elderly grandmother unable to fully care for her  · Patient requesting long-term care facility, due to quadriplegia unable to fully care for self  · PT/OT for recommendations, continued therapy while inpatient   · Case management for dispo planning

## 2023-05-22 NOTE — PLAN OF CARE
Problem: OCCUPATIONAL THERAPY ADULT  Goal: Performs self-care activities at highest level of function for planned discharge setting  See evaluation for individualized goals  Description: Treatment Interventions: ADL retraining, Functional transfer training, UE strengthening/ROM, Endurance training, Patient/family training, Equipment evaluation/education, Compensatory technique education, Continued evaluation, Energy conservation, Activityengagement          See flowsheet documentation for full assessment, interventions and recommendations  Note: Limitation: Decreased ADL status, Decreased UE ROM, Decreased UE strength, Decreased Safe judgement during ADL, Decreased endurance, Decreased fine motor control, Decreased self-care trans, Decreased high-level ADLs  Prognosis: Good  Assessment: Patient participated in Skilled OT session this date with interventions consisting of therapeutic exercise to: increase functional use of BUEs, increase BUE muscle strength ,  therapeutic activities to: increase activity tolerance, increase cardiovascular endurance , increase dynamic sit/ stand balance during functional activity , increase postural control, increase trunk control and increase OOB/ sitting tolerance   Patient agreeable to OT treatment session, upon arrival patient was found supine in bed, alert and responsive   In comparison to previous session, patient with improvements in overall endurance with ability to sit at edge of bed for an average of 5 minutes, required assist of 2 supine to sit, maintained unsupported sitting with min assist of one paerson  Patient requiring verbal cues for correct technique  Patient continues to be functioning below baseline level, occupational performance remains limited secondary to factors listed above and increased risk for falls and injury  From OT standpoint, recommendation at time of d/c would be Short Term Rehab     Patient to benefit from continued Occupational Therapy treatment while in the hospital to address deficits as defined above and maximize level of functional independence with ADLs and functional mobility       OT Discharge Recommendation: Post acute rehabilitation services

## 2023-05-22 NOTE — CONSULTS
Consultation - Infectious Disease   Sunshine Fonseca 28 y o  female MRN: [id number]  Unit/Bed#: -02 Encounter: [id number]      IMPRESSION & RECOMMENDATIONS:   Impression/Recommendations:  1  Bacteriuria  Urine culture with only low colony counts of Morganella and Aerococcus, which likely reflect chronic low-level colonization of the bladder  UA showed no pyuria  Patient remains without fevers or leukocytosis  Blood cultures are negative  Patient received 3 doses of cefepime and remains stable off antibiotics  No further antibiotic indicated in the absence of acute cystitis     -Continue to observe off any more antibiotics   -Follow urine output  -Follow temperatures and hemodynamics  -Follow CBC    2  Prior MRSA bacteremia/endocarditis complicated by cervical spinal infection with epidural abscess post washout with C3-T1 fusion at [facility] in August 2021  Complicated by quadriplegia, failure to thrive  Blood cultures on this admission are negative  Patient remains afebrile and clinically stable from ID standpoint  3   Failure to thrive  Pending placement to long-term care facility as family cannot adequately take care of her  4   Right ankle medial malleolus fracture  No clinical evidence of infection  Orthopedic surgery input noted with recommendation for conservative management  Antibiotics:   Off antibiotic D3    I discussed above plan with Sheri Kay and Claudia Phan from [facility]  I personally reviewed prior medical records including from outside facility  Thank you for this consultation  Will sign off  Please call with any new questions        HISTORY OF PRESENT ILLNESS:  Reason for Consult: Bacteriuria versus UTI    HPI: Sunshine Fonseca is a 28 y o  female with polysubstance use including IVDU, chronic HCV infection, prior MRSA bacteremia, endocarditis, cervical spinal infection with epidural abscess status post washout with C3-T1 fusion at [facility] in August 2301 complicated by quadriplegia  Patient presented to the ER on 5/18 as family is unable to take care of her  She also recently rolled her ankle  In addition, noted a foul odor to her urine  No fevers on presentation  No leukocytosis  Right ankle x-ray was consistent with medial malleolus displaced fracture  Patient was admitted for management of possible UTI, in addition to need for safe placement  She received several doses of cefepime, which was ultimately discontinued  Urine culture now with low colony counts of Morganella and Aerococcus  Blood cultures are negative  Patient remains without fevers  We are consulted regarding potential need for further antibiotics  REVIEW OF SYSTEMS:  A complete system-based review of systems is otherwise negative  PAST MEDICAL HISTORY:  Past Medical History:   Diagnosis Date   • Abscess     to the spine aug 10 2021   • Cancer ([facility] [address] )     ovarian     Past Surgical History:   Procedure Laterality Date   • LUMBAR LAMINECTOMY  08/10/2021    evacuation of spinal epidural abcess   • OVARIAN CYST REMOVAL     • POSTERIOR FUSION CERVICAL SPINE      secondary to evacuation of spinal abcess s/p IVDU       FAMILY HISTORY:  Non-contributory    SOCIAL HISTORY:  Social History     Substance and Sexual Activity   Alcohol Use Yes    Comment: socially     Social History     Substance and Sexual Activity   Drug Use No    Comment: 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     Social History     Tobacco Use   Smoking Status Former   • Packs/day: 1 00   • Types: Cigarettes   Smokeless Tobacco Never       ALLERGIES:  Allergies   Allergen Reactions   • Coconut Oil - Food Allergy Anaphylaxis   • Suboxone [Buprenorphine Hcl-Naloxone Hcl] Anaphylaxis       MEDICATIONS:  All current active medications have been reviewed      PHYSICAL EXAM:  Vitals:  Temp:  [97 8 °F (36 6 °C)-98 2 °F (36 8 °C)] 97 8 °F (36 6 °C)  HR:  [68-81] 81  Resp:  [18] 18  BP: ()/(58-64) 97/58  SpO2: [93 %-95 %] 93 %  Temp (24hrs), Av °F (36 7 °C), Min:97 8 °F (36 6 °C), Max:98 2 °F (36 8 °C)  Current: Temperature: 97 8 °F (36 6 °C)     Physical Exam:  General:  Well-nourished, well-developed, in no acute distress  Eyes:  Conjunctive clear with no hemorrhages or effusions  Oropharynx:  No ulcers, no lesions  Neck:  Supple, trachea midline  Lungs:  Clear to auscultation bilaterally, no accessory muscle use  Cardiac:  Regular rate and rhythm, no murmurs  Abdomen:  Soft, non-tender, non-distended  Extremities:  No peripheral cyanosis, clubbing, or edema  Skin:  No rashes, no ulcers  Neurological:  Partially moves extremities  LABS, IMAGING, & OTHER STUDIES:  Lab Results:  I have personally reviewed pertinent labs  Results from last 7 days   Lab Units 23  2245   POTASSIUM mmol/L 4 1   CHLORIDE mmol/L 106   CO2 mmol/L 23   BUN mg/dL 9   CREATININE mg/dL 0 44*   EGFR ml/min/1 73sq m 133   CALCIUM mg/dL 9 1   AST U/L 30   ALT U/L 26   ALK PHOS U/L 71     Results from last 7 days   Lab Units 23  2245   WBC Thousand/uL 5 34   HEMOGLOBIN g/dL 12 7   PLATELETS Thousands/uL 199     Results from last 7 days   Lab Units 23  0101 23  2332 23  2245   BLOOD CULTURE   --  No Growth at 72 hrs  No Growth at 72 hrs  URINE CULTURE  20,000-29,000 cfu/ml Morganella morganii*  20,000-29,000 cfu/ml Aerococcus species*  --   --        Imaging Studies:   I have personally reviewed pertinent imaging study reports and images in PACS  Right ankle x-ray shows medial malleolus displaced fracture  EKG, Pathology, and Other Studies:   I have personally reviewed pertinent reports

## 2023-05-22 NOTE — PLAN OF CARE
Problem: Potential for Falls  Goal: Patient will remain free of falls  Description: INTERVENTIONS:  - Educate patient/family on patient safety including physical limitations  - Instruct patient to call for assistance with activity   - Consult OT/PT to assist with strengthening/mobility   - Keep Call bell within reach  - Keep bed low and locked with side rails adjusted as appropriate  - Keep care items and personal belongings within reach  - Initiate and maintain comfort rounds  - Make Fall Risk Sign visible to staff  - Apply yellow socks and bracelet for high fall risk patients  - Consider moving patient to room near nurses station  Outcome: Progressing     Problem: MOBILITY - ADULT  Goal: Maintain or return to baseline ADL function  Description: INTERVENTIONS:  -  Assess patient's ability to carry out ADLs; assess patient's baseline for ADL function and identify physical deficits which impact ability to perform ADLs (bathing, care of mouth/teeth, toileting, grooming, dressing, etc )  - Assess/evaluate cause of self-care deficits   - Assess range of motion  - Assess patient's mobility; develop plan if impaired  - Assess patient's need for assistive devices and provide as appropriate  - Encourage maximum independence but intervene and supervise when necessary  - Involve family in performance of ADLs  - Assess for home care needs following discharge   - Consider OT consult to assist with ADL evaluation and planning for discharge  - Provide patient education as appropriate  Outcome: Progressing  Goal: Maintains/Returns to pre admission functional level  Description: INTERVENTIONS:  - Perform BMAT or MOVE assessment daily    - Set and communicate daily mobility goal to care team and patient/family/caregiver     - Collaborate with rehabilitation services on mobility goals if consulted  - Out of bed for toileting  - Record patient progress and toleration of activity level   Outcome: Progressing     Problem: PAIN - ADULT  Goal: Verbalizes/displays adequate comfort level or baseline comfort level  Description: Interventions:  - Encourage patient to monitor pain and request assistance  - Assess pain using appropriate pain scale  - Administer analgesics based on type and severity of pain and evaluate response  - Implement non-pharmacological measures as appropriate and evaluate response  - Consider cultural and social influences on pain and pain management  - Notify physician/advanced practitioner if interventions unsuccessful or patient reports new pain  Outcome: Progressing     Problem: INFECTION - ADULT  Goal: Absence or prevention of progression during hospitalization  Description: INTERVENTIONS:  - Assess and monitor for signs and symptoms of infection  - Monitor lab/diagnostic results  - Monitor all insertion sites, i e  indwelling lines, tubes, and drains  - Monitor endotracheal if appropriate and nasal secretions for changes in amount and color  - Jenera appropriate cooling/warming therapies per order  - Administer medications as ordered  - Instruct and encourage patient and family to use good hand hygiene technique  - Identify and instruct in appropriate isolation precautions for identified infection/condition  Outcome: Progressing  Goal: Absence of fever/infection during neutropenic period  Description: INTERVENTIONS:  - Monitor WBC    Outcome: Progressing     Problem: SAFETY ADULT  Goal: Patient will remain free of falls  Description: INTERVENTIONS:  - Educate patient/family on patient safety including physical limitations  - Instruct patient to call for assistance with activity   - Consult OT/PT to assist with strengthening/mobility   - Keep Call bell within reach  - Keep bed low and locked with side rails adjusted as appropriate  - Keep care items and personal belongings within reach  - Initiate and maintain comfort rounds  - Make Fall Risk Sign visible to staff  - Apply yellow socks and bracelet for high fall risk patients  - Consider moving patient to room near nurses station  Outcome: Progressing  Goal: Maintain or return to baseline ADL function  Description: INTERVENTIONS:  -  Assess patient's ability to carry out ADLs; assess patient's baseline for ADL function and identify physical deficits which impact ability to perform ADLs (bathing, care of mouth/teeth, toileting, grooming, dressing, etc )  - Assess/evaluate cause of self-care deficits   - Assess range of motion  - Assess patient's mobility; develop plan if impaired  - Assess patient's need for assistive devices and provide as appropriate  - Encourage maximum independence but intervene and supervise when necessary  - Involve family in performance of ADLs  - Assess for home care needs following discharge   - Consider OT consult to assist with ADL evaluation and planning for discharge  - Provide patient education as appropriate  Outcome: Progressing  Goal: Maintains/Returns to pre admission functional level  Description: INTERVENTIONS:  - Perform BMAT or MOVE assessment daily    - Set and communicate daily mobility goal to care team and patient/family/caregiver     - Collaborate with rehabilitation services on mobility goals if consulted  - Out of bed for toileting  - Record patient progress and toleration of activity level   Outcome: Progressing     Problem: DISCHARGE PLANNING  Goal: Discharge to home or other facility with appropriate resources  Description: INTERVENTIONS:  - Identify barriers to discharge w/patient and caregiver  - Arrange for needed discharge resources and transportation as appropriate  - Identify discharge learning needs (meds, wound care, etc )  - Arrange for interpretive services to assist at discharge as needed  - Refer to Case Management Department for coordinating discharge planning if the patient needs post-hospital services based on physician/advanced practitioner order or complex needs related to functional status, cognitive ability, or social support system  Outcome: Progressing     Problem: Knowledge Deficit  Goal: Patient/family/caregiver demonstrates understanding of disease process, treatment plan, medications, and discharge instructions  Description: Complete learning assessment and assess knowledge base    Interventions:  - Provide teaching at level of understanding  - Provide teaching via preferred learning methods  Outcome: Progressing     Problem: Prexisting or High Potential for Compromised Skin Integrity  Goal: Skin integrity is maintained or improved  Description: INTERVENTIONS:  - Identify patients at risk for skin breakdown  - Assess and monitor skin integrity  - Assess and monitor nutrition and hydration status  - Monitor labs   - Assess for incontinence   - Turn and reposition patient  - Assist with mobility/ambulation  - Relieve pressure over bony prominences  - Avoid friction and shearing  - Provide appropriate hygiene as needed including keeping skin clean and dry  - Evaluate need for skin moisturizer/barrier cream  - Collaborate with interdisciplinary team   - Patient/family teaching  - Consider wound care consult   Outcome: Progressing

## 2023-05-22 NOTE — ASSESSMENT & PLAN NOTE
· Reports home PT once a week;  · Pilar Avalos approx 2 weeks ago and rolled ankle, admits to some sensation of pain in right ankle  · X-ray right ankle 5/18/23: Medial malleolus displaced fracture   · Ortho consulted,  · CAM boot for mobility  · WBAT in boot  · Elevated extremity  · Ice, pain control as needed  · Follow up in office for repeat imaging to ensure routine healing

## 2023-05-22 NOTE — PROGRESS NOTES
[facility]  Progress Note  Name: Ingrid Goetz  MRN: [id number]  Unit/Bed#: -02 I Date of Admission: 5/17/2023   Date of Service: 5/22/2023 I Hospital Day: 3    Assessment/Plan   * Failure to thrive in adult  Assessment & Plan  · Elderly grandmother unable to fully care for her  · Patient requesting long-term care facility, due to quadriplegia unable to fully care for self  · PT/OT for recommendations, continued therapy while inpatient   · Case management for dispo planning     Quadriplegic spinal paralysis [facility])  Assessment & Plan  · Spinal cord injury 2 years ago  · Patient reports she now has sensation and able to move toes and arms, starting to feel some sensation in feet and arms  · P500 specialty bed ordered  · Turn patient every 2 hours  · Patient admits to chronic severe neck/spinal pain  · Encourage patient to follow-up outpatient with pain management   · Continue with Tylenol; Lidocaine;  Aqua-K  · Will also continue home baclofen 3 times daily  · Continue Gabapentin 300 mg 3 times daily    Closed right ankle fracture  Assessment & Plan  · Reports home PT once a week;  · Renetta Pawhuska approx 2 weeks ago and rolled ankle, admits to some sensation of pain in right ankle  · X-ray right ankle 5/18/23: Medial malleolus displaced fracture   · Ortho consulted,  · CAM boot for mobility  · WBAT in boot  · Elevated extremity  · Ice, pain control as needed  · Follow up in office for repeat imaging to ensure routine healing    UTI (urinary tract infection)  Assessment & Plan  · Patient admits to foul odor in urine, secondary to spinal cord injury patient unable to determine if any pain with urination  · UA positive for moderate bacteria  · Urine culture positive for morganella morganii MDRO; aerococcus species  · ID consult in setting of concern for true infection; abx rec's          VTE Pharmacologic Prophylaxis: VTE Score: 4 Moderate Risk (Score 3-4) - Pharmacological DVT Prophylaxis Ordered: enoxaparin (Lovenox)  Patient Centered Rounds: I performed bedside rounds with nursing staff today  Discussions with Specialists or Other Care Team Provider: CM, ortho consulted     Education and Discussions with Family / Patient: Patient declined call to   Total Time Spent on Date of Encounter in care of patient: 35 minutes This time was spent on one or more of the following: performing physical exam; counseling and coordination of care; obtaining or reviewing history; documenting in the medical record; reviewing/ordering tests, medications or procedures; communicating with other healthcare professionals and discussing with patient's family/caregivers  Current Length of Stay: 3 day(s)  Current Patient Status: Inpatient   Certification Statement: The patient will continue to require additional inpatient hospital stay due to pending ID evaluation; CM for dispo  Discharge Plan: Anticipate discharge in 24-48 hrs to rehab facility  Code Status: Level 1 - Full Code    Subjective:   Patient seen this am, requesting increase in oxycodone dosing due to chronic neck pain s/p fusion  Also requesting alternate to CAM boot due to discomfort; patient is not ambulatory presently  Feels the aircast is not enough support  She also complains of tooth pain for several days, poor dentition at baseline  No foul taste in mouth of drainage noted  No fevers  Objective:     Vitals:   Temp (24hrs), Av °F (36 7 °C), Min:97 8 °F (36 6 °C), Max:98 2 °F (36 8 °C)    Temp:  [97 8 °F (36 6 °C)-98 2 °F (36 8 °C)] 97 8 °F (36 6 °C)  HR:  [68-81] 81  Resp:  [18] 18  BP: ()/(58-64) 97/58  SpO2:  [93 %-95 %] 93 %  Body mass index is 31 74 kg/m²  Input and Output Summary (last 24 hours):      Intake/Output Summary (Last 24 hours) at 2023 0807  Last data filed at 2023 2300  Gross per 24 hour   Intake 480 ml   Output 1575 ml   Net -1095 ml       Physical Exam:   Physical Exam  Vitals and nursing note reviewed  Constitutional:       General: She is not in acute distress  Appearance: She is not toxic-appearing  Cardiovascular:      Rate and Rhythm: Normal rate  Pulmonary:      Effort: Pulmonary effort is normal  No respiratory distress  Skin:     Coloration: Skin is not pale  Neurological:      Mental Status: She is alert and oriented to person, place, and time  Psychiatric:         Mood and Affect: Mood normal          Behavior: Behavior normal            Additional Data:     Labs:  Results from last 7 days   Lab Units 05/17/23  2245   WBC Thousand/uL 5 34   HEMOGLOBIN g/dL 12 7   HEMATOCRIT % 38 2   PLATELETS Thousands/uL 199   NEUTROS PCT % 50   LYMPHS PCT % 41   MONOS PCT % 6   EOS PCT % 2     Results from last 7 days   Lab Units 05/17/23  2245   SODIUM mmol/L 139   POTASSIUM mmol/L 4 1   CHLORIDE mmol/L 106   CO2 mmol/L 23   BUN mg/dL 9   CREATININE mg/dL 0 44*   ANION GAP mmol/L 10   CALCIUM mg/dL 9 1   ALBUMIN g/dL 4 1   TOTAL BILIRUBIN mg/dL 0 46   ALK PHOS U/L 71   ALT U/L 26   AST U/L 30   GLUCOSE RANDOM mg/dL 102     Results from last 7 days   Lab Units 05/17/23  2332   INR  1 01             Results from last 7 days   Lab Units 05/18/23  0151 05/17/23  2245   LACTIC ACID mmol/L 1 8 2 1*   PROCALCITONIN ng/ml  --  <0 05       Lines/Drains:  Invasive Devices     Peripheral Intravenous Line  Duration           Peripheral IV 05/17/23 Dorsal (posterior); Left Hand 4 days          Drain  Duration           External Urinary Catheter 271 days                      Imaging: Reviewed radiology reports from this admission including: XR ankle and Personally reviewed the following imaging: XR ankle    Recent Cultures (last 7 days):   Results from last 7 days   Lab Units 05/18/23  0101 05/17/23  2332 05/17/23  2245   BLOOD CULTURE   --  No Growth at 72 hrs  No Growth at 72 hrs     URINE CULTURE  20,000-29,000 cfu/ml Morganella morganii*  20,000-29,000 cfu/ml Aerococcus species*  --   --        Last 24 Hours Medication List:   Current Facility-Administered Medications   Medication Dose Route Frequency Provider Last Rate   • acetaminophen  975 mg Oral Q8H PRN Illene Rumble, PA-C     • aluminum-magnesium hydroxide-simethicone  30 mL Oral Q6H PRN Illene Rumble, PA-C     • baclofen  10 mg Oral TID Illene Rumble, PA-C     • bisacodyl  10 mg Rectal Daily PRN Illene Rumble, PA-C     • Diclofenac Sodium  2 g Topical 4x Daily PRN Illene Rumble, PA-C     • enoxaparin  40 mg Subcutaneous Daily Illene Rumble, PA-C     • gabapentin  300 mg Oral Once Illene Rumble, PA-C     • gabapentin  300 mg Oral TID Illene Rumble, PAYaseminC     • lidocaine  2 patch Topical Daily PRN Illene Rumble, PA-C     • oxyCODONE  5 mg Oral Q4H PRN HOWARD Moyer      Or   • oxyCODONE  10 mg Oral Q4H PRN HOWARD Moyer     • polyethylene glycol  17 g Oral Daily Illene Rumble, PA-C     • prazosin  5 mg Oral HS Illene Rumble, PA-C     • QUEtiapine  500 mg Oral HS Illene Rumble, PA-C     • senna-docusate sodium  1 tablet Oral HS Illene Rumble, PA-C     • tamsulosin  0 4 mg Oral HS Illene Rumble, ISAI          Today, Patient Was Seen By: Saran Hairston PA-C    **Please Note: This note may have been constructed using a voice recognition system  **

## 2023-05-22 NOTE — UTILIZATION REVIEW
NOTIFICATION OF INPATIENT ADMISSION   AUTHORIZATION REQUEST   SERVICING FACILITY:   [address] [place] [address]  Tax ID: [id number]  NPI: [id number] ATTENDING PROVIDER:  Attending Name and NPI#: Blanche Marshall Md [[id number]]  Address: [address] [place] [address]  Phone: [phone]     ADMISSION INFORMATION:  Place of Service: [place] [address]  Place of Service Code: 21  Inpatient Admission Date/Time: 5/19/23  2:42 PM  Discharge Date/Time: No discharge date for patient encounter  Admitting Diagnosis Code/Description:  Ankle injury [S99 919A]  UTI (urinary tract infection) [N39 0]  Failure to thrive in adult [R62 7]  Right ankle injury [S99 911A]  Poor social situation [Z65 9]  Known medical problems [Z78 9]     UTILIZATION REVIEW CONTACT:  Chhaya Gilliam Utilization   Network Utilization Review Department  Phone: [phone]  Fax [phone]  Email: [email or web address]  Contact for approvals/pending authorizations, clinical reviews, and discharge  PHYSICIAN ADVISORY SERVICES:  Medical Necessity Denial & [facility] Review  Phone: [phone]  Fax: [phone]  Email: [email or web address]

## 2023-05-23 LAB
BACTERIA BLD CULT: NORMAL
BACTERIA BLD CULT: NORMAL

## 2023-05-23 RX ORDER — ENEMA 19; 7 G/133ML; G/133ML
1 ENEMA RECTAL DAILY PRN
Status: DISCONTINUED | OUTPATIENT
Start: 2023-05-23 | End: 2023-06-01 | Stop reason: HOSPADM

## 2023-05-23 RX ORDER — DIPHENHYDRAMINE HYDROCHLORIDE AND LIDOCAINE HYDROCHLORIDE AND ALUMINUM HYDROXIDE AND MAGNESIUM HYDRO
10 KIT ONCE
Status: COMPLETED | OUTPATIENT
Start: 2023-05-23 | End: 2023-05-23

## 2023-05-23 RX ORDER — KETOROLAC TROMETHAMINE 30 MG/ML
15 INJECTION, SOLUTION INTRAMUSCULAR; INTRAVENOUS ONCE
Status: COMPLETED | OUTPATIENT
Start: 2023-05-23 | End: 2023-05-23

## 2023-05-23 RX ADMIN — BACLOFEN 10 MG: 10 TABLET ORAL at 21:23

## 2023-05-23 RX ADMIN — BACLOFEN 10 MG: 10 TABLET ORAL at 16:59

## 2023-05-23 RX ADMIN — ACETAMINOPHEN 975 MG: 325 TABLET, FILM COATED ORAL at 16:59

## 2023-05-23 RX ADMIN — DIPHENHYDRAMINE HYDROCHLORIDE AND LIDOCAINE HYDROCHLORIDE AND ALUMINUM HYDROXIDE AND MAGNESIUM HYDRO 10 ML: KIT at 22:49

## 2023-05-23 RX ADMIN — GABAPENTIN 300 MG: 300 CAPSULE ORAL at 21:23

## 2023-05-23 RX ADMIN — Medication 12.5 MG: at 21:23

## 2023-05-23 RX ADMIN — Medication 12.5 MG: at 09:15

## 2023-05-23 RX ADMIN — GABAPENTIN 300 MG: 300 CAPSULE ORAL at 09:12

## 2023-05-23 RX ADMIN — SENNOSIDES AND DOCUSATE SODIUM 1 TABLET: 50; 8.6 TABLET ORAL at 21:23

## 2023-05-23 RX ADMIN — GABAPENTIN 300 MG: 300 CAPSULE ORAL at 16:59

## 2023-05-23 RX ADMIN — QUETIAPINE FUMARATE 500 MG: 200 TABLET ORAL at 21:24

## 2023-05-23 RX ADMIN — TAMSULOSIN HYDROCHLORIDE 0.4 MG: 0.4 CAPSULE ORAL at 21:23

## 2023-05-23 RX ADMIN — KETOROLAC TROMETHAMINE 15 MG: 30 INJECTION, SOLUTION INTRAMUSCULAR; INTRAVENOUS at 22:49

## 2023-05-23 RX ADMIN — BACLOFEN 10 MG: 10 TABLET ORAL at 09:12

## 2023-05-23 NOTE — ASSESSMENT & PLAN NOTE
· Spinal cord injury 2 years ago  · Patient reports she now has sensation and able to move toes and arms, starting to feel some sensation in feet and arms  · P500 specialty bed ordered  · Turn patient every 2 hours  · Patient admits to chronic severe neck/spinal pain  · Encourage patient to follow-up outpatient with pain management   · Continue with Tylenol; Lidocaine;  Aqua-K  · Baclofen 10 mg TID, Gabapentin 300 mg TID  · Increased oxycodone to 7 5 mg or 12 5 mg as needed

## 2023-05-23 NOTE — CASE MANAGEMENT
Case Management Assessment    Patient name Millie Conception  Location [facility] [address] [id number]/-[id number] MRN [id number]  : 1990 Date 2023       Current Admission Date: 2023  Current Admission Diagnosis:Failure to thrive in adult   Patient Active Problem List    Diagnosis Date Noted   • Closed right ankle fracture 2023   • UTI (urinary tract infection) 2023   • Urinary retention 2022   • Inability to return to living situation 2022   • Lower extremity edema 2022   • Anxiety 2022   • Insomnia 2022   • Hepatitis C antibody positive in blood 2022   • Failure to thrive in adult 2022   • Quadriplegic spinal paralysis ([facility] [address] ) 2022   • Nondependent opioid abuse in remission ([facility] [address] ) 10/15/2021      LOS (days): 4  Geometric Mean LOS (GMLOS) (days):   Days to GMLOS:     OBJECTIVE:    Risk of Unplanned Readmission Score: 12 87      Current admission status: Inpatient     Preferred Pharmacy:   [facility] [place], [address]  [place] [address]  Phone: [phone] Fax: [phone]    Primary Care Provider: Vivien Asher MD    Primary Insurance: [address] [place] [address]  Secondary Insurance:     ASSESSMENT:  [facility] Proxies    There are no active Health Care Proxies on file  Readmission Root Cause  30 Day Readmission: No    Patient Information  Admitted from[de-identified] Home  Mental Status: Alert  During Assessment patient was accompanied by: Not accompanied during assessment  Assessment information provided by[de-identified] Patient  Primary Caregiver: Family  Caregiver's Name[de-identified] Autumn Raymond ([address])  Caregiver's Relationship to Patient[de-identified] Family Member  Caregiver's Telephone Number[de-identified] [phone]  Support Systems: Family members, [address] [place] [address] Avenue of Residence: [place] [address] do you live in?: [address] entry access options   Select all that apply : Ramp  Type of Current Residence: [facility]  In the last 12 months, was there a time when you were not able to pay the mortgage or rent on time?: No  In the last 12 months, how many places have you lived?: 1  In the last 12 months, was there a time when you did not have a steady place to sleep or slept in a shelter (including now)?: No  Homeless/housing insecurity resource given?: N/A  Living Arrangements: Lives w/ Extended Family  Is patient a ?: No    Activities of Daily Living Prior to Admission  Functional Status: Total dependent  Completes ADLs independently?: No  Level of ADL dependence: Total Dependent  Ambulates independently?: No  Level of ambulatory dependence: Assistance  Does patient use assisted devices?: Yes  Assisted Devices (DME) used:  Shower Chair, Walker, Wheelchair, Lorrane Wallace lift  Does patient currently own DME?: Yes  What DME does the patient currently own?: Shower Chair, Debbie Albany, Wheelchair, Lorrane Wallace lift  Does patient have a history of Outpatient Therapy (PT/OT)?: Yes ([facility])  Does the patient have a history of Short-Term Rehab?: No  Does patient have a history of HHC?: Yes ([facility])  Does patient currently have [facility]?: No      Patient Information Continued  Income Source: SSI/SSD  Does patient have prescription coverage?: Yes  Within the past 12 months, you worried that your food would run out before you got the money to buy more : Never true  Within the past 12 months, the food you bought just didn't last and you didn't have money to get more : Never true  Food insecurity resource given?: N/A  Does patient receive dialysis treatments?: No  Does patient have a history of substance abuse?: Yes  Historical substance use preference: Cocaine, Heroin  Is patient currently in treatment for substance abuse?: N/A - sober  Does patient have a history of Mental Health Diagnosis?: Yes (depression)  Is patient receiving treatment for mental health?: Yes (psychiatrist - Dr Daniel Maurice)  Has patient received inpatient treatment related to mental health in the last 2 years?: No    PHQ 2/9 Screening   Reviewed PHQ 2/9 Depression Screening Score?: No    Means of Transportation  Means of Transport to Appts[de-identified] Magaly Dubon  In the past 12 months, has lack of transportation kept you from medical appointments or from getting medications?: No  In the past 12 months, has lack of transportation kept you from meetings, work, or from getting things needed for daily living?: No  Was application for public transport provided?: N/A    CM attempted to meet with patient at bedside to review DCP  STR placement referral open in [address] with 40 mile search radius  36 providers contacted, no accepting providers at this time  CM will continue to follow for d/c planning

## 2023-05-23 NOTE — PLAN OF CARE
Problem: Potential for Falls  Goal: Patient will remain free of falls  Description: INTERVENTIONS:  - Educate patient/family on patient safety including physical limitations  - Instruct patient to call for assistance with activity   - Consult OT/PT to assist with strengthening/mobility   - Keep Call bell within reach  - Keep bed low and locked with side rails adjusted as appropriate  - Keep care items and personal belongings within reach  - Initiate and maintain comfort rounds  - Make Fall Risk Sign visible to staff  - Offer Toileting every Hours, in advance of need  - Initiate/Maintain alarm  - Obtain necessary fall risk management equipment:   - Apply yellow socks and bracelet for high fall risk patients  - Consider moving patient to room near nurses station  Outcome: Progressing     Problem: MOBILITY - ADULT  Goal: Maintain or return to baseline ADL function  Description: INTERVENTIONS:  -  Assess patient's ability to carry out ADLs; assess patient's baseline for ADL function and identify physical deficits which impact ability to perform ADLs (bathing, care of mouth/teeth, toileting, grooming, dressing, etc )  - Assess/evaluate cause of self-care deficits   - Assess range of motion  - Assess patient's mobility; develop plan if impaired  - Assess patient's need for assistive devices and provide as appropriate  - Encourage maximum independence but intervene and supervise when necessary  - Involve family in performance of ADLs  - Assess for home care needs following discharge   - Consider OT consult to assist with ADL evaluation and planning for discharge  - Provide patient education as appropriate  Outcome: Progressing  Goal: Maintains/Returns to pre admission functional level  Description: INTERVENTIONS:  - Perform BMAT or MOVE assessment daily    - Set and communicate daily mobility goal to care team and patient/family/caregiver     - Collaborate with rehabilitation services on mobility goals if consulted  - Perform Range of Motion times a day  - Reposition patient every  hours    - Dangle patient  times a day  - Stand patient  times a day  - Ambulate patient  times a day  - Out of bed to chair  times a day   - Out of bed for meals  times a day  - Out of bed for toileting  - Record patient progress and toleration of activity level   Outcome: Progressing     Problem: PAIN - ADULT  Goal: Verbalizes/displays adequate comfort level or baseline comfort level  Description: Interventions:  - Encourage patient to monitor pain and request assistance  - Assess pain using appropriate pain scale  - Administer analgesics based on type and severity of pain and evaluate response  - Implement non-pharmacological measures as appropriate and evaluate response  - Consider cultural and social influences on pain and pain management  - Notify physician/advanced practitioner if interventions unsuccessful or patient reports new pain  Outcome: Progressing     Problem: INFECTION - ADULT  Goal: Absence or prevention of progression during hospitalization  Description: INTERVENTIONS:  - Assess and monitor for signs and symptoms of infection  - Monitor lab/diagnostic results  - Monitor all insertion sites, i e  indwelling lines, tubes, and drains  - Monitor endotracheal if appropriate and nasal secretions for changes in amount and color  - [place] appropriate cooling/warming therapies per order  - Administer medications as ordered  - Instruct and encourage patient and family to use good hand hygiene technique  - Identify and instruct in appropriate isolation precautions for identified infection/condition  Outcome: Progressing  Goal: Absence of fever/infection during neutropenic period  Description: INTERVENTIONS:  - Monitor WBC    Outcome: Progressing     Problem: SAFETY ADULT  Goal: Patient will remain free of falls  Description: INTERVENTIONS:  - Educate patient/family on patient safety including physical limitations  - Instruct patient to call for assistance with activity   - Consult OT/PT to assist with strengthening/mobility   - Keep Call bell within reach  - Keep bed low and locked with side rails adjusted as appropriate  - Keep care items and personal belongings within reach  - Initiate and maintain comfort rounds  - Make Fall Risk Sign visible to staff  - Offer Toileting every  Hours, in advance of need  - Initiate/Maintain alarm  - Obtain necessary fall risk management equipment:  - Apply yellow socks and bracelet for high fall risk patients  - Consider moving patient to room near nurses station  Outcome: Progressing  Goal: Maintain or return to baseline ADL function  Description: INTERVENTIONS:  -  Assess patient's ability to carry out ADLs; assess patient's baseline for ADL function and identify physical deficits which impact ability to perform ADLs (bathing, care of mouth/teeth, toileting, grooming, dressing, etc )  - Assess/evaluate cause of self-care deficits   - Assess range of motion  - Assess patient's mobility; develop plan if impaired  - Assess patient's need for assistive devices and provide as appropriate  - Encourage maximum independence but intervene and supervise when necessary  - Involve family in performance of ADLs  - Assess for home care needs following discharge   - Consider OT consult to assist with ADL evaluation and planning for discharge  - Provide patient education as appropriate  Outcome: Progressing  Goal: Maintains/Returns to pre admission functional level  Description: INTERVENTIONS:  - Perform BMAT or MOVE assessment daily    - Set and communicate daily mobility goal to care team and patient/family/caregiver  - Collaborate with rehabilitation services on mobility goals if consulted  - Perform Range of Motion  times a day  - Reposition patient every hours    - Dangle patient times a day  - Stand patient  times a day  - Ambulate patient  times a day  - Out of bed to chair  times a day   - Out of bed for meals times a day  - Out of bed for toileting  - Record patient progress and toleration of activity level   Outcome: Progressing     Problem: DISCHARGE PLANNING  Goal: Discharge to home or other facility with appropriate resources  Description: INTERVENTIONS:  - Identify barriers to discharge w/patient and caregiver  - Arrange for needed discharge resources and transportation as appropriate  - Identify discharge learning needs (meds, wound care, etc )  - Arrange for interpretive services to assist at discharge as needed  - Refer to Case Management Department for coordinating discharge planning if the patient needs post-hospital services based on physician/advanced practitioner order or complex needs related to functional status, cognitive ability, or social support system  Outcome: Progressing     Problem: Knowledge Deficit  Goal: Patient/family/caregiver demonstrates understanding of disease process, treatment plan, medications, and discharge instructions  Description: Complete learning assessment and assess knowledge base    Interventions:  - Provide teaching at level of understanding  - Provide teaching via preferred learning methods  Outcome: Progressing     Problem: Prexisting or High Potential for Compromised Skin Integrity  Goal: Skin integrity is maintained or improved  Description: INTERVENTIONS:  - Identify patients at risk for skin breakdown  - Assess and monitor skin integrity  - Assess and monitor nutrition and hydration status  - Monitor labs   - Assess for incontinence   - Turn and reposition patient  - Assist with mobility/ambulation  - Relieve pressure over bony prominences  - Avoid friction and shearing  - Provide appropriate hygiene as needed including keeping skin clean and dry  - Evaluate need for skin moisturizer/barrier cream  - Collaborate with interdisciplinary team   - Patient/family teaching  - Consider wound care consult   Outcome: Progressing

## 2023-05-23 NOTE — PROGRESS NOTES
[facility]  Progress Note  Name: Rachel Miller  MRN: [id number]  Unit/Bed#: -02 I Date of Admission: 5/17/2023   Date of Service: 5/23/2023 I Hospital Day: 4    Assessment/Plan   * Failure to thrive in adult  Assessment & Plan  · Elderly grandmother unable to fully care for her  · Patient requesting long-term care facility, due to quadriplegia unable to fully care for self  · PT/OT for recommendations, continued therapy while inpatient   · Case management for dispo planning   · Nutrition consulted for dietary recommendations     Quadriplegic spinal paralysis [facility])  Assessment & Plan  · Spinal cord injury 2 years ago  · Patient reports she now has sensation and able to move toes and arms, starting to feel some sensation in feet and arms  · P500 specialty bed ordered  · Turn patient every 2 hours  · Patient admits to chronic severe neck/spinal pain  · Encourage patient to follow-up outpatient with pain management   · Continue with Tylenol; Lidocaine;  Aqua-K  · Baclofen 10 mg TID, Gabapentin 300 mg TID  · Increased oxycodone to 7 5 mg or 12 5 mg as needed    Closed right ankle fracture  Assessment & Plan  · Reports home PT once a week;  · Marissa Escalante approx 2 weeks ago and rolled ankle, admits to some sensation of pain in right ankle  · X-ray right ankle 5/18/23: Medial malleolus displaced fracture   · Ortho consulted,  · CAM boot for mobility - uncomfortable; trial probus boots  · WBAT in boot  · Elevated extremity  · Ice, pain control as needed  · Follow up in office for repeat imaging to ensure routine healing    UTI (urinary tract infection)  Assessment & Plan  · Patient admits to foul odor in urine, secondary to spinal cord injury patient unable to determine if any pain with urination  · UA positive for moderate bacteria  · Urine culture positive for morganella morganii MDRO; aerococcus species  · ID consult in setting of concern for true infection,  · Hold further antibiotics           VTE Pharmacologic Prophylaxis: VTE Score: 4 Moderate Risk (Score 3-4) - Pharmacological DVT Prophylaxis Ordered: enoxaparin (Lovenox)  Patient Centered Rounds: I performed bedside rounds with nursing staff today  Discussions with Specialists or Other Care Team Provider: GALI JAY    Education and Discussions with Family / Patient: Patient declined call to   Total Time Spent on Date of Encounter in care of patient: 35 minutes This time was spent on one or more of the following: performing physical exam; counseling and coordination of care; obtaining or reviewing history; documenting in the medical record; reviewing/ordering tests, medications or procedures; communicating with other healthcare professionals and discussing with patient's family/caregivers  Current Length of Stay: 4 day(s)  Current Patient Status: Inpatient   Certification Statement: The patient will continue to require additional inpatient hospital stay due to CM for safe dispo  Discharge Plan: Anticipate discharge in 24-48 hrs to rehab facility  Code Status: Level 1 - Full Code    Subjective:   Patient seen this am; has not had a BM since , but reports she does not go regularly  Denies pain  Advised patient that she will need to have a BM prior to discharge to a facility  Objective:     Vitals:   Temp (24hrs), Av 3 °F (36 8 °C), Min:97 6 °F (36 4 °C), Max:98 9 °F (37 2 °C)    Temp:  [97 6 °F (36 4 °C)-98 9 °F (37 2 °C)] 97 6 °F (36 4 °C)  HR:  [68-82] 68  Resp:  [18] 18  BP: (104-112)/(61-65) 111/65  SpO2:  [86 %-94 %] 94 %  Body mass index is 31 74 kg/m²  Input and Output Summary (last 24 hours): Intake/Output Summary (Last 24 hours) at 2023 0743  Last data filed at 2023 0700  Gross per 24 hour   Intake 1920 ml   Output 2300 ml   Net -380 ml       Physical Exam:   Physical Exam  Vitals and nursing note reviewed     Constitutional:       Comments: States she is tired   Neurological:      Mental Status: She is alert and oriented to person, place, and time  Psychiatric:         Mood and Affect: Affect is flat  Comments: Patient less interactive today; stayed under her blanket with an eye mask on during conversation            Additional Data:     Labs:  Results from last 7 days   Lab Units 05/17/23  2245   WBC Thousand/uL 5 34   HEMOGLOBIN g/dL 12 7   HEMATOCRIT % 38 2   PLATELETS Thousands/uL 199   NEUTROS PCT % 50   LYMPHS PCT % 41   MONOS PCT % 6   EOS PCT % 2     Results from last 7 days   Lab Units 05/17/23  2245   SODIUM mmol/L 139   POTASSIUM mmol/L 4 1   CHLORIDE mmol/L 106   CO2 mmol/L 23   BUN mg/dL 9   CREATININE mg/dL 0 44*   ANION GAP mmol/L 10   CALCIUM mg/dL 9 1   ALBUMIN g/dL 4 1   TOTAL BILIRUBIN mg/dL 0 46   ALK PHOS U/L 71   ALT U/L 26   AST U/L 30   GLUCOSE RANDOM mg/dL 102     Results from last 7 days   Lab Units 05/17/23  2332   INR  1 01             Results from last 7 days   Lab Units 05/18/23  0151 05/17/23  2245   LACTIC ACID mmol/L 1 8 2 1*   PROCALCITONIN ng/ml  --  <0 05       Lines/Drains:  Invasive Devices     Drain  Duration           External Urinary Catheter <1 day              Imaging: Reviewed radiology reports from this admission including: XR shoulder    Recent Cultures (last 7 days):   Results from last 7 days   Lab Units 05/18/23  0101 05/17/23  2332 05/17/23  2245   BLOOD CULTURE   --  No Growth After 4 Days  No Growth After 4 Days     URINE CULTURE  20,000-29,000 cfu/ml Morganella morganii*  20,000-29,000 cfu/ml Aerococcus species*  --   --        Last 24 Hours Medication List:   Current Facility-Administered Medications   Medication Dose Route Frequency Provider Last Rate   • acetaminophen  975 mg Oral Q8H PRN Rae Manriquez PA-C     • aluminum-magnesium hydroxide-simethicone  30 mL Oral Q6H PRN Rae Manriquez PA-C     • baclofen  10 mg Oral TID Rae Manriquez PA-C     • bisacodyl  10 mg Rectal Daily PRN Rae Manriquez PA-C     • Diclofenac Sodium  2 g Topical 4x Daily PRN Desmond Ramires PA-C     • enoxaparin  40 mg Subcutaneous Daily Desmond Ramires PA-C     • gabapentin  300 mg Oral Once Desmond Ramires PA-C     • gabapentin  300 mg Oral TID Desmond Ramires PA-C     • lidocaine  2 patch Topical Daily PRN Desmond Ramires PA-C     • oxyCODONE  7 5 mg Oral Q4H PRN Bonifacio Solo PA-C      Or   • oxyCODONE  12 5 mg Oral Q4H PRN Bonifacio Solo PA-C     • polyethylene glycol  17 g Oral Daily Desmond Ramires PA-C     • prazosin  5 mg Oral HS Desmond Ramires PA-C     • QUEtiapine  500 mg Oral HS Desmond Ramires PA-C     • senna-docusate sodium  1 tablet Oral HS Desmond Ramires PA-C     • tamsulosin  0 4 mg Oral HS Desmond Ramires PA-C          Today, Patient Was Seen By: Bonifacio Solo PA-C    **Please Note: This note may have been constructed using a voice recognition system  **

## 2023-05-23 NOTE — ASSESSMENT & PLAN NOTE
· Reports home PT once a week;  · Liz Garg approx 2 weeks ago and rolled ankle, admits to some sensation of pain in right ankle  · X-ray right ankle 5/18/23: Medial malleolus displaced fracture   · Ortho consulted,  · CAM boot for mobility - uncomfortable; trial probus boots  · WBAT in boot  · Elevated extremity  · Ice, pain control as needed  · Follow up in office for repeat imaging to ensure routine healing

## 2023-05-23 NOTE — ASSESSMENT & PLAN NOTE
· Elderly grandmother unable to fully care for her  · Patient requesting long-term care facility, due to quadriplegia unable to fully care for self  · PT/OT for recommendations, continued therapy while inpatient   · Case management for dispo planning   · Nutrition consulted for dietary recommendations

## 2023-05-23 NOTE — PLAN OF CARE
Problem: Potential for Falls  Goal: Patient will remain free of falls  Description: INTERVENTIONS:  - Educate patient/family on patient safety including physical limitations  - Instruct patient to call for assistance with activity   - Consult OT/PT to assist with strengthening/mobility   - Keep Call bell within reach  - Keep bed low and locked with side rails adjusted as appropriate  - Keep care items and personal belongings within reach  - Initiate and maintain comfort rounds  - Make Fall Risk Sign visible to staff  - Offer Toileting every  Hours, in advance of need  - Initiate/Maintain alarm  - Obtain necessary fall risk management equipment:   - Apply yellow socks and bracelet for high fall risk patients  - Consider moving patient to room near nurses station  Outcome: Progressing     Problem: MOBILITY - ADULT  Goal: Maintain or return to baseline ADL function  Description: INTERVENTIONS:  -  Assess patient's ability to carry out ADLs; assess patient's baseline for ADL function and identify physical deficits which impact ability to perform ADLs (bathing, care of mouth/teeth, toileting, grooming, dressing, etc )  - Assess/evaluate cause of self-care deficits   - Assess range of motion  - Assess patient's mobility; develop plan if impaired  - Assess patient's need for assistive devices and provide as appropriate  - Encourage maximum independence but intervene and supervise when necessary  - Involve family in performance of ADLs  - Assess for home care needs following discharge   - Consider OT consult to assist with ADL evaluation and planning for discharge  - Provide patient education as appropriate  Outcome: Progressing  Goal: Maintains/Returns to pre admission functional level  Description: INTERVENTIONS:  - Perform BMAT or MOVE assessment daily    - Set and communicate daily mobility goal to care team and patient/family/caregiver     - Collaborate with rehabilitation services on mobility goals if consulted  - Perform Range of Motion  times a day  - Reposition patient every  hours    - Dangle patient  times a day  - Stand patient  times a day  - Ambulate patient  times a day  - Out of bed to chair  times a day   - Out of bed for meals times a day  - Out of bed for toileting  - Record patient progress and toleration of activity level   Outcome: Progressing     Problem: PAIN - ADULT  Goal: Verbalizes/displays adequate comfort level or baseline comfort level  Description: Interventions:  - Encourage patient to monitor pain and request assistance  - Assess pain using appropriate pain scale  - Administer analgesics based on type and severity of pain and evaluate response  - Implement non-pharmacological measures as appropriate and evaluate response  - Consider cultural and social influences on pain and pain management  - Notify physician/advanced practitioner if interventions unsuccessful or patient reports new pain  Outcome: Progressing     Problem: INFECTION - ADULT  Goal: Absence or prevention of progression during hospitalization  Description: INTERVENTIONS:  - Assess and monitor for signs and symptoms of infection  - Monitor lab/diagnostic results  - Monitor all insertion sites, i e  indwelling lines, tubes, and drains  - Monitor endotracheal if appropriate and nasal secretions for changes in amount and color  - [place] appropriate cooling/warming therapies per order  - Administer medications as ordered  - Instruct and encourage patient and family to use good hand hygiene technique  - Identify and instruct in appropriate isolation precautions for identified infection/condition  Outcome: Progressing  Goal: Absence of fever/infection during neutropenic period  Description: INTERVENTIONS:  - Monitor WBC    Outcome: Progressing

## 2023-05-23 NOTE — ASSESSMENT & PLAN NOTE
· Patient admits to foul odor in urine, secondary to spinal cord injury patient unable to determine if any pain with urination  · UA positive for moderate bacteria  · Urine culture positive for morganella morganii MDRO; aerococcus species  · ID consult in setting of concern for true infection,  · Hold further antibiotics

## 2023-05-24 ENCOUNTER — APPOINTMENT (INPATIENT)
Dept: CT IMAGING | Facility: HOSPITAL | Age: 33
End: 2023-05-24

## 2023-05-24 LAB — 1,25(OH)2D3 SERPL-MCNC: 21.3 PG/ML (ref 24.8–81.5)

## 2023-05-24 RX ORDER — DIPHENHYDRAMINE HYDROCHLORIDE AND LIDOCAINE HYDROCHLORIDE AND ALUMINUM HYDROXIDE AND MAGNESIUM HYDRO
10 KIT EVERY 4 HOURS PRN
Status: DISCONTINUED | OUTPATIENT
Start: 2023-05-24 | End: 2023-06-01 | Stop reason: HOSPADM

## 2023-05-24 RX ORDER — KETOROLAC TROMETHAMINE 30 MG/ML
15 INJECTION, SOLUTION INTRAMUSCULAR; INTRAVENOUS EVERY 6 HOURS PRN
Status: COMPLETED | OUTPATIENT
Start: 2023-05-24 | End: 2023-05-25

## 2023-05-24 RX ORDER — CHLORHEXIDINE GLUCONATE 0.12 MG/ML
15 RINSE ORAL EVERY 12 HOURS SCHEDULED
Status: DISCONTINUED | OUTPATIENT
Start: 2023-05-24 | End: 2023-06-01 | Stop reason: HOSPADM

## 2023-05-24 RX ADMIN — BACLOFEN 10 MG: 10 TABLET ORAL at 17:57

## 2023-05-24 RX ADMIN — Medication 12.5 MG: at 21:27

## 2023-05-24 RX ADMIN — MORPHINE SULFATE 2 MG: 2 INJECTION, SOLUTION INTRAMUSCULAR; INTRAVENOUS at 14:07

## 2023-05-24 RX ADMIN — TAMSULOSIN HYDROCHLORIDE 0.4 MG: 0.4 CAPSULE ORAL at 21:27

## 2023-05-24 RX ADMIN — BACLOFEN 10 MG: 10 TABLET ORAL at 21:27

## 2023-05-24 RX ADMIN — Medication 12.5 MG: at 10:25

## 2023-05-24 RX ADMIN — IOHEXOL 100 ML: 350 INJECTION, SOLUTION INTRAVENOUS at 14:19

## 2023-05-24 RX ADMIN — SENNOSIDES AND DOCUSATE SODIUM 1 TABLET: 50; 8.6 TABLET ORAL at 21:27

## 2023-05-24 RX ADMIN — QUETIAPINE FUMARATE 500 MG: 200 TABLET ORAL at 21:29

## 2023-05-24 RX ADMIN — KETOROLAC TROMETHAMINE 15 MG: 30 INJECTION, SOLUTION INTRAMUSCULAR; INTRAVENOUS at 18:28

## 2023-05-24 RX ADMIN — BACLOFEN 10 MG: 10 TABLET ORAL at 10:19

## 2023-05-24 RX ADMIN — GABAPENTIN 300 MG: 300 CAPSULE ORAL at 17:57

## 2023-05-24 RX ADMIN — GABAPENTIN 300 MG: 300 CAPSULE ORAL at 10:13

## 2023-05-24 RX ADMIN — GABAPENTIN 300 MG: 300 CAPSULE ORAL at 21:27

## 2023-05-24 NOTE — PROGRESS NOTES
Spiritual Care Progress Note    2023  Patient: Jaya Meredith : 1990  Admission Date & Time: 2023  MRN: [id number] CSN: [id number]     visited patient per CM referral  Minal Wilson introduced self & role and provided emotional support  Patient shared she is well-supported by family and identified no further needs at this time  Spiritual care remains available to support       23 1500   Clinical Encounter Type   Visited With Patient   Routine Visit Introduction   Referral From

## 2023-05-24 NOTE — PROGRESS NOTES
[address] [facility]  Progress Note  Name: Catarino Barthel  MRN: [id number]  Unit/Bed#: -02 I Date of Admission: 5/17/2023   Date of Service: 5/24/2023 I Hospital Day: 5    Assessment/Plan   * Failure to thrive in adult  Assessment & Plan  · Elderly grandmother unable to fully care for her  · Patient requesting long-term care facility, due to quadriplegia unable to fully care for self  · PT/OT for recommendations, continued therapy while inpatient   · Case management for dispo planning   · Nutrition consulted for dietary recommendations     Quadriplegic spinal paralysis [facility])  Assessment & Plan  · Spinal cord injury 2 years ago  · Patient reports she now has sensation and able to move toes and arms, starting to feel some sensation in feet and arms  · P500 specialty bed ordered  · Turn patient every 2 hours  · Patient admits to chronic severe neck/spinal pain  · Encourage patient to follow-up outpatient with pain management   · Continue with Tylenol; Lidocaine;  Aqua-K  · Baclofen 10 mg TID, Gabapentin 300 mg TID  · Increased oxycodone to 7 5 mg or 12 5 mg as needed    Closed right ankle fracture  Assessment & Plan  · Reports home PT once a week;  · [place]  approx 2 weeks ago and rolled ankle, admits to some sensation of pain in right ankle  · X-ray right ankle 5/18/23: Medial malleolus displaced fracture   · Ortho consulted,  · CAM boot for mobility - uncomfortable; trial probus boots  · WBAT in boot  · Elevated extremity  · Ice, pain control as needed  · Follow up in office for repeat imaging to ensure routine healing    UTI (urinary tract infection)  Assessment & Plan  · Patient admits to foul odor in urine, secondary to spinal cord injury patient unable to determine if any pain with urination  · UA positive for moderate bacteria  · Urine culture positive for morganella morganii MDRO; aerococcus species  · ID consult in setting of concern for true infection,  · Hold further antibiotics           VTE Pharmacologic Prophylaxis: VTE Score: 4 Moderate Risk (Score 3-4) - Pharmacological DVT Prophylaxis Ordered: enoxaparin (Lovenox)  Patient Centered Rounds: I performed bedside rounds with nursing staff today  Discussions with Specialists or Other Care Team Provider: GALI JAY    Education and Discussions with Family / Patient: Patient declined call to   Total Time Spent on Date of Encounter in care of patient: 35 minutes This time was spent on one or more of the following: performing physical exam; counseling and coordination of care; obtaining or reviewing history; documenting in the medical record; reviewing/ordering tests, medications or procedures; communicating with other healthcare professionals and discussing with patient's family/caregivers  Current Length of Stay: 5 day(s)  Current Patient Status: Inpatient   Certification Statement: The patient will continue to require additional inpatient hospital stay due to CM for safe dispo  Discharge Plan: medically stable pending STR plan    Code Status: Level 1 - Full Code    Subjective:   Patient complaining of mouth pain today  Reports she really did not sleep much last night at all secondary to the dental pain, mostly on the right upper jaw  She has poor dentition at baseline, she is worried she has a dental abscess  No fevers overnight  Objective:     Vitals:   Temp (24hrs), Av 2 °F (36 8 °C), Min:97 8 °F (36 6 °C), Max:98 5 °F (36 9 °C)    Temp:  [97 8 °F (36 6 °C)-98 5 °F (36 9 °C)] 97 8 °F (36 6 °C)  HR:  [71-79] 79  Resp:  [16-18] 18  BP: (106-111)/(59-66) 106/59  SpO2:  [92 %-93 %] 92 %  Body mass index is 31 74 kg/m²  Input and Output Summary (last 24 hours): Intake/Output Summary (Last 24 hours) at 2023 0750  Last data filed at 2023 0500  Gross per 24 hour   Intake 1200 ml   Output 1000 ml   Net 200 ml       Physical Exam:   Physical Exam  Vitals and nursing note reviewed     Constitutional:       General: She is not in acute distress  Appearance: She is not toxic-appearing  HENT:      Mouth/Throat:      Mouth: Mucous membranes are dry  Dentition: Abnormal dentition  Dental tenderness and gingival swelling present  Tongue: No lesions  Pharynx: Uvula midline  Neurological:      Mental Status: She is alert and oriented to person, place, and time  Additional Data:     Labs:  Results from last 7 days   Lab Units 05/17/23  2245   EOS PCT % 2   HEMATOCRIT % 38 2   HEMOGLOBIN g/dL 12 7   LYMPHS PCT % 41   MONOS PCT % 6   NEUTROS PCT % 50   PLATELETS Thousands/uL 199   WBC Thousand/uL 5 34     Results from last 7 days   Lab Units 05/17/23  2245   ANION GAP mmol/L 10   ALBUMIN g/dL 4 1   ALK PHOS U/L 71   ALT U/L 26   AST U/L 30   BUN mg/dL 9   CALCIUM mg/dL 9 1   CHLORIDE mmol/L 106   CO2 mmol/L 23   CREATININE mg/dL 0 44*   GLUCOSE RANDOM mg/dL 102   POTASSIUM mmol/L 4 1   SODIUM mmol/L 139   TOTAL BILIRUBIN mg/dL 0 46     Results from last 7 days   Lab Units 05/17/23  2332   INR  1 01             Results from last 7 days   Lab Units 05/18/23  0151 05/17/23  2245   LACTIC ACID mmol/L 1 8 2 1*   PROCALCITONIN ng/ml  --  <0 05       Lines/Drains:  Invasive Devices     Peripheral Intravenous Line  Duration           Peripheral IV 05/23/23 Left;Proximal;Ventral (anterior) Forearm <1 day          Drain  Duration           External Urinary Catheter 1 day              Imaging: Reviewed radiology reports from this admission including: XR shoulder    Recent Cultures (last 7 days):   Results from last 7 days   Lab Units 05/18/23  0101 05/17/23  2332 05/17/23  2245   BLOOD CULTURE   --  No Growth After 5 Days  No Growth After 5 Days     URINE CULTURE  20,000-29,000 cfu/ml Morganella morganii*  20,000-29,000 cfu/ml Aerococcus species*  --   --        Last 24 Hours Medication List:   Current Facility-Administered Medications   Medication Dose Route Frequency Provider Last Rate   • acetaminophen  975 mg Oral Q8H PRN Brooklyn Celestin PA-C     • aluminum-magnesium hydroxide-simethicone  30 mL Oral Q6H PRN Brooklyn Celestin PA-C     • baclofen  10 mg Oral TID Brooklyn Celestin PA-C     • bisacodyl  10 mg Rectal Daily PRN Brooklyn Celestin PA-C     • Diclofenac Sodium  2 g Topical 4x Daily PRN Brooklyn Celestin PA-C     • enoxaparin  40 mg Subcutaneous Daily Brooklyn Celestin PA-C     • gabapentin  300 mg Oral Once Brooklyn Celestin PA-C     • gabapentin  300 mg Oral TID Brooklyn Celestin PA-C     • lidocaine  2 patch Topical Daily PRN Brooklyn Celestin PA-C     • oxyCODONE  7 5 mg Oral Q4H PRN Fifi Burns PA-C      Or   • oxyCODONE  12 5 mg Oral Q4H PRN Fifi Burns PA-C     • polyethylene glycol  17 g Oral Daily Brooklyn Celestin PA-C     • prazosin  5 mg Oral HS Brooklyn Celestin PA-C     • QUEtiapine  500 mg Oral HS Brooklyn Celestin PA-C     • senna-docusate sodium  1 tablet Oral HS Brooklyn Celestin PA-C     • sodium phosphate-biphosphate  1 enema Rectal Daily PRN Fifi Burns PA-C     • tamsulosin  0 4 mg Oral HS Brooklyn Celestin PA-C          Today, Patient Was Seen By: Fifi Burns PA-C    **Please Note: This note may have been constructed using a voice recognition system  **

## 2023-05-24 NOTE — OCCUPATIONAL THERAPY NOTE
Occupational Therapy Cancellation Note        Patient Name: Claudene Pang  Today's Date: 5/24/2023 05/24/23 0956   OT Last Visit   OT Visit Date 05/24/23   Note Type   Note Type Cancelled Session  (Chart review performed  Attempted to see patient for occupatonal therapy treatment session  Pt was received supine in bed in NAD; pt refusing to participate with OT at this time   OT to continue to follow and treat as appropriate)   Cancel Reasons Refusal

## 2023-05-24 NOTE — PHYSICAL THERAPY NOTE
Physical Therapy Cancellation Note    Chart review performed  Attempted to see patient for physical therapy treatment session  Pt was received supine in bed in NAD; pt refusing to participate with PT at this time  PT to continue to follow and treat as appropriate       05/24/23 0904   PT Last Visit   PT Visit Date 05/24/23   Note Type   Note Type Cancelled Session   Cancel Reasons Refusal     Mel Brittle, PT, DPT

## 2023-05-24 NOTE — PLAN OF CARE
Problem: Potential for Falls  Goal: Patient will remain free of falls  Description: INTERVENTIONS:  - Educate patient/family on patient safety including physical limitations  - Instruct patient to call for assistance with activity   - Consult OT/PT to assist with strengthening/mobility   - Keep Call bell within reach  - Keep bed low and locked with side rails adjusted as appropriate  - Keep care items and personal belongings within reach  - Initiate and maintain comfort rounds  - Make Fall Risk Sign visible to staff  - Offer Toileting every Hours, in advance of need  - Initiate/Maintain alarm  - Obtain necessary fall risk management equipment:   - Apply yellow socks and bracelet for high fall risk patients  - Consider moving patient to room near nurses station  Outcome: Progressing     Problem: MOBILITY - ADULT  Goal: Maintain or return to baseline ADL function  Description: INTERVENTIONS:  -  Assess patient's ability to carry out ADLs; assess patient's baseline for ADL function and identify physical deficits which impact ability to perform ADLs (bathing, care of mouth/teeth, toileting, grooming, dressing, etc )  - Assess/evaluate cause of self-care deficits   - Assess range of motion  - Assess patient's mobility; develop plan if impaired  - Assess patient's need for assistive devices and provide as appropriate  - Encourage maximum independence but intervene and supervise when necessary  - Involve family in performance of ADLs  - Assess for home care needs following discharge   - Consider OT consult to assist with ADL evaluation and planning for discharge  - Provide patient education as appropriate  Outcome: Progressing  Goal: Maintains/Returns to pre admission functional level  Description: INTERVENTIONS:  - Perform BMAT or MOVE assessment daily    - Set and communicate daily mobility goal to care team and patient/family/caregiver     - Collaborate with rehabilitation services on mobility goals if consulted  - Perform Range of Motion  times a day  - Reposition patient every  hours    - Dangle patient  times a day  - Stand patient times a day  - Ambulate patient  times a day  - Out of bed to chair  times a day   - Out of bed for meals  times a day  - Out of bed for toileting  - Record patient progress and toleration of activity level   Outcome: Progressing     Problem: PAIN - ADULT  Goal: Verbalizes/displays adequate comfort level or baseline comfort level  Description: Interventions:  - Encourage patient to monitor pain and request assistance  - Assess pain using appropriate pain scale  - Administer analgesics based on type and severity of pain and evaluate response  - Implement non-pharmacological measures as appropriate and evaluate response  - Consider cultural and social influences on pain and pain management  - Notify physician/advanced practitioner if interventions unsuccessful or patient reports new pain  Outcome: Progressing     Problem: INFECTION - ADULT  Goal: Absence or prevention of progression during hospitalization  Description: INTERVENTIONS:  - Assess and monitor for signs and symptoms of infection  - Monitor lab/diagnostic results  - Monitor all insertion sites, i e  indwelling lines, tubes, and drains  - Monitor endotracheal if appropriate and nasal secretions for changes in amount and color  - [place] appropriate cooling/warming therapies per order  - Administer medications as ordered  - Instruct and encourage patient and family to use good hand hygiene technique  - Identify and instruct in appropriate isolation precautions for identified infection/condition  Outcome: Progressing  Goal: Absence of fever/infection during neutropenic period  Description: INTERVENTIONS:  - Monitor WBC    Outcome: Progressing     Problem: SAFETY ADULT  Goal: Patient will remain free of falls  Description: INTERVENTIONS:  - Educate patient/family on patient safety including physical limitations  - Instruct patient to call for assistance with activity   - Consult OT/PT to assist with strengthening/mobility   - Keep Call bell within reach  - Keep bed low and locked with side rails adjusted as appropriate  - Keep care items and personal belongings within reach  - Initiate and maintain comfort rounds  - Make Fall Risk Sign visible to staff  - Offer Toileting every  Hours, in advance of need  - Initiate/Maintain alarm  - Obtain necessary fall risk management equipment:   - Apply yellow socks and bracelet for high fall risk patients  - Consider moving patient to room near nurses station  Outcome: Progressing  Goal: Maintain or return to baseline ADL function  Description: INTERVENTIONS:  -  Assess patient's ability to carry out ADLs; assess patient's baseline for ADL function and identify physical deficits which impact ability to perform ADLs (bathing, care of mouth/teeth, toileting, grooming, dressing, etc )  - Assess/evaluate cause of self-care deficits   - Assess range of motion  - Assess patient's mobility; develop plan if impaired  - Assess patient's need for assistive devices and provide as appropriate  - Encourage maximum independence but intervene and supervise when necessary  - Involve family in performance of ADLs  - Assess for home care needs following discharge   - Consider OT consult to assist with ADL evaluation and planning for discharge  - Provide patient education as appropriate  Outcome: Progressing  Goal: Maintains/Returns to pre admission functional level  Description: INTERVENTIONS:  - Perform BMAT or MOVE assessment daily    - Set and communicate daily mobility goal to care team and patient/family/caregiver  - Collaborate with rehabilitation services on mobility goals if consulted  - Perform Range of Motion  times a day  - Reposition patient every hours    - Dangle patient  times a day  - Stand patient  times a day  - Ambulate patient times a day  - Out of bed to chair times a day   - Out of bed for meals times a day  - Out of bed for toileting  - Record patient progress and toleration of activity level   Outcome: Progressing     Problem: DISCHARGE PLANNING  Goal: Discharge to home or other facility with appropriate resources  Description: INTERVENTIONS:  - Identify barriers to discharge w/patient and caregiver  - Arrange for needed discharge resources and transportation as appropriate  - Identify discharge learning needs (meds, wound care, etc )  - Arrange for interpretive services to assist at discharge as needed  - Refer to Case Management Department for coordinating discharge planning if the patient needs post-hospital services based on physician/advanced practitioner order or complex needs related to functional status, cognitive ability, or social support system  Outcome: Progressing     Problem: Knowledge Deficit  Goal: Patient/family/caregiver demonstrates understanding of disease process, treatment plan, medications, and discharge instructions  Description: Complete learning assessment and assess knowledge base    Interventions:  - Provide teaching at level of understanding  - Provide teaching via preferred learning methods  Outcome: Progressing     Problem: Prexisting or High Potential for Compromised Skin Integrity  Goal: Skin integrity is maintained or improved  Description: INTERVENTIONS:  - Identify patients at risk for skin breakdown  - Assess and monitor skin integrity  - Assess and monitor nutrition and hydration status  - Monitor labs   - Assess for incontinence   - Turn and reposition patient  - Assist with mobility/ambulation  - Relieve pressure over bony prominences  - Avoid friction and shearing  - Provide appropriate hygiene as needed including keeping skin clean and dry  - Evaluate need for skin moisturizer/barrier cream  - Collaborate with interdisciplinary team   - Patient/family teaching  - Consider wound care consult   Outcome: Progressing

## 2023-05-25 PROBLEM — K08.89 TOOTHACHE: Status: ACTIVE | Noted: 2023-05-25

## 2023-05-25 RX ORDER — KETOROLAC TROMETHAMINE 30 MG/ML
15 INJECTION, SOLUTION INTRAMUSCULAR; INTRAVENOUS EVERY 6 HOURS PRN
Status: DISCONTINUED | OUTPATIENT
Start: 2023-05-25 | End: 2023-05-28

## 2023-05-25 RX ORDER — LIDOCAINE HYDROCHLORIDE 20 MG/ML
15 SOLUTION OROPHARYNGEAL 4 TIMES DAILY PRN
Status: DISCONTINUED | OUTPATIENT
Start: 2023-05-25 | End: 2023-06-01 | Stop reason: HOSPADM

## 2023-05-25 RX ADMIN — KETOROLAC TROMETHAMINE 15 MG: 30 INJECTION, SOLUTION INTRAMUSCULAR; INTRAVENOUS at 21:35

## 2023-05-25 RX ADMIN — BACLOFEN 10 MG: 10 TABLET ORAL at 22:45

## 2023-05-25 RX ADMIN — Medication 12.5 MG: at 21:31

## 2023-05-25 RX ADMIN — QUETIAPINE FUMARATE 500 MG: 200 TABLET ORAL at 21:40

## 2023-05-25 RX ADMIN — GABAPENTIN 300 MG: 300 CAPSULE ORAL at 09:33

## 2023-05-25 RX ADMIN — BACLOFEN 10 MG: 10 TABLET ORAL at 09:33

## 2023-05-25 RX ADMIN — GABAPENTIN 300 MG: 300 CAPSULE ORAL at 18:13

## 2023-05-25 RX ADMIN — KETOROLAC TROMETHAMINE 15 MG: 30 INJECTION, SOLUTION INTRAMUSCULAR; INTRAVENOUS at 02:01

## 2023-05-25 RX ADMIN — KETOROLAC TROMETHAMINE 15 MG: 30 INJECTION, SOLUTION INTRAMUSCULAR; INTRAVENOUS at 14:31

## 2023-05-25 RX ADMIN — TAMSULOSIN HYDROCHLORIDE 0.4 MG: 0.4 CAPSULE ORAL at 21:39

## 2023-05-25 RX ADMIN — BACLOFEN 10 MG: 10 TABLET ORAL at 18:14

## 2023-05-25 RX ADMIN — GABAPENTIN 300 MG: 300 CAPSULE ORAL at 22:45

## 2023-05-25 RX ADMIN — CHLORHEXIDINE GLUCONATE 0.12% ORAL RINSE 15 ML: 1.2 LIQUID ORAL at 21:39

## 2023-05-25 RX ADMIN — Medication 12.5 MG: at 12:00

## 2023-05-25 RX ADMIN — SENNOSIDES AND DOCUSATE SODIUM 1 TABLET: 50; 8.6 TABLET ORAL at 21:40

## 2023-05-25 RX ADMIN — DIPHENHYDRAMINE HYDROCHLORIDE AND LIDOCAINE HYDROCHLORIDE AND ALUMINUM HYDROXIDE AND MAGNESIUM HYDRO 10 ML: KIT at 12:02

## 2023-05-25 RX ADMIN — PRAZOSIN HYDROCHLORIDE 5 MG: 1 CAPSULE ORAL at 21:44

## 2023-05-25 NOTE — PLAN OF CARE
Problem: Potential for Falls  Goal: Patient will remain free of falls  Description: INTERVENTIONS:  - Educate patient/family on patient safety including physical limitations  - Instruct patient to call for assistance with activity   - Consult OT/PT to assist with strengthening/mobility   - Keep Call bell within reach  - Keep bed low and locked with side rails adjusted as appropriate  - Keep care items and personal belongings within reach  - Initiate and maintain comfort rounds  - Make Fall Risk Sign visible to staff  - Offer Toileting every 2 Hours, in advance of need  - Initiate/Maintain 2alarm  - Obtain necessary fall risk management equipment: 2  - Apply yellow socks and bracelet for high fall risk patients  - Consider moving patient to room near nurses station  Outcome: Progressing     Problem: MOBILITY - ADULT  Goal: Maintain or return to baseline ADL function  Description: INTERVENTIONS:  -  Assess patient's ability to carry out ADLs; assess patient's baseline for ADL function and identify physical deficits which impact ability to perform ADLs (bathing, care of mouth/teeth, toileting, grooming, dressing, etc )  - Assess/evaluate cause of self-care deficits   - Assess range of motion  - Assess patient's mobility; develop plan if impaired  - Assess patient's need for assistive devices and provide as appropriate  - Encourage maximum independence but intervene and supervise when necessary  - Involve family in performance of ADLs  - Assess for home care needs following discharge   - Consider OT consult to assist with ADL evaluation and planning for discharge  - Provide patient education as appropriate  Outcome: Progressing  Goal: Maintains/Returns to pre admission functional level  Description: INTERVENTIONS:  - Perform BMAT or MOVE assessment daily    - Set and communicate daily mobility goal to care team and patient/family/caregiver     - Collaborate with rehabilitation services on mobility goals if consulted  - Perform Range of Motion 2 times a day  - Reposition patient every 2 hours    - Dangle patient 2 times a day  - Stand patient 2 times a day  - Ambulate patient 2 times a day  - Out of bed to chair 2 times a day   - Out of bed for meals 2 times a day  - Out of bed for toileting  - Record patient progress and toleration of activity level   Outcome: Progressing     Problem: PAIN - ADULT  Goal: Verbalizes/displays adequate comfort level or baseline comfort level  Description: Interventions:  - Encourage patient to monitor pain and request assistance  - Assess pain using appropriate pain scale  - Administer analgesics based on type and severity of pain and evaluate response  - Implement non-pharmacological measures as appropriate and evaluate response  - Consider cultural and social influences on pain and pain management  - Notify physician/advanced practitioner if interventions unsuccessful or patient reports new pain  Outcome: Progressing     Problem: INFECTION - ADULT  Goal: Absence or prevention of progression during hospitalization  Description: INTERVENTIONS:  - Assess and monitor for signs and symptoms of infection  - Monitor lab/diagnostic results  - Monitor all insertion sites, i e  indwelling lines, tubes, and drains  - Monitor endotracheal if appropriate and nasal secretions for changes in amount and color  - [place] appropriate cooling/warming therapies per order  - Administer medications as ordered  - Instruct and encourage patient and family to use good hand hygiene technique  - Identify and instruct in appropriate isolation precautions for identified infection/condition  Outcome: Progressing  Goal: Absence of fever/infection during neutropenic period  Description: INTERVENTIONS:  - Monitor WBC    Outcome: Progressing     Problem: SAFETY ADULT  Goal: Patient will remain free of falls  Description: INTERVENTIONS:  - Educate patient/family on patient safety including physical limitations  - Instruct patient to call for assistance with activity   - Consult OT/PT to assist with strengthening/mobility   - Keep Call bell within reach  - Keep bed low and locked with side rails adjusted as appropriate  - Keep care items and personal belongings within reach  - Initiate and maintain comfort rounds  - Make Fall Risk Sign visible to staff  - Offer Toileting every 2 Hours, in advance of need  - Initiate/Maintain 2alarm  - Obtain necessary fall risk management equipment: 2  - Apply yellow socks and bracelet for high fall risk patients  - Consider moving patient to room near nurses station  Outcome: Progressing  Goal: Maintain or return to baseline ADL function  Description: INTERVENTIONS:  -  Assess patient's ability to carry out ADLs; assess patient's baseline for ADL function and identify physical deficits which impact ability to perform ADLs (bathing, care of mouth/teeth, toileting, grooming, dressing, etc )  - Assess/evaluate cause of self-care deficits   - Assess range of motion  - Assess patient's mobility; develop plan if impaired  - Assess patient's need for assistive devices and provide as appropriate  - Encourage maximum independence but intervene and supervise when necessary  - Involve family in performance of ADLs  - Assess for home care needs following discharge   - Consider OT consult to assist with ADL evaluation and planning for discharge  - Provide patient education as appropriate  Outcome: Progressing  Goal: Maintains/Returns to pre admission functional level  Description: INTERVENTIONS:  - Perform BMAT or MOVE assessment daily    - Set and communicate daily mobility goal to care team and patient/family/caregiver  - Collaborate with rehabilitation services on mobility goals if consulted  - Perform Range of Motion 2 times a day  - Reposition patient every 2 hours    - Dangle patient 2 times a day  - Stand patient 2 times a day  - Ambulate patient 2 times a day  - Out of bed to chair 2 times a day   - Out of bed for meals 2 times a day  - Out of bed for toileting  - Record patient progress and toleration of activity level   Outcome: Progressing     Problem: DISCHARGE PLANNING  Goal: Discharge to home or other facility with appropriate resources  Description: INTERVENTIONS:  - Identify barriers to discharge w/patient and caregiver  - Arrange for needed discharge resources and transportation as appropriate  - Identify discharge learning needs (meds, wound care, etc )  - Arrange for interpretive services to assist at discharge as needed  - Refer to Case Management Department for coordinating discharge planning if the patient needs post-hospital services based on physician/advanced practitioner order or complex needs related to functional status, cognitive ability, or social support system  Outcome: Progressing     Problem: Knowledge Deficit  Goal: Patient/family/caregiver demonstrates understanding of disease process, treatment plan, medications, and discharge instructions  Description: Complete learning assessment and assess knowledge base    Interventions:  - Provide teaching at level of understanding  - Provide teaching via preferred learning methods  Outcome: Progressing     Problem: Prexisting or High Potential for Compromised Skin Integrity  Goal: Skin integrity is maintained or improved  Description: INTERVENTIONS:  - Identify patients at risk for skin breakdown  - Assess and monitor skin integrity  - Assess and monitor nutrition and hydration status  - Monitor labs   - Assess for incontinence   - Turn and reposition patient  - Assist with mobility/ambulation  - Relieve pressure over bony prominences  - Avoid friction and shearing  - Provide appropriate hygiene as needed including keeping skin clean and dry  - Evaluate need for skin moisturizer/barrier cream  - Collaborate with interdisciplinary team   - Patient/family teaching  - Consider wound care consult   Outcome: Progressing

## 2023-05-25 NOTE — ASSESSMENT & PLAN NOTE
· CT scan showing poor dentition, dental caries, but no abscess or swelling to suggest acute infection  · Started peridex, magic mouthwash  · Will need oral surgery for complete tooth extraction at some point

## 2023-05-25 NOTE — PLAN OF CARE
Problem: Potential for Falls  Goal: Patient will remain free of falls  Description: INTERVENTIONS:  - Educate patient/family on patient safety including physical limitations  - Instruct patient to call for assistance with activity   - Consult OT/PT to assist with strengthening/mobility   - Keep Call bell within reach  - Keep bed low and locked with side rails adjusted as appropriate  - Keep care items and personal belongings within reach  - Initiate and maintain comfort rounds  - Make Fall Risk Sign visible to staff  - Offer Toileting every  Hours, in advance of need  - Initiate/Maintain alarm  - Obtain necessary fall risk management equipment:  - Apply yellow socks and bracelet for high fall risk patients  - Consider moving patient to room near nurses station  Outcome: Progressing     Problem: MOBILITY - ADULT  Goal: Maintain or return to baseline ADL function  Description: INTERVENTIONS:  -  Assess patient's ability to carry out ADLs; assess patient's baseline for ADL function and identify physical deficits which impact ability to perform ADLs (bathing, care of mouth/teeth, toileting, grooming, dressing, etc )  - Assess/evaluate cause of self-care deficits   - Assess range of motion  - Assess patient's mobility; develop plan if impaired  - Assess patient's need for assistive devices and provide as appropriate  - Encourage maximum independence but intervene and supervise when necessary  - Involve family in performance of ADLs  - Assess for home care needs following discharge   - Consider OT consult to assist with ADL evaluation and planning for discharge  - Provide patient education as appropriate  Outcome: Progressing  Goal: Maintains/Returns to pre admission functional level  Description: INTERVENTIONS:  - Perform BMAT or MOVE assessment daily    - Set and communicate daily mobility goal to care team and patient/family/caregiver     - Collaborate with rehabilitation services on mobility goals if consulted  - Perform Range of Motion  times a day  - Reposition patient every  hours    - Dangle patient  times a day  - Stand patient  times a day  - Ambulate patient  times a day  - Out of bed to chair times a day   - Out of bed for meals  times a day  - Out of bed for toileting  - Record patient progress and toleration of activity level   Outcome: Progressing     Problem: PAIN - ADULT  Goal: Verbalizes/displays adequate comfort level or baseline comfort level  Description: Interventions:  - Encourage patient to monitor pain and request assistance  - Assess pain using appropriate pain scale  - Administer analgesics based on type and severity of pain and evaluate response  - Implement non-pharmacological measures as appropriate and evaluate response  - Consider cultural and social influences on pain and pain management  - Notify physician/advanced practitioner if interventions unsuccessful or patient reports new pain  Outcome: Progressing     Problem: INFECTION - ADULT  Goal: Absence or prevention of progression during hospitalization  Description: INTERVENTIONS:  - Assess and monitor for signs and symptoms of infection  - Monitor lab/diagnostic results  - Monitor all insertion sites, i e  indwelling lines, tubes, and drains  - Monitor endotracheal if appropriate and nasal secretions for changes in amount and color  - [place] appropriate cooling/warming therapies per order  - Administer medications as ordered  - Instruct and encourage patient and family to use good hand hygiene technique  - Identify and instruct in appropriate isolation precautions for identified infection/condition  Outcome: Progressing  Goal: Absence of fever/infection during neutropenic period  Description: INTERVENTIONS:  - Monitor WBC    Outcome: Progressing     Problem: SAFETY ADULT  Goal: Patient will remain free of falls  Description: INTERVENTIONS:  - Educate patient/family on patient safety including physical limitations  - Instruct patient to call for assistance with activity   - Consult OT/PT to assist with strengthening/mobility   - Keep Call bell within reach  - Keep bed low and locked with side rails adjusted as appropriate  - Keep care items and personal belongings within reach  - Initiate and maintain comfort rounds  - Make Fall Risk Sign visible to staff  - Offer Toileting every  Hours, in advance of need  - Initiate/Maintain alarm  - Obtain necessary fall risk management equipment:   - Apply yellow socks and bracelet for high fall risk patients  - Consider moving patient to room near nurses station  Outcome: Progressing  Goal: Maintain or return to baseline ADL function  Description: INTERVENTIONS:  -  Assess patient's ability to carry out ADLs; assess patient's baseline for ADL function and identify physical deficits which impact ability to perform ADLs (bathing, care of mouth/teeth, toileting, grooming, dressing, etc )  - Assess/evaluate cause of self-care deficits   - Assess range of motion  - Assess patient's mobility; develop plan if impaired  - Assess patient's need for assistive devices and provide as appropriate  - Encourage maximum independence but intervene and supervise when necessary  - Involve family in performance of ADLs  - Assess for home care needs following discharge   - Consider OT consult to assist with ADL evaluation and planning for discharge  - Provide patient education as appropriate  Outcome: Progressing  Goal: Maintains/Returns to pre admission functional level  Description: INTERVENTIONS:  - Perform BMAT or MOVE assessment daily    - Set and communicate daily mobility goal to care team and patient/family/caregiver  - Collaborate with rehabilitation services on mobility goals if consulted  - Perform Range of Motion  times a day  - Reposition patient every  hours    - Dangle patient  times a day  - Stand patient times a day  - Ambulate patient  times a day  - Out of bed to chair  times a day   - Out of bed for meals  times a day  - Out of bed for toileting  - Record patient progress and toleration of activity level   Outcome: Progressing     Problem: DISCHARGE PLANNING  Goal: Discharge to home or other facility with appropriate resources  Description: INTERVENTIONS:  - Identify barriers to discharge w/patient and caregiver  - Arrange for needed discharge resources and transportation as appropriate  - Identify discharge learning needs (meds, wound care, etc )  - Arrange for interpretive services to assist at discharge as needed  - Refer to Case Management Department for coordinating discharge planning if the patient needs post-hospital services based on physician/advanced practitioner order or complex needs related to functional status, cognitive ability, or social support system  Outcome: Progressing     Problem: Knowledge Deficit  Goal: Patient/family/caregiver demonstrates understanding of disease process, treatment plan, medications, and discharge instructions  Description: Complete learning assessment and assess knowledge base    Interventions:  - Provide teaching at level of understanding  - Provide teaching via preferred learning methods  Outcome: Progressing     Problem: Prexisting or High Potential for Compromised Skin Integrity  Goal: Skin integrity is maintained or improved  Description: INTERVENTIONS:  - Identify patients at risk for skin breakdown  - Assess and monitor skin integrity  - Assess and monitor nutrition and hydration status  - Monitor labs   - Assess for incontinence   - Turn and reposition patient  - Assist with mobility/ambulation  - Relieve pressure over bony prominences  - Avoid friction and shearing  - Provide appropriate hygiene as needed including keeping skin clean and dry  - Evaluate need for skin moisturizer/barrier cream  - Collaborate with interdisciplinary team   - Patient/family teaching  - Consider wound care consult   Outcome: Progressing

## 2023-05-25 NOTE — OCCUPATIONAL THERAPY NOTE
Occupational Therapy Cancellation note        Patient Name: Lisa Clark  KMUPQ'E Date: 5/25/2023 05/25/23 1024   OT Last Visit   OT Visit Date 05/25/23   Note Type   Note Type Cancelled Session   Cancel Reasons Refusal     Chart review performed  Attempted to see patient for occupational  therapy treatment session  Pt received supine in bed in NAD  Pt refusing to participate with PT / OT secondary to complaints of tooth pain  RN made aware  OT to continue to follow and treat as appropriate

## 2023-05-25 NOTE — UTILIZATION REVIEW
Continued Stay Review    Date: 5/25                          Current Patient Class: INpatient  Current Level of Care: Med/surg    HPI:32 y o  female initially admitted on 5/19     Assessment/Plan:  Has not had bm in over a week  Offered miralax, fleets enema, stool softener  Refused  PT/OT recommending LTR  Refusing to participate in PT/OT today  Case management working on safe discharge plan  Continue with PT and OT       Vital Signs:   Date/Time Temp Pulse Resp BP MAP (mmHg) SpO2 O2 Device Patient Position - Orthostatic VS   05/25/23 12:02:04 99 8 °F (37 7 °C) 101 18 117/69 85 92 % None (Room air) Lying   05/25/23 09:31:47 100 °F (37 8 °C) 102 -- 123/71 88 92 % -- --   05/25/23 09:19:01 100 4 °F (38 °C) 104 -- 97/60 72 88 % Abnormal  --        Pertinent Labs/Diagnostic Results:     Results from last 7 days   Lab Units 05/20/23  1353   MAGNESIUM mg/dL 2 2   PHOSPHORUS mg/dL 3 7       Results from last 7 days   Lab Units 05/20/23  1353   TSH 3RD GENERATON uIU/mL 2 472           Medications:   Scheduled Medications:  baclofen, 10 mg, Oral, TID  chlorhexidine, 15 mL, Swish & Spit, Q12H DIALLO  enoxaparin, 40 mg, Subcutaneous, Daily  gabapentin, 300 mg, Oral, Once  gabapentin, 300 mg, Oral, TID  polyethylene glycol, 17 g, Oral, Daily  prazosin, 5 mg, Oral, HS  QUEtiapine, 500 mg, Oral, HS  senna-docusate sodium, 1 tablet, Oral, HS  tamsulosin, 0 4 mg, Oral, HS      Continuous IV Infusions:     PRN Meds:  acetaminophen, 975 mg, Oral, Q8H PRN  aluminum-magnesium hydroxide-simethicone, 30 mL, Oral, Q6H PRN  bisacodyl, 10 mg, Rectal, Daily PRN  Diclofenac Sodium, 2 g, Topical, 4x Daily PRN  diphenhydramine, lidocaine, Al/Mg hydroxide, simethicone, 10 mL, Swish & Spit, Q4H PRN  ketorolac, 15 mg, Intravenous, Q6H PRN  lidocaine, 2 patch, Topical, Daily PRN  Lidocaine Viscous HCl, 15 mL, Swish & Spit, 4x Daily PRN  oxyCODONE, 7 5 mg, Oral, Q4H PRN   Or  oxyCODONE, 12 5 mg, Oral, Q4H PRN  sodium phosphate-biphosphate, 1 enema, Rectal, Daily PRN        Discharge Plan: TBD    Network Utilization Review Department  ATTENTION: Please call with any questions or concerns to [phone] and carefully listen to the prompts so that you are directed to the right person  All voicemails are confidential   Raheem Bailon all requests for admission clinical reviews, approved or denied determinations and any other requests to dedicated fax number below belonging to the campus where the patient is receiving treatment   List of dedicated fax numbers for the Facilities:  [address] DENIALS (Administrative/Medical Necessity) [phone]   [address] (Maternity/NICU/Pediatrics) [phone]   [address] Roberta Lowe [phone]   [place] [address] [phone]   [facility] [place] [address] Nessa [facility] [phone]   [address] [facility] [address] 5 [facility] [phone]

## 2023-05-25 NOTE — CASE MANAGEMENT
Morgan Webber 50 received request for authorization from Care Manager    Authorization request for: Skyler Deshawn    GQY:[id number]  Facility MD: Dr Sudhir Donnelly    NPI: [id number]  Authorization initiated by contacting insurance: Jose Gomes Via Fax   Pending Reference #:N/A  Clinicals submitted via [facility]#[phone]

## 2023-05-25 NOTE — PROGRESS NOTES
[facility]  Progress Note  Name: Rosalia Meeks  MRN: [id number]  Unit/Bed#: -02 I Date of Admission: 5/17/2023   Date of Service: 5/25/2023 I Hospital Day: 6    Assessment/Plan   * Failure to thrive in adult  Assessment & Plan  · Elderly grandmother unable to fully care for her  · Patient requesting long-term care facility, due to quadriplegia unable to fully care for self  · PT/OT for recommendations, continued therapy while inpatient   · Case management for dispo planning   · Nutrition consulted for dietary recommendations     Quadriplegic spinal paralysis [facility])  Assessment & Plan  · Spinal cord injury 2 years ago  · Patient reports she now has sensation and able to move toes and arms, starting to feel some sensation in feet and arms  · P500 specialty bed ordered  · Turn patient every 2 hours  · Patient admits to chronic severe neck/spinal pain  · Encourage patient to follow-up outpatient with pain management   · Continue with Tylenol; Lidocaine;  Aqua-K  · Baclofen 10 mg TID, Gabapentin 300 mg TID  · Increased oxycodone to 7 5 mg or 12 5 mg as needed    Closed right ankle fracture  Assessment & Plan  · Reports home PT once a week;  · Veleta Manner approx 2 weeks ago and rolled ankle, admits to some sensation of pain in right ankle  · X-ray right ankle 5/18/23: Medial malleolus displaced fracture   · Ortho consulted,  · CAM boot for mobility - uncomfortable; trial probus boots  · WBAT in boot  · Elevated extremity  · Ice, pain control as needed  · Follow up in office for repeat imaging to ensure routine healing    UTI (urinary tract infection)  Assessment & Plan  · Patient admits to foul odor in urine, secondary to spinal cord injury patient unable to determine if any pain with urination  · UA positive for moderate bacteria  · Urine culture positive for morganella morganii MDRO; aerococcus species  · ID consult in setting of concern for true infection,  · Hold further antibiotics Toothache  Assessment & Plan  · CT scan showing poor dentition, dental caries, but no abscess or swelling to suggest acute infection  · Started peridex, magic mouthwash  · Will need oral surgery for complete tooth extraction at some point          VTE Pharmacologic Prophylaxis: VTE Score: 4 Moderate Risk (Score 3-4) - Pharmacological DVT Prophylaxis Ordered: enoxaparin (Lovenox)  Patient Centered Rounds: I performed bedside rounds with nursing staff today  Discussions with Specialists or Other Care Team Provider: CM    Education and Discussions with Family / Patient: Patient declined call to   Total Time Spent on Date of Encounter in care of patient: 25 minutes This time was spent on one or more of the following: performing physical exam; counseling and coordination of care; obtaining or reviewing history; documenting in the medical record; reviewing/ordering tests, medications or procedures; communicating with other healthcare professionals and discussing with patient's family/caregivers  Current Length of Stay: 6 day(s)  Current Patient Status: Inpatient   Certification Statement: The patient will continue to require additional inpatient hospital stay due to CM for safe dispo  Discharge Plan: Anticipate discharge tomorrow to rehab facility  Code Status: Level 1 - Full Code    Subjective:   Patient continues to complain of tooth pain  States toradol overnight really helped, would like to continue using this  No subjective fevers, temperature this am 100  Still no BM since admission, but agreeable to suppository or enema later today  Objective:     Vitals:   Temp (24hrs), Av 8 °F (36 6 °C), Min:97 8 °F (36 6 °C), Max:97 8 °F (36 6 °C)    Temp:  [97 8 °F (36 6 °C)] 97 8 °F (36 6 °C)  HR:  [70-88] 88  Resp:  [18] 18  BP: ()/(60-90) 96/60  SpO2:  [87 %-96 %] 92 %  Body mass index is 31 74 kg/m²  Input and Output Summary (last 24 hours):      Intake/Output Summary (Last 24 hours) at 5/25/2023 0754  Last data filed at 5/25/2023 0615  Gross per 24 hour   Intake 2400 ml   Output 1900 ml   Net 500 ml       Physical Exam:   Physical Exam  Vitals and nursing note reviewed  Constitutional:       General: She is not in acute distress  Appearance: She is not toxic-appearing  HENT:      Head: Normocephalic and atraumatic  Mouth/Throat:      Mouth: Mucous membranes are dry  Dentition: Abnormal dentition  Dental tenderness and dental caries present  No dental abscesses  Cardiovascular:      Rate and Rhythm: Normal rate  Pulmonary:      Effort: Pulmonary effort is normal  No respiratory distress  Neurological:      Mental Status: She is alert and oriented to person, place, and time     Psychiatric:         Mood and Affect: Mood normal            Additional Data:     Labs:                            Lines/Drains:  Invasive Devices     Peripheral Intravenous Line  Duration           Peripheral IV 05/23/23 Left;Proximal;Ventral (anterior) Forearm 1 day          Drain  Duration           External Urinary Catheter 2 days              Imaging: Reviewed radiology reports from this admission including: CT face    Recent Cultures (last 7 days):         Last 24 Hours Medication List:   Current Facility-Administered Medications   Medication Dose Route Frequency Provider Last Rate   • acetaminophen  975 mg Oral Q8H PRN Melanie Antony PA-C     • aluminum-magnesium hydroxide-simethicone  30 mL Oral Q6H PRN Melanie Antony PA-C     • baclofen  10 mg Oral TID Melanie Antony PA-C     • bisacodyl  10 mg Rectal Daily PRN Melanie Antony PA-C     • chlorhexidine  15 mL Swish & Spit Q12H [address] Dariela Power PA-C     • Diclofenac Sodium  2 g Topical 4x Daily PRN Melanie Antony PA-C     • diphenhydramine, lidocaine, Al/Mg hydroxide, simethicone  10 mL Swish & Spit Q4H PRN Dariela Power PA-C     • enoxaparin  40 mg Subcutaneous Daily Melanie Antony PA-C     • gabapentin  300 mg Oral Once Jones Leyden, PA-C     • gabapentin  300 mg Oral TID Jones Leyden, PA-C     • lidocaine  2 patch Topical Daily PRN Jones Leyden, PA-C     • oxyCODONE  7 5 mg Oral Q4H PRN Alyson Hunter PA-C      Or   • oxyCODONE  12 5 mg Oral Q4H PRN Alyson Hunter PA-C     • polyethylene glycol  17 g Oral Daily Jones Leyden, PA-C     • prazosin  5 mg Oral HS Jones Leyden, PA-C     • QUEtiapine  500 mg Oral HS Jones Leyden, PA-C     • senna-docusate sodium  1 tablet Oral HS Jones Leyden, PA-C     • sodium phosphate-biphosphate  1 enema Rectal Daily PRN Alyson Hunter PA-C     • tamsulosin  0 4 mg Oral HS [place] Leyden, PA-C          Today, Patient Was Seen By: Alyson Hunter PA-C    **Please Note: This note may have been constructed using a voice recognition system  **

## 2023-05-25 NOTE — PHYSICAL THERAPY NOTE
Physical Therapy Cancellation Note    Chart review performed  Attempted to see patient for physical therapy treatment session  Pt received supine in bed in NAD  Pt refusing to participate with PT secondary to complaints of tooth pain  RN made aware  PT to continue to follow and treat as appropriate       05/25/23 1023   PT Last Visit   PT Visit Date 05/25/23   Note Type   Note Type Cancelled Session   Cancel Reasons Refusal     Jostin Alejandra, PT, DPT

## 2023-05-25 NOTE — PROGRESS NOTES
"Patient had no bowel movement in over 1 week  Miralax, fleet and stool softener offered yesterday and today  patient is refusing  States, \"Why are you so worried about me going, I'm not going to take anything until my tooth pain is taken care of  \"  Medication  given to help pain as ordered  This nurse discussed importance of taking bowel meds and how pain medication  aide  in constipation and avoiding a bowel obstruction  Patient still refusing bowel regimen at this time   Fletcher PRITCHETT aware of same  "

## 2023-05-25 NOTE — CASE MANAGEMENT
Case Management Discharge Planning Note    Patient name [facility]  Location [address] [id number]/-[id number] MRN [id number]  : 1990 Date 2023       Current Admission Date: 2023  Current Admission Diagnosis:Failure to thrive in adult   Patient Active Problem List    Diagnosis Date Noted   • Toothache 2023   • Closed right ankle fracture 2023   • UTI (urinary tract infection) 2023   • Urinary retention 2022   • Inability to return to living situation 2022   • Lower extremity edema 2022   • Anxiety 2022   • Insomnia 2022   • Hepatitis C antibody positive in blood 2022   • Failure to thrive in adult 2022   • Quadriplegic spinal paralysis ([facility] [address] ) 2022   • Nondependent opioid abuse in remission ([facility] [address] ) 10/15/2021      LOS (days): 6  Geometric Mean LOS (GMLOS) (days):   Days to GMLOS:     OBJECTIVE:  Risk of Unplanned Readmission Score: 10 26      Current admission status: Inpatient   Preferred Pharmacy:   [facility] [address] [place] [address]  Phone: [phone] Fax: [phone]    Primary Care Provider: Kaycee Dean MD    Primary Insurance: [facility]  Secondary Insurance:     DISCHARGE DETAILS:    Discharge planning discussed with[de-identified] Patient at bedside  Freedom of Choice: Yes  Comments - Freedom of Choice: FOC reviewed as it relates to d/c planning  CM provided [facility] Patient Choice List   Harshil Celaya is only current accepting provider  Patient agreeable to placement  CM reviewed insurance auth process and next steps  Advised patient liaison would reach out to her directly for follow-up    CM contacted family/caregiver?: No- see comments (per patient request)  Were Treatment Team discharge recommendations reviewed with patient/caregiver?: Yes (As it pertains to d/c planning and CM role )  Did patient/caregiver verbalize understanding of patient care needs?: N/A- going to facility  Were patient/caregiver advised of the risks associated with not following Treatment Team discharge recommendations?: Yes (As it pertains to d/c planning and CM role )      Requested [address]         Is the patient interested in [place] [address] at discharge?: No    DME Referral Provided  Referral made for DME?: No    Other Referral/Resources/Interventions Provided:  Interventions: Short Term Rehab, Other (Specify) (CM D/C Support Team)  Referral Comments: [facility] in [facility] for [address] [place] [address]  Facility contacts updated    Auth to be initiated via CM D/C Support Team     Would you like to participate in our [address] service program?  : No - Declined    Treatment Team Recommendation: Short Term Rehab, SNF  Discharge Destination Plan[de-identified] SNF, Short Term Rehab  Transport at Discharge : [facility]  Dispatcher Contacted: No     Accepting Facility Name, [address] : [place] [address]  Receiving Facility/Agency Phone Number: Phone: [phone]  Facility/Agency Fax Number: Fax: [phone]

## 2023-05-26 RX ADMIN — KETOROLAC TROMETHAMINE 15 MG: 30 INJECTION, SOLUTION INTRAMUSCULAR; INTRAVENOUS at 18:25

## 2023-05-26 RX ADMIN — SENNOSIDES AND DOCUSATE SODIUM 1 TABLET: 50; 8.6 TABLET ORAL at 21:39

## 2023-05-26 RX ADMIN — TAMSULOSIN HYDROCHLORIDE 0.4 MG: 0.4 CAPSULE ORAL at 21:39

## 2023-05-26 RX ADMIN — Medication 12.5 MG: at 21:39

## 2023-05-26 RX ADMIN — BACLOFEN 10 MG: 10 TABLET ORAL at 15:36

## 2023-05-26 RX ADMIN — QUETIAPINE FUMARATE 500 MG: 200 TABLET ORAL at 21:40

## 2023-05-26 RX ADMIN — KETOROLAC TROMETHAMINE 15 MG: 30 INJECTION, SOLUTION INTRAMUSCULAR; INTRAVENOUS at 11:34

## 2023-05-26 RX ADMIN — GABAPENTIN 300 MG: 300 CAPSULE ORAL at 21:39

## 2023-05-26 RX ADMIN — KETOROLAC TROMETHAMINE 15 MG: 30 INJECTION, SOLUTION INTRAMUSCULAR; INTRAVENOUS at 04:35

## 2023-05-26 RX ADMIN — Medication 12.5 MG: at 08:43

## 2023-05-26 RX ADMIN — CHLORHEXIDINE GLUCONATE 0.12% ORAL RINSE 15 ML: 1.2 LIQUID ORAL at 21:40

## 2023-05-26 RX ADMIN — BACLOFEN 10 MG: 10 TABLET ORAL at 21:39

## 2023-05-26 RX ADMIN — PRAZOSIN HYDROCHLORIDE 5 MG: 1 CAPSULE ORAL at 21:40

## 2023-05-26 RX ADMIN — BACLOFEN 10 MG: 10 TABLET ORAL at 08:45

## 2023-05-26 RX ADMIN — GABAPENTIN 300 MG: 300 CAPSULE ORAL at 15:36

## 2023-05-26 RX ADMIN — CHLORHEXIDINE GLUCONATE 0.12% ORAL RINSE 15 ML: 1.2 LIQUID ORAL at 08:44

## 2023-05-26 RX ADMIN — GABAPENTIN 300 MG: 300 CAPSULE ORAL at 08:45

## 2023-05-26 NOTE — PLAN OF CARE
Problem: PHYSICAL THERAPY ADULT  Goal: Performs mobility at highest level of function for planned discharge setting  See evaluation for individualized goals  Description: Treatment/Interventions: Functional transfer training, LE strengthening/ROM, Endurance training, Therapeutic exercise, Patient/family training, Bed mobility, Continued evaluation, OT          See flowsheet documentation for full assessment, interventions and recommendations  Note: Prognosis: Fair  Problem List: Decreased strength, Decreased endurance, Impaired balance, Decreased mobility, Impaired sensation, Pain  Assessment: Chart reviewed  Patient was received supine in bed in NAD and agreeable to PT session with encouragement  Today's PT treatment session consisted of therapeutic activity for facilitation of transitional movements and safe performance of correct technique for bed mobility and therapeutic exercise to increase lower extremity muscle strength  In comparison to the previous session the patient has not made progress as evident by continuing to require assist of two for bed mobility  Pt reported dizziness upon sitting at EOB; BP 90/65; pt was returned to supine in bed and reported a resolve in symptoms  Pt tolerated all therapeutic exercise well without complaints  Pt was fitted with bilateral multi podus boots at end of session; pt denied pain  Overall, patient tolerated today's session well, but was limited due to fatigue  Co-treatment was performed with OT secondary to complex medical condition of pt  Pt requires assist of two to achieve and maintain transitional movements; requiring the need of skilled therapeutic intervention of two therapists to achieve the delivery of services  PT/OT goals were addressed separately  Pt with no functional improvements towards achieving her STG's  Patient's prognosis for achieving their STG's is fair   PT intervention continues to be appropriate as the patient continues to be limited by pain, decreased lower extremity strength, impaired balance, decreased endurance, and decreased functional mobility  Continue to recommend STR  PT to continue to see patient in order to address the deficits listed above and provide interventions consistent with the POC in order to achieve STG's and optimize the patient's independence with functional mobility  Barriers to Discharge: Inaccessible home environment, Decreased caregiver support     PT Discharge Recommendation: Post acute rehabilitation services    See flowsheet documentation for full assessment

## 2023-05-26 NOTE — PROGRESS NOTES
Offered patient enema or suppository, refusing at this time  Patient states she will do it later  Informed patient she should try to have a BM before being discharged later today  Pt continues to refuse at this time

## 2023-05-26 NOTE — PROGRESS NOTES
[facility]  Progress Note  Name: Elmer Arreola  MRN: [id number]  Unit/Bed#: -02 I Date of Admission: 5/17/2023   Date of Service: 5/26/2023 I Hospital Day: 7    Assessment/Plan   Toothache  Assessment & Plan  · CT scan showing poor dentition, dental caries, but no abscess or swelling to suggest acute infection  · Started peridex, magic mouthwash  · Will need oral surgery for complete tooth extraction at some point    UTI (urinary tract infection)  Assessment & Plan  · Patient admits to foul odor in urine, secondary to spinal cord injury patient unable to determine if any pain with urination  · UA positive for moderate bacteria  · Urine culture positive for morganella morganii MDRO; aerococcus species  · ID consult in setting of concern for true infection,  · Hold further antibiotics     Closed right ankle fracture  Assessment & Plan  · Reports home PT once a week;  · Fell approx 2 weeks ago and rolled ankle, admits to some sensation of pain in right ankle  · X-ray right ankle 5/18/23: Medial malleolus displaced fracture   · Ortho consulted,  · CAM boot for mobility - uncomfortable; trial probus boots  · WBAT in boot  · Elevated extremity  · Ice, pain control as needed  · Follow up in office for repeat imaging to ensure routine healing    Quadriplegic spinal paralysis ([address] )  Assessment & Plan  · Spinal cord injury 2 years ago  · Patient reports she now has sensation and able to move toes and arms, starting to feel some sensation in feet and arms  · P500 specialty bed ordered  · Turn patient every 2 hours  · Patient admits to chronic severe neck/spinal pain  · Encourage patient to follow-up outpatient with pain management   · Continue with Tylenol; Lidocaine;  Aqua-K  · Baclofen 10 mg TID, Gabapentin 300 mg TID  · Increased oxycodone to 7 5 mg or 12 5 mg as needed    * Failure to thrive in adult  Assessment & Plan  · Elderly grandmother unable to fully care for her  · Patient requesting long-term care facility, due to quadriplegia unable to fully care for self  · PT/OT for recommendations, continued therapy while inpatient   · Case management for dispo planning   · Nutrition consulted for dietary recommendations            VTE Pharmacologic Prophylaxis: VTE Score: 4 Moderate Risk (Score 3-4) - Pharmacological DVT Prophylaxis Ordered: enoxaparin (Lovenox)  Patient Centered Rounds: I performed bedside rounds with nursing staff today  Discussions with Specialists or Other Care Team Provider: Brent    Education and Discussions with Family / Patient: Patient declined call to   Total Time Spent on Date of Encounter in care of patient: 35 minutes This time was spent on one or more of the following: performing physical exam; counseling and coordination of care; obtaining or reviewing history; documenting in the medical record; reviewing/ordering tests, medications or procedures; communicating with other healthcare professionals and discussing with patient's family/caregivers  Current Length of Stay: 7 day(s)  Current Patient Status: Inpatient   Certification Statement: The patient will continue to require additional inpatient hospital stay due to Awaiting placement/insurance Auth  Discharge Plan: Anticipate discharge in 48 hrs to rehab facility  Code Status: Level 1 - Full Code    Subjective:   Patient reports feeling well although is nervous to go to rehab facility due to not receiving Toradol  She denies chest pain/pressure, palpitations, headedness, nausea, shortness of breath, or chills  Objective:     Vitals:   Temp (24hrs), Av 7 °F (37 6 °C), Min:98 5 °F (36 9 °C), Max:100 3 °F (37 9 °C)    Temp:  [98 5 °F (36 9 °C)-100 3 °F (37 9 °C)] 98 5 °F (36 9 °C)  HR:  [77-98] 77  Resp:  [18] 18  BP: ()/(65-78) 91/65  SpO2:  [89 %-94 %] 94 %  Body mass index is 31 74 kg/m²  Input and Output Summary (last 24 hours):      Intake/Output Summary (Last 24 hours) at 5/26/2023 1517  Last data filed at 5/26/2023 1300  Gross per 24 hour   Intake 1320 ml   Output 1100 ml   Net 220 ml       Physical Exam:   Physical Exam  Vitals and nursing note reviewed  Constitutional:       Appearance: She is normal weight  HENT:      Head: Normocephalic  Nose: Nose normal       Mouth/Throat:      Mouth: Mucous membranes are moist       Pharynx: Oropharynx is clear  Eyes:      General: No scleral icterus  Conjunctiva/sclera: Conjunctivae normal       Pupils: Pupils are equal, round, and reactive to light  Cardiovascular:      Rate and Rhythm: Normal rate and regular rhythm  Heart sounds: No murmur heard  No friction rub  No gallop  Pulmonary:      Effort: Pulmonary effort is normal  No respiratory distress  Breath sounds: Normal breath sounds  No stridor  No wheezing, rhonchi or rales  Abdominal:      General: Abdomen is flat  Palpations: Abdomen is soft  Musculoskeletal:      Cervical back: Normal range of motion and neck supple  Right lower leg: No edema  Left lower leg: No edema  Comments: Patient is a quadriplegic with limited upper extremity movement   Lymphadenopathy:      Cervical: No cervical adenopathy  Skin:     General: Skin is warm  Coloration: Skin is not jaundiced or pale  Findings: No bruising, erythema or lesion  Neurological:      General: No focal deficit present  Mental Status: She is alert and oriented to person, place, and time  Mental status is at baseline  Cranial Nerves: No cranial nerve deficit  Motor: No weakness  Psychiatric:         Mood and Affect: Mood normal          Behavior: Behavior normal          Thought Content:  Thought content normal           Additional Data:     Labs:                            Lines/Drains:  Invasive Devices     Peripheral Intravenous Line  Duration           Peripheral IV 05/23/23 Left;Proximal;Ventral (anterior) Forearm 2 days          Drain  Duration External Urinary Catheter 4 days                      Imaging: Reviewed radiology reports from this admission including: chest xray and CT head    Recent Cultures (last 7 days):         Last 24 Hours Medication List:   Current Facility-Administered Medications   Medication Dose Route Frequency Provider Last Rate   • acetaminophen  975 mg Oral Q8H PRN Zoya Melvin PA-C     • aluminum-magnesium hydroxide-simethicone  30 mL Oral Q6H PRN Zoya Melvin PA-C     • baclofen  10 mg Oral TID Zoya Melvin PA-C     • bisacodyl  10 mg Rectal Daily PRN Zoya Melvin PA-C     • chlorhexidine  15 mL Swish & Spit Q12H [address] Farhan Craven PA-C     • Diclofenac Sodium  2 g Topical 4x Daily PRN Zoya Melvin PA-C     • diphenhydramine, lidocaine, Al/Mg hydroxide, simethicone  10 mL Swish & Spit Q4H PRN Farhan Craven PA-C     • enoxaparin  40 mg Subcutaneous Daily Zoya Melvin PA-C     • gabapentin  300 mg Oral Once Zoya Melvin PA-C     • gabapentin  300 mg Oral TID Zoya Melvin PA-C     • ketorolac  15 mg Intravenous Q6H PRN Farhan Craven PA-C     • lidocaine  2 patch Topical Daily PRN Zoya Melvin PA-C     • Lidocaine Viscous HCl  15 mL Swish & Spit 4x Daily PRN Farhan Craven PA-C     • oxyCODONE  7 5 mg Oral Q4H PRN Farhan Craven PA-C      Or   • oxyCODONE  12 5 mg Oral Q4H PRN Farhan Craven PA-C     • polyethylene glycol  17 g Oral Daily Zoya Melvin PA-C     • prazosin  5 mg Oral HS Zoya Melvin PA-C     • QUEtiapine  500 mg Oral HS Zoya Melvin PA-C     • senna-docusate sodium  1 tablet Oral HS Zoya Melvin PA-C     • sodium phosphate-biphosphate  1 enema Rectal Daily PRN Farhan Craven PA-C     • tamsulosin  0 4 mg Oral HS Zoya Melvin PA-C          Today, Patient Was Seen By: Butch Myers PA-C    **Please Note: This note may have been constructed using a voice recognition system  **

## 2023-05-26 NOTE — PHYSICAL THERAPY NOTE
"Physical Therapy Treatment Note    Patient Name: Jeremy Muller    Diagnosis: Ankle injury [S99 919A]  UTI (urinary tract infection) [N39 0]  Failure to thrive in adult [R62 7]  Right ankle injury [S99 911A]  Poor social situation [Z65 9]  Known medical problems [Z78 9]     05/26/23 1037   PT Last Visit   PT Visit Date 05/26/23   Note Type   Note Type Treatment   Pain Assessment   Pain Assessment Tool 0-10   Pain Score 8   Pain Location/Orientation Other (Comment)  (right tooth)   Restrictions/Precautions   Weight Bearing Precautions Per Order Yes   RLE Weight Bearing Per Order WBAT  (per ortho with CAM Boot)   Braces or Orthoses CAM Boot; Other (Comment)  (multi podus boot; applied and donned this session)   Other Precautions Bed Alarm; Fall Risk;Pain   General   Chart Reviewed Yes   Response to Previous Treatment Patient with no complaints from previous session  Family/Caregiver Present No   Cognition   Overall Cognitive Status WFL   Arousal/Participation Alert; Responsive   Attention Within functional limits   Orientation Level Oriented X4   Memory Within functional limits   Following Commands Follows all commands and directions without difficulty   Comments Pt agreeable to PT with encouragement  Subjective   Subjective \"I'm weaker  \"   Bed Mobility   Supine to Sit 2  Maximal assistance   Additional items Assist x 2;HOB elevated; Increased time required;Verbal cues;LE management   Sit to Supine 2  Maximal assistance   Additional items Assist x 2;HOB elevated; Increased time required;Verbal cues;LE management   Additional Comments Pt reported dizziness upon sitting at EOB; BP 90/65; pt was returned to supine in bed and reported a resolve in symptoms   Balance   Static Sitting Fair -   Dynamic Sitting Poor +   Endurance Deficit   Endurance Deficit Yes   Endurance Deficit Description decreased activity tolerance   Activity Tolerance   Activity Tolerance Patient limited by fatigue   Medical Staff Made Aware OT Ileana Mcnulty " Exercises   Quad Sets Supine;10 reps;AROM; Bilateral   Heelslides Supine;10 reps;AAROM; Bilateral   Glute Sets Supine;10 reps;AROM; Bilateral   Hip Abduction Supine;10 reps;AAROM; Bilateral   Assessment   Prognosis Fair   Problem List Decreased strength;Decreased endurance; Impaired balance;Decreased mobility; Impaired sensation;Pain   Assessment Chart reviewed  Patient was received supine in bed in NAD and agreeable to PT session with encouragement  Today's PT treatment session consisted of therapeutic activity for facilitation of transitional movements and safe performance of correct technique for bed mobility and therapeutic exercise to increase lower extremity muscle strength  In comparison to the previous session the patient has not made progress as evident by continuing to require assist of two for bed mobility  Pt reported dizziness upon sitting at EOB; BP 90/65; pt was returned to supine in bed and reported a resolve in symptoms  Pt tolerated all therapeutic exercise well without complaints  Pt was fitted with bilateral multi podus boots at end of session; pt denied pain  Overall, patient tolerated today's session well, but was limited due to fatigue  Co-treatment was performed with OT secondary to complex medical condition of pt  Pt requires assist of two to achieve and maintain transitional movements; requiring the need of skilled therapeutic intervention of two therapists to achieve the delivery of services  PT/OT goals were addressed separately  Pt with no functional improvements towards achieving her STG's  Patient's prognosis for achieving their STG's is fair  PT intervention continues to be appropriate as the patient continues to be limited by pain, decreased lower extremity strength, impaired balance, decreased endurance, and decreased functional mobility  Continue to recommend STR   PT to continue to see patient in order to address the deficits listed above and provide interventions consistent with the POC in order to achieve STG's and optimize the patient's independence with functional mobility  Barriers to Discharge Inaccessible home environment;Decreased caregiver support   Goals   STG Expiration Date 06/05/23   Short Term Goal #1 In 10 days: Increase bilateral LE strength 1/2 grade to facilitate independent mobility, Perform all bed mobility tasks with mod A of 1 to decrease caregiver burden and PT to see and establish goals for transfers and balance when appropriate   PT Treatment Day 2   Plan   Treatment/Interventions Functional transfer training;LE strengthening/ROM; Therapeutic exercise; Endurance training;Patient/family training;Bed mobility;Continued evaluation;Spoke to nursing;OT   Progress No functional improvements   PT Frequency 3-5x/wk   Recommendation   PT Discharge Recommendation Post acute rehabilitation services   AM-PAC Basic Mobility Inpatient   Turning in Flat Bed Without Bedrails 1   Lying on Back to Sitting on Edge of Flat Bed Without Bedrails 1   Moving Bed to Chair 1   Standing Up From Chair Using Arms 1   Walk in Room 1   Climb 3-5 Stairs With Railing 1   Basic Mobility Inpatient Raw Score 6   Turning Head Towards Sound 4   Follow Simple Instructions 4   Low Function Basic Mobility Raw Score  14   Low Function Basic Mobility Standardized Score  22 01   Highest Level Of Mobility   JH-HLM Goal 2: Bed activities/Dependent transfer   JH-HLM Achieved 3: Sit at edge of bed   Education   Education Provided Home exercise program;Mobility training   Patient Reinforcement needed   End of Consult   Patient Position at End of Consult Supine; All needs within reach     Milena Hilario, PT, DPT    Time of PT treatment session: [id number]  27 minutes

## 2023-05-27 RX ORDER — AMOXICILLIN AND CLAVULANATE POTASSIUM 500; 125 MG/1; MG/1
1 TABLET, FILM COATED ORAL EVERY 12 HOURS SCHEDULED
Status: COMPLETED | OUTPATIENT
Start: 2023-05-27 | End: 2023-05-31

## 2023-05-27 RX ADMIN — SENNOSIDES AND DOCUSATE SODIUM 1 TABLET: 50; 8.6 TABLET ORAL at 21:20

## 2023-05-27 RX ADMIN — GABAPENTIN 300 MG: 300 CAPSULE ORAL at 15:44

## 2023-05-27 RX ADMIN — Medication 12.5 MG: at 21:20

## 2023-05-27 RX ADMIN — BACLOFEN 10 MG: 10 TABLET ORAL at 15:45

## 2023-05-27 RX ADMIN — BACLOFEN 10 MG: 10 TABLET ORAL at 08:31

## 2023-05-27 RX ADMIN — GABAPENTIN 300 MG: 300 CAPSULE ORAL at 21:20

## 2023-05-27 RX ADMIN — CHLORHEXIDINE GLUCONATE 0.12% ORAL RINSE 15 ML: 1.2 LIQUID ORAL at 08:30

## 2023-05-27 RX ADMIN — PRAZOSIN HYDROCHLORIDE 5 MG: 1 CAPSULE ORAL at 21:22

## 2023-05-27 RX ADMIN — AMOXICILLIN AND CLAVULANATE POTASSIUM 1 TABLET: 500; 125 TABLET, FILM COATED ORAL at 15:44

## 2023-05-27 RX ADMIN — GABAPENTIN 300 MG: 300 CAPSULE ORAL at 08:31

## 2023-05-27 RX ADMIN — KETOROLAC TROMETHAMINE 15 MG: 30 INJECTION, SOLUTION INTRAMUSCULAR; INTRAVENOUS at 22:33

## 2023-05-27 RX ADMIN — QUETIAPINE FUMARATE 500 MG: 200 TABLET ORAL at 21:21

## 2023-05-27 RX ADMIN — TAMSULOSIN HYDROCHLORIDE 0.4 MG: 0.4 CAPSULE ORAL at 21:17

## 2023-05-27 RX ADMIN — KETOROLAC TROMETHAMINE 15 MG: 30 INJECTION, SOLUTION INTRAMUSCULAR; INTRAVENOUS at 15:44

## 2023-05-27 RX ADMIN — KETOROLAC TROMETHAMINE 15 MG: 30 INJECTION, SOLUTION INTRAMUSCULAR; INTRAVENOUS at 00:25

## 2023-05-27 RX ADMIN — KETOROLAC TROMETHAMINE 15 MG: 30 INJECTION, SOLUTION INTRAMUSCULAR; INTRAVENOUS at 08:31

## 2023-05-27 RX ADMIN — BACLOFEN 10 MG: 10 TABLET ORAL at 21:20

## 2023-05-27 NOTE — PLAN OF CARE
Problem: OCCUPATIONAL THERAPY ADULT  Goal: Performs self-care activities at highest level of function for planned discharge setting  See evaluation for individualized goals  Description: Treatment Interventions: ADL retraining, Functional transfer training, UE strengthening/ROM, Endurance training, Patient/family training, Equipment evaluation/education, Compensatory technique education, Continued evaluation, Energy conservation, Activityengagement, Fine motor coordination activities          See flowsheet documentation for full assessment, interventions and recommendations  Note: Limitation: Decreased ADL status, Decreased UE ROM, Decreased UE strength, Decreased Safe judgement during ADL, Decreased endurance, Decreased fine motor control, Decreased self-care trans, Decreased high-level ADLs  Prognosis: Good  Assessment: Patient participated in Skilled OT session this date with interventions consisting of  therapeutic activities to: increase activity tolerance, increase cardiovascular endurance , increase dynamic sit/ stand balance during functional activity , increase postural control and increase OOB/ sitting tolerance   Patient agreeable to OT treatment session, upon arrival patient was found supine in bed, alert and responsive   In comparison to previous session, patient with improvements in functional mobility and positional changes in bed  Pt reported dizziness upon sitting at EOB; BP 90/65; pt was returned to supine in bed and reported a resolve in symptoms  Patient was issued bilateral multi podus boots, as ordered,  to allow for decreased foot drop position in bed, and pressure relieve to bilateral heels  Patient reported increased comfort post application of devices  Patient was instructed that this is a positioning devices for bed only, not to stand or ambulate on it  Patient was reminded she has weight bearing restricitons to RLE (WBAT) with the use of CAM boot   Patient verbalized understanding  Patient requiring verbal cues for safety, verbal cues for correct technique and frequent rest periods  Patient continues to be functioning below baseline level, occupational performance remains limited secondary to factors listed above and increased risk for falls and injury  From OT standpoint, recommendation at time of d/c would be Short Term Rehab  Patient to benefit from continued Occupational Therapy treatment while in the hospital to address deficits as defined above and maximize level of functional independence with ADLs and functional mobility       OT Discharge Recommendation: Post acute rehabilitation services

## 2023-05-27 NOTE — PLAN OF CARE
Problem: PAIN - ADULT  Goal: Verbalizes/displays adequate comfort level or baseline comfort level  Description: Interventions:  - Encourage patient to monitor pain and request assistance  - Assess pain using appropriate pain scale  - Administer analgesics based on type and severity of pain and evaluate response  - Implement non-pharmacological measures as appropriate and evaluate response  - Consider cultural and social influences on pain and pain management  - Notify physician/advanced practitioner if interventions unsuccessful or patient reports new pain  Outcome: Progressing     Problem: INFECTION - ADULT  Goal: Absence or prevention of progression during hospitalization  Description: INTERVENTIONS:  - Assess and monitor for signs and symptoms of infection  - Monitor lab/diagnostic results  - Monitor all insertion sites, i e  indwelling lines, tubes, and drains  - Monitor endotracheal if appropriate and nasal secretions for changes in amount and color  - Felda appropriate cooling/warming therapies per order  - Administer medications as ordered  - Instruct and encourage patient and family to use good hand hygiene technique  - Identify and instruct in appropriate isolation precautions for identified infection/condition  Outcome: Progressing  Goal: Absence of fever/infection during neutropenic period  Description: INTERVENTIONS:  - Monitor WBC    Outcome: Progressing

## 2023-05-27 NOTE — ASSESSMENT & PLAN NOTE
· Reports home PT once a week;  · [place] approx 2 weeks ago and rolled ankle, admits to some sensation of pain in right ankle  · X-ray right ankle 5/18/23: Medial malleolus displaced fracture   · Ortho consulted,  · CAM boot for mobility - uncomfortable; trial probus boots  · WBAT in boot  · Elevated extremity  · Ice, pain control as needed  · Follow up in office for repeat imaging to ensure routine healing

## 2023-05-27 NOTE — ASSESSMENT & PLAN NOTE
· Patient admits to foul odor in urine, secondary to spinal cord injury patient unable to determine if any pain with urination  · UA positive for moderate bacteria  · Urine culture positive for morganella morganii MDRO; aerococcus species  · ID consult in setting of concern for true infection,

## 2023-05-27 NOTE — PLAN OF CARE
Problem: Potential for Falls  Goal: Patient will remain free of falls  Description: INTERVENTIONS:  - Educate patient/family on patient safety including physical limitations  - Instruct patient to call for assistance with activity   - Consult OT/PT to assist with strengthening/mobility   - Keep Call bell within reach  - Keep bed low and locked with side rails adjusted as appropriate  - Keep care items and personal belongings within reach  - Initiate and maintain comfort rounds  - Make Fall Risk Sign visible to staff  - Offer Toileting every  Hours, in advance of need  - Initiate/Maintain alarm  - Obtain necessary fall risk management equipment:  - Apply yellow socks and bracelet for high fall risk patients  - Consider moving patient to room near nurses station  Outcome: Progressing     Problem: MOBILITY - ADULT  Goal: Maintain or return to baseline ADL function  Description: INTERVENTIONS:  -  Assess patient's ability to carry out ADLs; assess patient's baseline for ADL function and identify physical deficits which impact ability to perform ADLs (bathing, care of mouth/teeth, toileting, grooming, dressing, etc )  - Assess/evaluate cause of self-care deficits   - Assess range of motion  - Assess patient's mobility; develop plan if impaired  - Assess patient's need for assistive devices and provide as appropriate  - Encourage maximum independence but intervene and supervise when necessary  - Involve family in performance of ADLs  - Assess for home care needs following discharge   - Consider OT consult to assist with ADL evaluation and planning for discharge  - Provide patient education as appropriate  Outcome: Progressing  Goal: Maintains/Returns to pre admission functional level  Description: INTERVENTIONS:  - Perform BMAT or MOVE assessment daily    - Set and communicate daily mobility goal to care team and patient/family/caregiver     - Collaborate with rehabilitation services on mobility goals if consulted  - Perform Range of Motion  times a day  - Reposition patient every  hours    - Dangle patient  times a day  - Stand patient times a day  - Ambulate patient  times a day  - Out of bed to chair  times a day   - Out of bed for meals times a day  - Out of bed for toileting  - Record patient progress and toleration of activity level   Outcome: Progressing     Problem: PAIN - ADULT  Goal: Verbalizes/displays adequate comfort level or baseline comfort level  Description: Interventions:  - Encourage patient to monitor pain and request assistance  - Assess pain using appropriate pain scale  - Administer analgesics based on type and severity of pain and evaluate response  - Implement non-pharmacological measures as appropriate and evaluate response  - Consider cultural and social influences on pain and pain management  - Notify physician/advanced practitioner if interventions unsuccessful or patient reports new pain  Outcome: Progressing     Problem: INFECTION - ADULT  Goal: Absence or prevention of progression during hospitalization  Description: INTERVENTIONS:  - Assess and monitor for signs and symptoms of infection  - Monitor lab/diagnostic results  - Monitor all insertion sites, i e  indwelling lines, tubes, and drains  - Monitor endotracheal if appropriate and nasal secretions for changes in amount and color  - [place] appropriate cooling/warming therapies per order  - Administer medications as ordered  - Instruct and encourage patient and family to use good hand hygiene technique  - Identify and instruct in appropriate isolation precautions for identified infection/condition  Outcome: Progressing  Goal: Absence of fever/infection during neutropenic period  Description: INTERVENTIONS:  - Monitor WBC    Outcome: Progressing     Problem: SAFETY ADULT  Goal: Patient will remain free of falls  Description: INTERVENTIONS:  - Educate patient/family on patient safety including physical limitations  - Instruct patient to call for assistance with activity   - Consult OT/PT to assist with strengthening/mobility   - Keep Call bell within reach  - Keep bed low and locked with side rails adjusted as appropriate  - Keep care items and personal belongings within reach  - Initiate and maintain comfort rounds  - Make Fall Risk Sign visible to staff  - Offer Toileting every  Hours, in advance of need  - Initiate/Maintain alarm  - Obtain necessary fall risk management equipment:   - Apply yellow socks and bracelet for high fall risk patients  - Consider moving patient to room near nurses station  Outcome: Progressing  Goal: Maintain or return to baseline ADL function  Description: INTERVENTIONS:  -  Assess patient's ability to carry out ADLs; assess patient's baseline for ADL function and identify physical deficits which impact ability to perform ADLs (bathing, care of mouth/teeth, toileting, grooming, dressing, etc )  - Assess/evaluate cause of self-care deficits   - Assess range of motion  - Assess patient's mobility; develop plan if impaired  - Assess patient's need for assistive devices and provide as appropriate  - Encourage maximum independence but intervene and supervise when necessary  - Involve family in performance of ADLs  - Assess for home care needs following discharge   - Consider OT consult to assist with ADL evaluation and planning for discharge  - Provide patient education as appropriate  Outcome: Progressing  Goal: Maintains/Returns to pre admission functional level  Description: INTERVENTIONS:  - Perform BMAT or MOVE assessment daily    - Set and communicate daily mobility goal to care team and patient/family/caregiver  - Collaborate with rehabilitation services on mobility goals if consulted  - Perform Range of Motion  times a day  - Reposition patient every  hours    - Dangle patient  times a day  - Stand patient  times a day  - Ambulate patient  times a day  - Out of bed to chair  times a day   - Out of bed for meals  times a day  - Out of bed for toileting  - Record patient progress and toleration of activity level   Outcome: Progressing     Problem: DISCHARGE PLANNING  Goal: Discharge to home or other facility with appropriate resources  Description: INTERVENTIONS:  - Identify barriers to discharge w/patient and caregiver  - Arrange for needed discharge resources and transportation as appropriate  - Identify discharge learning needs (meds, wound care, etc )  - Arrange for interpretive services to assist at discharge as needed  - Refer to Case Management Department for coordinating discharge planning if the patient needs post-hospital services based on physician/advanced practitioner order or complex needs related to functional status, cognitive ability, or social support system  Outcome: Progressing     Problem: Knowledge Deficit  Goal: Patient/family/caregiver demonstrates understanding of disease process, treatment plan, medications, and discharge instructions  Description: Complete learning assessment and assess knowledge base    Interventions:  - Provide teaching at level of understanding  - Provide teaching via preferred learning methods  Outcome: Progressing     Problem: Prexisting or High Potential for Compromised Skin Integrity  Goal: Skin integrity is maintained or improved  Description: INTERVENTIONS:  - Identify patients at risk for skin breakdown  - Assess and monitor skin integrity  - Assess and monitor nutrition and hydration status  - Monitor labs   - Assess for incontinence   - Turn and reposition patient  - Assist with mobility/ambulation  - Relieve pressure over bony prominences  - Avoid friction and shearing  - Provide appropriate hygiene as needed including keeping skin clean and dry  - Evaluate need for skin moisturizer/barrier cream  - Collaborate with interdisciplinary team   - Patient/family teaching  - Consider wound care consult   Outcome: Progressing

## 2023-05-27 NOTE — OCCUPATIONAL THERAPY NOTE
Occupational Therapy Treatment Note        Patient Name: Renato Jean  Today's Date: 5/27/2023 05/26/23 1008   OT Last Visit   OT Visit Date 05/26/23   Note Type   Note Type Treatment   Pain Assessment   Pain Assessment Tool 0-10   Pain Score 8   Pain Location/Orientation Other (Comment)  (mouth- tooth ache)   Restrictions/Precautions   Weight Bearing Precautions Per Order Yes   RLE Weight Bearing Per Order WBAT  (CAM Boot)   Braces or Orthoses CAM Boot   Other Precautions Bed Alarm;WBS;Fall Risk;Pain   ADL   Where Assessed   (Patient reported ability to complete UB bathing and UB dressing with set up assist, patient has declined to complete self care tasks with this therapist, stating that she will start therapy when she is transferred to Rehab )   Bed Mobility   Supine to Sit 2  Maximal assistance   Additional items Assist x 2;HOB elevated; Increased time required;Verbal cues;LE management   Sit to Supine 2  Maximal assistance   Additional items Assist x 2;HOB elevated; Increased time required;Verbal cues;LE management   Additional Comments Pt reported dizziness upon sitting at EOB; BP 90/65; pt was returned to supine in bed and reported a resolve in symptoms  Transfers   Sit to Stand Unable to assess  (pt declined STS attempt from EOB stating too fatigued)   Cognition   Overall Cognitive Status WFL   Arousal/Participation Alert   Attention Within functional limits   Orientation Level Oriented X4   Memory Within functional limits   Following Commands Follows all commands and directions without difficulty   Additional Activities   Additional Activities Other (Comment)  (Bilateral multipodus boots)   Additional Activities Comments Patient was issued bilateral multi podus boots, as ordered,  to allow for decreased foot drop position in bed, and pressure relieve to bilateral heels  Patient reported increased comfort post application of devices   Patient was instructed that this is a positioning devices for bed only, not to stand or ambulate on it  Patient was reminded she has weight bearing restricitons to RLE (WBAT) with the use of CAM boot  Patient verbalized understanding  Activity Tolerance   Activity Tolerance Patient limited by fatigue   Medical Staff Made Aware Patient required assist of 2 skilled therapists to facilitate neuromuscular components of movement, provide weight bearing assistance to increase self performance in transitional movements and achieve functional sitting/ standing activities  Assessment   Assessment Patient participated in Skilled OT session this date with interventions consisting of  therapeutic activities to: increase activity tolerance, increase cardiovascular endurance , increase dynamic sit/ stand balance during functional activity , increase postural control and increase OOB/ sitting tolerance   Patient agreeable to OT treatment session, upon arrival patient was found supine in bed, alert and responsive   In comparison to previous session, patient with improvements in functional mobility and positional changes in bed  Pt reported dizziness upon sitting at EOB; BP 90/65; pt was returned to supine in bed and reported a resolve in symptoms  Patient was issued bilateral multi podus boots, as ordered,  to allow for decreased foot drop position in bed, and pressure relieve to bilateral heels  Patient reported increased comfort post application of devices  Patient was instructed that this is a positioning devices for bed only, not to stand or ambulate on it  Patient was reminded she has weight bearing restricitons to RLE (WBAT) with the use of CAM boot  Patient verbalized understanding  Patient requiring verbal cues for safety, verbal cues for correct technique and frequent rest periods  Patient continues to be functioning below baseline level, occupational performance remains limited secondary to factors listed above and increased risk for falls and injury     From OT standpoint, recommendation at time of d/c would be Short Term Rehab  Patient to benefit from continued Occupational Therapy treatment while in the hospital to address deficits as defined above and maximize level of functional independence with ADLs and functional mobility  Plan   Treatment Interventions ADL retraining;Functional transfer training;UE strengthening/ROM; Endurance training;Patient/family training;Equipment evaluation/education; Compensatory technique education;Continued evaluation; Energy conservation; Activityengagement; Fine motor coordination activities   Goal Expiration Date 06/01/23   OT Frequency 3-5x/wk   Recommendation   OT Discharge Recommendation Post acute rehabilitation services   AM-PAC Daily Activity Inpatient   Lower Body Dressing 1   Bathing 1   Toileting 1   Upper Body Dressing 1   Grooming 2   Eating 2   Daily Activity Raw Score 8   Turning Head Towards Sound 3   Follow Simple Instructions 4   Low Function Daily Activity Raw Score 15   Low Function Daily Activity Standardized Score  26 28   AM-PAC Applied Cognition Inpatient   Following a Speech/Presentation 4   Understanding Ordinary Conversation 4   Taking Medications 4   Remembering Where Things Are Placed or Put Away 4   Remembering List of 4-5 Errands 4   Taking Care of Complicated Tasks 4   Applied Cognition Raw Score 24   Applied Cognition Standardized Score 62 21

## 2023-05-27 NOTE — ASSESSMENT & PLAN NOTE
· CT scan showing poor dentition, dental caries, but no abscess or swelling to suggest acute infection  · Started peridex, magic mouthwash  · Will need oral surgery for complete tooth extraction at some point  · Will start Augmentin

## 2023-05-28 RX ORDER — OXYCODONE HYDROCHLORIDE 5 MG/1
5 TABLET ORAL EVERY 4 HOURS PRN
Status: DISCONTINUED | OUTPATIENT
Start: 2023-05-28 | End: 2023-05-30

## 2023-05-28 RX ORDER — KETOROLAC TROMETHAMINE 30 MG/ML
15 INJECTION, SOLUTION INTRAMUSCULAR; INTRAVENOUS EVERY 6 HOURS PRN
Status: DISPENSED | OUTPATIENT
Start: 2023-05-28 | End: 2023-05-29

## 2023-05-28 RX ADMIN — QUETIAPINE FUMARATE 500 MG: 200 TABLET ORAL at 22:55

## 2023-05-28 RX ADMIN — GABAPENTIN 300 MG: 300 CAPSULE ORAL at 20:24

## 2023-05-28 RX ADMIN — BACLOFEN 10 MG: 10 TABLET ORAL at 20:24

## 2023-05-28 RX ADMIN — AMOXICILLIN AND CLAVULANATE POTASSIUM 1 TABLET: 500; 125 TABLET, FILM COATED ORAL at 16:30

## 2023-05-28 RX ADMIN — KETOROLAC TROMETHAMINE 15 MG: 30 INJECTION, SOLUTION INTRAMUSCULAR; INTRAVENOUS at 10:01

## 2023-05-28 RX ADMIN — GABAPENTIN 300 MG: 300 CAPSULE ORAL at 16:30

## 2023-05-28 RX ADMIN — GABAPENTIN 300 MG: 300 CAPSULE ORAL at 09:55

## 2023-05-28 RX ADMIN — PRAZOSIN HYDROCHLORIDE 5 MG: 1 CAPSULE ORAL at 22:53

## 2023-05-28 RX ADMIN — BACLOFEN 10 MG: 10 TABLET ORAL at 09:56

## 2023-05-28 RX ADMIN — KETOROLAC TROMETHAMINE 15 MG: 30 INJECTION, SOLUTION INTRAMUSCULAR; INTRAVENOUS at 22:57

## 2023-05-28 RX ADMIN — SENNOSIDES AND DOCUSATE SODIUM 1 TABLET: 50; 8.6 TABLET ORAL at 22:52

## 2023-05-28 RX ADMIN — TAMSULOSIN HYDROCHLORIDE 0.4 MG: 0.4 CAPSULE ORAL at 22:52

## 2023-05-28 RX ADMIN — BACLOFEN 10 MG: 10 TABLET ORAL at 16:30

## 2023-05-28 RX ADMIN — AMOXICILLIN AND CLAVULANATE POTASSIUM 1 TABLET: 500; 125 TABLET, FILM COATED ORAL at 02:14

## 2023-05-28 RX ADMIN — KETOROLAC TROMETHAMINE 15 MG: 30 INJECTION, SOLUTION INTRAMUSCULAR; INTRAVENOUS at 16:30

## 2023-05-28 NOTE — ASSESSMENT & PLAN NOTE
· CT scan showing poor dentition, dental caries, but no abscess or swelling to suggest acute infection  · Started peridex, magic mouthwash  · Will need oral surgery for complete tooth extraction at some point  · Will continue Augmentin

## 2023-05-28 NOTE — PLAN OF CARE
Problem: Potential for Falls  Goal: Patient will remain free of falls  Description: INTERVENTIONS:  - Educate patient/family on patient safety including physical limitations  - Instruct patient to call for assistance with activity   - Consult OT/PT to assist with strengthening/mobility   - Keep Call bell within reach  - Keep bed low and locked with side rails adjusted as appropriate  - Keep care items and personal belongings within reach  - Initiate and maintain comfort rounds  - Make Fall Risk Sign visible to staff  - Offer Toileting every    Hours, in advance of need  - Initiate/Maintain   alarm  - Obtain necessary fall risk management equipment:     - Apply yellow socks and bracelet for high fall risk patients  - Consider moving patient to room near nurses station  Outcome: Progressing     Problem: MOBILITY - ADULT  Goal: Maintain or return to baseline ADL function  Description: INTERVENTIONS:  -  Assess patient's ability to carry out ADLs; assess patient's baseline for ADL function and identify physical deficits which impact ability to perform ADLs (bathing, care of mouth/teeth, toileting, grooming, dressing, etc )  - Assess/evaluate cause of self-care deficits   - Assess range of motion  - Assess patient's mobility; develop plan if impaired  - Assess patient's need for assistive devices and provide as appropriate  - Encourage maximum independence but intervene and supervise when necessary  - Involve family in performance of ADLs  - Assess for home care needs following discharge   - Consider OT consult to assist with ADL evaluation and planning for discharge  - Provide patient education as appropriate  Outcome: Progressing  Goal: Maintains/Returns to pre admission functional level  Description: INTERVENTIONS:  - Perform BMAT or MOVE assessment daily    - Set and communicate daily mobility goal to care team and patient/family/caregiver     - Collaborate with rehabilitation services on mobility goals if consulted  - Perform Range of Motion    times a day  - Reposition patient every    hours    - Dangle patient    times a day  - Stand patient    times a day  - Ambulate patient      times a day  - Out of bed to chair    times a day   - Out of bed for meals    times a day  - Out of bed for toileting  - Record patient progress and toleration of activity level   Outcome: Progressing     Problem: PAIN - ADULT  Goal: Verbalizes/displays adequate comfort level or baseline comfort level  Description: Interventions:  - Encourage patient to monitor pain and request assistance  - Assess pain using appropriate pain scale  - Administer analgesics based on type and severity of pain and evaluate response  - Implement non-pharmacological measures as appropriate and evaluate response  - Consider cultural and social influences on pain and pain management  - Notify physician/advanced practitioner if interventions unsuccessful or patient reports new pain  Outcome: Progressing     Problem: INFECTION - ADULT  Goal: Absence or prevention of progression during hospitalization  Description: INTERVENTIONS:  - Assess and monitor for signs and symptoms of infection  - Monitor lab/diagnostic results  - Monitor all insertion sites, i e  indwelling lines, tubes, and drains  - Monitor endotracheal if appropriate and nasal secretions for changes in amount and color  - [place] appropriate cooling/warming therapies per order  - Administer medications as ordered  - Instruct and encourage patient and family to use good hand hygiene technique  - Identify and instruct in appropriate isolation precautions for identified infection/condition  Outcome: Progressing  Goal: Absence of fever/infection during neutropenic period  Description: INTERVENTIONS:  - Monitor WBC    Outcome: Progressing     Problem: SAFETY ADULT  Goal: Patient will remain free of falls  Description: INTERVENTIONS:  - Educate patient/family on patient safety including physical limitations  - Instruct patient to call for assistance with activity   - Consult OT/PT to assist with strengthening/mobility   - Keep Call bell within reach  - Keep bed low and locked with side rails adjusted as appropriate  - Keep care items and personal belongings within reach  - Initiate and maintain comfort rounds  - Make Fall Risk Sign visible to staff  - Offer Toileting every    Hours, in advance of need  - Initiate/Maintain   alarm  - Obtain necessary fall risk management equipment:     - Apply yellow socks and bracelet for high fall risk patients  - Consider moving patient to room near nurses station  Outcome: Progressing  Goal: Maintain or return to baseline ADL function  Description: INTERVENTIONS:  -  Assess patient's ability to carry out ADLs; assess patient's baseline for ADL function and identify physical deficits which impact ability to perform ADLs (bathing, care of mouth/teeth, toileting, grooming, dressing, etc )  - Assess/evaluate cause of self-care deficits   - Assess range of motion  - Assess patient's mobility; develop plan if impaired  - Assess patient's need for assistive devices and provide as appropriate  - Encourage maximum independence but intervene and supervise when necessary  - Involve family in performance of ADLs  - Assess for home care needs following discharge   - Consider OT consult to assist with ADL evaluation and planning for discharge  - Provide patient education as appropriate  Outcome: Progressing  Goal: Maintains/Returns to pre admission functional level  Description: INTERVENTIONS:  - Perform BMAT or MOVE assessment daily    - Set and communicate daily mobility goal to care team and patient/family/caregiver  - Collaborate with rehabilitation services on mobility goals if consulted  - Perform Range of Motion    times a day  - Reposition patient every    hours    - Dangle patient    times a day  - Stand patient    times a day  - Ambulate patient      times a day  - Out of bed to chair    times a day   - Out of bed for meals  times a day  - Out of bed for toileting  - Record patient progress and toleration of activity level   Outcome: Progressing     Problem: DISCHARGE PLANNING  Goal: Discharge to home or other facility with appropriate resources  Description: INTERVENTIONS:  - Identify barriers to discharge w/patient and caregiver  - Arrange for needed discharge resources and transportation as appropriate  - Identify discharge learning needs (meds, wound care, etc )  - Arrange for interpretive services to assist at discharge as needed  - Refer to Case Management Department for coordinating discharge planning if the patient needs post-hospital services based on physician/advanced practitioner order or complex needs related to functional status, cognitive ability, or social support system  Outcome: Progressing     Problem: Knowledge Deficit  Goal: Patient/family/caregiver demonstrates understanding of disease process, treatment plan, medications, and discharge instructions  Description: Complete learning assessment and assess knowledge base    Interventions:  - Provide teaching at level of understanding  - Provide teaching via preferred learning methods  Outcome: Progressing     Problem: Prexisting or High Potential for Compromised Skin Integrity  Goal: Skin integrity is maintained or improved  Description: INTERVENTIONS:  - Identify patients at risk for skin breakdown  - Assess and monitor skin integrity  - Assess and monitor nutrition and hydration status  - Monitor labs   - Assess for incontinence   - Turn and reposition patient  - Assist with mobility/ambulation  - Relieve pressure over bony prominences  - Avoid friction and shearing  - Provide appropriate hygiene as needed including keeping skin clean and dry  - Evaluate need for skin moisturizer/barrier cream  - Collaborate with interdisciplinary team   - Patient/family teaching  - Consider wound care consult   Outcome: Progressing

## 2023-05-28 NOTE — ASSESSMENT & PLAN NOTE
· Reports home PT once a week;  · Lannis Clock approx 2 weeks ago and rolled ankle, admits to some sensation of pain in right ankle  · X-ray right ankle 5/18/23: Medial malleolus displaced fracture   · Ortho consulted,  · CAM boot for mobility - uncomfortable; trial probus boots  · WBAT in boot  · Elevated extremity  · Ice, pain control as needed  · Follow up in office for repeat imaging to ensure routine healing

## 2023-05-28 NOTE — ASSESSMENT & PLAN NOTE
· Spinal cord injury 2 years ago  · Patient reports she now has sensation and able to move toes and arms, starting to feel some sensation in feet and arms  · P500 specialty bed ordered  · Turn patient every 2 hours  · Patient admits to chronic severe neck/spinal pain  · Encourage patient to follow-up outpatient with pain management   · Continue with Tylenol; Lidocaine;  Aqua-K  · Baclofen 10 mg TID, Gabapentin 300 mg TID  · Decreased oxycodone, will continue to wean

## 2023-05-28 NOTE — PROGRESS NOTES
[address] [facility]  Progress Note  Name: Jesús Gama  MRN: [id number]  Unit/Bed#: -02 I Date of Admission: 5/17/2023   Date of Service: 5/28/2023 I Hospital Day: 9    Assessment/Plan   Toothache  Assessment & Plan  · CT scan showing poor dentition, dental caries, but no abscess or swelling to suggest acute infection  · Started peridex, magic mouthwash  · Will need oral surgery for complete tooth extraction at some point  · Will continue Augmentin     UTI (urinary tract infection)  Assessment & Plan  · Patient admits to foul odor in urine, secondary to spinal cord injury patient unable to determine if any pain with urination  · UA positive for moderate bacteria  · Urine culture positive for morganella morganii MDRO; aerococcus species  · ID consult in setting of concern for true infection,      Closed right ankle fracture  Assessment & Plan  · Reports home PT once a week;  · Fell approx 2 weeks ago and rolled ankle, admits to some sensation of pain in right ankle  · X-ray right ankle 5/18/23: Medial malleolus displaced fracture   · Ortho consulted,  · CAM boot for mobility - uncomfortable; trial probus boots  · WBAT in boot  · Elevated extremity  · Ice, pain control as needed  · Follow up in office for repeat imaging to ensure routine healing    Quadriplegic spinal paralysis ([address] )  Assessment & Plan  · Spinal cord injury 2 years ago  · Patient reports she now has sensation and able to move toes and arms, starting to feel some sensation in feet and arms  · P500 specialty bed ordered  · Turn patient every 2 hours  · Patient admits to chronic severe neck/spinal pain  · Encourage patient to follow-up outpatient with pain management   · Continue with Tylenol; Lidocaine;  Aqua-K  · Baclofen 10 mg TID, Gabapentin 300 mg TID  · Decreased oxycodone, will continue to wean    * Failure to thrive in adult  Assessment & Plan  · Elderly grandmother unable to fully care for her  · Patient requesting long-term care facility, due to quadriplegia unable to fully care for self  · PT/OT for recommendations, continued therapy while inpatient   · Case management for dispo planning   · Nutrition consulted for dietary recommendations            VTE Pharmacologic Prophylaxis: VTE Score: 4 Moderate Risk (Score 3-4) - Pharmacological DVT Prophylaxis Ordered: enoxaparin (Lovenox)  Patient Centered Rounds: I performed bedside rounds with nursing staff today  Discussions with Specialists or Other Care Team Provider: Brent    Education and Discussions with Family / Patient: Patient declined call to   Total Time Spent on Date of Encounter in care of patient: 35 minutes This time was spent on one or more of the following: performing physical exam; counseling and coordination of care; obtaining or reviewing history; documenting in the medical record; reviewing/ordering tests, medications or procedures; communicating with other healthcare professionals and discussing with patient's family/caregivers  Current Length of Stay: 9 day(s)  Current Patient Status: Inpatient   Certification Statement: The patient will continue to require additional inpatient hospital stay due to Awaiting placement, specifically insurance authorization  Discharge Plan: Anticipate discharge in 48 hrs to rehab facility  Code Status: Level 1 - Full Code    Subjective:   Patient reports feeling significantly improved since starting antibiotics  She still continues to have a toothache although pain is better controlled  She denies chest pain/pressure, palpitations, lightheadedness, shortness of breath, chills, or nausea  Objective:     Vitals:   Temp (24hrs), Av 9 °F (37 2 °C), Min:98 7 °F (37 1 °C), Max:99 1 °F (37 3 °C)    Temp:  [98 7 °F (37 1 °C)-99 1 °F (37 3 °C)] 98 7 °F (37 1 °C)  HR:  [79-93] 79  BP: ()/(45-71) 83/45  SpO2:  [89 %-93 %] 89 %  Body mass index is 31 74 kg/m²       Input and Output Summary (last  hours): Intake/Output Summary (Last 24 hours) at 5/28/2023 1152  Last data filed at 5/28/2023 3490  Gross per 24 hour   Intake 360 ml   Output 2450 ml   Net -2090 ml       Physical Exam:   Physical Exam  Vitals and nursing note reviewed  Constitutional:       Appearance: She is normal weight  HENT:      Head: Normocephalic  Comments: Improving right-sided perioral edema, no marked erythema, no drainage     Nose: Nose normal       Mouth/Throat:      Mouth: Mucous membranes are moist       Pharynx: Oropharynx is clear  Eyes:      General: No scleral icterus  Conjunctiva/sclera: Conjunctivae normal       Pupils: Pupils are equal, round, and reactive to light  Cardiovascular:      Rate and Rhythm: Normal rate and regular rhythm  Heart sounds: No murmur heard  No friction rub  No gallop  Pulmonary:      Effort: Pulmonary effort is normal  No respiratory distress  Breath sounds: Normal breath sounds  No stridor  No wheezing, rhonchi or rales  Abdominal:      General: Abdomen is flat  Palpations: Abdomen is soft  Musculoskeletal:      Cervical back: Normal range of motion and neck supple  Right lower leg: No edema  Left lower leg: No edema  Comments: Patient is a partial quadriplegia   Lymphadenopathy:      Cervical: No cervical adenopathy  Skin:     General: Skin is warm  Coloration: Skin is not jaundiced or pale  Findings: No bruising, erythema or lesion  Neurological:      General: No focal deficit present  Mental Status: She is alert and oriented to person, place, and time  Mental status is at baseline  Cranial Nerves: No cranial nerve deficit  Motor: No weakness  Psychiatric:         Mood and Affect: Mood normal          Behavior: Behavior normal          Thought Content:  Thought content normal           Additional Data:     Labs:                            Lines/Drains:  Invasive Devices     Peripheral Intravenous Line Duration           Peripheral IV 05/23/23 Left;Proximal;Ventral (anterior) Forearm 4 days          Drain  Duration           External Urinary Catheter 6 days                      Imaging: Reviewed radiology reports from this admission including: CT facial bones with contrast    Recent Cultures (last 7 days):         Last 24 Hours Medication List:   Current Facility-Administered Medications   Medication Dose Route Frequency Provider Last Rate   • acetaminophen  975 mg Oral Q8H PRN David Tran PA-C     • aluminum-magnesium hydroxide-simethicone  30 mL Oral Q6H PRN David Tran PA-C     • amoxicillin-clavulanate  1 tablet Oral Q12H [facility] ISAI Myers     • baclofen  10 mg Oral TID David Tran PA-C     • bisacodyl  10 mg Rectal Daily PRN David Tran PA-C     • chlorhexidine  15 mL Swish & Spit Q12H [facility] Medardo Jauregui PA-C     • Diclofenac Sodium  2 g Topical 4x Daily PRN David Tran PA-C     • diphenhydramine, lidocaine, Al/Mg hydroxide, simethicone  10 mL Swish & Spit Q4H PRN Medardo Jauregui PA-C     • enoxaparin  40 mg Subcutaneous Daily David Tran PA-C     • gabapentin  300 mg Oral Once David Tran PA-C     • gabapentin  300 mg Oral TID David Tran PA-C     • ketorolac  15 mg Intravenous Q6H PRN Keely Myers PA-C     • lidocaine  2 patch Topical Daily PRN David Tran PA-C     • Lidocaine Viscous HCl  15 mL Swish & Spit 4x Daily PRN Medardo Jauregui PA-C     • oxyCODONE  5 mg Oral Q4H PRN Keely Myers PA-C     • polyethylene glycol  17 g Oral Daily David Tran PA-C     • prazosin  5 mg Oral HS David Tran PA-C     • QUEtiapine  500 mg Oral HS David Tran PA-C     • senna-docusate sodium  1 tablet Oral HS David Tran PA-C     • sodium phosphate-biphosphate  1 enema Rectal Daily PRN Medardo Jauregui PA-C     • tamsulosin  0 4 mg Oral HS David Tran PA-C          Today, Patient Was Seen By: Keely Myers PA-C    **Please Note: This note may have been constructed using a voice recognition system  **

## 2023-05-29 RX ADMIN — AMOXICILLIN AND CLAVULANATE POTASSIUM 1 TABLET: 500; 125 TABLET, FILM COATED ORAL at 02:38

## 2023-05-29 RX ADMIN — GABAPENTIN 300 MG: 300 CAPSULE ORAL at 17:00

## 2023-05-29 RX ADMIN — OXYCODONE HYDROCHLORIDE 5 MG: 5 TABLET ORAL at 02:36

## 2023-05-29 RX ADMIN — BACLOFEN 10 MG: 10 TABLET ORAL at 09:38

## 2023-05-29 RX ADMIN — BACLOFEN 10 MG: 10 TABLET ORAL at 22:15

## 2023-05-29 RX ADMIN — GABAPENTIN 300 MG: 300 CAPSULE ORAL at 09:37

## 2023-05-29 RX ADMIN — TAMSULOSIN HYDROCHLORIDE 0.4 MG: 0.4 CAPSULE ORAL at 22:11

## 2023-05-29 RX ADMIN — GABAPENTIN 300 MG: 300 CAPSULE ORAL at 22:11

## 2023-05-29 RX ADMIN — OXYCODONE HYDROCHLORIDE 5 MG: 5 TABLET ORAL at 22:15

## 2023-05-29 RX ADMIN — KETOROLAC TROMETHAMINE 15 MG: 30 INJECTION, SOLUTION INTRAMUSCULAR; INTRAVENOUS at 09:41

## 2023-05-29 RX ADMIN — AMOXICILLIN AND CLAVULANATE POTASSIUM 1 TABLET: 500; 125 TABLET, FILM COATED ORAL at 17:00

## 2023-05-29 RX ADMIN — SENNOSIDES AND DOCUSATE SODIUM 1 TABLET: 50; 8.6 TABLET ORAL at 22:10

## 2023-05-29 RX ADMIN — QUETIAPINE FUMARATE 500 MG: 200 TABLET ORAL at 22:11

## 2023-05-29 RX ADMIN — BACLOFEN 10 MG: 10 TABLET ORAL at 17:00

## 2023-05-29 NOTE — PLAN OF CARE
"  Problem: PHYSICAL THERAPY ADULT  Goal: Performs mobility at highest level of function for planned discharge setting  See evaluation for individualized goals  Description: Treatment/Interventions: Functional transfer training, LE strengthening/ROM, Endurance training, Therapeutic exercise, Patient/family training, Bed mobility, Continued evaluation, OT          See flowsheet documentation for full assessment, interventions and recommendations  Outcome: Progressing  Note: Prognosis: Fair  Problem List: Decreased strength, Decreased endurance, Impaired balance, Decreased mobility, Pain, Impaired sensation  Assessment: Pt seen for PT treatment session this date, consisting of ther act focused on bed mobility  Since previous session, pt has made minimal progress in terms of activity tolerance and progression to further mobility  Patient greeted supine in bed and agreeable to PT session; did state \"I'm not standing today,\" upon PT arrival  Patient states she has not had multipodus boots on since provided by PT/OT; patient assisted in donning with dependent assist and re-educated on wear schedule and purpose of boots; RN notified multipodus boots donned  Patient completed sup <> sit maxAx2 and tolerated sitting EOB x3 minutes with close supervision; sitting tolerance limited by patient fatigue and requests to return to supine  Pertinent barriers during this session include pain  Current goals and POC remain appropriate, pt continues to have rehab potential and is making fair progress towards STGs  Pt prognosis for achieving goals is fair, pending pt progress with hospitalization/medical status improvements, and indicated by orientation and motivation  Pt limited d/t the presence of intractable pain, fear of pain provocation and avoidance behaviors  PT recommends post acute rehabilitation services upon discharge  Pt continues to be functioning below baseline level, and remains limited 2* factors listed above   PT will " continue to see pt during current hospitalization in order to address the deficits listed above and provide interventions consistent w/ POC in effort to achieve STGs  Barriers to Discharge: Inaccessible home environment, Decreased caregiver support     PT Discharge Recommendation: Post acute rehabilitation services    See flowsheet documentation for full assessment     Anna Payne; PT, DPT

## 2023-05-29 NOTE — PHYSICAL THERAPY NOTE
PHYSICAL THERAPY TREATMENT  NAME:  Rosaline Mehta  DATE: 05/29/23    AGE:   28 y o  Mrn:   [id number]  ADMIT DX:  Ankle injury [S99 919A]  UTI (urinary tract infection) [N39 0]  Failure to thrive in adult [R62 7]  Right ankle injury [S99 911A]  Poor social situation [Z65 9]  Known medical problems [Z78 9]  Problem List:   Patient Active Problem List   Diagnosis    Failure to thrive in adult    [facility] ([facility] )    Anxiety    Insomnia    Hepatitis C antibody positive in blood    Inability to return to living situation    Lower extremity edema    Urinary retention    Nondependent opioid abuse in remission ([facility] )    Closed right ankle fracture    UTI (urinary tract infection)    Toothache       Past Medical History  Past Medical History:   Diagnosis Date    Abscess     to the spine aug 10 2021    Cancer ([facility] )     ovarian       Past Surgical History  Past Surgical History:   Procedure Laterality Date    LUMBAR LAMINECTOMY  08/10/2021    evacuation of spinal epidural abcess    OVARIAN CYST REMOVAL      POSTERIOR FUSION CERVICAL SPINE      secondary to evacuation of spinal abcess s/p IVDU       Length Of Stay: 10  Performed at least 2 patient identifiers during session: Name and Birthday       05/29/23 0950   PT Last Visit   PT Visit Date 05/29/23   Note Type   Note Type Treatment   Pain Assessment   Pain Assessment Tool 0-10   Pain Score 5   Pain Location/Orientation Other (Comment)  (mouth/tooth)   Patient's Stated Pain Goal No pain   Hospital Pain Intervention(s) Emotional support  (RN aware)   Restrictions/Precautions   Weight Bearing Precautions Per Order Yes   RLE Weight Bearing Per Order WBAT  (per ortho with CAM Boot)   Braces or Orthoses CAM Boot; Other (Comment)  (multi-podus boots  donned during session)   Other Precautions Bed Alarm; Fall Risk;Pain   General   Chart Reviewed Yes   Response to Previous Treatment Patient with no complaints from previous session     Family/Caregiver Present No "  Cognition   Overall Cognitive Status WFL   Arousal/Participation Alert; Responsive   Attention Within functional limits   Orientation Level Oriented X4   Memory Within functional limits   Following Commands Follows all commands and directions without difficulty   Comments Pt agreeable to PT session   Subjective   Subjective \"I don't want to stand\"   Bed Mobility   Supine to Sit 2  Maximal assistance   Additional items Assist x 2; Increased time required;Verbal cues;LE management;HOB elevated   Sit to Supine 2  Maximal assistance   Additional items Assist x 2; Increased time required;Verbal cues;LE management;HOB elevated   Additional Comments Vitals monitored throughout session; BP supine 95/57, BP sitting EOB 96/62, BP upon return to supine 112/60  Pt reported mild dizziness upon initially sitting EOB; BP remained stable  Transfers   Sit to Stand Unable to assess   Balance   Static Sitting Fair -   Dynamic Sitting Poor +   Endurance Deficit   Endurance Deficit Yes   Endurance Deficit Description decreased activity tolerance   Activity Tolerance   Activity Tolerance Patient limited by fatigue;Patient limited by pain   Medical Staff Made Aware Co treatment with RICHARD Oliver secondary to complex medical condition of pt, possible A of 2 required to achieve and maintain transitional movements, requiring the need of skilled therapeutic intervention of 2 therapists to achieve delivery of services  Assessment   Prognosis Fair   Problem List Decreased strength;Decreased endurance; Impaired balance;Decreased mobility;Pain; Impaired sensation   Assessment Pt seen for PT treatment session this date, consisting of ther act focused on bed mobility  Since previous session, pt has made minimal progress in terms of activity tolerance and progression to further mobility   Patient greeted supine in bed and agreeable to PT session; did state \"I'm not standing today,\" upon PT arrival  Patient states she has not had multipodus boots on " since provided by PT/OT; patient assisted in donning with dependent assist and re-educated on wear schedule and purpose of boots; RN notified multipodus boots donned  Patient completed sup <> sit maxAx2 and tolerated sitting EOB x3 minutes with close supervision; sitting tolerance limited by patient fatigue and requests to return to supine  Pertinent barriers during this session include pain  Current goals and POC remain appropriate, pt continues to have rehab potential and is making fair progress towards STGs  Pt prognosis for achieving goals is fair, pending pt progress with hospitalization/medical status improvements, and indicated by orientation and motivation  Pt limited d/t the presence of intractable pain, fear of pain provocation and avoidance behaviors  PT recommends post acute rehabilitation services upon discharge  Pt continues to be functioning below baseline level, and remains limited 2* factors listed above  PT will continue to see pt during current hospitalization in order to address the deficits listed above and provide interventions consistent w/ POC in effort to achieve STGs  Barriers to Discharge Inaccessible home environment;Decreased caregiver support   Goals   Patient Goals to go to rehab   STG Expiration Date 06/08/23   Short Term Goal #1 goal date extended  Patients goals remain appropriate  Continue with plan of care   PT Treatment Day 3   Plan   Treatment/Interventions Functional transfer training; Therapeutic exercise; Endurance training;Patient/family training;Bed mobility;Spoke to nursing;Continued evaluation;OT   Progress No functional improvements   PT Frequency 3-5x/wk   Recommendation   PT Discharge Recommendation Post acute rehabilitation services   AM-PAC Basic Mobility Inpatient   Turning in Flat Bed Without Bedrails 1   Lying on Back to Sitting on Edge of Flat Bed Without Bedrails 1   Moving Bed to Chair 1   Standing Up From Chair Using Arms 1   Walk in Room 1   Climb 3-5 Stairs With Railing 1   Basic Mobility Inpatient Raw Score 6   Turning Head Towards Sound 4   Follow Simple Instructions 4   Low Function Basic Mobility Raw Score  14   Low Function Basic Mobility Standardized Score  22 01   Highest Level Of Mobility   -HLM Goal 2: Bed activities/Dependent transfer   -HLM Achieved 3: Sit at edge of bed   Education   Education Provided Mobility training   Patient Reinforcement needed   End of Consult   Patient Position at End of Consult All needs within reach; Supine  (HOB elevated)       Time In: 0950  Time Out: 1015   Total Treatment Minutes: 25    Lauren Brar, PT

## 2023-05-29 NOTE — ASSESSMENT & PLAN NOTE
· Reports home PT once a week;  · Nicki Fair approx 2 weeks ago and rolled ankle, admits to some sensation of pain in right ankle  · X-ray right ankle 5/18/23: Medial malleolus displaced fracture   · Ortho consulted,  · CAM boot for mobility - uncomfortable; trial probus boots  · WBAT in boot  · Elevated extremity  · Ice, pain control as needed  · Follow up in office for repeat imaging to ensure routine healing

## 2023-05-29 NOTE — PROGRESS NOTES
[facility]  Progress Note  Name: Israel Leonard  MRN: [id number]  Unit/Bed#: -02 I Date of Admission: 5/17/2023   Date of Service: 5/29/2023 I Hospital Day: 10    Assessment/Plan   Toothache  Assessment & Plan  · CT scan showing poor dentition, dental caries, but no abscess or swelling to suggest acute infection  · Started peridex, magic mouthwash  · Will need oral surgery for complete tooth extraction at some point  · Will continue Augmentin     UTI (urinary tract infection)  Assessment & Plan  · Patient admits to foul odor in urine, secondary to spinal cord injury patient unable to determine if any pain with urination  · UA positive for moderate bacteria  · Urine culture positive for morganella morganii MDRO; aerococcus species  · ID consult in setting of concern for true infection,      Closed right ankle fracture  Assessment & Plan  · Reports home PT once a week;  · Fell approx 2 weeks ago and rolled ankle, admits to some sensation of pain in right ankle  · X-ray right ankle 5/18/23: Medial malleolus displaced fracture   · Ortho consulted,  · CAM boot for mobility - uncomfortable; trial probus boots  · WBAT in boot  · Elevated extremity  · Ice, pain control as needed  · Follow up in office for repeat imaging to ensure routine healing    Quadriplegic spinal paralysis ([address] )  Assessment & Plan  · Spinal cord injury 2 years ago  · Patient reports she now has sensation and able to move toes and arms, starting to feel some sensation in feet and arms  · P500 specialty bed ordered  · Turn patient every 2 hours  · Patient admits to chronic severe neck/spinal pain  · Encourage patient to follow-up outpatient with pain management   · Continue with Tylenol; Lidocaine;  Aqua-K  · Baclofen 10 mg TID, Gabapentin 300 mg TID  · Decreased oxycodone, will continue to wean    * Failure to thrive in adult  Assessment & Plan  · Elderly grandmother unable to fully care for her  · Patient requesting long-term care facility, due to quadriplegia unable to fully care for self  · PT/OT for recommendations, continued therapy while inpatient   · Case management for dispo planning   · Nutrition consulted for dietary recommendations              VTE Pharmacologic Prophylaxis: VTE Score: 4 Moderate Risk (Score 3-4) - Pharmacological DVT Prophylaxis Ordered: enoxaparin (Lovenox)  Patient Centered Rounds: I performed bedside rounds with nursing staff today  Discussions with Specialists or Other Care Team Provider: Brent    Education and Discussions with Family / Patient: Patient declined call to   Total Time Spent on Date of Encounter in care of patient: 35 minutes This time was spent on one or more of the following: performing physical exam; counseling and coordination of care; obtaining or reviewing history; documenting in the medical record; reviewing/ordering tests, medications or procedures; communicating with other healthcare professionals and discussing with patient's family/caregivers  Current Length of Stay: 10 day(s)  Current Patient Status: Inpatient   Certification Statement: The patient will continue to require additional inpatient hospital stay due to awaiting placement  Discharge Plan: Anticipate discharge in 24-48 hrs to rehab facility  Code Status: Level 1 - Full Code    Subjective:   Patient reports feeling well  She reports not having a bowel movement in about 11 days, but agreeable to a suppository  She denies chest pain/pressure, palpitations, lightheadedness, sob, or nausea  Objective:     Vitals:   Temp (24hrs), Av 4 °F (37 4 °C), Min:98 2 °F (36 8 °C), Max:100 5 °F (38 1 °C)    Temp:  [98 2 °F (36 8 °C)-100 5 °F (38 1 °C)] 98 2 °F (36 8 °C)  HR:  [66-91] 66  Resp:  [14-20] 20  BP: ()/(57-73) 95/57  SpO2:  [91 %-94 %] 91 %  Body mass index is 31 74 kg/m²  Input and Output Summary (last 24 hours):      Intake/Output Summary (Last 24 hours) at 2023 1457  Last data filed at 5/29/2023 0300  Gross per 24 hour   Intake 960 ml   Output 1600 ml   Net -640 ml       Physical Exam:   Physical Exam  Vitals and nursing note reviewed  Constitutional:       Appearance: She is normal weight  HENT:      Head: Normocephalic  Nose: Nose normal       Mouth/Throat:      Mouth: Mucous membranes are moist       Pharynx: Oropharynx is clear  Eyes:      General: No scleral icterus  Conjunctiva/sclera: Conjunctivae normal       Pupils: Pupils are equal, round, and reactive to light  Cardiovascular:      Rate and Rhythm: Normal rate and regular rhythm  Heart sounds: No murmur heard  No friction rub  No gallop  Pulmonary:      Effort: Pulmonary effort is normal  No respiratory distress  Breath sounds: Normal breath sounds  No stridor  No wheezing, rhonchi or rales  Abdominal:      General: Abdomen is flat  Palpations: Abdomen is soft  Musculoskeletal:      Cervical back: Normal range of motion and neck supple  Right lower leg: No edema  Left lower leg: No edema  Comments: Patient is a partial quadriplegic   Lymphadenopathy:      Cervical: No cervical adenopathy  Skin:     General: Skin is warm  Coloration: Skin is not jaundiced or pale  Findings: No bruising, erythema or lesion  Neurological:      General: No focal deficit present  Mental Status: She is alert and oriented to person, place, and time  Mental status is at baseline  Cranial Nerves: No cranial nerve deficit  Motor: No weakness  Psychiatric:         Mood and Affect: Mood normal          Behavior: Behavior normal          Thought Content:  Thought content normal           Additional Data:     Labs:                            Lines/Drains:  Invasive Devices     Peripheral Intravenous Line  Duration           Peripheral IV 05/23/23 Left;Proximal;Ventral (anterior) Forearm 5 days          Drain  Duration           External Urinary Catheter 6 days                      Imaging: Reviewed radiology reports from this admission including: Ct of facial bones    Recent Cultures (last 7 days):         Last 24 Hours Medication List:   Current Facility-Administered Medications   Medication Dose Route Frequency Provider Last Rate   • acetaminophen  975 mg Oral Q8H PRN Brooklyn Celestin PA-C     • aluminum-magnesium hydroxide-simethicone  30 mL Oral Q6H PRN Brooklyn Celestin PA-C     • amoxicillin-clavulanate  1 tablet Oral Q12H [address] Shi Myers PA-C     • baclofen  10 mg Oral TID Brooklyn Celestin PA-C     • bisacodyl  10 mg Rectal Daily PRN Brooklyn Celestin PA-C     • chlorhexidine  15 mL Swish & Spit Q12H [address] Fifi Burns PA-C     • Diclofenac Sodium  2 g Topical 4x Daily PRN Brooklyn Celestin PA-C     • diphenhydramine, lidocaine, Al/Mg hydroxide, simethicone  10 mL Swish & Spit Q4H PRN Fifi Burns PA-C     • enoxaparin  40 mg Subcutaneous Daily Brooklyn Celestin PA-C     • gabapentin  300 mg Oral Once Brooklyn Celestin PA-C     • gabapentin  300 mg Oral TID Boroklyn Celestin PA-C     • ketorolac  15 mg Intravenous Q6H PRN Shi Myers PA-C     • lidocaine  2 patch Topical Daily PRN Brooklyn Celestin PA-C     • Lidocaine Viscous HCl  15 mL Swish & Spit 4x Daily PRN Fifi Burns PA-C     • oxyCODONE  5 mg Oral Q4H PRN Shi Myers PA-C     • polyethylene glycol  17 g Oral Daily Brooklyn Celestin PA-C     • prazosin  5 mg Oral HS Brooklyn Celestin PA-C     • QUEtiapine  500 mg Oral HS Brooklyn Celestin PA-C     • senna-docusate sodium  1 tablet Oral HS Brooklyn Celestin PA-C     • sodium phosphate-biphosphate  1 enema Rectal Daily PRN Fifi Burns PA-C     • tamsulosin  0 4 mg Oral HS Brooklyn Celestin PA-C          Today, Patient Was Seen By: Shi Myers PA-C    **Please Note: This note may have been constructed using a voice recognition system  **

## 2023-05-29 NOTE — PLAN OF CARE
Problem: Potential for Falls  Goal: Patient will remain free of falls  Description: INTERVENTIONS:  - Educate patient/family on patient safety including physical limitations  - Instruct patient to call for assistance with activity   - Consult OT/PT to assist with strengthening/mobility   - Keep Call bell within reach  - Keep bed low and locked with side rails adjusted as appropriate  - Keep care items and personal belongings within reach  - Initiate and maintain comfort rounds  - Make Fall Risk Sign visible to staff  - Offer Toileting every  Hours, in advance of need  - Initiate/Maintain alarm  - Obtain necessary fall risk management equipment:   - Apply yellow socks and bracelet for high fall risk patients  - Consider moving patient to room near nurses station  Outcome: Progressing     Problem: MOBILITY - ADULT  Goal: Maintain or return to baseline ADL function  Description: INTERVENTIONS:  -  Assess patient's ability to carry out ADLs; assess patient's baseline for ADL function and identify physical deficits which impact ability to perform ADLs (bathing, care of mouth/teeth, toileting, grooming, dressing, etc )  - Assess/evaluate cause of self-care deficits   - Assess range of motion  - Assess patient's mobility; develop plan if impaired  - Assess patient's need for assistive devices and provide as appropriate  - Encourage maximum independence but intervene and supervise when necessary  - Involve family in performance of ADLs  - Assess for home care needs following discharge   - Consider OT consult to assist with ADL evaluation and planning for discharge  - Provide patient education as appropriate  Outcome: Progressing  Goal: Maintains/Returns to pre admission functional level  Description: INTERVENTIONS:  - Perform BMAT or MOVE assessment daily    - Set and communicate daily mobility goal to care team and patient/family/caregiver     - Collaborate with rehabilitation services on mobility goals if consulted  - Perform Range of Motion  times a day  - Reposition patient every  hours    - Dangle patient  times a day  - Stand patient  times a day  - Ambulate patient  times a day  - Out of bed to chair  times a day   - Out of bed for meal times a day  - Out of bed for toileting  - Record patient progress and toleration of activity level   Outcome: Progressing     Problem: PAIN - ADULT  Goal: Verbalizes/displays adequate comfort level or baseline comfort level  Description: Interventions:  - Encourage patient to monitor pain and request assistance  - Assess pain using appropriate pain scale  - Administer analgesics based on type and severity of pain and evaluate response  - Implement non-pharmacological measures as appropriate and evaluate response  - Consider cultural and social influences on pain and pain management  - Notify physician/advanced practitioner if interventions unsuccessful or patient reports new pain  Outcome: Progressing     Problem: INFECTION - ADULT  Goal: Absence or prevention of progression during hospitalization  Description: INTERVENTIONS:  - Assess and monitor for signs and symptoms of infection  - Monitor lab/diagnostic results  - Monitor all insertion sites, i e  indwelling lines, tubes, and drains  - Monitor endotracheal if appropriate and nasal secretions for changes in amount and color  - [place] appropriate cooling/warming therapies per order  - Administer medications as ordered  - Instruct and encourage patient and family to use good hand hygiene technique  - Identify and instruct in appropriate isolation precautions for identified infection/condition  Outcome: Progressing  Goal: Absence of fever/infection during neutropenic period  Description: INTERVENTIONS:  - Monitor WBC    Outcome: Progressing

## 2023-05-29 NOTE — OCCUPATIONAL THERAPY NOTE
Occupational Therapy Progress Note     Patient Name: Hali Ocasio  EPFAO'U Date: 5/29/2023      Problem List  Principal Problem:    Failure to thrive in adult  Active Problems:    Quadriplegic spinal paralysis ([address] )    Closed right ankle fracture    UTI (urinary tract infection)    Toothache        05/29/23 1015   OT Last Visit   OT Visit Date 05/29/23   Note Type   Note Type Treatment   Pain Assessment   Pain Assessment Tool 0-10   Pain Score 5   Pain Location/Orientation   (mouth/tooth)   Restrictions/Precautions   Weight Bearing Precautions Per Order Yes   RLE Weight Bearing Per Order WBAT   Braces or Orthoses CAM Boot  (multipodus boots (donned at end of today's session))   Other Precautions Bed Alarm;Multiple lines; Fall Risk;Pain  (SCI)   ADL   Where Assessed Other (Comment)  (Assist levels for some self care tasks are based on functional assessment of performance skills and deficits observed during session )   Grooming Assistance   (pt declined to perform)   UB Bathing Assistance 2  Maximal Assistance   LB Bathing Assistance 1  Total Assistance   UB Dressing Assistance 2  Maximal Assistance   LB Dressing Assistance 1  Total Assistance   Toileting Assistance  1  Total Assistance   Functional Standing Tolerance   Time SHOAIB   Comments pt declined to attempt standing trials this date   Bed Mobility   Supine to Sit 2  Maximal assistance   Additional items Assist x 2;HOB elevated; Increased time required;LE management   Sit to Supine 2  Maximal assistance   Additional items Assist x 2; Increased time required;Verbal cues;LE management   Additional Comments Vitals monitored throughout session; BP supine 95/57, BP sitting EOB 96/62, BP upon return to supine 112/60  Pt reported mild dizziness upon initially sitting EOB; BP remained stable     Transfers   Sit to Stand Unable to assess   Functional Mobility   Functional Mobility 2  Maximal assistance   Additional Comments assist x2   Subjective   Subjective Pt expressed she was having a difficulty day emotionally  Agreeable to therapy session with encouragement   Cognition   Overall Cognitive Status [facility]   Arousal/Participation Alert; Responsive   Attention Within functional limits   Orientation Level Oriented X4   Memory Within functional limits   Following Commands Follows all commands and directions without difficulty   Comments Pt agreeable to therapy session, but required encouragement to continue, pt expressed poor mood  Activity Tolerance   Activity Tolerance Patient limited by fatigue   Medical Staff Made Aware Stacey Chicas PT, Alejandra RN  Pt seen for co-treatment with Physical Therapist due to pt's medical complexity, functional limitations and limited activity tolerance  Assessment   Assessment Pt seen this date for skilled OT session focused on ADLs, functional transfers and mobility, safety education  The patient was received supine in bed, NAD, PIV access, masimo  She participated in functional bed mobility, and sitting EOB for ~3 minutes  Pt required Maximal Assistance x2 for bed  Mobility, and maximal verbal cues and encouragement to participate in therapy  Pt was total assist this date for LB dressing, and was assisted with positioning in bed with multipodus boots  At end of session the patient was located supine in bed with call bell in reach and all needs met  Overall the patient remains below her functional baseline, and is primarily limited at this time due to pain, dizziness, and mood limitation  OT will continue to follow while acute to address POC  At this time, recommend inpatient rehab upon d/c    Plan   Treatment Interventions ADL retraining;Functional transfer training;Patient/family training   Goal Expiration Date 06/01/23   OT Treatment Day 2   OT Frequency 3-5x/wk   Recommendation   OT Discharge Recommendation Post acute rehabilitation services   Additional Comments  The patient's raw score on the AM-PAC Daily Activity Inpatient Short Form is 11   A raw score of less than 19 suggests the patient may benefit from discharge to post-acute rehabilitation services  Please refer to the recommendation of the Occupational Therapist for safe discharge planning     AM-PAC Daily Activity Inpatient   Lower Body Dressing 1   Bathing 2   Toileting 1   Upper Body Dressing 2   Grooming 2   Eating 3   Daily Activity Raw Score 11   Daily Activity Standardized Score (Calc for Raw Score >=11) 29 04   AM-PAC Applied Cognition Inpatient   Following a Speech/Presentation 4   Understanding Ordinary Conversation 4   Taking Medications 4   Remembering Where Things Are Placed or Put Away 4   Remembering List of 4-5 Errands 4   Taking Care of Complicated Tasks 4   Applied Cognition Raw Score 24   Applied Cognition Standardized Score 62 21     Alyx Hals, OTR/L

## 2023-05-29 NOTE — PLAN OF CARE
Problem: OCCUPATIONAL THERAPY ADULT  Goal: Performs self-care activities at highest level of function for planned discharge setting  See evaluation for individualized goals  Description: Treatment Interventions: ADL retraining, Functional transfer training, Patient/family training          See flowsheet documentation for full assessment, interventions and recommendations  5/29/2023 1407 by Meet Olvera OT  Outcome: Progressing  Note: Limitation: Decreased ADL status, Decreased UE ROM, Decreased UE strength, Decreased Safe judgement during ADL, Decreased endurance, Decreased fine motor control, Decreased self-care trans, Decreased high-level ADLs  Prognosis: Good  Assessment: Pt seen this date for skilled OT session focused on ADLs, functional transfers and mobility, safety education  The patient was received supine in bed, NAD, PIV access, masimo  She participated in functional bed mobility, and sitting EOB for ~3 minutes  Pt required Maximal Assistance x2 for bed  Mobility, and maximal verbal cues and encouragement to participate in therapy  Pt was total assist this date for LB dressing, and was assisted with positioning in bed with multipodus boots  At end of session the patient was located supine in bed with call bell in reach and all needs met  Overall the patient remains below her functional baseline, and is primarily limited at this time due to pain, dizziness, and mood limitation  OT will continue to follow while acute to address POC   At this time, recommend inpatient rehab upon d/c      OT Discharge Recommendation: Post acute rehabilitation services       Meet Olvera OTR/L

## 2023-05-29 NOTE — PLAN OF CARE
Problem: Potential for Falls  Goal: Patient will remain free of falls  Description: INTERVENTIONS:  - Educate patient/family on patient safety including physical limitations  - Instruct patient to call for assistance with activity   - Consult OT/PT to assist with strengthening/mobility   - Keep Call bell within reach  - Keep bed low and locked with side rails adjusted as appropriate  - Keep care items and personal belongings within reach  - Initiate and maintain comfort rounds  - Make Fall Risk Sign visible to staff  - Offer Toileting every 2 Hours, in advance of need  - Initiate/Maintain bed alarm  - Obtain necessary fall risk management equipment  - Apply yellow socks and bracelet for high fall risk patients  - Consider moving patient to room near nurses station  Outcome: Progressing     Problem: MOBILITY - ADULT  Goal: Maintain or return to baseline ADL function  Description: INTERVENTIONS:  -  Assess patient's ability to carry out ADLs; assess patient's baseline for ADL function and identify physical deficits which impact ability to perform ADLs (bathing, care of mouth/teeth, toileting, grooming, dressing, etc )  - Assess/evaluate cause of self-care deficits   - Assess range of motion  - Assess patient's mobility; develop plan if impaired  - Assess patient's need for assistive devices and provide as appropriate  - Encourage maximum independence but intervene and supervise when necessary  - Involve family in performance of ADLs  - Assess for home care needs following discharge   - Consider OT consult to assist with ADL evaluation and planning for discharge  - Provide patient education as appropriate  Outcome: Progressing  Goal: Maintains/Returns to pre admission functional level  Description: INTERVENTIONS:  - Perform BMAT or MOVE assessment daily    - Set and communicate daily mobility goal to care team and patient/family/caregiver     - Collaborate with rehabilitation services on mobility goals if consulted  - Perform Range of Motion 3 times a day  - Reposition patient every 2 hours    - Dangle patient 3 times a day  - Stand patient 3 times a day  - Ambulate patient 3 times a day  - Out of bed to chair 3 times a day   - Out of bed for meals 3 times a day  - Out of bed for toileting  - Record patient progress and toleration of activity level   Outcome: Progressing     Problem: PAIN - ADULT  Goal: Verbalizes/displays adequate comfort level or baseline comfort level  Description: Interventions:  - Encourage patient to monitor pain and request assistance  - Assess pain using appropriate pain scale  - Administer analgesics based on type and severity of pain and evaluate response  - Implement non-pharmacological measures as appropriate and evaluate response  - Consider cultural and social influences on pain and pain management  - Notify physician/advanced practitioner if interventions unsuccessful or patient reports new pain  Outcome: Progressing     Problem: INFECTION - ADULT  Goal: Absence or prevention of progression during hospitalization  Description: INTERVENTIONS:  - Assess and monitor for signs and symptoms of infection  - Monitor lab/diagnostic results  - Monitor all insertion sites, i e  indwelling lines, tubes, and drains  - Monitor endotracheal if appropriate and nasal secretions for changes in amount and color  - [place] appropriate cooling/warming therapies per order  - Administer medications as ordered  - Instruct and encourage patient and family to use good hand hygiene technique  - Identify and instruct in appropriate isolation precautions for identified infection/condition  Outcome: Progressing  Goal: Absence of fever/infection during neutropenic period  Description: INTERVENTIONS:  - Monitor WBC    Outcome: Progressing     Problem: SAFETY ADULT  Goal: Patient will remain free of falls  Description: INTERVENTIONS:  - Educate patient/family on patient safety including physical limitations  - Instruct patient to call for assistance with activity   - Consult OT/PT to assist with strengthening/mobility   - Keep Call bell within reach  - Keep bed low and locked with side rails adjusted as appropriate  - Keep care items and personal belongings within reach  - Initiate and maintain comfort rounds  - Make Fall Risk Sign visible to staff  - Offer Toileting every 2 Hours, in advance of need  - Initiate/Maintain bed alarm  - Obtain necessary fall risk management equipment  - Apply yellow socks and bracelet for high fall risk patients  - Consider moving patient to room near nurses station  Outcome: Progressing  Goal: Maintain or return to baseline ADL function  Description: INTERVENTIONS:  -  Assess patient's ability to carry out ADLs; assess patient's baseline for ADL function and identify physical deficits which impact ability to perform ADLs (bathing, care of mouth/teeth, toileting, grooming, dressing, etc )  - Assess/evaluate cause of self-care deficits   - Assess range of motion  - Assess patient's mobility; develop plan if impaired  - Assess patient's need for assistive devices and provide as appropriate  - Encourage maximum independence but intervene and supervise when necessary  - Involve family in performance of ADLs  - Assess for home care needs following discharge   - Consider OT consult to assist with ADL evaluation and planning for discharge  - Provide patient education as appropriate  Outcome: Progressing  Goal: Maintains/Returns to pre admission functional level  Description: INTERVENTIONS:  - Perform BMAT or MOVE assessment daily    - Set and communicate daily mobility goal to care team and patient/family/caregiver  - Collaborate with rehabilitation services on mobility goals if consulted  - Perform Range of Motion 3 times a day  - Reposition patient every 2 hours    - Dangle patient 3 times a day  - Stand patient 3 times a day  - Ambulate patient 3 times a day  - Out of bed to chair 3 times a day   - Out of bed for meals 3 times a day  - Out of bed for toileting  - Record patient progress and toleration of activity level   Outcome: Progressing     Problem: DISCHARGE PLANNING  Goal: Discharge to home or other facility with appropriate resources  Description: INTERVENTIONS:  - Identify barriers to discharge w/patient and caregiver  - Arrange for needed discharge resources and transportation as appropriate  - Identify discharge learning needs (meds, wound care, etc )  - Arrange for interpretive services to assist at discharge as needed  - Refer to Case Management Department for coordinating discharge planning if the patient needs post-hospital services based on physician/advanced practitioner order or complex needs related to functional status, cognitive ability, or social support system  Outcome: Progressing     Problem: Knowledge Deficit  Goal: Patient/family/caregiver demonstrates understanding of disease process, treatment plan, medications, and discharge instructions  Description: Complete learning assessment and assess knowledge base    Interventions:  - Provide teaching at level of understanding  - Provide teaching via preferred learning methods  Outcome: Progressing     Problem: Prexisting or High Potential for Compromised Skin Integrity  Goal: Skin integrity is maintained or improved  Description: INTERVENTIONS:  - Identify patients at risk for skin breakdown  - Assess and monitor skin integrity  - Assess and monitor nutrition and hydration status  - Monitor labs   - Assess for incontinence   - Turn and reposition patient  - Assist with mobility/ambulation  - Relieve pressure over bony prominences  - Avoid friction and shearing  - Provide appropriate hygiene as needed including keeping skin clean and dry  - Evaluate need for skin moisturizer/barrier cream  - Collaborate with interdisciplinary team   - Patient/family teaching  - Consider wound care consult   Outcome: Progressing

## 2023-05-30 RX ORDER — IBUPROFEN 400 MG/1
800 TABLET ORAL EVERY 8 HOURS PRN
Status: DISCONTINUED | OUTPATIENT
Start: 2023-05-30 | End: 2023-06-01 | Stop reason: HOSPADM

## 2023-05-30 RX ADMIN — QUETIAPINE FUMARATE 500 MG: 200 TABLET ORAL at 22:28

## 2023-05-30 RX ADMIN — AMOXICILLIN AND CLAVULANATE POTASSIUM 1 TABLET: 500; 125 TABLET, FILM COATED ORAL at 09:11

## 2023-05-30 RX ADMIN — GABAPENTIN 300 MG: 300 CAPSULE ORAL at 17:31

## 2023-05-30 RX ADMIN — BACLOFEN 10 MG: 10 TABLET ORAL at 09:10

## 2023-05-30 RX ADMIN — SENNOSIDES AND DOCUSATE SODIUM 1 TABLET: 50; 8.6 TABLET ORAL at 22:27

## 2023-05-30 RX ADMIN — BACLOFEN 10 MG: 10 TABLET ORAL at 17:31

## 2023-05-30 RX ADMIN — BACLOFEN 10 MG: 10 TABLET ORAL at 22:26

## 2023-05-30 RX ADMIN — AMOXICILLIN AND CLAVULANATE POTASSIUM 1 TABLET: 500; 125 TABLET, FILM COATED ORAL at 22:26

## 2023-05-30 RX ADMIN — GABAPENTIN 300 MG: 300 CAPSULE ORAL at 09:10

## 2023-05-30 RX ADMIN — TAMSULOSIN HYDROCHLORIDE 0.4 MG: 0.4 CAPSULE ORAL at 22:26

## 2023-05-30 RX ADMIN — GABAPENTIN 300 MG: 300 CAPSULE ORAL at 22:26

## 2023-05-30 NOTE — PROGRESS NOTES
[facility]  Progress Note  Name: Karen Sibley  MRN: [id number]  Unit/Bed#: -02 I Date of Admission: 5/17/2023   Date of Service: 5/30/2023 I Hospital Day: 11    Assessment/Plan   Toothache  Assessment & Plan  · CT scan showing poor dentition, dental caries, but no abscess or swelling to suggest acute infection  · Started peridex, magic mouthwash  · Will need oral surgery for complete tooth extraction at some point  · Will continue Augmentin     UTI (urinary tract infection)  Assessment & Plan  · Patient admits to foul odor in urine, secondary to spinal cord injury patient unable to determine if any pain with urination  · UA positive for moderate bacteria  · Urine culture positive for morganella morganii MDRO; aerococcus species  · ID consult in setting of concern for true infection,      Closed right ankle fracture  Assessment & Plan  · Reports home PT once a week;  · Fell approx 2 weeks ago and rolled ankle, admits to some sensation of pain in right ankle  · X-ray right ankle 5/18/23: Medial malleolus displaced fracture   · Ortho consulted,  · CAM boot for mobility - uncomfortable; trial probus boots  · WBAT in boot  · Elevated extremity  · Ice, pain control as needed  · Follow up in office for repeat imaging to ensure routine healing    Quadriplegic spinal paralysis ([address] )  Assessment & Plan  · Spinal cord injury 2 years ago  · Patient reports she now has sensation and able to move toes and arms, starting to feel some sensation in feet and arms  · P500 specialty bed ordered  · Turn patient every 2 hours  · Patient admits to chronic severe neck/spinal pain  · Encourage patient to follow-up outpatient with pain management   · Continue with Tylenol; Lidocaine;  Aqua-K  · Baclofen 10 mg TID, Gabapentin 300 mg TID  · Decreased oxycodone, will continue to wean    * Failure to thrive in adult  Assessment & Plan  · Elderly grandmother unable to fully care for her  · Patient requesting long-term care facility, due to quadriplegia unable to fully care for self  · PT/OT for recommendations, continued therapy while inpatient   · Case management for dispo planning   · Nutrition consulted for dietary recommendations            VTE Pharmacologic Prophylaxis: VTE Score: 4 Moderate Risk (Score 3-4) - Pharmacological DVT Prophylaxis Ordered: enoxaparin (Lovenox)  Patient Centered Rounds: I performed bedside rounds with nursing staff today  Discussions with Specialists or Other Care Team Provider: Brent    Education and Discussions with Family / Patient: Patient declined call to   Total Time Spent on Date of Encounter in care of patient: 35 minutes This time was spent on one or more of the following: performing physical exam; counseling and coordination of care; obtaining or reviewing history; documenting in the medical record; reviewing/ordering tests, medications or procedures; communicating with other healthcare professionals and discussing with patient's family/caregivers  Current Length of Stay: 11 day(s)  Current Patient Status: Inpatient   Certification Statement: The patient will continue to require additional inpatient hospital stay due to Awaiting placement  Discharge Plan: Anticipate discharge in 24-48 hrs to rehab facility  Code Status: Level 1 - Full Code    Subjective:   Patient reports feeling well  She reports her tooth ache is improving  She denies chest pain/pressure, palpitations, lightheadedness, nausea, chills, or shortness of breath  Objective:     Vitals:   Temp (24hrs), Av 3 °F (37 4 °C), Min:99 °F (37 2 °C), Max:99 6 °F (37 6 °C)    Temp:  [99 °F (37 2 °C)-99 6 °F (37 6 °C)] 99 °F (37 2 °C)  HR:  [101-102] 102  BP: (92-93)/(51-52) 93/52  SpO2:  [93 %-95 %] 95 %  Body mass index is 31 74 kg/m²  Input and Output Summary (last 24 hours):      Intake/Output Summary (Last 24 hours) at 2023 1258  Last data filed at 2023 0900  Gross per 24 hour   Intake 1340 ml   Output 2700 ml   Net -1360 ml       Physical Exam:   Physical Exam  Vitals and nursing note reviewed  Constitutional:       Appearance: She is normal weight  HENT:      Head: Normocephalic  Nose: Nose normal       Mouth/Throat:      Mouth: Mucous membranes are moist       Pharynx: Oropharynx is clear  Eyes:      General: No scleral icterus  Conjunctiva/sclera: Conjunctivae normal       Pupils: Pupils are equal, round, and reactive to light  Cardiovascular:      Rate and Rhythm: Normal rate and regular rhythm  Heart sounds: No murmur heard  No friction rub  No gallop  Pulmonary:      Effort: Pulmonary effort is normal  No respiratory distress  Breath sounds: Normal breath sounds  No stridor  No wheezing, rhonchi or rales  Abdominal:      General: Abdomen is flat  Palpations: Abdomen is soft  Musculoskeletal:      Cervical back: Normal range of motion and neck supple  Right lower leg: No edema  Left lower leg: No edema  Comments: Patient is a partial quadriplegic   Lymphadenopathy:      Cervical: No cervical adenopathy  Skin:     General: Skin is warm  Coloration: Skin is not jaundiced or pale  Findings: No bruising, erythema or lesion  Neurological:      General: No focal deficit present  Mental Status: She is alert and oriented to person, place, and time  Mental status is at baseline  Cranial Nerves: No cranial nerve deficit  Motor: No weakness  Psychiatric:         Mood and Affect: Mood normal          Behavior: Behavior normal          Thought Content:  Thought content normal           Additional Data:     Labs:                            Lines/Drains:  Invasive Devices     Peripheral Intravenous Line  Duration           Peripheral IV 05/23/23 Left;Proximal;Ventral (anterior) Forearm 6 days          Drain  Duration           External Urinary Catheter 8 days                      Imaging: Reviewed radiology reports from this admission including: CT of facial bones    Recent Cultures (last 7 days):         Last 24 Hours Medication List:   Current Facility-Administered Medications   Medication Dose Route Frequency Provider Last Rate   • acetaminophen  975 mg Oral Q8H PRN Brea Rivers PA-C     • aluminum-magnesium hydroxide-simethicone  30 mL Oral Q6H PRN Brea Rivers PA-C     • amoxicillin-clavulanate  1 tablet Oral Q12H [address] Maliha Myers PA-C     • baclofen  10 mg Oral TID Brea Rivers PA-C     • bisacodyl  10 mg Rectal Daily PRN Brea Rivers PA-C     • chlorhexidine  15 mL Swish & Spit Q12H [address] Hemal Melendez PA-C     • Diclofenac Sodium  2 g Topical 4x Daily PRN Brea Rivers PA-C     • diphenhydramine, lidocaine, Al/Mg hydroxide, simethicone  10 mL Swish & Spit Q4H PRN Hemal Melendez PA-C     • enoxaparin  40 mg Subcutaneous Daily Brea Rivers PA-C     • gabapentin  300 mg Oral Once Brea Rivers PA-C     • gabapentin  300 mg Oral TID Brea Rivers PA-C     • ibuprofen  800 mg Oral Q8H PRN Maliha Myers PA-C     • lidocaine  2 patch Topical Daily PRN Brea Rivers PA-C     • Lidocaine Viscous HCl  15 mL Swish & Spit 4x Daily PRN Hemal Melendez PA-C     • oxyCODONE  2 5 mg Oral Q6H PRN Maliha Myers PA-C     • prazosin  5 mg Oral HS Brea Rivers PA-C     • QUEtiapine  500 mg Oral HS Brea Rivers PA-C     • senna-docusate sodium  1 tablet Oral HS Brea Rivers PA-C     • sodium phosphate-biphosphate  1 enema Rectal Daily PRN Hemal Melendez PA-C     • tamsulosin  0 4 mg Oral HS Brea Rivers PA-C          Today, Patient Was Seen By: Maliha Myers PA-C    **Please Note: This note may have been constructed using a voice recognition system  **

## 2023-05-30 NOTE — CASE MANAGEMENT
Called [facility] ([phone]) to check status of authorization  Spoke to [employer] who stated no auth on file  DCS will resubmit  CM notified

## 2023-05-30 NOTE — UTILIZATION REVIEW
Continued Stay Review    Date: 5/30                          Current Patient Class: Inpatient   Current Level of Care: MEd/surg    HPI:32 y o  female initially admitted on 5/19     Assessment/Plan:   Toothache improving  Will need oral surgery for complete tooth extraction at some point  COntinue Augmentin  Case management working on safe DC plan  Vital Signs:   Date/Time Temp Pulse Resp BP MAP (mmHg) SpO2 O2 Device Patient Position - Orthostatic VS   05/30/23 0900 -- -- -- -- -- 95 % None (Room air) --   05/30/23 07:35:58 99 °F (37 2 °C) 102 -- 93/52 66 95 % -- --   05/29/23 22:12:11 99 6 °F (37 6 °C) 101 -- 92/51 65 93 %           Pertinent Labs/Diagnostic Results:     Medications:   Scheduled Medications:  amoxicillin-clavulanate, 1 tablet, Oral, Q12H [address]  baclofen, 10 mg, Oral, TID  chlorhexidine, 15 mL, Swish & Spit, Q12H DIALLO  enoxaparin, 40 mg, Subcutaneous, Daily  gabapentin, 300 mg, Oral, Once  gabapentin, 300 mg, Oral, TID  prazosin, 5 mg, Oral, HS  QUEtiapine, 500 mg, Oral, HS  senna-docusate sodium, 1 tablet, Oral, HS  tamsulosin, 0 4 mg, Oral, HS      Continuous IV Infusions:     PRN Meds:  acetaminophen, 975 mg, Oral, Q8H PRN  aluminum-magnesium hydroxide-simethicone, 30 mL, Oral, Q6H PRN  bisacodyl, 10 mg, Rectal, Daily PRN  Diclofenac Sodium, 2 g, Topical, 4x Daily PRN  diphenhydramine, lidocaine, Al/Mg hydroxide, simethicone, 10 mL, Swish & Spit, Q4H PRN  ibuprofen, 800 mg, Oral, Q8H PRN  lidocaine, 2 patch, Topical, Daily PRN  Lidocaine Viscous HCl, 15 mL, Swish & Spit, 4x Daily PRN  oxyCODONE, 2 5 mg, Oral, Q6H PRN  sodium phosphate-biphosphate, 1 enema, Rectal, Daily PRN        Discharge Plan: TBD    Network Utilization Review Department  ATTENTION: Please call with any questions or concerns to [phone] and carefully listen to the prompts so that you are directed to the right person   All voicemails are confidential   Leigha Ayers all requests for admission clinical reviews, approved or denied determinations and any other requests to dedicated fax number below belonging to the campus where the patient is receiving treatment   List of dedicated fax numbers for the Facilities:  [address] [place] [address] DENIALS (Administrative/Medical Necessity) [phone]   [address] (Maternity/NICU/Pediatrics) [phone] [address]ta [address] [phone]   [address] [phone]   [address] [address] Ashanti Olvera [facility] 28 [phone]   [address] [facility] 815 [facility] [phone]

## 2023-05-30 NOTE — ASSESSMENT & PLAN NOTE
· Reports home PT once a week;  · Trinda Bills approx 2 weeks ago and rolled ankle, admits to some sensation of pain in right ankle  · X-ray right ankle 5/18/23: Medial malleolus displaced fracture   · Ortho consulted,  · CAM boot for mobility - uncomfortable; trial probus boots  · WBAT in boot  · Elevated extremity  · Ice, pain control as needed  · Follow up in office for repeat imaging to ensure routine healing

## 2023-05-30 NOTE — CASE MANAGEMENT
Morgan Webber 50 received request for authorization from Care Manager  Authorization request for: SNF  Facility Name: [facility]  NPI: [id number]  Facility MD:  Dr Itzel Arndt   NPI: [id number]  Authorization initiated by contacting insurance: [address] Via: Fax  Clinicals submitted via: Fax ([phone])

## 2023-05-30 NOTE — CASE MANAGEMENT
Case Management Progress Note    Patient name Jeremy Organ  Location [address] 207/-02 MRN [id number]  : 1990 Date 2023       LOS (days): 11  Geometric Mean LOS (GMLOS) (days):   Days to GMLOS:        OBJECTIVE:     Current admission status: Inpatient  Preferred Pharmacy:   [facility] # [place], [address] [address] [address] [address]  Phone: [phone] Fax: [phone]    Primary Care Provider: Ana Werner MD    Primary Insurance: [employer]  Secondary Insurance:     PROGRESS NOTE:  CM contacted  D/C Support Team via TT regarding auth status  Auth was submitted on   CM also contacted [facility] via [address] with update  Will continue to follow for d/c planning

## 2023-05-30 NOTE — NURSING NOTE
Patient is refusing IV change  Patient currently has no IV access, patient is currently not receiving anything via IV  SLIM is made aware

## 2023-05-31 RX ADMIN — CHLORHEXIDINE GLUCONATE 0.12% ORAL RINSE 15 ML: 1.2 LIQUID ORAL at 21:28

## 2023-05-31 RX ADMIN — PRAZOSIN HYDROCHLORIDE 5 MG: 1 CAPSULE ORAL at 21:29

## 2023-05-31 RX ADMIN — TAMSULOSIN HYDROCHLORIDE 0.4 MG: 0.4 CAPSULE ORAL at 21:28

## 2023-05-31 RX ADMIN — AMOXICILLIN AND CLAVULANATE POTASSIUM 1 TABLET: 500; 125 TABLET, FILM COATED ORAL at 21:29

## 2023-05-31 RX ADMIN — QUETIAPINE FUMARATE 500 MG: 200 TABLET ORAL at 21:29

## 2023-05-31 RX ADMIN — GABAPENTIN 300 MG: 300 CAPSULE ORAL at 17:39

## 2023-05-31 RX ADMIN — SENNOSIDES AND DOCUSATE SODIUM 1 TABLET: 50; 8.6 TABLET ORAL at 21:29

## 2023-05-31 RX ADMIN — GABAPENTIN 300 MG: 300 CAPSULE ORAL at 21:28

## 2023-05-31 RX ADMIN — GABAPENTIN 300 MG: 300 CAPSULE ORAL at 09:20

## 2023-05-31 RX ADMIN — BACLOFEN 10 MG: 10 TABLET ORAL at 21:28

## 2023-05-31 RX ADMIN — AMOXICILLIN AND CLAVULANATE POTASSIUM 1 TABLET: 500; 125 TABLET, FILM COATED ORAL at 09:20

## 2023-05-31 RX ADMIN — BACLOFEN 10 MG: 10 TABLET ORAL at 17:39

## 2023-05-31 RX ADMIN — Medication 2.5 MG: at 21:38

## 2023-05-31 RX ADMIN — BACLOFEN 10 MG: 10 TABLET ORAL at 09:21

## 2023-05-31 NOTE — ASSESSMENT & PLAN NOTE
· Spinal cord injury 2 years ago  · Patient reports she now has sensation and able to move toes and arms, starting to feel some sensation in feet and arms  · P500 specialty bed ordered  · Turn patient every 2 hours  · Patient admits to chronic severe neck/spinal pain  · Encourage patient to follow-up outpatient with pain management   · Continue with Tylenol; Lidocaine;  Aqua-K  · Baclofen 10 mg TID, Gabapentin 300 mg TID  · Decreased oxycodone

## 2023-05-31 NOTE — CASE MANAGEMENT
Called [facility] ([phone]) to check status of authorization  Spoke to Megan who stated authorization is still pending at this time   notified

## 2023-05-31 NOTE — CASE MANAGEMENT
Case Management Discharge Planning Note    Patient name Rajendra Elizondo  Location [address] 207/-02 MRN [id number]  : 1990 Date 2023       Current Admission Date: 2023  Current Admission Diagnosis:Failure to thrive in adult   Patient Active Problem List    Diagnosis Date Noted   • Toothache 2023   • Closed right ankle fracture 2023   • UTI (urinary tract infection) 2023   • Urinary retention 2022   • Inability to return to living situation 2022   • Lower extremity edema 2022   • Anxiety 2022   • Insomnia 2022   • Hepatitis C antibody positive in blood 2022   • Failure to thrive in adult 2022   • Quadriplegic spinal paralysis [address] [address] ) 2022   • Nondependent opioid abuse in remission ([facility] [address] ) 10/15/2021      LOS (days): 12  Geometric Mean LOS (GMLOS) (days):   Days to GMLOS:     OBJECTIVE:  Risk of Unplanned Readmission Score: 10 99      Current admission status: Inpatient   Preferred Pharmacy:   [facility] [place] # [place], [address] [address]  Phone: [phone] Fax: [phone]    Primary Care Provider: Nadia Arellano MD    Primary Insurance: [address] [place] [address]  Secondary Insurance:     DISCHARGE DETAILS:  CM contacted by Yael Olivas from [facility]   ([phone])   Per TY Suazo has an open case for caregiver neglect and self-neglect  CM informed Lakeisha that patient remains inpatient at [facility] with d/c to [facility] pending insurance auth

## 2023-05-31 NOTE — ASSESSMENT & PLAN NOTE
· Reports home PT once a week;  · X-ray right ankle 5/18/23: Medial malleolus displaced fracture   · Ortho consulted,  · CAM boot for mobility - uncomfortable; trial probus boots  · WBAT in boot  · Elevated extremity  · Ice, pain control as needed  · Follow up in office for repeat imaging to ensure routine healing

## 2023-05-31 NOTE — PLAN OF CARE
Problem: Potential for Falls  Goal: Patient will remain free of falls  Description: INTERVENTIONS:  - Educate patient/family on patient safety including physical limitations  - Instruct patient to call for assistance with activity   - Consult OT/PT to assist with strengthening/mobility   - Keep Call bell within reach  - Keep bed low and locked with side rails adjusted as appropriate  - Keep care items and personal belongings within reach  - Initiate and maintain comfort rounds  - Make Fall Risk Sign visible to staff  - Offer Toileting every  Hours, in advance of need  - Initiate/Maintain alarm  - Obtain necessary fall risk management equipment:   - Apply yellow socks and bracelet for high fall risk patients  - Consider moving patient to room near nurses station  Outcome: Progressing     Problem: MOBILITY - ADULT  Goal: Maintain or return to baseline ADL function  Description: INTERVENTIONS:  -  Assess patient's ability to carry out ADLs; assess patient's baseline for ADL function and identify physical deficits which impact ability to perform ADLs (bathing, care of mouth/teeth, toileting, grooming, dressing, etc )  - Assess/evaluate cause of self-care deficits   - Assess range of motion  - Assess patient's mobility; develop plan if impaired  - Assess patient's need for assistive devices and provide as appropriate  - Encourage maximum independence but intervene and supervise when necessary  - Involve family in performance of ADLs  - Assess for home care needs following discharge   - Consider OT consult to assist with ADL evaluation and planning for discharge  - Provide patient education as appropriate  Outcome: Progressing  Goal: Maintains/Returns to pre admission functional level  Description: INTERVENTIONS:  - Perform BMAT or MOVE assessment daily    - Set and communicate daily mobility goal to care team and patient/family/caregiver     - Collaborate with rehabilitation services on mobility goals if consulted  - Perform Range of Motion  times a day  - Reposition patient every  hours    - Dangle patient  times a day  - Stand patient  times a day  - Ambulate patient  times a day  - Out of bed to chair  times a day   - Out of bed for meals  times a day  - Out of bed for toileting  - Record patient progress and toleration of activity level   Outcome: Progressing     Problem: PAIN - ADULT  Goal: Verbalizes/displays adequate comfort level or baseline comfort level  Description: Interventions:  - Encourage patient to monitor pain and request assistance  - Assess pain using appropriate pain scale  - Administer analgesics based on type and severity of pain and evaluate response  - Implement non-pharmacological measures as appropriate and evaluate response  - Consider cultural and social influences on pain and pain management  - Notify physician/advanced practitioner if interventions unsuccessful or patient reports new pain  Outcome: Progressing     Problem: INFECTION - ADULT  Goal: Absence or prevention of progression during hospitalization  Description: INTERVENTIONS:  - Assess and monitor for signs and symptoms of infection  - Monitor lab/diagnostic results  - Monitor all insertion sites, i e  indwelling lines, tubes, and drains  - Monitor endotracheal if appropriate and nasal secretions for changes in amount and color  - [place] appropriate cooling/warming therapies per order  - Administer medications as ordered  - Instruct and encourage patient and family to use good hand hygiene technique  - Identify and instruct in appropriate isolation precautions for identified infection/condition  Outcome: Progressing  Goal: Absence of fever/infection during neutropenic period  Description: INTERVENTIONS:  - Monitor WBC    Outcome: Progressing     Problem: SAFETY ADULT  Goal: Patient will remain free of falls  Description: INTERVENTIONS:  - Educate patient/family on patient safety including physical limitations  - Instruct patient to call for assistance with activity   - Consult OT/PT to assist with strengthening/mobility   - Keep Call bell within reach  - Keep bed low and locked with side rails adjusted as appropriate  - Keep care items and personal belongings within reach  - Initiate and maintain comfort rounds  - Make Fall Risk Sign visible to staff  - Offer Toileting every  Hours, in advance of need  - Initiate/Maintain alarm  - Obtain necessary fall risk management equipment:   - Apply yellow socks and bracelet for high fall risk patients  - Consider moving patient to room near nurses station  Outcome: Progressing  Goal: Maintain or return to baseline ADL function  Description: INTERVENTIONS:  -  Assess patient's ability to carry out ADLs; assess patient's baseline for ADL function and identify physical deficits which impact ability to perform ADLs (bathing, care of mouth/teeth, toileting, grooming, dressing, etc )  - Assess/evaluate cause of self-care deficits   - Assess range of motion  - Assess patient's mobility; develop plan if impaired  - Assess patient's need for assistive devices and provide as appropriate  - Encourage maximum independence but intervene and supervise when necessary  - Involve family in performance of ADLs  - Assess for home care needs following discharge   - Consider OT consult to assist with ADL evaluation and planning for discharge  - Provide patient education as appropriate  Outcome: Progressing  Goal: Maintains/Returns to pre admission functional level  Description: INTERVENTIONS:  - Perform BMAT or MOVE assessment daily    - Set and communicate daily mobility goal to care team and patient/family/caregiver  - Collaborate with rehabilitation services on mobility goals if consulted  - Perform Range of Motion  times a day  - Reposition patient every  hours    - Dangle patient  times a day  - Stand patient  times a day  - Ambulate patient  times a day  - Out of bed to chair  times a day   - Out of bed for meals times a day  - Out of bed for toileting  - Record patient progress and toleration of activity level   Outcome: Progressing     Problem: DISCHARGE PLANNING  Goal: Discharge to home or other facility with appropriate resources  Description: INTERVENTIONS:  - Identify barriers to discharge w/patient and caregiver  - Arrange for needed discharge resources and transportation as appropriate  - Identify discharge learning needs (meds, wound care, etc )  - Arrange for interpretive services to assist at discharge as needed  - Refer to Case Management Department for coordinating discharge planning if the patient needs post-hospital services based on physician/advanced practitioner order or complex needs related to functional status, cognitive ability, or social support system  Outcome: Progressing     Problem: Knowledge Deficit  Goal: Patient/family/caregiver demonstrates understanding of disease process, treatment plan, medications, and discharge instructions  Description: Complete learning assessment and assess knowledge base    Interventions:  - Provide teaching at level of understanding  - Provide teaching via preferred learning methods  Outcome: Progressing     Problem: Prexisting or High Potential for Compromised Skin Integrity  Goal: Skin integrity is maintained or improved  Description: INTERVENTIONS:  - Identify patients at risk for skin breakdown  - Assess and monitor skin integrity  - Assess and monitor nutrition and hydration status  - Monitor labs   - Assess for incontinence   - Turn and reposition patient  - Assist with mobility/ambulation  - Relieve pressure over bony prominences  - Avoid friction and shearing  - Provide appropriate hygiene as needed including keeping skin clean and dry  - Evaluate need for skin moisturizer/barrier cream  - Collaborate with interdisciplinary team   - Patient/family teaching  - Consider wound care consult   Outcome: Progressing

## 2023-05-31 NOTE — PLAN OF CARE
Problem: MOBILITY - ADULT  Goal: Maintain or return to baseline ADL function  Description: INTERVENTIONS:  -  Assess patient's ability to carry out ADLs; assess patient's baseline for ADL function and identify physical deficits which impact ability to perform ADLs (bathing, care of mouth/teeth, toileting, grooming, dressing, etc )  - Assess/evaluate cause of self-care deficits   - Assess range of motion  - Assess patient's mobility; develop plan if impaired  - Assess patient's need for assistive devices and provide as appropriate  - Encourage maximum independence but intervene and supervise when necessary  - Involve family in performance of ADLs  - Assess for home care needs following discharge   - Consider OT consult to assist with ADL evaluation and planning for discharge  - Provide patient education as appropriate  Outcome: Progressing     Problem: PAIN - ADULT  Goal: Verbalizes/displays adequate comfort level or baseline comfort level  Description: Interventions:  - Encourage patient to monitor pain and request assistance  - Assess pain using appropriate pain scale  - Administer analgesics based on type and severity of pain and evaluate response  - Implement non-pharmacological measures as appropriate and evaluate response  - Consider cultural and social influences on pain and pain management  - Notify physician/advanced practitioner if interventions unsuccessful or patient reports new pain  Outcome: Progressing     Problem: INFECTION - ADULT  Goal: Absence or prevention of progression during hospitalization  Description: INTERVENTIONS:  - Assess and monitor for signs and symptoms of infection  - Monitor lab/diagnostic results  - Monitor all insertion sites, i e  indwelling lines, tubes, and drains  - Monitor endotracheal if appropriate and nasal secretions for changes in amount and color  - [place] appropriate cooling/warming therapies per order  - Administer medications as ordered  - Instruct and encourage patient and family to use good hand hygiene technique  - Identify and instruct in appropriate isolation precautions for identified infection/condition  Outcome: Progressing

## 2023-05-31 NOTE — CASE MANAGEMENT
Case Management Discharge Planning Note    Patient name Deedee Ellington  Location [address] [id number]/-[id number] MRN [id number]  : 1990 Date 2023       Current Admission Date: 2023  Current Admission Diagnosis:Failure to thrive in adult   Patient Active Problem List    Diagnosis Date Noted   • Toothache 2023   • Closed right ankle fracture 2023   • UTI (urinary tract infection) 2023   • Urinary retention 2022   • Inability to return to living situation 2022   • Lower extremity edema 2022   • Anxiety 2022   • Insomnia 2022   • Hepatitis C antibody positive in blood 2022   • Failure to thrive in adult 2022   • Quadriplegic spinal paralysis ([facility] ) 2022   • Nondependent opioid abuse in remission ([facility] ) 10/15/2021      LOS (days): 12  Geometric Mean LOS (GMLOS) (days):   Days to GMLOS:     OBJECTIVE:  Risk of Unplanned Readmission Score: 11 05      Current admission status: Inpatient   Preferred Pharmacy:   [facility], [place] # [place], [address] [place] [address]  Phone: [phone] Fax: [phone]    Primary Care Provider: Kailee Kline MD    Primary Insurance: [employer]  Secondary Insurance:     DISCHARGE DETAILS:  CM met with patient at bedside to review DCP and auth status   auth request was not received by patient's insurance and was re-submitted by DCS on   DCS confirmed receipt today  CM provided update to patient  Patient accepting of status though she is ready to d/c to next level of care  CM sent request for ongoing PT/OT visits should updated notes be needed for auth  CM will continue to follow for d/c planning

## 2023-05-31 NOTE — PROGRESS NOTES
[facility]  Progress Note  Name: Victor Hugo Cameron  MRN: [id number]  Unit/Bed#: -02 I Date of Admission: 5/17/2023   Date of Service: 5/31/2023 I Hospital Day: 12    Assessment/Plan   Toothache  Assessment & Plan  · CT scan showing poor dentition, dental caries, but no abscess or swelling to suggest acute infection  · Continue peridex, magic mouthwash  · Will need oral surgery for complete tooth extraction at some point  · Finish 5 days of Augmentin    UTI (urinary tract infection)  Assessment & Plan  · Patient admits to foul odor in urine, secondary to spinal cord injury patient unable to determine if any pain with urination  · UA positive for moderate bacteria  · Urine culture positive for morganella morganii MDRO; aerococcus species  · ID consult in setting of concern for true infection      Closed right ankle fracture  Assessment & Plan  · Reports home PT once a week;  · X-ray right ankle 5/18/23: Medial malleolus displaced fracture   · Ortho consulted,  · CAM boot for mobility - uncomfortable; trial probus boots  · WBAT in boot  · Elevated extremity  · Ice, pain control as needed  · Follow up in office for repeat imaging to ensure routine healing    Quadriplegic spinal paralysis ([facility] )  Assessment & Plan  · Spinal cord injury 2 years ago  · Patient reports she now has sensation and able to move toes and arms, starting to feel some sensation in feet and arms  · P500 specialty bed ordered  · Turn patient every 2 hours  · Patient admits to chronic severe neck/spinal pain  · Encourage patient to follow-up outpatient with pain management   · Continue with Tylenol; Lidocaine;  Aqua-K  · Baclofen 10 mg TID, Gabapentin 300 mg TID  · Decreased oxycodone    * Failure to thrive in adult  Assessment & Plan  · Elderly grandmother unable to fully care for her  · Patient requesting long-term care facility, due to quadriplegia unable to fully care for self  · PT/OT for recommendations, continued therapy while inpatient   · Case management for dispo planning   · Nutrition consulted for dietary recommendations            VTE Pharmacologic Prophylaxis: VTE Score: 4 Moderate Risk (Score 3-4) - Pharmacological DVT Prophylaxis Ordered: enoxaparin (Lovenox)  Patient Centered Rounds: I performed bedside rounds with nursing staff today  Discussions with Specialists or Other Care Team Provider: Brent    Education and Discussions with Family / Patient: Patient declined call to   Total Time Spent on Date of Encounter in care of patient: 35 minutes This time was spent on one or more of the following: performing physical exam; counseling and coordination of care; obtaining or reviewing history; documenting in the medical record; reviewing/ordering tests, medications or procedures; communicating with other healthcare professionals and discussing with patient's family/caregivers  Current Length of Stay: 12 day(s)  Current Patient Status: Inpatient   Certification Statement: The patient will continue to require additional inpatient hospital stay due to Awaiting placement  Discharge Plan: Anticipate discharge in 24-48 hrs to rehab facility  Code Status: Level 1 - Full Code    Subjective:   Patient reports feeling well although would like to be discharged to the rehab facility  She is tired of waiting for the insurance authorization  She denies chest pain/pressure, palpitations, lightheadedness, shortness of breath, chills, or nausea  Objective:     Vitals:   Temp (24hrs), Av 2 °F (37 3 °C), Min:98 5 °F (36 9 °C), Max:99 8 °F (37 7 °C)    Temp:  [98 5 °F (36 9 °C)-99 8 °F (37 7 °C)] 98 5 °F (36 9 °C)  HR:  [69-94] 69  Resp:  [20] 20  BP: ()/(55-76) 115/76  SpO2:  [89 %-96 %] 89 %  Body mass index is 31 74 kg/m²  Input and Output Summary (last 24 hours):      Intake/Output Summary (Last 24 hours) at 2023 1624  Last data filed at 2023 1439  Gross per 24 hour   Intake 2580 ml Output 1700 ml   Net 880 ml       Physical Exam:   Physical Exam  Vitals and nursing note reviewed  Constitutional:       Appearance: She is normal weight  HENT:      Head: Normocephalic  Nose: Nose normal       Mouth/Throat:      Mouth: Mucous membranes are moist       Pharynx: Oropharynx is clear  Eyes:      General: No scleral icterus  Conjunctiva/sclera: Conjunctivae normal       Pupils: Pupils are equal, round, and reactive to light  Cardiovascular:      Rate and Rhythm: Normal rate and regular rhythm  Heart sounds: No murmur heard  No friction rub  No gallop  Pulmonary:      Effort: Pulmonary effort is normal  No respiratory distress  Breath sounds: Normal breath sounds  No stridor  No wheezing, rhonchi or rales  Abdominal:      General: Abdomen is flat  Palpations: Abdomen is soft  Musculoskeletal:      Cervical back: Normal range of motion and neck supple  Right lower leg: No edema  Left lower leg: No edema  Lymphadenopathy:      Cervical: No cervical adenopathy  Skin:     General: Skin is warm  Coloration: Skin is not jaundiced or pale  Findings: No bruising, erythema or lesion  Neurological:      General: No focal deficit present  Mental Status: She is alert and oriented to person, place, and time  Mental status is at baseline  Cranial Nerves: No cranial nerve deficit  Motor: No weakness  Psychiatric:         Mood and Affect: Mood normal          Behavior: Behavior normal          Thought Content:  Thought content normal           Additional Data:     Labs:                            Lines/Drains:  Invasive Devices     Drain  Duration           External Urinary Catheter 9 days                      Imaging: Reviewed radiology reports from this admission including: CT facial bone    Recent Cultures (last 7 days):         Last 24 Hours Medication List:   Current Facility-Administered Medications   Medication Dose Route Frequency Provider Last Rate   • acetaminophen  975 mg Oral Q8H PRN Gloria Wong PA-C     • aluminum-magnesium hydroxide-simethicone  30 mL Oral Q6H PRN Gloria Wong PA-C     • amoxicillin-clavulanate  1 tablet Oral Q12H [address] Mckenzie Myers PA-C     • baclofen  10 mg Oral TID Gloria Wong PA-C     • bisacodyl  10 mg Rectal Daily PRN Gloria Wong PA-C     • chlorhexidine  15 mL Swish & Spit Q12H [address] Ben Solano PA-C     • Diclofenac Sodium  2 g Topical 4x Daily PRN Gloria Wong PA-C     • diphenhydramine, lidocaine, Al/Mg hydroxide, simethicone  10 mL Swish & Spit Q4H PRN Ben Solano PA-C     • enoxaparin  40 mg Subcutaneous Daily Gloria Wong PA-C     • gabapentin  300 mg Oral Once Gloria Wong PA-C     • gabapentin  300 mg Oral TID Gloria Wong PA-C     • ibuprofen  800 mg Oral Q8H PRN Mckenzie Myers PA-C     • lidocaine  2 patch Topical Daily PRN Gloria Wong PA-C     • Lidocaine Viscous HCl  15 mL Swish & Spit 4x Daily PRN Ben Solano PA-C     • oxyCODONE  2 5 mg Oral Q6H PRN Mckenzie Myers PA-C     • prazosin  5 mg Oral HS Gloria Wong PA-C     • QUEtiapine  500 mg Oral HS Gloria Wong PA-C     • senna-docusate sodium  1 tablet Oral HS Gloria Wong PA-C     • sodium phosphate-biphosphate  1 enema Rectal Daily PRN Ben Solano PA-C     • tamsulosin  0 4 mg Oral HS Gloria Wong PA-C          Today, Patient Was Seen By: Mckenzie Myers PA-C    **Please Note: This note may have been constructed using a voice recognition system  **

## 2023-05-31 NOTE — ASSESSMENT & PLAN NOTE
· CT scan showing poor dentition, dental caries, but no abscess or swelling to suggest acute infection  · Continue peridex, magic mouthwash  · Will need oral surgery for complete tooth extraction at some point  · Finish 5 days of Augmentin

## 2023-05-31 NOTE — ASSESSMENT & PLAN NOTE
· Patient admits to foul odor in urine, secondary to spinal cord injury patient unable to determine if any pain with urination  · UA positive for moderate bacteria  · Urine culture positive for morganella morganii MDRO; aerococcus species  · ID consult in setting of concern for true infection

## 2023-06-01 VITALS
DIASTOLIC BLOOD PRESSURE: 57 MMHG | SYSTOLIC BLOOD PRESSURE: 94 MMHG | HEART RATE: 69 BPM | WEIGHT: 208.78 LBS | OXYGEN SATURATION: 92 % | HEIGHT: 68 IN | TEMPERATURE: 98 F | BODY MASS INDEX: 31.64 KG/M2 | RESPIRATION RATE: 18 BRPM

## 2023-06-01 LAB
FLUAV RNA RESP QL NAA+PROBE: NEGATIVE
FLUBV RNA RESP QL NAA+PROBE: NEGATIVE
RSV RNA RESP QL NAA+PROBE: NEGATIVE
SARS-COV-2 RNA RESP QL NAA+PROBE: NEGATIVE

## 2023-06-01 RX ORDER — IBUPROFEN 800 MG/1
800 TABLET ORAL EVERY 8 HOURS PRN
Refills: 0
Start: 2023-06-01

## 2023-06-01 RX ORDER — DIPHENHYDRAMINE HYDROCHLORIDE AND LIDOCAINE HYDROCHLORIDE AND ALUMINUM HYDROXIDE AND MAGNESIUM HYDRO
10 KIT EVERY 4 HOURS PRN
Refills: 0
Start: 2023-06-01

## 2023-06-01 RX ORDER — ENEMA 19; 7 G/133ML; G/133ML
1 ENEMA RECTAL DAILY PRN
Refills: 0
Start: 2023-06-01

## 2023-06-01 RX ORDER — LIDOCAINE 50 MG/G
2 PATCH TOPICAL DAILY PRN
Refills: 0
Start: 2023-06-01

## 2023-06-01 RX ADMIN — GABAPENTIN 300 MG: 300 CAPSULE ORAL at 15:54

## 2023-06-01 RX ADMIN — Medication 10 MG: at 10:38

## 2023-06-01 RX ADMIN — GABAPENTIN 300 MG: 300 CAPSULE ORAL at 09:29

## 2023-06-01 RX ADMIN — BACLOFEN 10 MG: 10 TABLET ORAL at 09:29

## 2023-06-01 RX ADMIN — BACLOFEN 10 MG: 10 TABLET ORAL at 15:54

## 2023-06-01 NOTE — PLAN OF CARE
Problem: Potential for Falls  Goal: Patient will remain free of falls  Description: INTERVENTIONS:  - Educate patient/family on patient safety including physical limitations  - Instruct patient to call for assistance with activity   - Consult OT/PT to assist with strengthening/mobility   - Keep Call bell within reach  - Keep bed low and locked with side rails adjusted as appropriate  - Keep care items and personal belongings within reach  - Initiate and maintain comfort rounds  - Make Fall Risk Sign visible to staff  - Offer Toileting every 2 Hours, in advance of need  - Initiate/Maintain bed alarm  - Obtain necessary fall risk management equipment  - Apply yellow socks and bracelet for high fall risk patients  - Consider moving patient to room near nurses station  Outcome: Progressing     Problem: MOBILITY - ADULT  Goal: Maintain or return to baseline ADL function  Description: INTERVENTIONS:  -  Assess patient's ability to carry out ADLs; assess patient's baseline for ADL function and identify physical deficits which impact ability to perform ADLs (bathing, care of mouth/teeth, toileting, grooming, dressing, etc )  - Assess/evaluate cause of self-care deficits   - Assess range of motion  - Assess patient's mobility; develop plan if impaired  - Assess patient's need for assistive devices and provide as appropriate  - Encourage maximum independence but intervene and supervise when necessary  - Involve family in performance of ADLs  - Assess for home care needs following discharge   - Consider OT consult to assist with ADL evaluation and planning for discharge  - Provide patient education as appropriate  Outcome: Progressing  Goal: Maintains/Returns to pre admission functional level  Description: INTERVENTIONS:  - Perform BMAT or MOVE assessment daily    - Set and communicate daily mobility goal to care team and patient/family/caregiver     - Collaborate with rehabilitation services on mobility goals if consulted  - Perform Range of Motion 3 times a day  - Reposition patient every 2 hours    - Dangle patient 3 times a day  - Stand patient 3 times a day  - Ambulate patient 3 times a day  - Out of bed to chair 3 times a day   - Out of bed for meals 3 times a day  - Out of bed for toileting  - Record patient progress and toleration of activity level   Outcome: Progressing     Problem: PAIN - ADULT  Goal: Verbalizes/displays adequate comfort level or baseline comfort level  Description: Interventions:  - Encourage patient to monitor pain and request assistance  - Assess pain using appropriate pain scale  - Administer analgesics based on type and severity of pain and evaluate response  - Implement non-pharmacological measures as appropriate and evaluate response  - Consider cultural and social influences on pain and pain management  - Notify physician/advanced practitioner if interventions unsuccessful or patient reports new pain  Outcome: Progressing     Problem: INFECTION - ADULT  Goal: Absence or prevention of progression during hospitalization  Description: INTERVENTIONS:  - Assess and monitor for signs and symptoms of infection  - Monitor lab/diagnostic results  - Monitor all insertion sites, i e  indwelling lines, tubes, and drains  - Monitor endotracheal if appropriate and nasal secretions for changes in amount and color  - [place] appropriate cooling/warming therapies per order  - Administer medications as ordered  - Instruct and encourage patient and family to use good hand hygiene technique  - Identify and instruct in appropriate isolation precautions for identified infection/condition  Outcome: Progressing  Goal: Absence of fever/infection during neutropenic period  Description: INTERVENTIONS:  - Monitor WBC    Outcome: Progressing     Problem: SAFETY ADULT  Goal: Patient will remain free of falls  Description: INTERVENTIONS:  - Educate patient/family on patient safety including physical limitations  - Instruct patient to call for assistance with activity   - Consult OT/PT to assist with strengthening/mobility   - Keep Call bell within reach  - Keep bed low and locked with side rails adjusted as appropriate  - Keep care items and personal belongings within reach  - Initiate and maintain comfort rounds  - Make Fall Risk Sign visible to staff  - Offer Toileting every 3 Hours, in advance of need  - Initiate/Maintain bed alarm  - Obtain necessary fall risk management equipment  - Apply yellow socks and bracelet for high fall risk patients  - Consider moving patient to room near nurses station  Outcome: Progressing  Goal: Maintain or return to baseline ADL function  Description: INTERVENTIONS:  -  Assess patient's ability to carry out ADLs; assess patient's baseline for ADL function and identify physical deficits which impact ability to perform ADLs (bathing, care of mouth/teeth, toileting, grooming, dressing, etc )  - Assess/evaluate cause of self-care deficits   - Assess range of motion  - Assess patient's mobility; develop plan if impaired  - Assess patient's need for assistive devices and provide as appropriate  - Encourage maximum independence but intervene and supervise when necessary  - Involve family in performance of ADLs  - Assess for home care needs following discharge   - Consider OT consult to assist with ADL evaluation and planning for discharge  - Provide patient education as appropriate  Outcome: Progressing  Goal: Maintains/Returns to pre admission functional level  Description: INTERVENTIONS:  - Perform BMAT or MOVE assessment daily    - Set and communicate daily mobility goal to care team and patient/family/caregiver  - Collaborate with rehabilitation services on mobility goals if consulted  - Perform Range of Motion 3 times a day  - Reposition patient every 2 hours    - Dangle patient 3 times a day  - Stand patient 3 times a day  - Ambulate patient 3 times a day  - Out of bed to chair 3 times a day   - Out of bed for meals 3 times a day  - Out of bed for toileting  - Record patient progress and toleration of activity level   Outcome: Progressing     Problem: DISCHARGE PLANNING  Goal: Discharge to home or other facility with appropriate resources  Description: INTERVENTIONS:  - Identify barriers to discharge w/patient and caregiver  - Arrange for needed discharge resources and transportation as appropriate  - Identify discharge learning needs (meds, wound care, etc )  - Arrange for interpretive services to assist at discharge as needed  - Refer to Case Management Department for coordinating discharge planning if the patient needs post-hospital services based on physician/advanced practitioner order or complex needs related to functional status, cognitive ability, or social support system  Outcome: Progressing     Problem: Knowledge Deficit  Goal: Patient/family/caregiver demonstrates understanding of disease process, treatment plan, medications, and discharge instructions  Description: Complete learning assessment and assess knowledge base    Interventions:  - Provide teaching at level of understanding  - Provide teaching via preferred learning methods  Outcome: Progressing     Problem: Prexisting or High Potential for Compromised Skin Integrity  Goal: Skin integrity is maintained or improved  Description: INTERVENTIONS:  - Identify patients at risk for skin breakdown  - Assess and monitor skin integrity  - Assess and monitor nutrition and hydration status  - Monitor labs   - Assess for incontinence   - Turn and reposition patient  - Assist with mobility/ambulation  - Relieve pressure over bony prominences  - Avoid friction and shearing  - Provide appropriate hygiene as needed including keeping skin clean and dry  - Evaluate need for skin moisturizer/barrier cream  - Collaborate with interdisciplinary team   - Patient/family teaching  - Consider wound care consult   Outcome: Progressing

## 2023-06-01 NOTE — CASE MANAGEMENT
Case Management Discharge Planning Note    Patient name Clevester Apley  Location [address] 207/-02 MRN [id number]  : 1990 Date 2023       Current Admission Date: 2023  Current Admission Diagnosis:Failure to thrive in adult   Patient Active Problem List    Diagnosis Date Noted   • Toothache 2023   • Closed right ankle fracture 2023   • UTI (urinary tract infection) 2023   • Urinary retention 2022   • Inability to return to living situation 2022   • Lower extremity edema 2022   • Anxiety 2022   • Insomnia 2022   • Hepatitis C antibody positive in blood 2022   • Failure to thrive in adult 2022   • Quadriplegic spinal paralysis ([facility] [address] ) 2022   • Nondependent opioid abuse in remission ([facility] [address] ) 10/15/2021      LOS (days): 13  Geometric Mean LOS (GMLOS) (days):   Days to GMLOS:     OBJECTIVE:  Risk of Unplanned Readmission Score: 11 16      Current admission status: Inpatient   Preferred Pharmacy:   [facility] # [place] [address]  Phone: [phone] Fax: [phone]    Primary Care Provider: Bella Hollingsworth MD    Primary Insurance: Soraida Cordelia  Secondary Insurance:     DISCHARGE DETAILS:  [facility][phone][facility]        Updated report contacts:   P:  [phone], ext [phone]  F:  [phone]

## 2023-06-01 NOTE — DISCHARGE SUMMARY
[facility]  Discharge- Clevester Apley 1990, 28 y o  female MRN: [id number]  Unit/Bed#: -02 Encounter: [id number]  Primary Care Provider: Bella Hollingsworth MD   Date and time admitted to hospital: 5/17/2023  9:48 PM    * Failure to thrive in adult  Assessment & Plan  · Elderly grandmother unable to fully care for her  · Patient requesting long-term care facility, due to quadriplegia unable to fully care for self  · PT/OT for recommendations, continued therapy while inpatient STR upon discharge    · Case management for dispo planning     Closed right ankle fracture  Assessment & Plan  · Reports home PT once a week;  · X-ray right ankle 5/18/23: Medial malleolus displaced fracture   · Ortho consulted,  · CAM boot for mobility - uncomfortable; trial probus boots  · WBAT in boot  · Elevated extremity  · Ice, pain control as needed  · Follow up in office for repeat imaging to ensure routine healing    Toothache  Assessment & Plan  · CT scan showing poor dentition, dental caries, but no abscess or swelling to suggest acute infection  · Continue peridex, magic mouthwash  · Will need oral surgery for complete tooth extraction at some point  · Finish 5 days of Augmentin    UTI (urinary tract infection)  Assessment & Plan  · Patient admits to foul odor in urine, secondary to spinal cord injury patient unable to determine if any pain with urination  · UA positive for moderate bacteria  · Urine culture positive for morganella morganii MDRO; aerococcus species  · ID consulted recommend monitoring off antibiotics   Patient has remained afebrile without leukocytosis       Quadriplegic spinal paralysis ([address] )  Assessment & Plan  · Spinal cord injury 2 years ago  · Patient reports she now has sensation and able to move toes and arms, starting to feel some sensation in feet and arms  · P500 specialty bed ordered  · Turn patient every 2 hours  · Patient admits to chronic severe neck/spinal pain  · Encourage patient to follow-up outpatient with pain management   · Continue with Tylenol; Lidocaine; Aqua-K  · Baclofen 10 mg TID, Gabapentin 300 mg TID  · Patient started on oxycodone 2 5 mg prn per PDMP review patient patient not on narcotic and given abuse history did order on discharge        Medical Problems     Resolved Problems  Date Reviewed: 5/25/2023          Resolved    Lactic acidosis 5/21/2023     Resolved by  Zayra Manrique        Discharging Physician / Practitioner: HOWARD Arvizu  PCP: Shraddha Ceron MD  Admission Date:   Admission Orders (From admission, onward)     Ordered        05/19/23 1442  Inpatient Admission  Once            05/18/23 0204  Place in Observation  Once                      Discharge Date: 06/01/23    Consultations During Hospital Stay:  · Orthopedic surgery  · infectious disease    Procedures Performed:   · none    Significant Findings / Test Results:   · Failure to thrive in known quadriplegic  · Ankle fracture    Incidental Findings:   · Tooth ache known dental caries need outpatient dental follow up   · I reviewed the above mentioned incidental findings with the patient and/or family and they expressed understanding  Test Results Pending at Discharge (will require follow up):   · none     Outpatient Tests Requested:  · Dental follow up  · Orthopedic follow up    Complications:  none    Reason for Admission: failure to thrive     Hospital Course:   Ron Nelson is a 28 y o  female patient who originally presented to the hospital on 5/17/2023 due to past medical history known quadriplegic, anxiety, history of opioid use and patient is traveling given family is unable to attend her and care for her at home  Reports she she has an elderly grandmother and a sister with disabilities to take care of her however is becoming overwhelming to them  Patient reports being in bed up to 12 hours without any care unable to change her self reporting foul-smelling urine on admission  "Patient further been seen by home PD is usually assist x2 PT attempted x1 and patient rolled and fell on her ankle x-rays do reveal a ankle fracture orthopedic evaluated recommended cam boot weightbearing as tolerated  Patient initially treated for TIA however given her MDRO history ID was consulted and recommended no antibiotic treatment given suspected colonization remained afebrile off antibiotics  Patient with complaints of dental pain noted numerous dental caries patient understands need for outpatient dental follow-up  Even her mobility needs PT OT evaluated recommended short-term rehab patient was excepted at facility and discharged  Please see above list of diagnoses and related plan for additional information  Condition at Discharge: stable    Discharge Day Visit / Exam:   Subjective: Patient pleasant reports she is happy to hear about her discharge and has no other concerns at this time declined family call and stated she would update her family  Vitals: Blood Pressure: 94/57 (06/01/23 0706)  Pulse: 69 (06/01/23 0706)  Temperature: 98 °F (36 7 °C) (06/01/23 0706)  Temp Source: Oral (05/31/23 2139)  Respirations: 18 (06/01/23 0706)  Height: 5' 8\" (172 7 cm) (05/18/23 1703)  Weight - Scale: 94 7 kg (208 lb 12 4 oz) (05/18/23 1703)  SpO2: 92 % (06/01/23 0706)  Exam:   Physical Exam  Vitals and nursing note reviewed  Constitutional:       General: She is not in acute distress  Appearance: She is well-developed  HENT:      Head: Normocephalic and atraumatic  Eyes:      Conjunctiva/sclera: Conjunctivae normal    Cardiovascular:      Rate and Rhythm: Normal rate and regular rhythm  Heart sounds: No murmur heard  Pulmonary:      Effort: Pulmonary effort is normal  No respiratory distress  Breath sounds: Normal breath sounds  Abdominal:      Palpations: Abdomen is soft  Tenderness: There is no abdominal tenderness  Musculoskeletal:         General: No swelling        " Cervical back: Neck supple  Skin:     General: Skin is warm and dry  Capillary Refill: Capillary refill takes less than 2 seconds  Neurological:      Mental Status: She is alert and oriented to person, place, and time  Comments: Quadriplegic at baseline   Psychiatric:         Mood and Affect: Mood normal           Discussion with Family: Patient declined call to   Discharge instructions/Information to patient and family:   See after visit summary for information provided to patient and family  Provisions for Follow-Up Care:  See after visit summary for information related to follow-up care and any pertinent home health orders  Disposition:   [facility]  Readmission: none     Discharge Statement:  I spent 45 minutes discharging the patient  This time was spent on the day of discharge  I had direct contact with the patient on the day of discharge  Greater than 50% of the total time was spent examining patient, answering all patient questions, arranging and discussing plan of care with patient as well as directly providing post-discharge instructions  Additional time then spent on discharge activities  Discharge Medications:  See after visit summary for reconciled discharge medications provided to patient and/or family        **Please Note: This note may have been constructed using a voice recognition system**

## 2023-06-01 NOTE — ASSESSMENT & PLAN NOTE
· Elderly grandmother unable to fully care for her  · Patient requesting long-term care facility, due to quadriplegia unable to fully care for self  · PT/OT for recommendations, continued therapy while inpatient STR upon discharge    · Case management for dispo planning

## 2023-06-01 NOTE — INCIDENTAL FINDINGS
The following findings require follow up:  Radiographic finding   Finding:    Medial malleolus displaced fracture   Associated soft tissue swelling   Follow up required: yes   Follow up should be done within 2 week(s)    Please notify the following clinician to assist with the follow up:   Orthopedic surgery

## 2023-06-01 NOTE — ASSESSMENT & PLAN NOTE
· Patient admits to foul odor in urine, secondary to spinal cord injury patient unable to determine if any pain with urination  · UA positive for moderate bacteria  · Urine culture positive for morganella morganii MDRO; aerococcus species  · ID consulted recommend monitoring off antibiotics   Patient has remained afebrile without leukocytosis

## 2023-06-01 NOTE — CASE MANAGEMENT
[facility] 50 has received approved authorization from insurance:   2005 [employer] received for: SNF  Facility: [address] [place] [address] #: [id number]  Start of Care: 5/31  Next Review Date: 6/7  P#: [phone]  Submit next review to: [address] [place] [address] notified: Russell Cruz

## 2023-06-01 NOTE — CASE MANAGEMENT
Case Management Discharge Planning Note    Patient name Connor Alexander  Location [address] 207/-02 MRN [id number]  : 1990 Date 2023       Current Admission Date: 2023  Current Admission Diagnosis:Failure to thrive in adult   Patient Active Problem List    Diagnosis Date Noted   • Toothache 2023   • Closed right ankle fracture 2023   • UTI (urinary tract infection) 2023   • Urinary retention 2022   • Inability to return to living situation 2022   • Lower extremity edema 2022   • Anxiety 2022   • Insomnia 2022   • Hepatitis C antibody positive in blood 2022   • Failure to thrive in adult 2022   • Quadriplegic spinal paralysis ([facility] [address] ) 2022   • Nondependent opioid abuse in remission ([facility] [address] ) 10/15/2021      LOS (days): 13  Geometric Mean LOS (GMLOS) (days):   Days to GMLOS:     OBJECTIVE:  Risk of Unplanned Readmission Score: 11 16         Current admission status: Inpatient   Preferred Pharmacy:   [facility] # [place], [address] [place] [address]  Phone: [phone] Fax: [phone]    Primary Care Provider: Megan Barry MD    Primary Insurance: [facility]  Secondary Insurance:     [facility] Number: [id number]

## 2023-06-01 NOTE — CASE MANAGEMENT
Case Management Discharge Planning Note    Patient name Fab Ordoñez  Location [address] 207/-02 MRN [id number]  : 1990 Date 2023       Current Admission Date: 2023  Current Admission Diagnosis:Failure to thrive in adult   Patient Active Problem List    Diagnosis Date Noted   • Toothache 2023   • Closed right ankle fracture 2023   • UTI (urinary tract infection) 2023   • Urinary retention 2022   • Inability to return to living situation 2022   • Lower extremity edema 2022   • Anxiety 2022   • Insomnia 2022   • Hepatitis C antibody positive in blood 2022   • Failure to thrive in adult 2022   • Quadriplegic spinal paralysis ([facility] [address] ) 2022   • Nondependent opioid abuse in remission ([facility] [address] ) 10/15/2021      LOS (days): 13  Geometric Mean LOS (GMLOS) (days):   Days to GMLOS:     OBJECTIVE:  Risk of Unplanned Readmission Score: 11 16      Current admission status: Inpatient   Preferred Pharmacy:   [facility] # [place] [address]  Phone: [phone] Fax: [phone]    Primary Care Provider: Jayde Blackman MD    Primary Insurance: [employer]  Secondary Insurance:     DISCHARGE DETAILS:  CM notified of Verneta Persons for d/c to [facility]  CM updated provider and requested transport for 11a ([facility])  CM will continue to follow for updates and confirm with all parties  Auth details sent to [facility] via [address]       Accepting Facility Name, [address] : [facility] [address] [place] [address],[address] [place] [address]  Receiving Facility/Agency Phone Number: Phone: [phone]  Facility/Agency Fax Number: Fax: [phone]

## 2023-06-19 DIAGNOSIS — S82.891A CLOSED RIGHT ANKLE FRACTURE, INITIAL ENCOUNTER: Primary | ICD-10-CM

## 2023-06-21 ENCOUNTER — HOSPITAL ENCOUNTER (OUTPATIENT)
Dept: RADIOLOGY | Facility: HOSPITAL | Age: 33
Discharge: HOME | End: 2023-06-21
Attending: ORTHOPAEDIC SURGERY
Payer: COMMERCIAL

## 2023-06-21 ENCOUNTER — OFFICE VISIT (OUTPATIENT)
Dept: OBGYN CLINIC | Facility: HOSPITAL | Age: 33
End: 2023-06-21
Payer: COMMERCIAL

## 2023-06-21 VITALS
HEART RATE: 75 BPM | BODY MASS INDEX: 31.52 KG/M2 | WEIGHT: 208 LBS | HEIGHT: 68 IN | DIASTOLIC BLOOD PRESSURE: 46 MMHG | SYSTOLIC BLOOD PRESSURE: 138 MMHG

## 2023-06-21 DIAGNOSIS — S82.891A CLOSED RIGHT ANKLE FRACTURE, INITIAL ENCOUNTER: ICD-10-CM

## 2023-06-21 DIAGNOSIS — S82.891A CLOSED RIGHT ANKLE FRACTURE, INITIAL ENCOUNTER: Primary | ICD-10-CM

## 2023-06-21 PROCEDURE — 99203 OFFICE O/P NEW LOW 30 MIN: CPT | Performed by: ORTHOPAEDIC SURGERY

## 2023-06-21 PROCEDURE — 73610 X-RAY EXAM OF ANKLE: CPT

## 2023-06-21 NOTE — PROGRESS NOTES
Assessment:   Diagnosis ICD-10-CM Associated Orders   1  Closed right ankle fracture  [id number]           Plan:     right medial malleolus avulsion fracture, DOI 5/17/23 , well healed     · X-ray right ankle was reviewed in the office today  · Weightbearing as tolerated at baseline   · No cam boot needed at this time  · May start physical therapy and advance as tolerated     To do next visit:  Return if symptoms worsen or fail to improve  The above stated was discussed in layman's terms and the patient expressed understanding  All questions were answered to the patient's satisfaction  Scribe Attestation    I,:  Nasra Schulz am acting as a scribe while in the presence of the attending physician :       I,:  Gio Bland MD personally performed the services described in this documentation    as scribed in my presence :             Subjective:   Elver Lamb is a 28 y o  female who presents today consultation for right ankle pain  She has medical history known quadriplegic and hx of opioid abuse  She is present in a stretcher today  She was seen in the ED on 5/17/23  She had x-rays right ankle which showed displaced fracture and was placed in a cam boot  She states she is having generalized right ankle pain  She has stopped wearing the cam boot at night  She is taking IBU for pain relief  She currently resided at [facility] rehab nursing facility in [place]  Review of systems negative unless otherwise specified in HPI  Review of Systems    Past Medical History:   Diagnosis Date   • Abscess     to the spine aug 10 2021   • Cancer ([facility] [address] )     ovarian       Past Surgical History:   Procedure Laterality Date   • LUMBAR LAMINECTOMY  08/10/2021    evacuation of spinal epidural abcess   • OVARIAN CYST REMOVAL     • POSTERIOR FUSION CERVICAL SPINE      secondary to evacuation of spinal abcess s/p IVDU       History reviewed  No pertinent family history      Social History     Occupational History • Not on file   Tobacco Use   • Smoking status: Former     Packs/day: 1 00     Types: Cigarettes   • Smokeless tobacco: Never   Vaping Use   • Vaping Use: Never used   Substance and Sexual Activity   • Alcohol use: Yes     Comment: socially   • Drug use: No     Comment: 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   • Sexual activity: Not Currently         Current Outpatient Medications:   •  acetaminophen (TYLENOL) 325 mg tablet, Take 3 tablets (975 mg total) by mouth every 6 (six) hours as needed for mild pain, Disp: , Rfl: 0  •  baclofen 10 mg tablet, Take 10 mg by mouth 3 (three) times a day, Disp: , Rfl:   •  bisacodyl (FLEET) 10 MG/30ML ENEM, Insert 10 mg into the rectum daily as needed for constipation As needed for constipation, Disp: , Rfl:   •  Diclofenac Sodium (VOLTAREN) 1 %, Apply 2 g topically 4 (four) times a day, Disp: 2 g, Rfl: 0  •  diphenhydramine, lidocaine, Al/Mg hydroxide, simethicone (Magic Mouthwash) SUSP, Swish and spit 10 mL every 4 (four) hours as needed for mouth pain or discomfort, Disp: , Rfl: 0  •  ibuprofen (MOTRIN) 800 mg tablet, Take 1 tablet (800 mg total) by mouth every 8 (eight) hours as needed for mild pain, Disp: , Rfl: 0  •  lidocaine (LIDODERM) 5 %, Apply 2 patches topically over 12 hours daily as needed (neck/spine pain) Remove & Discard patch within 12 hours or as directed by MD, Disp: , Rfl: 0  •  polyethylene glycol (MIRALAX) 17 g packet, Take 17 g by mouth daily, Disp: , Rfl: 0  •  QUEtiapine (SEROquel) 100 mg tablet, Take 500 mg by mouth daily at bedtime Take five tablets by mouth nightly, Disp: , Rfl:   •  senna-docusate sodium (SENOKOT S) 8 6-50 mg per tablet, Take 1 tablet by mouth daily at bedtime, Disp: , Rfl: 0  •  sodium phosphate-biphosphate (FLEET) 7-19 g 118 mL enema, Insert 1 enema into the rectum daily as needed (constipation), Disp: , Rfl: 0  •  tamsulosin (FLOMAX) 0 4 mg, Take 0 4 mg by mouth daily at bedtime, Disp: , Rfl:   •  gabapentin (NEURONTIN) 400 mg capsule, "Take 1 capsule (400 mg total) by mouth every 8 (eight) hours, Disp: 90 capsule, Rfl: 0  •  ketoconazole (NIZORAL) 2 % shampoo, Apply 1 application topically 2 (two) times a week for 4 doses, Disp: 4 mL, Rfl: 0  •  prazosin (MINIPRESS) 1 mg capsule, Take 1 capsule (1 mg total) by mouth daily at bedtime (Patient taking differently: Take 5 mg by mouth daily at bedtime), Disp: 30 capsule, Rfl: 0    Allergies   Allergen Reactions   • Coconut Oil - Food Allergy Anaphylaxis   • Suboxone [Buprenorphine Hcl-Naloxone Hcl] Anaphylaxis            Vitals:    06/21/23 1111   BP: (!) 138/46   Pulse: 75       Objective:                    Ortho Exam     Right ankle  Skin intact  No erythema   No TTP over medial or lateral malleoli  No swelling or effusion  Extremity appears perfused      Diagnostics, reviewed and taken today if performed as documented: The attending physician has personally reviewed the pertinent films in PACS and interpretation is as follows: x-ray right ankle reveals medial malleolus avulsion fracture with no further displacement      Procedures, if performed today:    Procedures    None performed      Portions of the record may have been created with voice recognition software  Occasional wrong word or \"sound a like\" substitutions may have occurred due to the inherent limitations of voice recognition software  Read the chart carefully and recognize, using context, where substitutions have occurred    "

## 2023-07-31 PROBLEM — N39.0 UTI (URINARY TRACT INFECTION): Status: RESOLVED | Noted: 2023-05-18 | Resolved: 2023-07-31

## 2023-10-16 ENCOUNTER — BMR PREADMISSION ASSESSMENT (INPATIENT)
Dept: ADMISSIONS | Facility: REHABILITATION | Age: 33
End: 2023-10-16
Payer: COMMERCIAL

## 2025-06-18 ENCOUNTER — TELEPHONE (OUTPATIENT)
Age: 35
End: 2025-06-18

## 2025-06-18 NOTE — TELEPHONE ENCOUNTER
New Patient      Insurance   Current Insurance? [facility]   Insurance E-verified? yes    History   Reason for appointment/active symptoms?  Pt has SPT and needs to establish care with urology. Pt was seeing urologist in [place] but moved to [place] recently. Pt having issues with SPT getting clogged and has been seen multiple times in ED.      Has the patient had any previous Urologist(s)? yes in [place]- pt will need to have records faxed     Was the patient seen in the ED? yes- last visit on 5/25 for clogged SPT     Labs/Imaging(Including Out Of Network)? yes US on 4/15. Pt sts when having SPT changed she felt pop and believes that piece of SPT may still be in bladder. US showed:    There is a 1.8 cm oblong hyperechoic region with twinkle artifact   which is felt to be separate from the bladder wall may represent suprapubic   catheter tip, bladder stone, or other foreign material. CT pelvis could be   useful for definitive characterization if warranted.      Records Requested? yes- some records visible and others will need to be faxed  Records Visible in EPIC?      Personal history of cancer? no     Appointment   Office location preference:  [facility]   ?   Appointment Details   Date:  9/17/25  Time:  820am  Location:  [facility]  Provider:  Rosetta CROW  Does the appointment need further review?     Please review if any sooner appts available. Pt will need to have SPT changed. Last exchange  was 5/25 in ED and was going every 6 weeks for exchanges.    Pt FM- [phone]

## 2025-06-21 ENCOUNTER — HOSPITAL ENCOUNTER (EMERGENCY)
Facility: HOSPITAL | Age: 35
Discharge: HOME | End: 2025-06-21
Attending: EMERGENCY MEDICINE
Payer: COMMERCIAL

## 2025-06-21 VITALS
SYSTOLIC BLOOD PRESSURE: 101 MMHG | OXYGEN SATURATION: 96 % | WEIGHT: 154 LBS | DIASTOLIC BLOOD PRESSURE: 59 MMHG | BODY MASS INDEX: 23.42 KG/M2 | TEMPERATURE: 98 F | RESPIRATION RATE: 18 BRPM | HEART RATE: 91 BPM

## 2025-06-21 DIAGNOSIS — T83.010A SUPRAPUBIC CATHETER DYSFUNCTION, INITIAL ENCOUNTER (HCC): Primary | ICD-10-CM

## 2025-06-21 PROCEDURE — 99284 EMERGENCY DEPT VISIT MOD MDM: CPT | Performed by: EMERGENCY MEDICINE

## 2025-06-21 PROCEDURE — 99283 EMERGENCY DEPT VISIT LOW MDM: CPT

## 2025-06-21 RX ORDER — LIDOCAINE HYDROCHLORIDE 20 MG/ML
1 JELLY TOPICAL ONCE
Status: DISCONTINUED | OUTPATIENT
Start: 2025-06-21 | End: 2025-06-21 | Stop reason: HOSPADM

## 2025-06-22 NOTE — ED NOTES
Suprapubic catheter irrigated at this time. Urine return achieved, new bag placed, urine draining at this time. Provider made aware.      Cynthia Mcnulty RN  06/21/25 2026

## 2025-06-23 ENCOUNTER — OFFICE VISIT (OUTPATIENT)
Dept: UROLOGY | Facility: CLINIC | Age: 35
End: 2025-06-23
Payer: COMMERCIAL

## 2025-06-23 ENCOUNTER — TELEPHONE (OUTPATIENT)
Dept: UROLOGY | Facility: CLINIC | Age: 35
End: 2025-06-23

## 2025-06-23 VITALS
SYSTOLIC BLOOD PRESSURE: 120 MMHG | TEMPERATURE: 97.6 F | HEART RATE: 88 BPM | DIASTOLIC BLOOD PRESSURE: 74 MMHG | OXYGEN SATURATION: 99 %

## 2025-06-23 DIAGNOSIS — R93.5 ABNORMAL US (ULTRASOUND) OF ABDOMEN: ICD-10-CM

## 2025-06-23 DIAGNOSIS — R33.9 URINARY RETENTION: Primary | ICD-10-CM

## 2025-06-23 PROCEDURE — 99204 OFFICE O/P NEW MOD 45 MIN: CPT | Performed by: PHYSICIAN ASSISTANT

## 2025-06-23 RX ORDER — OXYBUTYNIN CHLORIDE 5 MG/1
5 TABLET ORAL 3 TIMES DAILY
COMMUNITY

## 2025-06-23 RX ORDER — MELATONIN 10 MG
CAPSULE ORAL
COMMUNITY
Start: 2025-06-11

## 2025-06-23 RX ORDER — DOCUSATE SODIUM 100 MG/1
CAPSULE, LIQUID FILLED ORAL
COMMUNITY
Start: 2025-06-11

## 2025-06-23 RX ORDER — ACETIC ACID 0.25 G/100ML
IRRIGANT IRRIGATION
COMMUNITY

## 2025-06-23 RX ORDER — OXYCODONE HYDROCHLORIDE 10 MG/1
10 TABLET ORAL
COMMUNITY
Start: 2025-03-26

## 2025-06-23 RX ORDER — QUETIAPINE FUMARATE 400 MG/1
TABLET, FILM COATED ORAL
COMMUNITY
Start: 2025-06-11

## 2025-06-23 NOTE — TELEPHONE ENCOUNTER
Pt CT is scheduled for 7/7 - moved encounter to 7/8 will send to provider on 7/8 to have them monitor for the CT results and based off that will decide if Cysto will be in office or OR...

## 2025-06-23 NOTE — TELEPHONE ENCOUNTER
Pt getting CT scan - pt will need Cysto- pending results pt will either have Cysto in OR or Office    Will move encounter out to the day of the CT and then send to provider pool to monitor for the results and base decision for Cysto on the results of Foreign body

## 2025-06-23 NOTE — PROGRESS NOTES
Name: Juan Yan      : 1990      MRN: [id number]  Encounter Provider: Laura Zhu PA-C  Encounter Date: 2025   Encounter department: [facility] [place]  :  Assessment & Plan  Urinary retention  Neurogenic bladder secondary to spinal abscess years ago  SPT exchanged in office today. Continue Renacidin flushes daily and as needed. Consider trial of silicone catheter if continues to clog. Increased hydration  Patient hopeful for capping trial as clinical status continues to improve  Will maintain routine SPT exchanges at this time as she continues to improve with PT  Orders:    CT abdomen pelvis w wo contrast; Future    Basic metabolic panel; Future    Abnormal US (ultrasound) of abdomen  US bladder 4/15/25 at outside facility - 1.8 cm oblong hyperechoic region with twinkle artifact   which is felt to be separate from the bladder wall may represent suprapubic   catheter tip, bladder stone, or other foreign material.   No imaging since  CT ordered. Call with results. Discussed possible office cystoscopy vs OR cystoscopy pending results            History of Present Illness   Juan Yan is a 34 y.o. female who presents as a new patient.    Patient states a few years ago she had spinal abscess s/p surgery and subsequent neurogenic bladder/quadriplegia. She had SPT placed. This has been clogging often. Does use Renacidin flushes. Was seen in ER a few days ago, no SPT exchange was performed. Has been undergoing physical therapy with benefit. Now able to move arms. Is hopeful for continued improvement and trial of SPT capping in the future. States in April she had possible retained catheter piece on US. No further imaging was completed.     Review of Systems   Constitutional:  Negative for activity change, appetite change, chills and fever.   HENT:  Negative for congestion and trouble swallowing.    Respiratory:  Negative for cough and shortness of breath.    Cardiovascular:   "Negative for chest pain, palpitations and leg swelling.   Gastrointestinal:  Negative for abdominal pain, constipation, diarrhea, nausea and vomiting.   Genitourinary:  Negative for difficulty urinating, dysuria, flank pain, frequency, hematuria and urgency.   Musculoskeletal:  Negative for back pain and gait problem.   Skin:  Negative for wound.   Allergic/Immunologic: Negative for immunocompromised state.   Neurological:  Negative for dizziness and syncope.   Hematological:  Does not bruise/bleed easily.   Psychiatric/Behavioral:  Negative for confusion.    All other systems reviewed and are negative.         Objective   /74 (BP Location: Left arm, Patient Position: Sitting, Cuff Size: Standard)   Pulse 88   Temp 97.6 °F (36.4 °C)   SpO2 99%     Physical Exam  Constitutional:       Appearance: Normal appearance.   HENT:      Head: Normocephalic.      Nose: Nose normal.      Mouth/Throat:      Pharynx: Oropharynx is clear.     Eyes:      Pupils: Pupils are equal, round, and reactive to light.     Pulmonary:      Effort: Pulmonary effort is normal.     Musculoskeletal:      Cervical back: Normal range of motion.     Skin:     General: Skin is warm.     Neurological:      General: No focal deficit present.      Mental Status: She is alert and oriented to person, place, and time. Mental status is at baseline.     Psychiatric:         Mood and Affect: Mood normal.         Behavior: Behavior normal.         Thought Content: Thought content normal.         Judgment: Judgment normal.           Results   No results found for: \"PSA\"  Lab Results   Component Value Date    CALCIUM 9.1 05/17/2023    K 4.1 05/17/2023    CO2 23 05/17/2023     05/17/2023    BUN 9 05/17/2023    CREATININE 0.44 (L) 05/17/2023     Lab Results   Component Value Date    WBC 5.34 05/17/2023    HGB 12.7 05/17/2023    HCT 38.2 05/17/2023    MCV 91 05/17/2023     05/17/2023       Office Urine Dip  No results found for this or any " previous visit (from the past hour).

## 2025-06-23 NOTE — ASSESSMENT & PLAN NOTE
Neurogenic bladder secondary to spinal abscess years ago  SPT exchanged in office today. Continue Renacidin flushes daily and as needed. Consider trial of silicone catheter if continues to clog. Increased hydration  Patient hopeful for capping trial as clinical status continues to improve  Will maintain routine SPT exchanges at this time as she continues to improve with PT  Orders:    CT abdomen pelvis w wo contrast; Future    Basic metabolic panel; Future

## 2025-06-27 NOTE — TELEPHONE ENCOUNTER
Patient called in requesting help, she accidentally ran over her awan cath bag and needs a new one. She's asking if her grandmother can come to the office and pick just one bag up for her. Please review.     Pt cb: [phone]

## 2025-07-02 ENCOUNTER — APPOINTMENT (OUTPATIENT)
Dept: LAB | Facility: HOSPITAL | Age: 35
End: 2025-07-02
Payer: COMMERCIAL

## 2025-07-02 ENCOUNTER — OFFICE VISIT (OUTPATIENT)
Age: 35
End: 2025-07-02
Payer: COMMERCIAL

## 2025-07-02 VITALS
TEMPERATURE: 97.9 F | RESPIRATION RATE: 18 BRPM | DIASTOLIC BLOOD PRESSURE: 72 MMHG | OXYGEN SATURATION: 97 % | SYSTOLIC BLOOD PRESSURE: 134 MMHG | HEART RATE: 78 BPM

## 2025-07-02 DIAGNOSIS — L21.9 SEBORRHEIC DERMATITIS: ICD-10-CM

## 2025-07-02 DIAGNOSIS — G89.29 CHRONIC BACK PAIN, UNSPECIFIED BACK LOCATION, UNSPECIFIED BACK PAIN LATERALITY: ICD-10-CM

## 2025-07-02 DIAGNOSIS — Z12.4 SCREENING FOR CERVICAL CANCER: ICD-10-CM

## 2025-07-02 DIAGNOSIS — K59.00 CONSTIPATION: ICD-10-CM

## 2025-07-02 DIAGNOSIS — F51.01 PRIMARY INSOMNIA: ICD-10-CM

## 2025-07-02 DIAGNOSIS — R33.9 URINARY RETENTION: ICD-10-CM

## 2025-07-02 DIAGNOSIS — G82.50 QUADRIPLEGIC SPINAL PARALYSIS (HCC): Primary | ICD-10-CM

## 2025-07-02 DIAGNOSIS — Z13.228 SCREENING FOR METABOLIC DISORDER: ICD-10-CM

## 2025-07-02 DIAGNOSIS — K08.89 TOOTHACHE: ICD-10-CM

## 2025-07-02 DIAGNOSIS — M54.9 CHRONIC BACK PAIN, UNSPECIFIED BACK LOCATION, UNSPECIFIED BACK PAIN LATERALITY: ICD-10-CM

## 2025-07-02 DIAGNOSIS — R76.8 HEPATITIS C ANTIBODY POSITIVE IN BLOOD: ICD-10-CM

## 2025-07-02 DIAGNOSIS — F11.20 CONTINUOUS OPIOID DEPENDENCE (HCC): ICD-10-CM

## 2025-07-02 LAB
ANION GAP SERPL CALCULATED.3IONS-SCNC: 13 MMOL/L (ref 4–13)
BUN SERPL-MCNC: 6 MG/DL (ref 5–25)
CALCIUM SERPL-MCNC: 8.7 MG/DL (ref 8.4–10.2)
CHLORIDE SERPL-SCNC: 107 MMOL/L (ref 96–108)
CO2 SERPL-SCNC: 19 MMOL/L (ref 21–32)
CREAT SERPL-MCNC: 0.25 MG/DL (ref 0.6–1.3)
GFR SERPL CREATININE-BSD FRML MDRD: 159 ML/MIN/1.73SQ M
GLUCOSE P FAST SERPL-MCNC: 81 MG/DL (ref 65–99)
POTASSIUM SERPL-SCNC: 4.1 MMOL/L (ref 3.5–5.3)
SODIUM SERPL-SCNC: 139 MMOL/L (ref 135–147)

## 2025-07-02 PROCEDURE — 80048 BASIC METABOLIC PNL TOTAL CA: CPT

## 2025-07-02 PROCEDURE — 36415 COLL VENOUS BLD VENIPUNCTURE: CPT

## 2025-07-02 PROCEDURE — 99204 OFFICE O/P NEW MOD 45 MIN: CPT | Performed by: STUDENT IN AN ORGANIZED HEALTH CARE EDUCATION/TRAINING PROGRAM

## 2025-07-02 RX ORDER — KETOCONAZOLE 20 MG/ML
1 SHAMPOO, SUSPENSION TOPICAL 2 TIMES WEEKLY
Qty: 120 ML | Refills: 0 | Status: SHIPPED | OUTPATIENT
Start: 2025-07-03 | End: 2025-07-15

## 2025-07-02 RX ORDER — QUETIAPINE FUMARATE 100 MG/1
100 TABLET, FILM COATED ORAL
Qty: 90 TABLET | Refills: 1 | Status: SHIPPED | OUTPATIENT
Start: 2025-07-02

## 2025-07-02 RX ORDER — CLONAZEPAM 0.5 MG/1
0.5 TABLET ORAL
COMMUNITY
End: 2025-07-02 | Stop reason: SDUPTHER

## 2025-07-02 RX ORDER — POLYETHYLENE GLYCOL 3350 17 G/17G
17 POWDER, FOR SOLUTION ORAL DAILY
Qty: 100 EACH | Refills: 1 | Status: SHIPPED | OUTPATIENT
Start: 2025-07-02

## 2025-07-02 RX ORDER — ACETAMINOPHEN 325 MG/1
975 TABLET ORAL EVERY 6 HOURS PRN
Qty: 90 TABLET | Refills: 2 | Status: SHIPPED | OUTPATIENT
Start: 2025-07-02 | End: 2025-09-30

## 2025-07-02 RX ORDER — PRAZOSIN HYDROCHLORIDE 1 MG/1
1 CAPSULE ORAL
Qty: 30 CAPSULE | Refills: 0 | Status: SHIPPED | OUTPATIENT
Start: 2025-07-02 | End: 2025-08-01

## 2025-07-02 RX ORDER — DIPHENHYDRAMINE HYDROCHLORIDE AND LIDOCAINE HYDROCHLORIDE AND ALUMINUM HYDROXIDE AND MAGNESIUM HYDRO
10 KIT EVERY 4 HOURS PRN
Qty: 237 ML | Refills: 1 | Status: SHIPPED | OUTPATIENT
Start: 2025-07-02

## 2025-07-02 RX ORDER — TAMSULOSIN HYDROCHLORIDE 0.4 MG/1
0.4 CAPSULE ORAL
Qty: 90 CAPSULE | Refills: 1 | Status: SHIPPED | OUTPATIENT
Start: 2025-07-02

## 2025-07-02 RX ORDER — BACLOFEN 10 MG/1
10 TABLET ORAL 3 TIMES DAILY
Qty: 90 TABLET | Refills: 2 | Status: SHIPPED | OUTPATIENT
Start: 2025-07-02

## 2025-07-02 RX ORDER — IBUPROFEN 800 MG/1
800 TABLET, FILM COATED ORAL EVERY 8 HOURS PRN
Qty: 30 TABLET | Refills: 2 | Status: SHIPPED | OUTPATIENT
Start: 2025-07-02

## 2025-07-02 RX ORDER — SODIUM PHOSPHATE,MONO-DIBASIC 19G-7G/118
1 ENEMA (ML) RECTAL DAILY PRN
Qty: 133 ML | Refills: 0 | Status: SHIPPED | OUTPATIENT
Start: 2025-07-02

## 2025-07-02 RX ORDER — LIDOCAINE 50 MG/G
2 PATCH TOPICAL DAILY PRN
Qty: 30 PATCH | Refills: 2 | Status: SHIPPED | OUTPATIENT
Start: 2025-07-02

## 2025-07-02 RX ORDER — OXYBUTYNIN CHLORIDE 5 MG/1
5 TABLET ORAL 3 TIMES DAILY
Qty: 270 TABLET | Refills: 1 | Status: SHIPPED | OUTPATIENT
Start: 2025-07-02

## 2025-07-02 RX ORDER — MELATONIN 10 MG
10 CAPSULE ORAL
Qty: 90 CAPSULE | Refills: 1 | Status: SHIPPED | OUTPATIENT
Start: 2025-07-02

## 2025-07-02 RX ORDER — CLONAZEPAM 0.5 MG/1
0.5 TABLET ORAL
Qty: 30 TABLET | Refills: 0 | Status: SHIPPED | OUTPATIENT
Start: 2025-07-02

## 2025-07-02 RX ORDER — GABAPENTIN 400 MG/1
400 CAPSULE ORAL EVERY 8 HOURS
Qty: 90 CAPSULE | Refills: 2 | Status: SHIPPED | OUTPATIENT
Start: 2025-07-02 | End: 2025-08-01

## 2025-07-02 RX ORDER — OXYCODONE HYDROCHLORIDE 10 MG/1
10 TABLET ORAL EVERY 12 HOURS PRN
Qty: 60 TABLET | Refills: 0 | Status: SHIPPED | OUTPATIENT
Start: 2025-07-02

## 2025-07-02 RX ORDER — QUETIAPINE FUMARATE 400 MG/1
400 TABLET, FILM COATED ORAL
Qty: 90 TABLET | Refills: 1 | Status: SHIPPED | OUTPATIENT
Start: 2025-07-02

## 2025-07-02 RX ORDER — AMOXICILLIN 250 MG
1 CAPSULE ORAL
Qty: 90 TABLET | Refills: 1 | Status: SHIPPED | OUTPATIENT
Start: 2025-07-02

## 2025-07-02 RX ORDER — BISACODYL 10 MG
10 SUPPOSITORY, RECTAL RECTAL DAILY PRN
Qty: 12 SUPPOSITORY | Refills: 2 | Status: SHIPPED | OUTPATIENT
Start: 2025-07-02

## 2025-07-02 NOTE — ASSESSMENT & PLAN NOTE
Patient reports history of positive hepatitis C antibody that was never addressed.  Hep C RNA undetectable in 2021.  Will recheck HCV PCR.    Orders:    Hepatitis C RNA, Quantitative PCR; Future

## 2025-07-02 NOTE — PATIENT INSTRUCTIONS
"Please contact your insurance/supply company regarding:  Incontinence supplies - can they send an order form for us to sign or do they need a paper script?  Hospital bed - figure out what company may be covered with your insurance  Ankle brace - speak to PT when you see them  Patient Education     Routine physical for adults   The Basics   Written by the doctors and editors at [facility]   What is a physical? -- A physical is a routine visit, or \"check-up,\" with your doctor. You might also hear it called a \"wellness visit\" or \"preventive visit.\"  During each visit, the doctor will:   Ask about your physical and mental health   Ask about your habits, behaviors, and lifestyle   Do an exam   Give you vaccines if needed   Talk to you about any medicines you take   Give advice about your health   Answer your questions  Getting regular check-ups is an important part of taking care of your health. It can help your doctor find and treat any problems you have. But it's also important for preventing health problems.  A routine physical is different from a \"sick visit.\" A sick visit is when you see a doctor because of a health concern or problem. Since physicals are scheduled ahead of time, you can think about what you want to ask the doctor.  How often should I get a physical? -- It depends on your age and health. In general, for people age 21 years and older:   If you are younger than 50 years, you might be able to get a physical every 3 years.   If you are 50 years or older, your doctor might recommend a physical every year.  If you have an ongoing health condition, like diabetes or high blood pressure, your doctor will probably want to see you more often.  What happens during a physical? -- In general, each visit will include:   Physical exam - The doctor or nurse will check your height, weight, heart rate, and blood pressure. They will also look at your eyes and ears. They will ask about how you are feeling and whether you " "have any symptoms that bother you.   Medicines - It's a good idea to bring a list of all the medicines you take to each doctor visit. Your doctor will talk to you about your medicines and answer any questions. Tell them if you are having any side effects that bother you. You should also tell them if you are having trouble paying for any of your medicines.   Habits and behaviors - This includes:   Your diet   Your exercise habits   Whether you smoke, drink alcohol, or use drugs   Whether you are sexually active   Whether you feel safe at home  Your doctor will talk to you about things you can do to improve your health and lower your risk of health problems. They will also offer help and support. For example, if you want to quit smoking, they can give you advice and might prescribe medicines. If you want to improve your diet or get more physical activity, they can help you with this, too.   Lab tests, if needed - The tests you get will depend on your age and situation. For example, your doctor might want to check your:   Cholesterol   Blood sugar   Iron level   Vaccines - The recommended vaccines will depend on your age, health, and what vaccines you already had. Vaccines are very important because they can prevent certain serious or deadly infections.   Discussion of screening - \"Screening\" means checking for diseases or other health problems before they cause symptoms. Your doctor can recommend screening based on your age, risk, and preferences. This might include tests to check for:   Cancer, such as breast, prostate, cervical, ovarian, colorectal, prostate, lung, or skin cancer   Sexually transmitted infections, such as chlamydia and gonorrhea   Mental health conditions like depression and anxiety  Your doctor will talk to you about the different types of screening tests. They can help you decide which screenings to have. They can also explain what the results might mean.   Answering questions - The physical is a " good time to ask the doctor or nurse questions about your health. If needed, they can refer you to other doctors or specialists, too.  Adults older than 65 years often need other care, too. As you get older, your doctor will talk to you about:   How to prevent falling at home   Hearing or vision tests   Memory testing   How to take your medicines safely   Making sure that you have the help and support you need at home  All topics are updated as new evidence becomes available and our peer review process is complete.  This topic retrieved from [employer] on: May 02, 2024.  Topic 237564 Version 1.0  Release: 32.4.3 - C32.122  © 2024 UpToDate, Inc. and/or its affiliates. All rights reserved.  Consumer Information Use and Disclaimer   Disclaimer: This generalized information is a limited summary of diagnosis, treatment, and/or medication information. It is not meant to be comprehensive and should be used as a tool to help the user understand and/or assess potential diagnostic and treatment options. It does NOT include all information about conditions, treatments, medications, side effects, or risks that may apply to a specific patient. It is not intended to be medical advice or a substitute for the medical advice, diagnosis, or treatment of a health care provider based on the health care provider's examination and assessment of a patient's specific and unique circumstances. Patients must speak with a health care provider for complete information about their health, medical questions, and treatment options, including any risks or benefits regarding use of medications. This information does not endorse any treatments or medications as safe, effective, or approved for treating a specific patient. UpToDate, Inc. and its affiliates disclaim any warranty or liability relating to this information or the use thereof.The use of this information is governed by the Terms of Use, available at  https://www.[employer].com/en/know/clinical-effectiveness-terms. 2024© [employer] and its affiliates and/or licensors. All rights reserved.  Copyright   © 2024 [employer] and/or its affiliates. All rights reserved.

## 2025-07-02 NOTE — PROGRESS NOTES
"Name: Juan Yan      : 1990      MRN: [id number]  Encounter Provider: Randy Britton MD  Encounter Date: 2025   Encounter department: [facility] [place]  :  Assessment & Plan  Quadriplegic spinal paralysis (HCC)  2/2 spinal cord abscess and now s/p surgery in .  Has been in long-term care rehab facilities up until 1 month ago.  Referral to home health for nursing, PT, OT  Refer to spine and pain management  Patient requesting hospital bed - attempted to place order through [employer] but insurance coverage for each supplier is listed as \"unknown\"; advised that reach out to insurance company to find out where to send order    Orders:    Ambulatory referral to Spine & Pain Management; Future    EXTERNAL Referral to Home Health; Future    Urinary retention  In setting of spinal cord paralysis.  Has a suprapubic catheter.  Following with urology.  Continue Flomax and oxybutynin. For incontinence supplies, asked that she reach out to DME supplier to send us order form for supplies.    Orders:    tamsulosin (FLOMAX) 0.4 mg; Take 1 capsule (0.4 mg total) by mouth daily at bedtime    oxybutynin (DITROPAN) 5 mg tablet; Take 1 tablet (5 mg total) by mouth 3 (three) times a day    Primary insomnia  Patient reports that she has history of nightmares and insomnia.  She is on a few psychiatric medications but denies any true psychiatric diagnosis.  She reports that she was being given these medications in the facility.  She was diagnosed with bipolar disease as a child but she denies really ever having symptoms suggestive of bipolar disease and she refutes this diagnosis.  She does not wish to follow with a psychiatrist.  She is interested in seeing sleep medicine.  Current regimen for insomnia includes melatonin, Seroquel, prazosin, Klonopin  PDMP reviewed.  Refill medications at current doses  Refer to sleep medicine    Orders:    Melatonin 10 MG CAPS; Take 1 capsule (10 mg total) by " mouth daily at bedtime    QUEtiapine (SEROquel) 400 MG tablet; Take 1 tablet (400 mg total) by mouth daily at bedtime    QUEtiapine (SEROquel) 100 mg tablet; Take 1 tablet (100 mg total) by mouth daily at bedtime In addition to 400 mg pill for total of 500 mg    prazosin (MINIPRESS) 1 mg capsule; Take 1 capsule (1 mg total) by mouth daily at bedtime    clonazePAM (KlonoPIN) 0.5 mg tablet; Take 1 tablet (0.5 mg total) by mouth daily at bedtime    Ambulatory Referral to Sleep Medicine; Future    Chronic back pain, unspecified back location, unspecified back pain laterality  Continuous opioid dependence (HCC)  In the setting of quadriplegia.  She is chronically on oxycodone 10 mg twice daily.  PDMP reviewed.  She is also on baclofen and other multimodal pain regimen.  She asks for increase in baclofen.  Will defer increase in her medications until she can establish with spine and pain medicine.  Referral placed.  For her oxycodone, will refill at current dosing for 30 days.  Advised that patient should establish with spine and pain but if they are not able to continue her opioid medications medications she will have to return to office for opioid initial visit and we will plan to wean her off her opioids.  She is in agreement with this plan.  Continue oxycodone, baclofen, Lidoderm Derm patches, gabapentin, Tylenol at current dosing.      Orders:    oxyCODONE (ROXICODONE) 10 MG TABS; Take 1 tablet (10 mg total) by mouth every 12 (twelve) hours as needed for moderate pain or severe pain Max Daily Amount: 20 mg    baclofen 10 mg tablet; Take 1 tablet (10 mg total) by mouth in the morning and 1 tablet (10 mg total) in the evening and 1 tablet (10 mg total) before bedtime.    lidocaine (LIDODERM) 5 %; Apply 2 patches topically daily as needed over 12 hours (neck/spine pain) Remove & Discard patch within 12 hours or as directed by MD    gabapentin (NEURONTIN) 400 mg capsule; Take 1 capsule (400 mg total) by mouth every 8  (eight) hours    acetaminophen (TYLENOL) 325 mg tablet; Take 3 tablets (975 mg total) by mouth every 6 (six) hours as needed for mild pain    naloxone (NARCAN) 4 mg/0.1 mL nasal spray; Administer 1 spray into a nostril. If no response after 2-3 minutes, give another dose in the other nostril using a new spray.    Constipation  In the setting of immobility, spinal cord injury, and chronic opioid therapy.  Continue bowel regimen which consists of Senokot, MiraLAX, Fleet enemas, as needed Dulcolax suppositories.  She wishes to come off of Colace which is reasonable because she is already on Senokot-S.  Check TSH.    Orders:    sodium phosphate-biphosphate 7-19 g 118 mL enema; Insert 1 enema into the rectum daily as needed (constipation)    senna-docusate sodium (SENOKOT S) 8.6-50 mg per tablet; Take 1 tablet by mouth daily at bedtime    polyethylene glycol (MIRALAX) 17 g packet; Take 17 g by mouth daily    bisacodyl (DULCOLAX) 10 mg suppository; Insert 1 suppository (10 mg total) into the rectum daily as needed for constipation    TSH, 3rd generation with Free T4 reflex; Future    Toothache    Orders:    diphenhydramine, lidocaine, Al/Mg hydroxide, simethicone (Magic Mouthwash) SUSP; Swish and spit 10 mL every 4 (four) hours as needed for mouth pain or discomfort    ibuprofen (MOTRIN) 800 mg tablet; Take 1 tablet (800 mg total) by mouth every 8 (eight) hours as needed for mild pain    Screening for cervical cancer    Orders:    Ambulatory Referral to Obstetrics / Gynecology; Future    Screening for metabolic disorder    Orders:    Basic metabolic panel; Future    Lipid panel; Future    Hepatitis C antibody positive in blood  Patient reports history of positive hepatitis C antibody that was never addressed.  Hep C RNA undetectable in 2021.  Will recheck HCV PCR.    Orders:    Hepatitis C RNA, Quantitative PCR; Future    Seborrheic dermatitis    Orders:    ketoconazole (NIZORAL) 2 % shampoo; Apply 1 Application topically  2 (two) times a week for 4 doses           History of Present Illness   Juan Yan is a 33 yo F with PMH of quadriplegic spinal paralysis 2/2 spinal cord abscess s/p surgery in 2021, insomnia, urinary retention with suprapubic catheter, and chronic back pain who presents today to establish care. Originally annual physical scheduled but due to amount of patient concerns, this was deferred. She presents with her best friend.  Patient recently discharged from rehab facility about 1 month ago.  She was there for 3 months.  Previous to that she was in another rehab that she had been in for over 1 year.  She has since run out of her medications about 5 days ago.  She needs medications reordered, home health referral placed.  She is asking for order for hospital bed, ankle brace, incontinence supplies.  She would also like referral to sleep medicine and pain medicine.  She has chronic back pain related to her spinal paralysis.  She also is on a few different psychiatric medications she reports that these are all used for insomnia.  She reports that when she was a kid, she was diagnosed with bipolar disorder but she never felt that this diagnosis was accurate.  She does not wish to follow with a psychiatrist.      Review of Systems   Eyes:  Negative for visual disturbance.   Respiratory:  Negative for cough and shortness of breath.    Cardiovascular:  Negative for chest pain and leg swelling.   Gastrointestinal:  Positive for constipation. Negative for abdominal pain, diarrhea and nausea.   Genitourinary:  Negative for difficulty urinating and dysuria.   Musculoskeletal:  Positive for back pain.   Neurological:  Positive for weakness. Negative for dizziness, light-headedness and headaches.   Psychiatric/Behavioral:  Positive for sleep disturbance. Negative for confusion. The patient is not nervous/anxious.        Objective   /72 (BP Location: Right arm, Patient Position: Sitting, Cuff Size: Standard)   Pulse 78    Temp 97.9 °F (36.6 °C) (Tympanic)   Resp 18   SpO2 97%      Physical Exam  Constitutional:       General: She is not in acute distress.    Eyes:      Conjunctiva/sclera: Conjunctivae normal.       Cardiovascular:      Rate and Rhythm: Normal rate and regular rhythm.      Heart sounds: Normal heart sounds.   Pulmonary:      Effort: Pulmonary effort is normal.      Breath sounds: Normal breath sounds.   Abdominal:      General: There is no distension.      Tenderness: There is no abdominal tenderness.     Musculoskeletal:      Right lower leg: No edema.      Left lower leg: No edema.     Skin:     General: Skin is warm and dry.     Neurological:      Mental Status: She is alert.      Comments: Quadriplegic, in wheelchair   Psychiatric:         Speech: Speech normal.         Behavior: Behavior normal. Behavior is cooperative.

## 2025-07-02 NOTE — ASSESSMENT & PLAN NOTE
Orders:    diphenhydramine, lidocaine, Al/Mg hydroxide, simethicone (Magic Mouthwash) SUSP; Swish and spit 10 mL every 4 (four) hours as needed for mouth pain or discomfort    ibuprofen (MOTRIN) 800 mg tablet; Take 1 tablet (800 mg total) by mouth every 8 (eight) hours as needed for mild pain

## 2025-07-02 NOTE — ASSESSMENT & PLAN NOTE
"2/2 spinal cord abscess and now s/p surgery in 2021.  Has been in long-term care rehab facilities up until 1 month ago.  Referral to home health for nursing, PT, OT  Refer to spine and pain management  Patient requesting hospital bed - attempted to place order through [employer] but insurance coverage for each supplier is listed as \"unknown\"; advised that reach out to insurance company to find out where to send order    Orders:    Ambulatory referral to Spine & Pain Management; Future    EXTERNAL Referral to Home Health; Future    "

## 2025-07-02 NOTE — ASSESSMENT & PLAN NOTE
Patient reports that she has history of nightmares and insomnia.  She is on a few psychiatric medications but denies any true psychiatric diagnosis.  She reports that she was being given these medications in the facility.  She was diagnosed with bipolar disease as a child but she denies really ever having symptoms suggestive of bipolar disease and she refutes this diagnosis.  She does not wish to follow with a psychiatrist.  She is interested in seeing sleep medicine.  Current regimen for insomnia includes melatonin, Seroquel, prazosin, Klonopin  PDMP reviewed.  Refill medications at current doses  Refer to sleep medicine    Orders:    Melatonin 10 MG CAPS; Take 1 capsule (10 mg total) by mouth daily at bedtime    QUEtiapine (SEROquel) 400 MG tablet; Take 1 tablet (400 mg total) by mouth daily at bedtime    QUEtiapine (SEROquel) 100 mg tablet; Take 1 tablet (100 mg total) by mouth daily at bedtime In addition to 400 mg pill for total of 500 mg    prazosin (MINIPRESS) 1 mg capsule; Take 1 capsule (1 mg total) by mouth daily at bedtime    clonazePAM (KlonoPIN) 0.5 mg tablet; Take 1 tablet (0.5 mg total) by mouth daily at bedtime    Ambulatory Referral to Sleep Medicine; Future

## 2025-07-02 NOTE — ASSESSMENT & PLAN NOTE
In setting of spinal cord paralysis.  Has a suprapubic catheter.  Following with urology.  Continue Flomax and oxybutynin. For incontinence supplies, asked that she reach out to DME supplier to send us order form for supplies.    Orders:    tamsulosin (FLOMAX) 0.4 mg; Take 1 capsule (0.4 mg total) by mouth daily at bedtime    oxybutynin (DITROPAN) 5 mg tablet; Take 1 tablet (5 mg total) by mouth 3 (three) times a day

## 2025-07-03 ENCOUNTER — TELEPHONE (OUTPATIENT)
Dept: UROLOGY | Facility: CLINIC | Age: 35
End: 2025-07-03

## 2025-07-03 ENCOUNTER — NURSE TRIAGE (OUTPATIENT)
Age: 35
End: 2025-07-03

## 2025-07-03 ENCOUNTER — TELEPHONE (OUTPATIENT)
Age: 35
End: 2025-07-03

## 2025-07-03 DIAGNOSIS — L89.152 PRESSURE INJURY OF SACRAL REGION, STAGE 2 (HCC): ICD-10-CM

## 2025-07-03 DIAGNOSIS — G82.50 QUADRIPLEGIC SPINAL PARALYSIS (HCC): Primary | ICD-10-CM

## 2025-07-03 NOTE — TELEPHONE ENCOUNTER
I attempted to call patient but was unable to leave a message due to the mailbox being full.     When patient calls back please advise her to go to the ER to get the catheter exchanged as we do not provide SPT catheter tubes to patients for them to change on there own. The ER will be able to provide her with a Lyft ride home so transportation should not be an issue. If her catheter is blocked she should be seen in the ER as she wont be able to get a nurse visit at [facility] until Monday. If patient is willing to travel to another office you may be able to look into other offices nurse schedules.

## 2025-07-03 NOTE — TELEPHONE ENCOUNTER
Tried calling to advise that her 7/21 appt had to be r/s'd to 7/22 at 3 pm - was unable to leave a message because her v/m is full- will try again at a later time

## 2025-07-03 NOTE — TELEPHONE ENCOUNTER
Patient called back and I gave her AP's recommendations. She had additional questions. I asked her to hold while I tried to get the office RN on the line for her but she hung up after waiting a few seconds.

## 2025-07-03 NOTE — TELEPHONE ENCOUNTER
Patient called back, she states she needs a half rail, electric and 0 gravity, no pressure mattress (she believes that's what its called)

## 2025-07-03 NOTE — TELEPHONE ENCOUNTER
I spoke with Juan and informed her that it is in her best interest to report to the ER as we do not have any availability and we are going into a holiday weekend. Patient is concerned about transportation, but I informed her that it is more important to get this problem taken care of and case management in the hospital will help address her transportation issues. Patient expressed understanding and is willing to go to the ER.

## 2025-07-03 NOTE — TELEPHONE ENCOUNTER
Please place order for hospital bed with    [id number]   Phone # [phone]  FAX # [phone]     Patient request a return call once order has been faxed.

## 2025-07-03 NOTE — TELEPHONE ENCOUNTER
Tried calling patient to see what is needed on hospital bed ie: rails full or half any special mattress, full electric. Mail box full unable to reach patient.

## 2025-07-03 NOTE — TELEPHONE ENCOUNTER
REASON FOR CONVERSATION: Blocked catheter     SYMPTOMS: No urinary output in catheter bag since last evening; attempted to flush but was unsuccessful     OTHER HEALTH INFORMATION: Patient has a SPT; no VNA services yet     PROTOCOL DISPOSITION: See Today or Tomorrow in Office    CARE ADVICE PROVIDED: No availability in the office today. Advised patient to go to ED. States she is unable to go to ED as she will not have a ride home. Advised patient catheter needs to be assessed. States she can change her own catheter and asking if office has supplies for her to do it at home. States she uses a 20 f catheter. Please advise.     PRACTICE FOLLOW-UP: N/A      Reason for Disposition   The patient has a blocked catheter or the patient may need to have their catheter replaced    Answer Assessment - Initial Assessment Questions  1. What type of catheter do you have?   SPT   2. When was the last time you emptied your urinary drainage bag?   Last evening   4. Do you have a visiting nurse or home health nurse that you can contact in case of issues that will arise to your home for an urgent visit?   No  5. Were you taught how to irrigate or flush your catheter?   Yes attempted to flush   6. Are you drinking and how much fluid have you had today?   Yes   7. Are you having bladder spasms, pain, or blood in your urine?  No    Protocols used: Urology-Catheter Problem-ADULT-[place]

## 2025-07-03 NOTE — TELEPHONE ENCOUNTER
Attempted to call patient and her first contact listed her aunt Kiah (on communication consent) to ask more questions necessary for documentation for the bed order. Unable to leave message with patient. Voicemail left with aunt Kiah

## 2025-07-05 ENCOUNTER — HOSPITAL ENCOUNTER (EMERGENCY)
Facility: HOSPITAL | Age: 35
Discharge: HOME | End: 2025-07-06
Attending: EMERGENCY MEDICINE | Admitting: STUDENT IN AN ORGANIZED HEALTH CARE EDUCATION/TRAINING PROGRAM
Payer: COMMERCIAL

## 2025-07-05 ENCOUNTER — APPOINTMENT (EMERGENCY)
Dept: CT IMAGING | Facility: HOSPITAL | Age: 35
End: 2025-07-05
Payer: COMMERCIAL

## 2025-07-05 DIAGNOSIS — R93.7 ABNORMAL CT OF THORACIC SPINE: ICD-10-CM

## 2025-07-05 DIAGNOSIS — T83.010A SUPRAPUBIC CATHETER DYSFUNCTION, INITIAL ENCOUNTER (HCC): Primary | ICD-10-CM

## 2025-07-05 DIAGNOSIS — N39.0 UTI (URINARY TRACT INFECTION): ICD-10-CM

## 2025-07-05 DIAGNOSIS — R10.9 ABDOMINAL PAIN: ICD-10-CM

## 2025-07-05 LAB
ALBUMIN SERPL BCG-MCNC: 3.6 G/DL (ref 3.5–5)
ALP SERPL-CCNC: 49 U/L (ref 34–104)
ALT SERPL W P-5'-P-CCNC: 12 U/L (ref 7–52)
ANION GAP SERPL CALCULATED.3IONS-SCNC: 8 MMOL/L (ref 4–13)
AST SERPL W P-5'-P-CCNC: 19 U/L (ref 13–39)
BACTERIA UR QL AUTO: ABNORMAL /HPF
BASOPHILS # BLD AUTO: 0.04 THOUSANDS/ÂΜL (ref 0–0.1)
BASOPHILS NFR BLD AUTO: 1 % (ref 0–1)
BILIRUB SERPL-MCNC: 0.42 MG/DL (ref 0.2–1)
BILIRUB UR QL STRIP: NEGATIVE
BUN SERPL-MCNC: 8 MG/DL (ref 5–25)
CALCIUM SERPL-MCNC: 8.6 MG/DL (ref 8.4–10.2)
CHLORIDE SERPL-SCNC: 107 MMOL/L (ref 96–108)
CLARITY UR: CLEAR
CO2 SERPL-SCNC: 21 MMOL/L (ref 21–32)
COLOR UR: COLORLESS
CREAT SERPL-MCNC: 0.25 MG/DL (ref 0.6–1.3)
EOSINOPHIL # BLD AUTO: 0.21 THOUSAND/ÂΜL (ref 0–0.61)
EOSINOPHIL NFR BLD AUTO: 3 % (ref 0–6)
ERYTHROCYTE [DISTWIDTH] IN BLOOD BY AUTOMATED COUNT: 15.7 % (ref 11.6–15.1)
EXT PREGNANCY TEST URINE: NEGATIVE
EXT. CONTROL: NORMAL
GFR SERPL CREATININE-BSD FRML MDRD: 159 ML/MIN/1.73SQ M
GLUCOSE SERPL-MCNC: 122 MG/DL (ref 65–140)
GLUCOSE UR STRIP-MCNC: NEGATIVE MG/DL
HCT VFR BLD AUTO: 36 % (ref 34.8–46.1)
HGB BLD-MCNC: 11.4 G/DL (ref 11.5–15.4)
HGB UR QL STRIP.AUTO: ABNORMAL
IMM GRANULOCYTES # BLD AUTO: 0.02 THOUSAND/UL (ref 0–0.2)
IMM GRANULOCYTES NFR BLD AUTO: 0 % (ref 0–2)
INR PPP: 0.94 (ref 0.85–1.19)
KETONES UR STRIP-MCNC: NEGATIVE MG/DL
LACTATE SERPL-SCNC: 2 MMOL/L (ref 0.5–2)
LEUKOCYTE ESTERASE UR QL STRIP: NEGATIVE
LIPASE SERPL-CCNC: 21 U/L (ref 11–82)
LYMPHOCYTES # BLD AUTO: 1.35 THOUSANDS/ÂΜL (ref 0.6–4.47)
LYMPHOCYTES NFR BLD AUTO: 17 % (ref 14–44)
MCH RBC QN AUTO: 27.6 PG (ref 26.8–34.3)
MCHC RBC AUTO-ENTMCNC: 31.7 G/DL (ref 31.4–37.4)
MCV RBC AUTO: 87 FL (ref 82–98)
MONOCYTES # BLD AUTO: 0.44 THOUSAND/ÂΜL (ref 0.17–1.22)
MONOCYTES NFR BLD AUTO: 5 % (ref 4–12)
MUCOUS THREADS UR QL AUTO: ABNORMAL
NEUTROPHILS # BLD AUTO: 6.07 THOUSANDS/ÂΜL (ref 1.85–7.62)
NEUTS SEG NFR BLD AUTO: 74 % (ref 43–75)
NITRITE UR QL STRIP: NEGATIVE
NON-SQ EPI CELLS URNS QL MICRO: ABNORMAL /HPF
NRBC BLD AUTO-RTO: 0 /100 WBCS
PH UR STRIP.AUTO: 7.5 [PH]
PLATELET # BLD AUTO: 196 THOUSANDS/UL (ref 149–390)
PMV BLD AUTO: 9 FL (ref 8.9–12.7)
POTASSIUM SERPL-SCNC: 3.7 MMOL/L (ref 3.5–5.3)
PROCALCITONIN SERPL-MCNC: <0.05 NG/ML
PROT SERPL-MCNC: 6.4 G/DL (ref 6.4–8.4)
PROT UR STRIP-MCNC: ABNORMAL MG/DL
PROTHROMBIN TIME: 13.3 SECONDS (ref 12.3–15)
RBC # BLD AUTO: 4.13 MILLION/UL (ref 3.81–5.12)
RBC #/AREA URNS AUTO: ABNORMAL /HPF
SODIUM SERPL-SCNC: 136 MMOL/L (ref 135–147)
SP GR UR STRIP.AUTO: >=1.05 (ref 1–1.03)
UROBILINOGEN UR STRIP-ACNC: <2 MG/DL
WBC # BLD AUTO: 8.13 THOUSAND/UL (ref 4.31–10.16)
WBC #/AREA URNS AUTO: ABNORMAL /HPF

## 2025-07-05 PROCEDURE — 99284 EMERGENCY DEPT VISIT MOD MDM: CPT

## 2025-07-05 PROCEDURE — 74177 CT ABD & PELVIS W/CONTRAST: CPT

## 2025-07-05 PROCEDURE — 96376 TX/PRO/DX INJ SAME DRUG ADON: CPT

## 2025-07-05 PROCEDURE — 96374 THER/PROPH/DIAG INJ IV PUSH: CPT

## 2025-07-05 PROCEDURE — 99285 EMERGENCY DEPT VISIT HI MDM: CPT | Performed by: EMERGENCY MEDICINE

## 2025-07-05 PROCEDURE — 36415 COLL VENOUS BLD VENIPUNCTURE: CPT

## 2025-07-05 PROCEDURE — 96361 HYDRATE IV INFUSION ADD-ON: CPT

## 2025-07-05 PROCEDURE — 80053 COMPREHEN METABOLIC PANEL: CPT

## 2025-07-05 PROCEDURE — 87040 BLOOD CULTURE FOR BACTERIA: CPT

## 2025-07-05 PROCEDURE — 85610 PROTHROMBIN TIME: CPT

## 2025-07-05 PROCEDURE — 83690 ASSAY OF LIPASE: CPT

## 2025-07-05 PROCEDURE — 87086 URINE CULTURE/COLONY COUNT: CPT

## 2025-07-05 PROCEDURE — 83605 ASSAY OF LACTIC ACID: CPT

## 2025-07-05 PROCEDURE — 85025 COMPLETE CBC W/AUTO DIFF WBC: CPT

## 2025-07-05 PROCEDURE — 84145 PROCALCITONIN (PCT): CPT

## 2025-07-05 PROCEDURE — 81025 URINE PREGNANCY TEST: CPT

## 2025-07-05 PROCEDURE — 51702 INSERT TEMP BLADDER CATH: CPT | Performed by: EMERGENCY MEDICINE

## 2025-07-05 PROCEDURE — 81001 URINALYSIS AUTO W/SCOPE: CPT

## 2025-07-05 RX ORDER — ONDANSETRON 4 MG/1
4 TABLET, FILM COATED ORAL EVERY 6 HOURS
Qty: 12 TABLET | Refills: 0 | Status: SHIPPED | OUTPATIENT
Start: 2025-07-05

## 2025-07-05 RX ORDER — HYDROMORPHONE HCL/PF 1 MG/ML
0.5 SYRINGE (ML) INJECTION ONCE
Status: COMPLETED | OUTPATIENT
Start: 2025-07-05 | End: 2025-07-05

## 2025-07-05 RX ORDER — SULFAMETHOXAZOLE AND TRIMETHOPRIM 800; 160 MG/1; MG/1
1 TABLET ORAL ONCE
Status: COMPLETED | OUTPATIENT
Start: 2025-07-05 | End: 2025-07-05

## 2025-07-05 RX ORDER — SULFAMETHOXAZOLE AND TRIMETHOPRIM 800; 160 MG/1; MG/1
1 TABLET ORAL 2 TIMES DAILY
Qty: 10 TABLET | Refills: 0 | Status: SHIPPED | OUTPATIENT
Start: 2025-07-05 | End: 2025-07-10

## 2025-07-05 RX ADMIN — IOHEXOL 100 ML: 350 INJECTION, SOLUTION INTRAVENOUS at 18:55

## 2025-07-05 RX ADMIN — HYDROMORPHONE HYDROCHLORIDE 0.5 MG: 1 INJECTION, SOLUTION INTRAMUSCULAR; INTRAVENOUS; SUBCUTANEOUS at 18:31

## 2025-07-05 RX ADMIN — SODIUM CHLORIDE 1000 ML: 0.9 INJECTION, SOLUTION INTRAVENOUS at 20:09

## 2025-07-05 RX ADMIN — SULFAMETHOXAZOLE AND TRIMETHOPRIM 1 TABLET: 800; 160 TABLET ORAL at 23:19

## 2025-07-05 RX ADMIN — HYDROMORPHONE HYDROCHLORIDE 0.5 MG: 1 INJECTION, SOLUTION INTRAMUSCULAR; INTRAVENOUS; SUBCUTANEOUS at 20:09

## 2025-07-05 NOTE — ED PROVIDER NOTES
Time reflects when diagnosis was documented in both MDM as applicable and the Disposition within this note       Time User Action Codes Description Comment    7/5/2025  7:57 PM Mirela Méndez [T83.010A] Suprapubic catheter dysfunction, initial encounter (HCC)     7/5/2025  8:03 PM Mirela Méndez Add [R10.9] Abdominal pain     7/5/2025 11:15 PM Dallas Garcia Add [N39.0] UTI (urinary tract infection)     7/5/2025 11:15 PM Dallas Garcia Add [R93.7] Abnormal CT of thoracic spine           ED Disposition       ED Disposition   Discharge    Condition   Stable    Date/Time   Sat Jul 5, 2025 11:15 PM    Comment   Juan Yuriy discharge to home/self care.                   Assessment & Plan       Medical Decision Making  Patient is a 34-year-old female presenting for evaluation of abdominal pain and suprapubic catheter dysfunction. Upon examination, patient is non-toxic appearing and does not appear in acute distress. Vital signs show hypertension of 179/92 with a repeat BP of 115/58. She is otherwise afebrile and nontachycardic. Normal heart and lung sounds. Abdomen is soft with generalized tenderness. Suprapubic catheter is in place with purulent drainage at the insertion site. No erythema or cellulitis appearance.     Differentials include but are not limited to: urinary tract infection, cystitis, abscess    Blood work obtained which did not show evidence of leukocytosis. CMP did not show an electrolyte imbalance or abnormal kidney function. Lactic acid and procalcitonin were negative. Suprapubic catheter was exchanged and a clean urine specimen was obtained for urinalysis. Patient was signed out to Dallas Garcia PA-C at 2130 who will assume care of patient. At this time, CT of abdomen/pelvis and urinalysis are pending.     Amount and/or Complexity of Data Reviewed  Labs: ordered. Decision-making details documented in ED Course.     Details: Reviewed   Radiology: ordered.     Details: Pending      Risk  Prescription drug management.        ED Course as of 07/06/25 1731   Sat Jul 05, 2025 1839 Comprehensive metabolic panel(!)  No electrolyte imbalance. Normal kidney function.   1859 CBC and differential(!)  No leukocytosis. Hemoglobin is stable.   1909 LACTIC ACID: 2.0  Negative    1909 Procalcitonin: <0.05  Negative        Medications   HYDROmorphone (DILAUDID) injection 0.5 mg (0.5 mg Intravenous Given 7/5/25 1831)   iohexol (OMNIPAQUE) 350 MG/ML injection (MULTI-DOSE) 100 mL (100 mL Intravenous Given 7/5/25 1855)   HYDROmorphone (DILAUDID) injection 0.5 mg (0.5 mg Intravenous Given 7/5/25 2009)   sodium chloride 0.9 % bolus 1,000 mL (0 mL Intravenous Stopped 7/5/25 2135)   sulfamethoxazole-trimethoprim (BACTRIM DS) 800-160 mg per tablet 1 tablet (1 tablet Oral Given 7/5/25 2319)       ED Risk Strat Scores                    No data recorded        SBIRT 20yo+      Flowsheet Row Most Recent Value   Initial Alcohol Screen: US AUDIT-C     1. How often do you have a drink containing alcohol? 0 Filed at: 07/05/2025 1900   2. How many drinks containing alcohol do you have on a typical day you are drinking?  0 Filed at: 07/05/2025 1900   3b. FEMALE Any Age, or MALE 65+: How often do you have 4 or more drinks on one occassion? 0 Filed at: 07/05/2025 1900   Audit-C Score 0 Filed at: 07/05/2025 1900   MINA: How many times in the past year have you...    Used an illegal drug or used a prescription medication for non-medical reasons? Never Filed at: 07/05/2025 1900                            History of Present Illness       Chief Complaint   Patient presents with    Urinary Catheter Problem     Pt arrived via EMS. Pt has a suprapubic catheter and reports pus from site of catheter, minimal drainage from catheter since Thursday. Red/brown drainage in urinary bag. Abdominal pain starting in the last week. Urologist told pt to go to ED. Pt reports dizziness and weakness. EMS reported hypotension. Pt reports  "hypotension at baseline.        Past Medical History[1]   Past Surgical History[2]   Family History[3]   Social History[4]   E-Cigarette/Vaping    E-Cigarette Use Current Every Day User       E-Cigarette/Vaping Substances    Nicotine Yes     THC No     CBD No     Flavoring Yes     Other No     Unknown No       I have reviewed and agree with the history as documented.     Patient is a 34-year-old female with a past medical history including quadriplegic spinal paralysis, ovarian cancer, and hepatitis C. Presents today for evaluation of a urinary catheter problem. Patient states that she started experiencing some abnormal drainage around the suprapubic catheter site over the last 3 days. Has also had a decrease in urine output from her catheter but is still urinating through her urethra. Last catheter change was about 1.5 weeks ago. She continues to experience red/brown urine which has been ongoing and she is followed by urology. Patient reports mid abdominal discomfort starting this week, pain at the insertion site, and a \"hard abdomen.\" Notes some chills and sweating only at night, temperature was 99.1 last night. She denies any chest pain, shortness of breath, nausea, or vomiting.       Urinary Catheter Problem  Associated symptoms: abdominal pain and fever    Associated symptoms: no chest pain, no diarrhea, no nausea, no shortness of breath and no vomiting        Review of Systems   Constitutional:  Positive for chills and fever.   Respiratory:  Negative for chest tightness and shortness of breath.    Cardiovascular:  Negative for chest pain.   Gastrointestinal:  Positive for abdominal pain. Negative for diarrhea, nausea and vomiting.   Genitourinary:         Suprapubic catheter problem   Neurological:  Positive for weakness.   All other systems reviewed and are negative.          Objective       ED Triage Vitals [07/05/25 1702]   Temperature Pulse Blood Pressure Respirations SpO2 Patient Position - Orthostatic VS "   98.3 °F (36.8 °C) 98 (!) 179/92 18 98 % --      Temp Source Heart Rate Source BP Location FiO2 (%) Pain Score    Oral Monitor Right arm -- 5      Vitals      Date and Time Temp Pulse SpO2 Resp BP Pain Score FACES Pain Rating User   07/06/25 0716 -- -- -- -- -- No Pain -- ASM   07/06/25 0700 -- -- -- 16 156/91 -- -- ASM   07/06/25 0500 -- 98 98 % 16 106/56 -- -- [place]   07/05/25 2009 -- -- -- -- -- 8 -- [place]   07/05/25 1915 -- 110 97 % 18 101/58 -- -- [place]   07/05/25 1831 -- -- -- -- -- 10 - Worst Possible Pain --    07/05/25 1730 -- 95 94 % 18 115/58 -- --    07/05/25 1705 -- -- -- -- -- No Pain --    07/05/25 1702 98.3 °F (36.8 °C) 98 98 % 18 179/92 5 -- SM            Physical Exam  Vitals and nursing note reviewed.   Constitutional:       Appearance: Normal appearance.     Cardiovascular:      Heart sounds: Normal heart sounds.   Pulmonary:      Effort: Pulmonary effort is normal.      Breath sounds: Normal breath sounds.   Abdominal:      Palpations: Abdomen is soft.      Tenderness: There is generalized abdominal tenderness.     Skin:     Capillary Refill: Capillary refill takes less than 2 seconds.          Neurological:      General: No focal deficit present.      Mental Status: She is alert.     Psychiatric:         Mood and Affect: Mood normal.         Behavior: Behavior normal.         Results Reviewed       Procedure Component Value Units Date/Time    Blood culture #1 [[id number]] Collected: 07/05/25 1834    Lab Status: Preliminary result Specimen: Blood from Arm, Right Updated: 07/06/25 0801     Blood Culture Received in Microbiology Lab. Culture in Progress.    Urine Microscopic [[id number]]  (Abnormal) Collected: 07/05/25 2036    Lab Status: Final result Specimen: Urine, Suprapubic catheter Updated: 07/05/25 2058     RBC, UA Innumerable /hpf      WBC, UA 10-20 /hpf      Epithelial Cells Occasional /hpf      Bacteria, UA Occasional /hpf      MUCUS THREADS Occasional    Urine culture [[id number]] Collected:  07/05/25 2036    Lab Status: In process Specimen: Urine, Suprapubic catheter Updated: 07/05/25 2058    UA w Reflex to Microscopic w Reflex to Culture [[id number]]  (Abnormal) Collected: 07/05/25 2036    Lab Status: Final result Specimen: Urine, Suprapubic catheter Updated: 07/05/25 2057     Color, UA Colorless     Clarity, UA Clear     Specific Gravity, UA >=1.050     pH, UA 7.5     Leukocytes, UA Negative     Nitrite, UA Negative     Protein, UA 50 (1+) mg/dl      Glucose, UA Negative mg/dl      Ketones, UA Negative mg/dl      Urobilinogen, UA <2.0 mg/dl      Bilirubin, UA Negative     Occult Blood, UA Large    Protime-INR [[id number]]  (Normal) Collected: 07/05/25 1913    Lab Status: Final result Specimen: Blood from Arm, Right Updated: 07/05/25 1933     Protime 13.3 seconds      INR 0.94    Narrative:      INR Therapeutic Range    Indication                                             INR Range      Atrial Fibrillation                                               2.0-3.0  Hypercoagulable State                                    2.0.2.3  Left Ventricular Asist Device                            2.0-3.0  Mechanical Heart Valve                                  -    Aortic(with afib, MI, embolism, HF, LA enlargement,    and/or coagulopathy)                                     2.0-3.0 (2.5-3.5)     Mitral                                                             2.5-3.5  Prosthetic/Bioprosthetic Heart Valve               2.0-3.0  Venous thromboembolism (VTE: VT, PE        2.0-3.0    Procalcitonin [[id number]]  (Normal) Collected: 07/05/25 1834    Lab Status: Final result Specimen: Blood from Arm, Right Updated: 07/05/25 1909     Procalcitonin <0.05 ng/ml     Lactic acid, plasma (w/reflex if result > 2.0) [[id number]]  (Normal) Collected: 07/05/25 1834    Lab Status: Final result Specimen: Blood from Arm, Right Updated: 07/05/25 1901     LACTIC ACID 2.0 mmol/L     Narrative:      Result may be elevated if tourniquet was  used during collection.    CBC and differential [[id number]]  (Abnormal) Collected: 07/05/25 1834    Lab Status: Final result Specimen: Blood from Arm, Right Updated: 07/05/25 1843     WBC 8.13 Thousand/uL      RBC 4.13 Million/uL      Hemoglobin 11.4 g/dL      Hematocrit 36.0 %      MCV 87 fL      MCH 27.6 pg      MCHC 31.7 g/dL      RDW 15.7 %      MPV 9.0 fL      Platelets 196 Thousands/uL      nRBC 0 /100 WBCs      Segmented % 74 %      Immature Grans % 0 %      Lymphocytes % 17 %      Monocytes % 5 %      Eosinophils Relative 3 %      Basophils Relative 1 %      Absolute Neutrophils 6.07 Thousands/µL      Absolute Immature Grans 0.02 Thousand/uL      Absolute Lymphocytes 1.35 Thousands/µL      Absolute Monocytes 0.44 Thousand/µL      Eosinophils Absolute 0.21 Thousand/µL      Basophils Absolute 0.04 Thousands/µL     Blood culture #2 [[id number]] Collected: 07/05/25 1834    Lab Status: In process Specimen: Blood from Arm, Right Updated: 07/05/25 1841    Lipase [[id number]]  (Normal) Collected: 07/05/25 1749    Lab Status: Final result Specimen: Blood from Arm, Right Updated: 07/05/25 1826     Lipase 21 u/L     Comprehensive metabolic panel [[id number]]  (Abnormal) Collected: 07/05/25 1749    Lab Status: Final result Specimen: Blood from Arm, Right Updated: 07/05/25 1826     Sodium 136 mmol/L      Potassium 3.7 mmol/L      Chloride 107 mmol/L      CO2 21 mmol/L      ANION GAP 8 mmol/L      BUN 8 mg/dL      Creatinine 0.25 mg/dL      Glucose 122 mg/dL      Calcium 8.6 mg/dL      AST 19 U/L      ALT 12 U/L      Alkaline Phosphatase 49 U/L      Total Protein 6.4 g/dL      Albumin 3.6 g/dL      Total Bilirubin 0.42 mg/dL      eGFR 159 ml/min/1.73sq m     Narrative:      National Kid[employer] guidelines for Chronic Kidney Disease (CKD):     Stage 1 with normal or high GFR (GFR > 90 mL/min/1.73 square meters)    Stage 2 Mild CKD (GFR = 60-89 mL/min/1.73 square meters)    Stage 3A Moderate CKD (GFR = 45-59  mL/min/1.73 square meters)    Stage 3B Moderate CKD (GFR = 30-44 mL/min/1.73 square meters)    Stage 4 Severe CKD (GFR = 15-29 mL/min/1.73 square meters)    Stage 5 End Stage CKD (GFR <15 mL/min/1.73 square meters)  Note: GFR calculation is accurate only with a steady state creatinine    POCT pregnancy, urine [[id number]]  (Normal) Collected: 07/05/25 1817    Lab Status: Final result Updated: 07/05/25 1820     EXT Preg Test, Ur Negative     Control Valid            CT abdomen pelvis with contrast   Final Interpretation by Angelito Murdock DO (07/05 2250)   Addendum (preliminary) 1 of 1 by Angelito Murdock DO (07/05 2250)   ADDENDUM:      Mildly prominent 11 mm left pelvic sidewall lymph node.      There is a suprapubic catheter with its tip coiled within the bladder.    There is abnormal bladder wall thickening and peribladder stranding.    Findings are concerning for cystitis. No definite organizing fluid    collection is identified. Mild degree of    infectious/inflammatory process involving the distal ureters bilaterally.    Recommend genitourinary consultation.      Final      There is a suprapubic catheter with its tip coiled within the bladder. There is abnormal bladder wall thickening and peribladder stranding. Findings are concerning for cystitis. No definite organizing fluid collection is identified. Mild degree of    infectious/inflammatory process involving the distal ureters bilaterally.      Abnormal sclerosis at at T10 and T11 vertebral bodies. Findings are indeterminate, however may reflect metastatic disease given history of cancer. Recommend bone scan for further evaluation.      Mild casting calcifications are present at the pelvicalyceal left kidney.      Other findings as detailed above.            Workstation performed: [id number]             Suprapubic Tube    Date/Time: 7/5/2025 9:00 PM    Performed by: HOWARD Katz  Authorized by: HOWARD Katz    Patient location:   ED  Indications / Diagnosis:  Exchange tube/possible source of infection  Other Assisting Provider: No    Consent:     Consent obtained:  Verbal    Consent given by:  Patient    Risks discussed:  Pain, infection and bleeding    Alternatives discussed:  No treatment  Universal protocol:     Procedure explained and questions answered to patient or proxy's satisfaction: yes      Immediately prior to procedure a time out was called: yes      Patient identity confirmed:  Verbally with patient  Anesthesia (see MAR for exact dosages):     Anesthesia method:  None (provided with IV pain medication)  Procedure details:     Complexity:  Simple    Catheter type:  Gonzalez    Catheter size:  20 Fr    Ultrasound guidance: no      Number of attempts:  1    Successful placement: yes      Urine characteristics:  Mildly cloudy  Post-procedure details:     Patient tolerance of procedure:  Tolerated well, no immediate complications      ED Medication and Procedure Management   Prior to Admission Medications   Prescriptions Last Dose Informant Patient Reported? Taking?   Melatonin 10 MG CAPS   No No   Sig: Take 1 capsule (10 mg total) by mouth daily at bedtime   QUEtiapine (SEROquel) 100 mg tablet   No No   Sig: Take 1 tablet (100 mg total) by mouth daily at bedtime In addition to 400 mg pill for total of 500 mg   QUEtiapine (SEROquel) 400 MG tablet   No No   Sig: Take 1 tablet (400 mg total) by mouth daily at bedtime   acetaminophen (TYLENOL) 325 mg tablet   No No   Sig: Take 3 tablets (975 mg total) by mouth every 6 (six) hours as needed for mild pain   acetic acid 0.25 % irrigation solution  Self Yes No   Sig: INSTILL 30 ML INTO BLADDER AND ALLOW TO DWELL EACH SHIFT AS DIRECTED   baclofen 10 mg tablet   No No   Sig: Take 1 tablet (10 mg total) by mouth in the morning and 1 tablet (10 mg total) in the evening and 1 tablet (10 mg total) before bedtime.   bisacodyl (DULCOLAX) 10 mg suppository   No No   Sig: Insert 1 suppository (10 mg  total) into the rectum daily as needed for constipation   clonazePAM (KlonoPIN) 0.5 mg tablet   No No   Sig: Take 1 tablet (0.5 mg total) by mouth daily at bedtime   diphenhydramine, lidocaine, Al/Mg hydroxide, simethicone (Magic Mouthwash) SUSP   No No   Sig: Swish and spit 10 mL every 4 (four) hours as needed for mouth pain or discomfort   gabapentin (NEURONTIN) 400 mg capsule   No No   Sig: Take 1 capsule (400 mg total) by mouth every 8 (eight) hours   ibuprofen (MOTRIN) 800 mg tablet   No No   Sig: Take 1 tablet (800 mg total) by mouth every 8 (eight) hours as needed for mild pain   ketoconazole (NIZORAL) 2 % shampoo   No No   Sig: Apply 1 Application topically 2 (two) times a week for 4 doses   lidocaine (LIDODERM) 5 %   No No   Sig: Apply 2 patches topically daily as needed over 12 hours (neck/spine pain) Remove & Discard patch within 12 hours or as directed by MD   naloxone (NARCAN) 4 mg/0.1 mL nasal spray   No No   Sig: Administer 1 spray into a nostril. If no response after 2-3 minutes, give another dose in the other nostril using a new spray.   oxyCODONE (ROXICODONE) 10 MG TABS   No No   Sig: Take 1 tablet (10 mg total) by mouth every 12 (twelve) hours as needed for moderate pain or severe pain Max Daily Amount: 20 mg   oxybutynin (DITROPAN) 5 mg tablet   No No   Sig: Take 1 tablet (5 mg total) by mouth 3 (three) times a day   polyethylene glycol (MIRALAX) 17 g packet   No No   Sig: Take 17 g by mouth daily   prazosin (MINIPRESS) 1 mg capsule   No No   Sig: Take 1 capsule (1 mg total) by mouth daily at bedtime   senna-docusate sodium (SENOKOT S) 8.6-50 mg per tablet   No No   Sig: Take 1 tablet by mouth daily at bedtime   sodium phosphate-biphosphate 7-19 g 118 mL enema   No No   Sig: Insert 1 enema into the rectum daily as needed (constipation)   tamsulosin (FLOMAX) 0.4 mg   No No   Sig: Take 1 capsule (0.4 mg total) by mouth daily at bedtime      Facility-Administered Medications: None     Discharge  Medication List as of 7/5/2025 11:18 PM        START taking these medications    Details   ondansetron (ZOFRAN) 4 mg tablet Take 1 tablet (4 mg total) by mouth every 6 (six) hours, Starting Sat 7/5/2025, Normal      sulfamethoxazole-trimethoprim (BACTRIM DS) 800-160 mg per tablet Take 1 tablet by mouth 2 (two) times a day for 5 days smx-tmp DS (BACTRIM) 800-160 mg tabs (1tab q12 D10), Starting Sat 7/5/2025, Until Thu 7/10/2025, Normal           CONTINUE these medications which have NOT CHANGED    Details   acetaminophen (TYLENOL) 325 mg tablet Take 3 tablets (975 mg total) by mouth every 6 (six) hours as needed for mild pain, Starting Wed 7/2/2025, Until Tue 9/30/2025 at 2359, Normal      acetic acid 0.25 % irrigation solution INSTILL 30 ML INTO BLADDER AND ALLOW TO DWELL EACH SHIFT AS DIRECTED, Historical Med      baclofen 10 mg tablet Take 1 tablet (10 mg total) by mouth in the morning and 1 tablet (10 mg total) in the evening and 1 tablet (10 mg total) before bedtime., Starting Wed 7/2/2025, Normal      bisacodyl (DULCOLAX) 10 mg suppository Insert 1 suppository (10 mg total) into the rectum daily as needed for constipation, Starting Wed 7/2/2025, Normal      clonazePAM (KlonoPIN) 0.5 mg tablet Take 1 tablet (0.5 mg total) by mouth daily at bedtime, Starting Wed 7/2/2025, Normal      diphenhydramine, lidocaine, Al/Mg hydroxide, simethicone (Magic Mouthwash) SUSP Swish and spit 10 mL every 4 (four) hours as needed for mouth pain or discomfort, Starting Wed 7/2/2025, Normal      gabapentin (NEURONTIN) 400 mg capsule Take 1 capsule (400 mg total) by mouth every 8 (eight) hours, Starting Wed 7/2/2025, Until Fri 8/1/2025, Normal      ibuprofen (MOTRIN) 800 mg tablet Take 1 tablet (800 mg total) by mouth every 8 (eight) hours as needed for mild pain, Starting Wed 7/2/2025, Normal      ketoconazole (NIZORAL) 2 % shampoo Apply 1 Application topically 2 (two) times a week for 4 doses, Starting Thu 7/3/2025, Until Tue  7/15/2025, Normal      lidocaine (LIDODERM) 5 % Apply 2 patches topically daily as needed over 12 hours (neck/spine pain) Remove & Discard patch within 12 hours or as directed by MD, Starting Wed 7/2/2025, Normal      Melatonin 10 MG CAPS Take 1 capsule (10 mg total) by mouth daily at bedtime, Starting Wed 7/2/2025, Normal      naloxone (NARCAN) 4 mg/0.1 mL nasal spray Administer 1 spray into a nostril. If no response after 2-3 minutes, give another dose in the other nostril using a new spray., Normal      oxybutynin (DITROPAN) 5 mg tablet Take 1 tablet (5 mg total) by mouth 3 (three) times a day, Starting Wed 7/2/2025, Normal      oxyCODONE (ROXICODONE) 10 MG TABS Take 1 tablet (10 mg total) by mouth every 12 (twelve) hours as needed for moderate pain or severe pain Max Daily Amount: 20 mg, Starting Wed 7/2/2025, Normal      polyethylene glycol (MIRALAX) 17 g packet Take 17 g by mouth daily, Starting Wed 7/2/2025, Normal      prazosin (MINIPRESS) 1 mg capsule Take 1 capsule (1 mg total) by mouth daily at bedtime, Starting Wed 7/2/2025, Until Fri 8/1/2025, Normal      !! QUEtiapine (SEROquel) 100 mg tablet Take 1 tablet (100 mg total) by mouth daily at bedtime In addition to 400 mg pill for total of 500 mg, Starting Wed 7/2/2025, Normal      !! QUEtiapine (SEROquel) 400 MG tablet Take 1 tablet (400 mg total) by mouth daily at bedtime, Starting Wed 7/2/2025, Normal      senna-docusate sodium (SENOKOT S) 8.6-50 mg per tablet Take 1 tablet by mouth daily at bedtime, Starting Wed 7/2/2025, Normal      sodium phosphate-biphosphate 7-19 g 118 mL enema Insert 1 enema into the rectum daily as needed (constipation), Starting Wed 7/2/2025, Normal      tamsulosin (FLOMAX) 0.4 mg Take 1 capsule (0.4 mg total) by mouth daily at bedtime, Starting Wed 7/2/2025, Normal       !! - Potential duplicate medications found. Please discuss with provider.        No discharge procedures on file.  ED SEPSIS DOCUMENTATION   Time reflects when  diagnosis was documented in both MDM as applicable and the Disposition within this note       Time User Action Codes Description Comment    7/5/2025  7:57 PM Mirela Méndez Add [T83.010A] Suprapubic catheter dysfunction, initial encounter ([facility])     7/5/2025  8:03 PM Mirela Méndez Add [R10.9] Abdominal pain     7/5/2025 11:15 PM Dallas Garcia Add [N39.0] UTI (urinary tract infection)     7/5/2025 11:15 PM Dallas Garcia Add [R93.7] Abnormal CT of thoracic spine                      [1]   Past Medical History:  Diagnosis Date    Abscess     to the spine aug 10 2021    Cancer (HCC)     ovarian   [2]   Past Surgical History:  Procedure Laterality Date    CT CYSTOGRAM  2/20/2025    LUMBAR LAMINECTOMY  08/10/2021    evacuation of spinal epidural abcess    OVARIAN CYST REMOVAL      POSTERIOR FUSION CERVICAL SPINE      secondary to evacuation of spinal abcess s/p IVDU   [3] No family history on file.  [4]   Social History  Tobacco Use    Smoking status: Former     Types: Cigarettes    Smokeless tobacco: Never   Vaping Use    Vaping status: Every Day    Substances: Nicotine, Flavoring   Substance Use Topics    Alcohol use: Yes     Comment: socially    Drug use: No     Comment: 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        HOWARD Katz  07/06/25 6526

## 2025-07-06 VITALS
SYSTOLIC BLOOD PRESSURE: 156 MMHG | DIASTOLIC BLOOD PRESSURE: 91 MMHG | RESPIRATION RATE: 16 BRPM | OXYGEN SATURATION: 98 % | TEMPERATURE: 98.3 F | HEART RATE: 98 BPM

## 2025-07-06 NOTE — INCIDENTAL FINDINGS
The following findings require follow up:  Radiographic finding   Finding: Abnormal sclerosis at at T10 and T11 vertebral bodies. Findings are indeterminate, however may reflect metastatic disease given history of cancer. Recommend bone scan for further evaluation.     Follow up required: yes   Follow up should be done within 2 week(s)    Please notify the following clinician to assist with the follow up:   Dr. Britton    Incidental finding results were discussed with the Patient by Dallas Garcia PA-C on 07/05/25.   They expressed understanding and all questions answered.

## 2025-07-06 NOTE — ED NOTES
Pt sleeping comfortably in the stretcher waiting for the transportation back home     Sarah Merchant RN  07/06/25 0250

## 2025-07-06 NOTE — DISCHARGE INSTRUCTIONS
Today I provided prescription for Bactrim, Zofran.  Bactrim is an antibiotic used to treat suspected urinary tract infection.   Follow-up with your urology office in the next couple days to ensure resolution of symptoms.  Follow-up with your primary care provider to discuss the abnormal CT scan of your thoracic spine.  Return to ED for new or worsening symptoms.

## 2025-07-07 ENCOUNTER — TELEPHONE (OUTPATIENT)
Age: 35
End: 2025-07-07

## 2025-07-07 DIAGNOSIS — M89.9 LESION OF BONE OF THORACIC SPINE: Primary | ICD-10-CM

## 2025-07-07 LAB — BACTERIA UR CULT: NORMAL

## 2025-07-07 NOTE — TELEPHONE ENCOUNTER
Please advise:  1) Follow up with Urology regarding results related to the catheter. I see she was given ABX in the ED the other day. She can continue these to completion.    2) I will send order for bone scan but advise the report does not state infection, though they do recommend the bone scan to rule out metastatic lesions    3) I was ordering the hospital bed the other day but there were some questions on there I need to ask her for the order. I tried calling her Thursday afternoon but was not able to leave a message. I left a message on her Aunt Kiah's phone. I will try calling her again later when I get the chance

## 2025-07-07 NOTE — TELEPHONE ENCOUNTER
Patient is calling with a few concerns:    1) She had her CT scan done and would like the doctor to review as they did find the tip of the catheter in her bladder.  She would like the doctor to review.    2) CT Scan also revealed an infection in her spine and recommended a bone scan.  She would like the doctor to order this so that she can get it done as soon as possible.    3) She would like to know if an order was faxed to [facility] for a hospital bed.    Please contact patient and advise.  Thank you.

## 2025-07-07 NOTE — TELEPHONE ENCOUNTER
Patient called in requesting fax be sent over for catheter supplies to [facility]. Patient provided fax number [phone]

## 2025-07-07 NOTE — TELEPHONE ENCOUNTER
Pt calling for a new catheter bag.  Stated she was in the ER over the weekend  and needs a new bag. Stated the bag she was given has a hole in it

## 2025-07-07 NOTE — TELEPHONE ENCOUNTER
Cath Bag as well as Cath Supply companies provided for the PT and left in the front for the PT to .

## 2025-07-07 NOTE — TELEPHONE ENCOUNTER
Spoke to pt advised her of Dr. Bellamy note, also informed her that Dr. Britton will be calling her again in regards to the bed order.

## 2025-07-07 NOTE — TELEPHONE ENCOUNTER
Patient calling in stating she was scheduled for a procedure to remove part of awan that is stuck in her bladder. The only apt I see on 7/28 is a awan exchange. Please review and call patient back.     Patient would also like CT reviewed that was done on 7/5/25.     CB: [phone]

## 2025-07-08 ENCOUNTER — TELEPHONE (OUTPATIENT)
Age: 35
End: 2025-07-08

## 2025-07-08 NOTE — TELEPHONE ENCOUNTER
This is not seen on most recent CT. I recommend cystoscopy for further evaluation. Please schedule this

## 2025-07-08 NOTE — TELEPHONE ENCOUNTER
Attempted to call patient twice again today to complete documentation for hospital bed order. No answer, left message.

## 2025-07-08 NOTE — TELEPHONE ENCOUNTER
I called patient and left voicemail to give office a call.    When patient calls back, please ask her what kind of catheter supplies are needed. Also inform her of Rosetta's note.   Sarah Schnitzler, PA-C Physician Assistant Signed 8:34 AM         No foreign body seen on recent CT.

## 2025-07-08 NOTE — TELEPHONE ENCOUNTER
"Patient confused why we are saying that there is not foreign body in bladder;     Patient was told by [facility] that there is a foreign body in bladder. She said when they were removing catheter, they told her the \"catheter tip broke off\" .        She wants it removed.  Asking for procedure to be scheduled to remove the broken catheter tip.       Patient has no supplies in home for catheter management.      Unable to come to office to ;  no one can drive to the other offices;  and [place] has no supplies to provide.      She only has leg bag, but states it is fine.    Informed her that appointment on 07/28/25 for catheter exchange    Referral to [facility] was sent by Dr Britton.  She thinks they will be managing her catheters;     She will call them again.      Patient wants phone call from urology about her the \"foreign body\" in her bladder.    # [phone]    "

## 2025-07-09 NOTE — TELEPHONE ENCOUNTER
Patient returned call.    Attempted to warm transfer.  Office would not accept.  Office staff advised PEP to let patient know provider not in the office today, 7/9/25, and a message would be left for him to return the call.    Advised patient.  Patient expressed understanding, but stated the missed call she received was from today, 7/9/25.

## 2025-07-09 NOTE — TELEPHONE ENCOUNTER
Currently we do not have any cysto appts to offer pt- will monitor for any cancellations, or for [facility] schedule to open and call pt with an appt.

## 2025-07-10 DIAGNOSIS — M20.12 HALLUX VALGUS (ACQUIRED), LEFT FOOT: ICD-10-CM

## 2025-07-10 DIAGNOSIS — Z87.81 HISTORY OF FOOT FRACTURE: Primary | ICD-10-CM

## 2025-07-11 LAB
BACTERIA BLD CULT: NORMAL
DME PARACHUTE DELIVERY DATE REQUESTED: NORMAL
DME PARACHUTE ITEM DESCRIPTION: NORMAL
DME PARACHUTE ORDER STATUS: NORMAL
DME PARACHUTE SUPPLIER NAME: NORMAL
DME PARACHUTE SUPPLIER PHONE: NORMAL

## 2025-07-12 LAB — BACTERIA BLD CULT: NORMAL

## 2025-07-15 ENCOUNTER — TELEPHONE (OUTPATIENT)
Age: 35
End: 2025-07-15

## 2025-07-16 DIAGNOSIS — M20.12 HALLUX VALGUS (ACQUIRED), LEFT FOOT: ICD-10-CM

## 2025-07-16 DIAGNOSIS — Z87.81 HISTORY OF FOOT FRACTURE: Primary | ICD-10-CM

## 2025-07-16 DIAGNOSIS — G82.50 QUADRIPLEGIC SPINAL PARALYSIS (HCC): ICD-10-CM

## 2025-07-16 NOTE — TELEPHONE ENCOUNTER
Spoke with patient - informed her to print the form from the [employer] website and complete her portion before bringing it for Dr. Britton's signature.  She agreed.

## 2025-07-17 LAB
DME PARACHUTE DELIVERY DATE ACTUAL: NORMAL
DME PARACHUTE DELIVERY DATE EXPECTED: NORMAL
DME PARACHUTE DELIVERY DATE REQUESTED: NORMAL
DME PARACHUTE ITEM DESCRIPTION: NORMAL
DME PARACHUTE ORDER STATUS: NORMAL
DME PARACHUTE SUPPLIER NAME: NORMAL
DME PARACHUTE SUPPLIER PHONE: NORMAL

## 2025-07-18 NOTE — ED PROVIDER NOTES
Time reflects when diagnosis was documented in both MDM as applicable and the Disposition within this note       Time User Action Codes Description Comment    6/21/2025  7:33 PM Riki Okeefe Add [T83.9XXA] Problem with Gonzalez catheter, initial encounter ([facility])     6/21/2025  7:33 PM Riki Okeefe Remove [T83.9XXA] Problem with Gonzalez catheter, initial encounter ([facility])     6/21/2025  7:33 PM Riki Okeefe Add [T83.010A] Suprapubic catheter dysfunction, initial encounter ([facility])           ED Disposition       ED Disposition   Discharge    Condition   Stable    Date/Time   Sat Jun 21, 2025  7:33 PM    Comment   Juan Solitarion discharge to home/self care.                   Assessment & Plan       Medical Decision Making  34-year-old female with suprapubic Gonzalez catheter dysfunction.  This was able to be flushed and drained by nursing, patient's symptoms have now resolved.  Will discharge with urology follow-up as scheduled             Medications - No data to display    ED Risk Strat Scores                    No data recorded        SBIRT 20yo+      Flowsheet Row Most Recent Value   Initial Alcohol Screen: US AUDIT-C     1. How often do you have a drink containing alcohol? 0 Filed at: 06/21/2025 1838   2. How many drinks containing alcohol do you have on a typical day you are drinking?  0 Filed at: 06/21/2025 1838   3a. Male UNDER 65: How often do you have five or more drinks on one occasion? 0 Filed at: 06/21/2025 1838   3b. FEMALE Any Age, or MALE 65+: How often do you have 4 or more drinks on one occassion? 0 Filed at: 06/21/2025 1838   Audit-C Score 0 Filed at: 06/21/2025 1838   MINA: How many times in the past year have you...    Used an illegal drug or used a prescription medication for non-medical reasons? Never Filed at: 06/21/2025 1838                            History of Present Illness       Chief Complaint   Patient presents with    Urinary Catheter Problem     Pt comes from home c/o clogged catheter since this  morning. Pt also reports pain in the suprapubic catheter area 8/10. Reports they have urology appointment on Monday for exploratory surgery for a piece of the catheter in their bladder.        Past Medical History[1]   Past Surgical History[2]   Family History[3]   Social History[4]   E-Cigarette/Vaping    E-Cigarette Use Current Every Day User       E-Cigarette/Vaping Substances    Nicotine Yes     THC No     CBD No     Flavoring Yes     Other No     Unknown No       I have reviewed and agree with the history as documented.     34-year-old female presenting to the emergency department for evaluation of blocked urinary catheter.  Patient has chronic indwelling suprapubic catheter which is blocked.  Patient has follow-up with urology scheduled.        Review of Systems   Constitutional:  Negative for appetite change, chills, fatigue and fever.   HENT:  Negative for sneezing and sore throat.    Eyes:  Negative for visual disturbance.   Respiratory:  Negative for cough, choking, chest tightness, shortness of breath and wheezing.    Cardiovascular:  Negative for chest pain and palpitations.   Gastrointestinal:  Negative for abdominal pain, constipation, diarrhea, nausea and vomiting.   Genitourinary:  Negative for difficulty urinating and dysuria.   Neurological:  Negative for dizziness, weakness, light-headedness, numbness and headaches.   All other systems reviewed and are negative.          Objective       ED Triage Vitals [06/21/25 1838]   Temperature Pulse Blood Pressure Respirations SpO2 Patient Position - Orthostatic VS   98 °F (36.7 °C) 90 93/51 20 97 % Sitting      Temp Source Heart Rate Source BP Location FiO2 (%) Pain Score    Oral Monitor Right arm -- --      Vitals      Date and Time Temp Pulse SpO2 Resp BP Pain Score FACES Pain Rating User   06/21/25 2000 -- 91 96 % 18 101/59 -- --    06/21/25 1930 -- 82 100 % 18 110/65 -- --    06/21/25 1845 -- 88 97 % 18 93/51 -- --    06/21/25 1838 98 °F (36.7 °C)  90 97 % 20 93/51 -- --             Physical Exam  Vitals and nursing note reviewed.   Constitutional:       General: She is not in acute distress.     Appearance: She is well-developed. She is not diaphoretic.   HENT:      Head: Normocephalic and atraumatic.     Eyes:      Pupils: Pupils are equal, round, and reactive to light.     Neck:      Vascular: No JVD.      Trachea: No tracheal deviation.     Cardiovascular:      Rate and Rhythm: Normal rate and regular rhythm.      Heart sounds: Normal heart sounds. No murmur heard.     No friction rub. No gallop.   Pulmonary:      Effort: Pulmonary effort is normal. No respiratory distress.      Breath sounds: Normal breath sounds. No wheezing or rales.   Abdominal:      General: Bowel sounds are normal. There is no distension.      Palpations: Abdomen is soft.      Tenderness: There is no abdominal tenderness. There is no guarding or rebound.      Comments: Suprapubic catheter noted     Skin:     General: Skin is warm and dry.      Coloration: Skin is not pale.     Neurological:      Mental Status: She is alert and oriented to person, place, and time.      Cranial Nerves: No cranial nerve deficit.      Motor: No abnormal muscle tone.     Psychiatric:         Behavior: Behavior normal.         Results Reviewed       None            No orders to display       Procedures    ED Medication and Procedure Management   None     Discharge Medication List as of 6/21/2025  7:33 PM        CONTINUE these medications which have NOT CHANGED    Details   acetaminophen (TYLENOL) 325 mg tablet Take 3 tablets (975 mg total) by mouth every 6 (six) hours as needed for mild pain, Starting Sat 8/27/2022, No Print      baclofen 10 mg tablet Take 10 mg by mouth 3 (three) times a day, Historical Med      bisacodyl (FLEET) 10 MG/30ML ENEM Insert 10 mg into the rectum daily as needed for constipation As needed for constipation, Historical Med      Diclofenac Sodium (VOLTAREN) 1 % Apply 2 g  topically 4 (four) times a day, Starting Sat 8/27/2022, Normal      diphenhydramine, lidocaine, Al/Mg hydroxide, simethicone (Magic Mouthwash) SUSP Swish and spit 10 mL every 4 (four) hours as needed for mouth pain or discomfort, Starting Thu 6/1/2023, No Print      gabapentin (NEURONTIN) 400 mg capsule Take 1 capsule (400 mg total) by mouth every 8 (eight) hours, Starting Sat 8/27/2022, Until Mon 9/26/2022, Normal      ibuprofen (MOTRIN) 800 mg tablet Take 1 tablet (800 mg total) by mouth every 8 (eight) hours as needed for mild pain, Starting Thu 6/1/2023, No Print      ketoconazole (NIZORAL) 2 % shampoo Apply 1 application topically 2 (two) times a week for 4 doses, Starting Mon 8/29/2022, Until Fri 9/9/2022, Normal      lidocaine (LIDODERM) 5 % Apply 2 patches topically over 12 hours daily as needed (neck/spine pain) Remove & Discard patch within 12 hours or as directed by MD, Starting Thu 6/1/2023, No Print      polyethylene glycol (MIRALAX) 17 g packet Take 17 g by mouth daily, Starting Sun 8/28/2022, No Print      prazosin (MINIPRESS) 1 mg capsule Take 1 capsule (1 mg total) by mouth daily at bedtime, Starting Sat 8/27/2022, Until Wed 5/17/2023, Normal      QUEtiapine (SEROquel) 100 mg tablet Take 500 mg by mouth daily at bedtime Take five tablets by mouth nightly, Historical Med      senna-docusate sodium (SENOKOT S) 8.6-50 mg per tablet Take 1 tablet by mouth daily at bedtime, Starting Sat 8/27/2022, No Print      sodium phosphate-biphosphate (FLEET) 7-19 g 118 mL enema Insert 1 enema into the rectum daily as needed (constipation), Starting Thu 6/1/2023, No Print      tamsulosin (FLOMAX) 0.4 mg Take 0.4 mg by mouth daily at bedtime, Historical Med           No discharge procedures on file.  ED SEPSIS DOCUMENTATION   Time reflects when diagnosis was documented in both MDM as applicable and the Disposition within this note       Time User Action Codes Description Comment    6/21/2025  7:33 PM Riki Okeefe Add  [T83.9XXA] Problem with Gonzalez catheter, initial encounter ([facility])     6/21/2025  7:33 PM Riki Okeefe Remove [T83.9XXA] Problem with Gonzalez catheter, initial encounter ([facility])     6/21/2025  7:33 PM Riki Okeefe Add [T83.010A] Suprapubic catheter dysfunction, initial encounter ([facility])                    [1]   Past Medical History:  Diagnosis Date    Abscess     to the spine aug 10 2021    Cancer (HCC)     ovarian   [2]   Past Surgical History:  Procedure Laterality Date    CT CYSTOGRAM  2/20/2025    LUMBAR LAMINECTOMY  08/10/2021    evacuation of spinal epidural abcess    OVARIAN CYST REMOVAL      POSTERIOR FUSION CERVICAL SPINE      secondary to evacuation of spinal abcess s/p IVDU   [3] No family history on file.  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

## 2025-07-24 ENCOUNTER — PREP FOR PROCEDURE (OUTPATIENT)
Dept: UROLOGY | Facility: CLINIC | Age: 35
End: 2025-07-24

## 2025-07-24 DIAGNOSIS — R33.9 URINARY RETENTION: Primary | ICD-10-CM

## 2025-07-29 ENCOUNTER — PREP FOR PROCEDURE (OUTPATIENT)
Dept: UROLOGY | Facility: CLINIC | Age: 35
End: 2025-07-29

## 2025-07-29 ENCOUNTER — HOSPITAL ENCOUNTER (EMERGENCY)
Facility: HOSPITAL | Age: 35
Discharge: HOME | End: 2025-07-30
Attending: EMERGENCY MEDICINE | Admitting: EMERGENCY MEDICINE
Payer: COMMERCIAL

## 2025-07-29 DIAGNOSIS — K59.00 CONSTIPATION: ICD-10-CM

## 2025-07-29 DIAGNOSIS — N39.0 UTI (URINARY TRACT INFECTION): ICD-10-CM

## 2025-07-29 DIAGNOSIS — G89.29 CHRONIC BACK PAIN, UNSPECIFIED BACK LOCATION, UNSPECIFIED BACK PAIN LATERALITY: ICD-10-CM

## 2025-07-29 DIAGNOSIS — R33.9 URINARY RETENTION: ICD-10-CM

## 2025-07-29 DIAGNOSIS — N39.0 UTI (URINARY TRACT INFECTION): Primary | ICD-10-CM

## 2025-07-29 DIAGNOSIS — R39.89 SUSPECTED UTI: Primary | ICD-10-CM

## 2025-07-29 DIAGNOSIS — F51.01 PRIMARY INSOMNIA: ICD-10-CM

## 2025-07-29 DIAGNOSIS — M54.9 CHRONIC BACK PAIN, UNSPECIFIED BACK LOCATION, UNSPECIFIED BACK PAIN LATERALITY: ICD-10-CM

## 2025-07-29 LAB
ANION GAP SERPL CALCULATED.3IONS-SCNC: 10 MMOL/L (ref 4–13)
BACTERIA UR QL AUTO: ABNORMAL /HPF
BASOPHILS # BLD AUTO: 0.04 THOUSANDS/ÂΜL (ref 0–0.1)
BASOPHILS NFR BLD AUTO: 1 % (ref 0–1)
BILIRUB UR QL STRIP: NEGATIVE
BUN SERPL-MCNC: 7 MG/DL (ref 5–25)
CALCIUM SERPL-MCNC: 9 MG/DL (ref 8.4–10.2)
CHLORIDE SERPL-SCNC: 106 MMOL/L (ref 96–108)
CLARITY UR: ABNORMAL
CO2 SERPL-SCNC: 21 MMOL/L (ref 21–32)
COLOR UR: ABNORMAL
CREAT SERPL-MCNC: 0.4 MG/DL (ref 0.6–1.3)
EOSINOPHIL # BLD AUTO: 0.11 THOUSAND/ÂΜL (ref 0–0.61)
EOSINOPHIL NFR BLD AUTO: 2 % (ref 0–6)
ERYTHROCYTE [DISTWIDTH] IN BLOOD BY AUTOMATED COUNT: 16.2 % (ref 11.6–15.1)
GFR SERPL CREATININE-BSD FRML MDRD: 136 ML/MIN/1.73SQ M
GLUCOSE SERPL-MCNC: 105 MG/DL (ref 65–140)
GLUCOSE UR STRIP-MCNC: NEGATIVE MG/DL
HCT VFR BLD AUTO: 34.8 % (ref 34.8–46.1)
HGB BLD-MCNC: 11.1 G/DL (ref 11.5–15.4)
HGB UR QL STRIP.AUTO: ABNORMAL
IMM GRANULOCYTES # BLD AUTO: 0.01 THOUSAND/UL (ref 0–0.2)
IMM GRANULOCYTES NFR BLD AUTO: 0 % (ref 0–2)
KETONES UR STRIP-MCNC: NEGATIVE MG/DL
LACTATE SERPL-SCNC: 2.4 MMOL/L (ref 0.5–2)
LEUKOCYTE ESTERASE UR QL STRIP: ABNORMAL
LYMPHOCYTES # BLD AUTO: 1.74 THOUSANDS/ÂΜL (ref 0.6–4.47)
LYMPHOCYTES NFR BLD AUTO: 33 % (ref 14–44)
MCH RBC QN AUTO: 27.8 PG (ref 26.8–34.3)
MCHC RBC AUTO-ENTMCNC: 31.9 G/DL (ref 31.4–37.4)
MCV RBC AUTO: 87 FL (ref 82–98)
MONOCYTES # BLD AUTO: 0.28 THOUSAND/ÂΜL (ref 0.17–1.22)
MONOCYTES NFR BLD AUTO: 5 % (ref 4–12)
NEUTROPHILS # BLD AUTO: 3.18 THOUSANDS/ÂΜL (ref 1.85–7.62)
NEUTS SEG NFR BLD AUTO: 59 % (ref 43–75)
NITRITE UR QL STRIP: POSITIVE
NON-SQ EPI CELLS URNS QL MICRO: ABNORMAL /HPF
NRBC BLD AUTO-RTO: 0 /100 WBCS
PH UR STRIP.AUTO: 7.5 [PH]
PLATELET # BLD AUTO: 171 THOUSANDS/UL (ref 149–390)
PMV BLD AUTO: 9.4 FL (ref 8.9–12.7)
POTASSIUM SERPL-SCNC: 4 MMOL/L (ref 3.5–5.3)
PROCALCITONIN SERPL-MCNC: <0.05 NG/ML
PROT UR STRIP-MCNC: ABNORMAL MG/DL
RBC # BLD AUTO: 4 MILLION/UL (ref 3.81–5.12)
RBC #/AREA URNS AUTO: ABNORMAL /HPF
SODIUM SERPL-SCNC: 137 MMOL/L (ref 135–147)
SP GR UR STRIP.AUTO: 1.01 (ref 1–1.03)
UROBILINOGEN UR STRIP-ACNC: <2 MG/DL
WBC # BLD AUTO: 5.36 THOUSAND/UL (ref 4.31–10.16)
WBC #/AREA URNS AUTO: ABNORMAL /HPF
WBC CLUMPS # UR AUTO: PRESENT /UL

## 2025-07-29 PROCEDURE — 80048 BASIC METABOLIC PNL TOTAL CA: CPT | Performed by: EMERGENCY MEDICINE

## 2025-07-29 PROCEDURE — 81001 URINALYSIS AUTO W/SCOPE: CPT | Performed by: EMERGENCY MEDICINE

## 2025-07-29 PROCEDURE — 87040 BLOOD CULTURE FOR BACTERIA: CPT | Performed by: EMERGENCY MEDICINE

## 2025-07-29 PROCEDURE — 83605 ASSAY OF LACTIC ACID: CPT | Performed by: EMERGENCY MEDICINE

## 2025-07-29 PROCEDURE — 96376 TX/PRO/DX INJ SAME DRUG ADON: CPT

## 2025-07-29 PROCEDURE — 99283 EMERGENCY DEPT VISIT LOW MDM: CPT

## 2025-07-29 PROCEDURE — 96365 THER/PROPH/DIAG IV INF INIT: CPT

## 2025-07-29 PROCEDURE — 96361 HYDRATE IV INFUSION ADD-ON: CPT

## 2025-07-29 PROCEDURE — 87086 URINE CULTURE/COLONY COUNT: CPT | Performed by: EMERGENCY MEDICINE

## 2025-07-29 PROCEDURE — 36415 COLL VENOUS BLD VENIPUNCTURE: CPT | Performed by: EMERGENCY MEDICINE

## 2025-07-29 PROCEDURE — 85025 COMPLETE CBC W/AUTO DIFF WBC: CPT | Performed by: EMERGENCY MEDICINE

## 2025-07-29 PROCEDURE — 96375 TX/PRO/DX INJ NEW DRUG ADDON: CPT

## 2025-07-29 PROCEDURE — 84145 PROCALCITONIN (PCT): CPT | Performed by: EMERGENCY MEDICINE

## 2025-07-29 RX ORDER — OXYCODONE HYDROCHLORIDE 10 MG/1
10 TABLET ORAL EVERY 12 HOURS PRN
Qty: 60 TABLET | Refills: 0 | Status: SHIPPED | OUTPATIENT
Start: 2025-07-29

## 2025-07-29 RX ORDER — LORAZEPAM 2 MG/ML
0.5 INJECTION INTRAMUSCULAR ONCE
Status: COMPLETED | OUTPATIENT
Start: 2025-07-29 | End: 2025-07-29

## 2025-07-29 RX ORDER — HYDROMORPHONE HCL/PF 1 MG/ML
1 SYRINGE (ML) INJECTION ONCE
Status: COMPLETED | OUTPATIENT
Start: 2025-07-29 | End: 2025-07-29

## 2025-07-29 RX ORDER — ACETAMINOPHEN 325 MG/1
975 TABLET ORAL ONCE
Status: COMPLETED | OUTPATIENT
Start: 2025-07-29 | End: 2025-07-29

## 2025-07-29 RX ORDER — LEVOFLOXACIN 500 MG/1
500 TABLET, FILM COATED ORAL EVERY 24 HOURS
Qty: 7 TABLET | Refills: 0 | Status: SHIPPED | OUTPATIENT
Start: 2025-07-29 | End: 2025-08-05

## 2025-07-29 RX ORDER — ONDANSETRON 4 MG/1
4 TABLET, FILM COATED ORAL EVERY 6 HOURS
Qty: 12 TABLET | Refills: 0 | Status: SHIPPED | OUTPATIENT
Start: 2025-07-29

## 2025-07-29 RX ORDER — PRAZOSIN HYDROCHLORIDE 1 MG/1
1 CAPSULE ORAL
Qty: 30 CAPSULE | Refills: 0 | Status: SHIPPED | OUTPATIENT
Start: 2025-07-29 | End: 2025-08-28

## 2025-07-29 RX ORDER — LIDOCAINE 40 MG/G
CREAM TOPICAL ONCE
Status: COMPLETED | OUTPATIENT
Start: 2025-07-29 | End: 2025-07-29

## 2025-07-29 RX ORDER — CLONAZEPAM 0.5 MG/1
0.5 TABLET ORAL
Qty: 30 TABLET | Refills: 0 | Status: SHIPPED | OUTPATIENT
Start: 2025-07-29

## 2025-07-29 RX ORDER — SODIUM PHOSPHATE,MONO-DIBASIC 19G-7G/118
1 ENEMA (ML) RECTAL DAILY PRN
Qty: 133 ML | Refills: 0 | Status: SHIPPED | OUTPATIENT
Start: 2025-07-29

## 2025-07-29 RX ORDER — HYDROMORPHONE HCL/PF 1 MG/ML
0.5 SYRINGE (ML) INJECTION ONCE
Refills: 0 | Status: COMPLETED | OUTPATIENT
Start: 2025-07-29 | End: 2025-07-29

## 2025-07-29 RX ADMIN — ACETAMINOPHEN 975 MG: 325 TABLET ORAL at 22:31

## 2025-07-29 RX ADMIN — HYDROMORPHONE HYDROCHLORIDE 1 MG: 1 INJECTION, SOLUTION INTRAMUSCULAR; INTRAVENOUS; SUBCUTANEOUS at 21:34

## 2025-07-29 RX ADMIN — LORAZEPAM 0.5 MG: 2 INJECTION INTRAMUSCULAR; INTRAVENOUS at 21:34

## 2025-07-29 RX ADMIN — HYDROMORPHONE HYDROCHLORIDE 0.5 MG: 1 INJECTION, SOLUTION INTRAMUSCULAR; INTRAVENOUS; SUBCUTANEOUS at 22:27

## 2025-07-29 RX ADMIN — LIDOCAINE: 40 CREAM TOPICAL at 21:35

## 2025-07-29 RX ADMIN — CEFTRIAXONE SODIUM 1000 MG: 10 INJECTION, POWDER, FOR SOLUTION INTRAVENOUS at 21:33

## 2025-07-29 RX ADMIN — SODIUM CHLORIDE 1000 ML: 0.9 INJECTION, SOLUTION INTRAVENOUS at 21:34

## 2025-07-30 ENCOUNTER — TELEPHONE (OUTPATIENT)
Age: 35
End: 2025-07-30

## 2025-07-30 VITALS
HEART RATE: 103 BPM | SYSTOLIC BLOOD PRESSURE: 106 MMHG | TEMPERATURE: 99 F | OXYGEN SATURATION: 98 % | DIASTOLIC BLOOD PRESSURE: 56 MMHG | RESPIRATION RATE: 18 BRPM

## 2025-07-30 PROCEDURE — 51705 CHANGE OF BLADDER TUBE: CPT | Performed by: EMERGENCY MEDICINE

## 2025-07-30 PROCEDURE — 99284 EMERGENCY DEPT VISIT MOD MDM: CPT | Performed by: EMERGENCY MEDICINE

## 2025-07-31 LAB — BACTERIA UR CULT: NORMAL

## 2025-08-01 ENCOUNTER — HOSPITAL ENCOUNTER (OUTPATIENT)
Dept: NUCLEAR MEDICINE | Facility: HOSPITAL | Age: 35
Discharge: HOME | End: 2025-08-01
Attending: STUDENT IN AN ORGANIZED HEALTH CARE EDUCATION/TRAINING PROGRAM
Payer: COMMERCIAL

## 2025-08-01 ENCOUNTER — HOSPITAL ENCOUNTER (OUTPATIENT)
Dept: NUCLEAR MEDICINE | Facility: HOSPITAL | Age: 35
Discharge: HOME | End: 2025-08-01
Attending: STUDENT IN AN ORGANIZED HEALTH CARE EDUCATION/TRAINING PROGRAM

## 2025-08-01 DIAGNOSIS — M89.9 LESION OF BONE OF THORACIC SPINE: ICD-10-CM

## 2025-08-01 PROCEDURE — 78306 BONE IMAGING WHOLE BODY: CPT

## 2025-08-01 PROCEDURE — A9503 TC99M MEDRONATE: HCPCS

## 2025-08-04 ENCOUNTER — TELEPHONE (OUTPATIENT)
Age: 35
End: 2025-08-04

## 2025-08-04 LAB
BACTERIA BLD CULT: NORMAL
BACTERIA BLD CULT: NORMAL

## 2025-08-06 ENCOUNTER — TELEPHONE (OUTPATIENT)
Age: 35
End: 2025-08-06

## 2025-08-06 ENCOUNTER — TELEPHONE (OUTPATIENT)
Dept: LAB | Facility: HOSPITAL | Age: 35
End: 2025-08-06

## 2025-08-11 ENCOUNTER — TELEPHONE (OUTPATIENT)
Age: 35
End: 2025-08-11

## 2025-08-12 ENCOUNTER — TELEPHONE (OUTPATIENT)
Age: 35
End: 2025-08-12

## 2025-08-18 DIAGNOSIS — F51.01 PRIMARY INSOMNIA: ICD-10-CM

## 2025-08-18 DIAGNOSIS — G89.29 CHRONIC BACK PAIN, UNSPECIFIED BACK LOCATION, UNSPECIFIED BACK PAIN LATERALITY: ICD-10-CM

## 2025-08-18 DIAGNOSIS — M54.9 CHRONIC BACK PAIN, UNSPECIFIED BACK LOCATION, UNSPECIFIED BACK PAIN LATERALITY: ICD-10-CM

## 2025-08-19 RX ORDER — CLONAZEPAM 0.5 MG/1
0.5 TABLET ORAL
Qty: 30 TABLET | Refills: 0 | OUTPATIENT
Start: 2025-08-19

## 2025-08-19 RX ORDER — OXYCODONE HYDROCHLORIDE 10 MG/1
10 TABLET ORAL EVERY 12 HOURS PRN
Qty: 60 TABLET | Refills: 0 | OUTPATIENT
Start: 2025-08-19

## 2025-08-22 ENCOUNTER — TELEPHONE (OUTPATIENT)
Age: 35
End: 2025-08-22

## 2025-08-22 DIAGNOSIS — F51.01 PRIMARY INSOMNIA: ICD-10-CM

## 2025-08-22 DIAGNOSIS — G89.29 CHRONIC BACK PAIN, UNSPECIFIED BACK LOCATION, UNSPECIFIED BACK PAIN LATERALITY: ICD-10-CM

## 2025-08-22 DIAGNOSIS — M54.9 CHRONIC BACK PAIN, UNSPECIFIED BACK LOCATION, UNSPECIFIED BACK PAIN LATERALITY: ICD-10-CM

## 2025-08-22 DIAGNOSIS — B18.2 CHRONIC HEPATITIS C WITHOUT HEPATIC COMA (HCC): Primary | ICD-10-CM

## 2025-08-22 RX ORDER — CLONAZEPAM 0.5 MG/1
0.5 TABLET ORAL
Qty: 5 TABLET | Refills: 0 | Status: SHIPPED | OUTPATIENT
Start: 2025-08-22 | End: 2025-08-27 | Stop reason: SDUPTHER

## 2025-08-22 RX ORDER — OXYCODONE HYDROCHLORIDE 10 MG/1
10 TABLET ORAL EVERY 12 HOURS PRN
Qty: 10 TABLET | Refills: 0 | Status: SHIPPED | OUTPATIENT
Start: 2025-08-22 | End: 2025-08-27